# Patient Record
Sex: FEMALE | Race: WHITE | NOT HISPANIC OR LATINO | Employment: FULL TIME | URBAN - METROPOLITAN AREA
[De-identification: names, ages, dates, MRNs, and addresses within clinical notes are randomized per-mention and may not be internally consistent; named-entity substitution may affect disease eponyms.]

---

## 2017-02-10 ENCOUNTER — OFFICE VISIT - HEALTHEAST (OUTPATIENT)
Dept: INTERNAL MEDICINE | Facility: CLINIC | Age: 47
End: 2017-02-10

## 2017-02-10 DIAGNOSIS — Z12.31 SCREENING MAMMOGRAM, ENCOUNTER FOR: ICD-10-CM

## 2017-02-10 DIAGNOSIS — Z13.220 SCREENING, LIPID: ICD-10-CM

## 2017-02-10 DIAGNOSIS — F41.8 MIXED ANXIETY DEPRESSIVE DISORDER: ICD-10-CM

## 2017-02-10 DIAGNOSIS — K21.9 GASTROESOPHAGEAL REFLUX DISEASE WITHOUT ESOPHAGITIS: ICD-10-CM

## 2017-02-10 DIAGNOSIS — Z12.11 SCREENING FOR COLON CANCER: ICD-10-CM

## 2017-02-10 DIAGNOSIS — Z86.0100 HISTORY OF COLONIC POLYPS: ICD-10-CM

## 2017-02-10 DIAGNOSIS — G47.33 OBSTRUCTIVE SLEEP APNEA SYNDROME: ICD-10-CM

## 2017-02-10 DIAGNOSIS — K58.9 IRRITABLE BOWEL SYNDROME: ICD-10-CM

## 2017-02-10 DIAGNOSIS — G43.909 MIGRAINE WITHOUT STATUS MIGRAINOSUS, NOT INTRACTABLE: ICD-10-CM

## 2017-02-10 DIAGNOSIS — Z00.00 ROUTINE GENERAL MEDICAL EXAMINATION AT A HEALTH CARE FACILITY: ICD-10-CM

## 2017-02-10 LAB
CHOLEST SERPL-MCNC: 218 MG/DL
FASTING STATUS PATIENT QL REPORTED: YES
HDLC SERPL-MCNC: 82 MG/DL
LDLC SERPL CALC-MCNC: 111 MG/DL
TRIGL SERPL-MCNC: 127 MG/DL

## 2017-02-10 ASSESSMENT — MIFFLIN-ST. JEOR: SCORE: 1425.64

## 2017-02-14 ENCOUNTER — COMMUNICATION - HEALTHEAST (OUTPATIENT)
Dept: INTERNAL MEDICINE | Facility: CLINIC | Age: 47
End: 2017-02-14

## 2017-03-03 ENCOUNTER — RECORDS - HEALTHEAST (OUTPATIENT)
Dept: ADMINISTRATIVE | Facility: OTHER | Age: 47
End: 2017-03-03

## 2017-06-02 ENCOUNTER — HOSPITAL ENCOUNTER (OUTPATIENT)
Dept: MAMMOGRAPHY | Facility: CLINIC | Age: 47
Discharge: HOME | End: 2017-06-02
Attending: INTERNAL MEDICINE

## 2017-06-02 ENCOUNTER — COMMUNICATION - HEALTHEAST (OUTPATIENT)
Dept: TELEHEALTH | Facility: CLINIC | Age: 47
End: 2017-06-02

## 2017-06-02 DIAGNOSIS — Z12.31 SCREENING MAMMOGRAM, ENCOUNTER FOR: ICD-10-CM

## 2017-06-15 ENCOUNTER — OFFICE VISIT - HEALTHEAST (OUTPATIENT)
Dept: INTERNAL MEDICINE | Facility: CLINIC | Age: 47
End: 2017-06-15

## 2017-06-15 DIAGNOSIS — H10.33 ACUTE CONJUNCTIVITIS OF BOTH EYES: ICD-10-CM

## 2017-07-11 ENCOUNTER — OFFICE VISIT - HEALTHEAST (OUTPATIENT)
Dept: INTERNAL MEDICINE | Facility: CLINIC | Age: 47
End: 2017-07-11

## 2017-07-11 DIAGNOSIS — R68.89 LIGHT SENSITIVITY: ICD-10-CM

## 2017-07-11 DIAGNOSIS — R51.9 HEADACHE: ICD-10-CM

## 2017-07-11 DIAGNOSIS — Z12.4 SCREENING FOR CERVICAL CANCER: ICD-10-CM

## 2017-07-11 DIAGNOSIS — R42 VERTIGO: ICD-10-CM

## 2017-07-11 DIAGNOSIS — L98.9 SKIN LESION: ICD-10-CM

## 2017-07-11 DIAGNOSIS — R11.0 NAUSEA: ICD-10-CM

## 2017-07-11 ASSESSMENT — MIFFLIN-ST. JEOR: SCORE: 1434.72

## 2017-07-21 ENCOUNTER — RECORDS - HEALTHEAST (OUTPATIENT)
Dept: ADMINISTRATIVE | Facility: OTHER | Age: 47
End: 2017-07-21

## 2017-08-08 ENCOUNTER — COMMUNICATION - HEALTHEAST (OUTPATIENT)
Dept: INTERNAL MEDICINE | Facility: CLINIC | Age: 47
End: 2017-08-08

## 2017-08-10 ENCOUNTER — RECORDS - HEALTHEAST (OUTPATIENT)
Dept: ADMINISTRATIVE | Facility: OTHER | Age: 47
End: 2017-08-10

## 2017-08-29 ENCOUNTER — COMMUNICATION - HEALTHEAST (OUTPATIENT)
Dept: FAMILY MEDICINE | Facility: CLINIC | Age: 47
End: 2017-08-29

## 2017-08-29 ENCOUNTER — COMMUNICATION - HEALTHEAST (OUTPATIENT)
Dept: INTERNAL MEDICINE | Facility: CLINIC | Age: 47
End: 2017-08-29

## 2017-08-29 ENCOUNTER — OFFICE VISIT - HEALTHEAST (OUTPATIENT)
Dept: FAMILY MEDICINE | Facility: CLINIC | Age: 47
End: 2017-08-29

## 2017-08-29 DIAGNOSIS — J06.9 URI (UPPER RESPIRATORY INFECTION): ICD-10-CM

## 2017-08-29 DIAGNOSIS — H10.30 ACUTE CONJUNCTIVITIS: ICD-10-CM

## 2017-08-29 DIAGNOSIS — J34.89 SINUS PRESSURE: ICD-10-CM

## 2017-09-01 ENCOUNTER — RECORDS - HEALTHEAST (OUTPATIENT)
Dept: ADMINISTRATIVE | Facility: OTHER | Age: 47
End: 2017-09-01

## 2017-10-08 ENCOUNTER — COMMUNICATION - HEALTHEAST (OUTPATIENT)
Dept: FAMILY MEDICINE | Facility: CLINIC | Age: 47
End: 2017-10-08

## 2017-10-08 DIAGNOSIS — T78.40XA ALLERGY: ICD-10-CM

## 2017-10-16 ENCOUNTER — RECORDS - HEALTHEAST (OUTPATIENT)
Dept: ADMINISTRATIVE | Facility: OTHER | Age: 47
End: 2017-10-16

## 2018-01-24 ENCOUNTER — OFFICE VISIT - HEALTHEAST (OUTPATIENT)
Dept: FAMILY MEDICINE | Facility: CLINIC | Age: 48
End: 2018-01-24

## 2018-01-24 DIAGNOSIS — M54.50 PAIN IN LOWER BACK: ICD-10-CM

## 2018-01-24 DIAGNOSIS — R25.2 LEG CRAMPS: ICD-10-CM

## 2018-01-24 LAB
ANION GAP SERPL CALCULATED.3IONS-SCNC: 7 MMOL/L (ref 5–18)
BUN SERPL-MCNC: 15 MG/DL (ref 8–22)
CALCIUM SERPL-MCNC: 9 MG/DL (ref 8.5–10.5)
CHLORIDE BLD-SCNC: 105 MMOL/L (ref 98–107)
CO2 SERPL-SCNC: 27 MMOL/L (ref 22–31)
CREAT SERPL-MCNC: 0.75 MG/DL (ref 0.6–1.1)
GFR SERPL CREATININE-BSD FRML MDRD: >60 ML/MIN/1.73M2
GLUCOSE BLD-MCNC: 122 MG/DL (ref 70–125)
MAGNESIUM SERPL-MCNC: 2.1 MG/DL (ref 1.8–2.6)
POTASSIUM BLD-SCNC: 4.1 MMOL/L (ref 3.5–5)
SODIUM SERPL-SCNC: 139 MMOL/L (ref 136–145)

## 2018-01-26 ENCOUNTER — COMMUNICATION - HEALTHEAST (OUTPATIENT)
Dept: INTERNAL MEDICINE | Facility: CLINIC | Age: 48
End: 2018-01-26

## 2018-01-26 ENCOUNTER — HOSPITAL ENCOUNTER (OUTPATIENT)
Dept: PHYSICAL MEDICINE AND REHAB | Facility: CLINIC | Age: 48
Discharge: HOME | End: 2018-01-26
Attending: NURSE PRACTITIONER

## 2018-01-26 DIAGNOSIS — M54.9 BACK PAIN: ICD-10-CM

## 2018-01-26 DIAGNOSIS — M42.00 SCHEUERMANN DISEASE: ICD-10-CM

## 2018-01-26 DIAGNOSIS — R20.2 NUMBNESS AND TINGLING OF LEFT LEG: ICD-10-CM

## 2018-01-26 DIAGNOSIS — R20.0 NUMBNESS AND TINGLING OF LEFT LEG: ICD-10-CM

## 2018-01-26 DIAGNOSIS — M54.16 LEFT LUMBAR RADICULITIS: ICD-10-CM

## 2018-01-26 DIAGNOSIS — M54.16 LEFT LUMBAR RADICULOPATHY: ICD-10-CM

## 2018-01-31 ENCOUNTER — OFFICE VISIT - HEALTHEAST (OUTPATIENT)
Dept: PHYSICAL THERAPY | Facility: REHABILITATION | Age: 48
End: 2018-01-31

## 2018-01-31 ENCOUNTER — HOSPITAL ENCOUNTER (OUTPATIENT)
Dept: MRI IMAGING | Facility: CLINIC | Age: 48
Discharge: HOME | End: 2018-01-31

## 2018-01-31 DIAGNOSIS — M54.16 LEFT LUMBAR RADICULITIS: ICD-10-CM

## 2018-01-31 DIAGNOSIS — M53.86 DECREASED ROM OF LUMBAR SPINE: ICD-10-CM

## 2018-01-31 DIAGNOSIS — M62.81 GENERALIZED MUSCLE WEAKNESS: ICD-10-CM

## 2018-01-31 DIAGNOSIS — R20.0 NUMBNESS AND TINGLING OF LEFT LEG: ICD-10-CM

## 2018-01-31 DIAGNOSIS — M54.16 LEFT LUMBAR RADICULOPATHY: ICD-10-CM

## 2018-01-31 DIAGNOSIS — R20.2 NUMBNESS AND TINGLING OF LEFT LEG: ICD-10-CM

## 2018-01-31 DIAGNOSIS — M54.42 ACUTE LEFT-SIDED LOW BACK PAIN WITH LEFT-SIDED SCIATICA: ICD-10-CM

## 2018-02-02 ENCOUNTER — HOSPITAL ENCOUNTER (OUTPATIENT)
Dept: PHYSICAL MEDICINE AND REHAB | Facility: CLINIC | Age: 48
Discharge: HOME | End: 2018-02-02
Attending: NURSE PRACTITIONER

## 2018-02-02 DIAGNOSIS — M54.16 LEFT LUMBAR RADICULOPATHY: ICD-10-CM

## 2018-02-02 DIAGNOSIS — R20.2 NUMBNESS AND TINGLING OF LEFT LEG: ICD-10-CM

## 2018-02-02 DIAGNOSIS — R20.0 NUMBNESS AND TINGLING OF LEFT LEG: ICD-10-CM

## 2018-02-02 DIAGNOSIS — M42.00 SCHEUERMANN DISEASE: ICD-10-CM

## 2018-02-02 DIAGNOSIS — M51.26 LUMBAR DISC HERNIATION: ICD-10-CM

## 2018-02-02 DIAGNOSIS — M54.16 LEFT LUMBAR RADICULITIS: ICD-10-CM

## 2018-02-07 ENCOUNTER — COMMUNICATION - HEALTHEAST (OUTPATIENT)
Dept: PHYSICAL MEDICINE AND REHAB | Facility: CLINIC | Age: 48
End: 2018-02-07

## 2018-02-07 ENCOUNTER — OFFICE VISIT - HEALTHEAST (OUTPATIENT)
Dept: PHYSICAL THERAPY | Facility: REHABILITATION | Age: 48
End: 2018-02-07

## 2018-02-07 DIAGNOSIS — M53.86 DECREASED ROM OF LUMBAR SPINE: ICD-10-CM

## 2018-02-07 DIAGNOSIS — M54.42 ACUTE LEFT-SIDED LOW BACK PAIN WITH LEFT-SIDED SCIATICA: ICD-10-CM

## 2018-02-07 DIAGNOSIS — M62.81 GENERALIZED MUSCLE WEAKNESS: ICD-10-CM

## 2018-02-11 ENCOUNTER — RECORDS - HEALTHEAST (OUTPATIENT)
Dept: ADMINISTRATIVE | Facility: OTHER | Age: 48
End: 2018-02-11

## 2018-02-14 ENCOUNTER — OFFICE VISIT - HEALTHEAST (OUTPATIENT)
Dept: PHYSICAL THERAPY | Facility: REHABILITATION | Age: 48
End: 2018-02-14

## 2018-02-14 DIAGNOSIS — M54.42 ACUTE LEFT-SIDED LOW BACK PAIN WITH LEFT-SIDED SCIATICA: ICD-10-CM

## 2018-02-14 DIAGNOSIS — M53.86 DECREASED ROM OF LUMBAR SPINE: ICD-10-CM

## 2018-02-14 DIAGNOSIS — M62.81 GENERALIZED MUSCLE WEAKNESS: ICD-10-CM

## 2018-02-16 ENCOUNTER — OFFICE VISIT - HEALTHEAST (OUTPATIENT)
Dept: PHYSICAL THERAPY | Facility: REHABILITATION | Age: 48
End: 2018-02-16

## 2018-02-16 DIAGNOSIS — M53.86 DECREASED ROM OF LUMBAR SPINE: ICD-10-CM

## 2018-02-16 DIAGNOSIS — M62.81 GENERALIZED MUSCLE WEAKNESS: ICD-10-CM

## 2018-02-16 DIAGNOSIS — M54.42 ACUTE LEFT-SIDED LOW BACK PAIN WITH LEFT-SIDED SCIATICA: ICD-10-CM

## 2018-02-20 ENCOUNTER — COMMUNICATION - HEALTHEAST (OUTPATIENT)
Dept: INTERNAL MEDICINE | Facility: CLINIC | Age: 48
End: 2018-02-20

## 2018-02-21 ENCOUNTER — OFFICE VISIT - HEALTHEAST (OUTPATIENT)
Dept: PHYSICAL THERAPY | Facility: REHABILITATION | Age: 48
End: 2018-02-21

## 2018-02-21 ENCOUNTER — COMMUNICATION - HEALTHEAST (OUTPATIENT)
Dept: INTERNAL MEDICINE | Facility: CLINIC | Age: 48
End: 2018-02-21

## 2018-02-21 DIAGNOSIS — M62.81 GENERALIZED MUSCLE WEAKNESS: ICD-10-CM

## 2018-02-21 DIAGNOSIS — M54.42 ACUTE LEFT-SIDED LOW BACK PAIN WITH LEFT-SIDED SCIATICA: ICD-10-CM

## 2018-02-21 DIAGNOSIS — M53.86 DECREASED ROM OF LUMBAR SPINE: ICD-10-CM

## 2018-02-23 ENCOUNTER — OFFICE VISIT - HEALTHEAST (OUTPATIENT)
Dept: PHYSICAL THERAPY | Facility: REHABILITATION | Age: 48
End: 2018-02-23

## 2018-02-23 ENCOUNTER — OFFICE VISIT - HEALTHEAST (OUTPATIENT)
Dept: INTERNAL MEDICINE | Facility: CLINIC | Age: 48
End: 2018-02-23

## 2018-02-23 DIAGNOSIS — R41.0 CONFUSION: ICD-10-CM

## 2018-02-23 DIAGNOSIS — F41.8 MIXED ANXIETY DEPRESSIVE DISORDER: ICD-10-CM

## 2018-02-23 DIAGNOSIS — M62.81 GENERALIZED MUSCLE WEAKNESS: ICD-10-CM

## 2018-02-23 DIAGNOSIS — M54.10 RADICULAR LOW BACK PAIN: ICD-10-CM

## 2018-02-23 DIAGNOSIS — M53.86 DECREASED ROM OF LUMBAR SPINE: ICD-10-CM

## 2018-02-23 DIAGNOSIS — M54.42 ACUTE LEFT-SIDED LOW BACK PAIN WITH LEFT-SIDED SCIATICA: ICD-10-CM

## 2018-02-23 ASSESSMENT — MIFFLIN-ST. JEOR: SCORE: 1471

## 2018-03-02 ENCOUNTER — OFFICE VISIT - HEALTHEAST (OUTPATIENT)
Dept: PHYSICAL THERAPY | Facility: REHABILITATION | Age: 48
End: 2018-03-02

## 2018-03-02 DIAGNOSIS — M54.42 ACUTE LEFT-SIDED LOW BACK PAIN WITH LEFT-SIDED SCIATICA: ICD-10-CM

## 2018-03-02 DIAGNOSIS — M62.81 GENERALIZED MUSCLE WEAKNESS: ICD-10-CM

## 2018-03-02 DIAGNOSIS — M53.86 DECREASED ROM OF LUMBAR SPINE: ICD-10-CM

## 2018-03-16 ENCOUNTER — OFFICE VISIT - HEALTHEAST (OUTPATIENT)
Dept: PHYSICAL THERAPY | Facility: REHABILITATION | Age: 48
End: 2018-03-16

## 2018-03-16 DIAGNOSIS — M53.86 DECREASED ROM OF LUMBAR SPINE: ICD-10-CM

## 2018-03-16 DIAGNOSIS — M62.81 GENERALIZED MUSCLE WEAKNESS: ICD-10-CM

## 2018-03-16 DIAGNOSIS — M54.42 ACUTE LEFT-SIDED LOW BACK PAIN WITH LEFT-SIDED SCIATICA: ICD-10-CM

## 2018-03-22 ENCOUNTER — COMMUNICATION - HEALTHEAST (OUTPATIENT)
Dept: INTERNAL MEDICINE | Facility: CLINIC | Age: 48
End: 2018-03-22

## 2018-05-25 ENCOUNTER — OFFICE VISIT - HEALTHEAST (OUTPATIENT)
Dept: INTERNAL MEDICINE | Facility: CLINIC | Age: 48
End: 2018-05-25

## 2018-05-25 DIAGNOSIS — R71.8 RBC MICROCYTOSIS: ICD-10-CM

## 2018-05-25 DIAGNOSIS — T78.40XA ALLERGY: ICD-10-CM

## 2018-05-25 DIAGNOSIS — Z00.00 ROUTINE GENERAL MEDICAL EXAMINATION AT A HEALTH CARE FACILITY: ICD-10-CM

## 2018-05-25 DIAGNOSIS — F41.8 MIXED ANXIETY DEPRESSIVE DISORDER: ICD-10-CM

## 2018-05-25 DIAGNOSIS — Z13.1 SCREENING FOR DIABETES MELLITUS: ICD-10-CM

## 2018-05-25 DIAGNOSIS — G43.809 OTHER MIGRAINE WITHOUT STATUS MIGRAINOSUS, NOT INTRACTABLE: ICD-10-CM

## 2018-05-25 DIAGNOSIS — G47.33 OBSTRUCTIVE SLEEP APNEA SYNDROME: ICD-10-CM

## 2018-05-25 DIAGNOSIS — K58.1 IRRITABLE BOWEL SYNDROME WITH CONSTIPATION: ICD-10-CM

## 2018-05-25 DIAGNOSIS — Z13.220 SCREENING FOR HYPERLIPIDEMIA: ICD-10-CM

## 2018-05-25 LAB
ANION GAP SERPL CALCULATED.3IONS-SCNC: 11 MMOL/L (ref 5–18)
BUN SERPL-MCNC: 16 MG/DL (ref 8–22)
CALCIUM SERPL-MCNC: 9.4 MG/DL (ref 8.5–10.5)
CHLORIDE BLD-SCNC: 107 MMOL/L (ref 98–107)
CHOLEST SERPL-MCNC: 205 MG/DL
CO2 SERPL-SCNC: 23 MMOL/L (ref 22–31)
CREAT SERPL-MCNC: 0.76 MG/DL (ref 0.6–1.1)
ERYTHROCYTE [DISTWIDTH] IN BLOOD BY AUTOMATED COUNT: 13.2 % (ref 11–14.5)
FASTING STATUS PATIENT QL REPORTED: NO
GFR SERPL CREATININE-BSD FRML MDRD: >60 ML/MIN/1.73M2
GLUCOSE BLD-MCNC: 61 MG/DL (ref 70–125)
HBA1C MFR BLD: 6 % (ref 3.5–6)
HCT VFR BLD AUTO: 36.7 % (ref 35–47)
HDLC SERPL-MCNC: 68 MG/DL
HGB BLD-MCNC: 12.2 G/DL (ref 12–16)
LDLC SERPL CALC-MCNC: 106 MG/DL
MCH RBC QN AUTO: 26 PG (ref 27–34)
MCHC RBC AUTO-ENTMCNC: 33.2 G/DL (ref 32–36)
MCV RBC AUTO: 78 FL (ref 80–100)
PLATELET # BLD AUTO: 144 THOU/UL (ref 140–440)
PMV BLD AUTO: 9 FL (ref 7–10)
POTASSIUM BLD-SCNC: 4 MMOL/L (ref 3.5–5)
RBC # BLD AUTO: 4.69 MILL/UL (ref 3.8–5.4)
SODIUM SERPL-SCNC: 141 MMOL/L (ref 136–145)
TRIGL SERPL-MCNC: 154 MG/DL
TSH SERPL DL<=0.005 MIU/L-ACNC: 2.55 UIU/ML (ref 0.3–5)
WBC: 5.8 THOU/UL (ref 4–11)

## 2018-05-25 ASSESSMENT — MIFFLIN-ST. JEOR: SCORE: 1434.72

## 2018-07-23 ENCOUNTER — OFFICE VISIT - HEALTHEAST (OUTPATIENT)
Dept: INTERNAL MEDICINE | Facility: CLINIC | Age: 48
End: 2018-07-23

## 2018-07-23 DIAGNOSIS — E61.1 IRON DEFICIENCY: ICD-10-CM

## 2018-07-23 DIAGNOSIS — F41.8 MIXED ANXIETY DEPRESSIVE DISORDER: ICD-10-CM

## 2018-07-23 DIAGNOSIS — R73.01 IMPAIRED FASTING GLUCOSE: ICD-10-CM

## 2018-07-23 DIAGNOSIS — K21.9 GERD (GASTROESOPHAGEAL REFLUX DISEASE): ICD-10-CM

## 2018-07-23 ASSESSMENT — MIFFLIN-ST. JEOR: SCORE: 1452.86

## 2018-08-20 ENCOUNTER — HOSPITAL ENCOUNTER (OUTPATIENT)
Dept: MAMMOGRAPHY | Facility: CLINIC | Age: 48
Discharge: HOME | End: 2018-08-20
Attending: INTERNAL MEDICINE

## 2018-08-20 DIAGNOSIS — Z12.31 VISIT FOR SCREENING MAMMOGRAM: ICD-10-CM

## 2018-10-02 ENCOUNTER — RECORDS - HEALTHEAST (OUTPATIENT)
Dept: GENERAL RADIOLOGY | Facility: CLINIC | Age: 48
End: 2018-10-02

## 2018-10-02 ENCOUNTER — AMBULATORY - HEALTHEAST (OUTPATIENT)
Dept: INTERNAL MEDICINE | Facility: CLINIC | Age: 48
End: 2018-10-02

## 2018-10-02 ENCOUNTER — COMMUNICATION - HEALTHEAST (OUTPATIENT)
Dept: INTERNAL MEDICINE | Facility: CLINIC | Age: 48
End: 2018-10-02

## 2018-10-02 ENCOUNTER — OFFICE VISIT - HEALTHEAST (OUTPATIENT)
Dept: INTERNAL MEDICINE | Facility: CLINIC | Age: 48
End: 2018-10-02

## 2018-10-02 DIAGNOSIS — M25.552 HIP PAIN, LEFT: ICD-10-CM

## 2018-10-02 DIAGNOSIS — Z23 NEED FOR PROPHYLACTIC VACCINATION AND INOCULATION AGAINST INFLUENZA: ICD-10-CM

## 2018-10-02 DIAGNOSIS — R73.01 IMPAIRED FASTING GLUCOSE: ICD-10-CM

## 2018-10-02 DIAGNOSIS — R71.8 RBC MICROCYTOSIS: ICD-10-CM

## 2018-10-02 DIAGNOSIS — M25.552 PAIN IN LEFT HIP: ICD-10-CM

## 2018-10-02 LAB
ERYTHROCYTE [DISTWIDTH] IN BLOOD BY AUTOMATED COUNT: 15.8 % (ref 11–14.5)
FERRITIN SERPL-MCNC: 9 NG/ML (ref 10–130)
HBA1C MFR BLD: 5.9 % (ref 3.5–6)
HCT VFR BLD AUTO: 39.7 % (ref 35–47)
HGB BLD-MCNC: 13.1 G/DL (ref 12–16)
MCH RBC QN AUTO: 26.6 PG (ref 27–34)
MCHC RBC AUTO-ENTMCNC: 33.1 G/DL (ref 32–36)
MCV RBC AUTO: 80 FL (ref 80–100)
PLATELET # BLD AUTO: 145 THOU/UL (ref 140–440)
PMV BLD AUTO: 8.5 FL (ref 7–10)
RBC # BLD AUTO: 4.93 MILL/UL (ref 3.8–5.4)
WBC: 5.8 THOU/UL (ref 4–11)

## 2018-10-02 ASSESSMENT — MIFFLIN-ST. JEOR: SCORE: 1439.25

## 2018-10-04 ENCOUNTER — COMMUNICATION - HEALTHEAST (OUTPATIENT)
Dept: INTERNAL MEDICINE | Facility: CLINIC | Age: 48
End: 2018-10-04

## 2018-10-04 LAB
TTG IGA SER-ACNC: <0.1 U/ML
TTG IGG SER-ACNC: <0.6 U/ML

## 2018-10-09 ENCOUNTER — COMMUNICATION - HEALTHEAST (OUTPATIENT)
Dept: SCHEDULING | Facility: CLINIC | Age: 48
End: 2018-10-09

## 2018-10-09 ENCOUNTER — OFFICE VISIT - HEALTHEAST (OUTPATIENT)
Dept: PHYSICAL THERAPY | Facility: REHABILITATION | Age: 48
End: 2018-10-09

## 2018-10-09 DIAGNOSIS — R29.898 WEAKNESS OF LEFT HIP: ICD-10-CM

## 2018-10-09 DIAGNOSIS — M25.552 CHRONIC LEFT HIP PAIN: ICD-10-CM

## 2018-10-09 DIAGNOSIS — G89.29 CHRONIC LEFT HIP PAIN: ICD-10-CM

## 2018-10-09 DIAGNOSIS — M25.661 DECREASED RANGE OF MOTION OF RIGHT LOWER EXTREMITY: ICD-10-CM

## 2018-10-10 ENCOUNTER — OFFICE VISIT - HEALTHEAST (OUTPATIENT)
Dept: INTERNAL MEDICINE | Facility: CLINIC | Age: 48
End: 2018-10-10

## 2018-10-10 DIAGNOSIS — J06.9 URI (UPPER RESPIRATORY INFECTION): ICD-10-CM

## 2018-10-10 ASSESSMENT — MIFFLIN-ST. JEOR: SCORE: 1457.4

## 2018-10-11 ENCOUNTER — OFFICE VISIT - HEALTHEAST (OUTPATIENT)
Dept: PHYSICAL THERAPY | Facility: REHABILITATION | Age: 48
End: 2018-10-11

## 2018-10-11 DIAGNOSIS — M25.661 DECREASED RANGE OF MOTION OF RIGHT LOWER EXTREMITY: ICD-10-CM

## 2018-10-11 DIAGNOSIS — M54.42 ACUTE LEFT-SIDED LOW BACK PAIN WITH LEFT-SIDED SCIATICA: ICD-10-CM

## 2018-10-11 DIAGNOSIS — M62.81 GENERALIZED MUSCLE WEAKNESS: ICD-10-CM

## 2018-10-11 DIAGNOSIS — M53.86 DECREASED ROM OF LUMBAR SPINE: ICD-10-CM

## 2018-10-11 DIAGNOSIS — G89.29 CHRONIC LEFT HIP PAIN: ICD-10-CM

## 2018-10-11 DIAGNOSIS — M25.552 CHRONIC LEFT HIP PAIN: ICD-10-CM

## 2018-10-11 DIAGNOSIS — R29.898 WEAKNESS OF LEFT HIP: ICD-10-CM

## 2018-10-16 ENCOUNTER — OFFICE VISIT - HEALTHEAST (OUTPATIENT)
Dept: PHYSICAL THERAPY | Facility: REHABILITATION | Age: 48
End: 2018-10-16

## 2018-10-16 DIAGNOSIS — M25.552 CHRONIC LEFT HIP PAIN: ICD-10-CM

## 2018-10-16 DIAGNOSIS — M25.661 DECREASED RANGE OF MOTION OF RIGHT LOWER EXTREMITY: ICD-10-CM

## 2018-10-16 DIAGNOSIS — R29.898 WEAKNESS OF LEFT HIP: ICD-10-CM

## 2018-10-16 DIAGNOSIS — G89.29 CHRONIC LEFT HIP PAIN: ICD-10-CM

## 2018-10-23 ENCOUNTER — OFFICE VISIT - HEALTHEAST (OUTPATIENT)
Dept: PHYSICAL THERAPY | Facility: REHABILITATION | Age: 48
End: 2018-10-23

## 2018-10-23 DIAGNOSIS — R29.898 WEAKNESS OF LEFT HIP: ICD-10-CM

## 2018-10-23 DIAGNOSIS — G89.29 CHRONIC LEFT HIP PAIN: ICD-10-CM

## 2018-10-23 DIAGNOSIS — M25.552 CHRONIC LEFT HIP PAIN: ICD-10-CM

## 2018-10-23 DIAGNOSIS — M25.661 DECREASED RANGE OF MOTION OF RIGHT LOWER EXTREMITY: ICD-10-CM

## 2018-10-30 ENCOUNTER — OFFICE VISIT - HEALTHEAST (OUTPATIENT)
Dept: PHYSICAL THERAPY | Facility: REHABILITATION | Age: 48
End: 2018-10-30

## 2018-10-30 DIAGNOSIS — R29.898 WEAKNESS OF LEFT HIP: ICD-10-CM

## 2018-10-30 DIAGNOSIS — M62.81 GENERALIZED MUSCLE WEAKNESS: ICD-10-CM

## 2018-10-30 DIAGNOSIS — M53.86 DECREASED ROM OF LUMBAR SPINE: ICD-10-CM

## 2018-10-30 DIAGNOSIS — G89.29 CHRONIC LEFT HIP PAIN: ICD-10-CM

## 2018-10-30 DIAGNOSIS — M25.552 CHRONIC LEFT HIP PAIN: ICD-10-CM

## 2018-10-30 DIAGNOSIS — M54.42 ACUTE LEFT-SIDED LOW BACK PAIN WITH LEFT-SIDED SCIATICA: ICD-10-CM

## 2018-10-30 DIAGNOSIS — M25.661 DECREASED RANGE OF MOTION OF RIGHT LOWER EXTREMITY: ICD-10-CM

## 2018-11-09 ENCOUNTER — OFFICE VISIT - HEALTHEAST (OUTPATIENT)
Dept: PHYSICAL THERAPY | Facility: REHABILITATION | Age: 48
End: 2018-11-09

## 2018-11-09 DIAGNOSIS — M25.552 CHRONIC LEFT HIP PAIN: ICD-10-CM

## 2018-11-09 DIAGNOSIS — R29.898 WEAKNESS OF LEFT HIP: ICD-10-CM

## 2018-11-09 DIAGNOSIS — G89.29 CHRONIC LEFT HIP PAIN: ICD-10-CM

## 2018-11-09 DIAGNOSIS — M25.661 DECREASED RANGE OF MOTION OF RIGHT LOWER EXTREMITY: ICD-10-CM

## 2018-12-14 ENCOUNTER — OFFICE VISIT - HEALTHEAST (OUTPATIENT)
Dept: INTERNAL MEDICINE | Facility: CLINIC | Age: 48
End: 2018-12-14

## 2018-12-14 DIAGNOSIS — R42 DIZZINESS: ICD-10-CM

## 2018-12-14 ASSESSMENT — MIFFLIN-ST. JEOR: SCORE: 1466.47

## 2018-12-29 ENCOUNTER — COMMUNICATION - HEALTHEAST (OUTPATIENT)
Dept: INTERNAL MEDICINE | Facility: CLINIC | Age: 48
End: 2018-12-29

## 2019-05-02 ENCOUNTER — RECORDS - HEALTHEAST (OUTPATIENT)
Dept: ADMINISTRATIVE | Facility: OTHER | Age: 49
End: 2019-05-02

## 2019-05-30 ENCOUNTER — OFFICE VISIT - HEALTHEAST (OUTPATIENT)
Dept: INTERNAL MEDICINE | Facility: CLINIC | Age: 49
End: 2019-05-30

## 2019-05-30 DIAGNOSIS — K58.1 IRRITABLE BOWEL SYNDROME WITH CONSTIPATION: ICD-10-CM

## 2019-05-30 DIAGNOSIS — Z00.00 ROUTINE GENERAL MEDICAL EXAMINATION AT A HEALTH CARE FACILITY: ICD-10-CM

## 2019-05-30 DIAGNOSIS — G47.33 OBSTRUCTIVE SLEEP APNEA SYNDROME: ICD-10-CM

## 2019-05-30 DIAGNOSIS — F41.8 MIXED ANXIETY DEPRESSIVE DISORDER: ICD-10-CM

## 2019-05-30 DIAGNOSIS — G43.809 OTHER MIGRAINE WITHOUT STATUS MIGRAINOSUS, NOT INTRACTABLE: ICD-10-CM

## 2019-05-30 DIAGNOSIS — Z13.220 SCREENING FOR HYPERLIPIDEMIA: ICD-10-CM

## 2019-05-30 DIAGNOSIS — R73.03 PREDIABETES: ICD-10-CM

## 2019-05-30 DIAGNOSIS — E61.1 IRON DEFICIENCY: ICD-10-CM

## 2019-05-30 LAB
ANION GAP SERPL CALCULATED.3IONS-SCNC: 11 MMOL/L (ref 5–18)
BUN SERPL-MCNC: 11 MG/DL (ref 8–22)
CALCIUM SERPL-MCNC: 8.9 MG/DL (ref 8.5–10.5)
CHLORIDE BLD-SCNC: 107 MMOL/L (ref 98–107)
CHOLEST SERPL-MCNC: 162 MG/DL
CO2 SERPL-SCNC: 22 MMOL/L (ref 22–31)
CREAT SERPL-MCNC: 0.73 MG/DL (ref 0.6–1.1)
ERYTHROCYTE [DISTWIDTH] IN BLOOD BY AUTOMATED COUNT: 12.1 % (ref 11–14.5)
FASTING STATUS PATIENT QL REPORTED: YES
FERRITIN SERPL-MCNC: 79 NG/ML (ref 10–130)
GFR SERPL CREATININE-BSD FRML MDRD: >60 ML/MIN/1.73M2
GLUCOSE BLD-MCNC: 85 MG/DL (ref 70–125)
HBA1C MFR BLD: 5.3 % (ref 3.5–6)
HCT VFR BLD AUTO: 39.6 % (ref 35–47)
HDLC SERPL-MCNC: 72 MG/DL
HGB BLD-MCNC: 13.6 G/DL (ref 12–16)
LDLC SERPL CALC-MCNC: 72 MG/DL
MCH RBC QN AUTO: 30.3 PG (ref 27–34)
MCHC RBC AUTO-ENTMCNC: 34.4 G/DL (ref 32–36)
MCV RBC AUTO: 88 FL (ref 80–100)
PLATELET # BLD AUTO: 161 THOU/UL (ref 140–440)
PMV BLD AUTO: 8.3 FL (ref 7–10)
POTASSIUM BLD-SCNC: 3.8 MMOL/L (ref 3.5–5)
RBC # BLD AUTO: 4.5 MILL/UL (ref 3.8–5.4)
SODIUM SERPL-SCNC: 140 MMOL/L (ref 136–145)
TRIGL SERPL-MCNC: 88 MG/DL
WBC: 5.5 THOU/UL (ref 4–11)

## 2019-05-30 ASSESSMENT — MIFFLIN-ST. JEOR: SCORE: 1416.57

## 2019-06-20 ENCOUNTER — COMMUNICATION - HEALTHEAST (OUTPATIENT)
Dept: INTERNAL MEDICINE | Facility: CLINIC | Age: 49
End: 2019-06-20

## 2019-06-20 DIAGNOSIS — Z30.9 ENCOUNTER FOR CONTRACEPTIVE MANAGEMENT, UNSPECIFIED TYPE: ICD-10-CM

## 2019-08-21 ENCOUNTER — HOSPITAL ENCOUNTER (OUTPATIENT)
Dept: MAMMOGRAPHY | Facility: CLINIC | Age: 49
Discharge: HOME | End: 2019-08-21
Attending: INTERNAL MEDICINE

## 2019-08-21 DIAGNOSIS — Z12.31 VISIT FOR SCREENING MAMMOGRAM: ICD-10-CM

## 2019-08-24 ENCOUNTER — COMMUNICATION - HEALTHEAST (OUTPATIENT)
Dept: INTERNAL MEDICINE | Facility: CLINIC | Age: 49
End: 2019-08-24

## 2019-08-24 DIAGNOSIS — T78.40XA ALLERGY: ICD-10-CM

## 2019-09-04 ENCOUNTER — COMMUNICATION - HEALTHEAST (OUTPATIENT)
Dept: INTERNAL MEDICINE | Facility: CLINIC | Age: 49
End: 2019-09-04

## 2019-09-04 DIAGNOSIS — Z00.00 ROUTINE GENERAL MEDICAL EXAMINATION AT A HEALTH CARE FACILITY: ICD-10-CM

## 2019-09-18 ENCOUNTER — RECORDS - HEALTHEAST (OUTPATIENT)
Dept: ADMINISTRATIVE | Facility: OTHER | Age: 49
End: 2019-09-18

## 2019-10-21 ENCOUNTER — OFFICE VISIT - HEALTHEAST (OUTPATIENT)
Dept: INTERNAL MEDICINE | Facility: CLINIC | Age: 49
End: 2019-10-21

## 2019-10-21 DIAGNOSIS — M25.552 HIP PAIN, LEFT: ICD-10-CM

## 2019-10-21 DIAGNOSIS — R32 URINARY INCONTINENCE: ICD-10-CM

## 2019-10-21 DIAGNOSIS — F41.1 GAD (GENERALIZED ANXIETY DISORDER): ICD-10-CM

## 2019-10-21 DIAGNOSIS — F32.9 MAJOR DEPRESSIVE DISORDER WITH CURRENT ACTIVE EPISODE, UNSPECIFIED DEPRESSION EPISODE SEVERITY, UNSPECIFIED WHETHER RECURRENT: ICD-10-CM

## 2019-10-21 DIAGNOSIS — N89.8 VAGINA ITCHING: ICD-10-CM

## 2019-10-21 DIAGNOSIS — Z23 NEED FOR PROPHYLACTIC VACCINATION AND INOCULATION AGAINST INFLUENZA: ICD-10-CM

## 2019-10-21 DIAGNOSIS — M54.2 NECK PAIN: ICD-10-CM

## 2019-10-21 LAB
ALBUMIN UR-MCNC: NEGATIVE MG/DL
APPEARANCE UR: CLEAR
BILIRUB UR QL STRIP: NEGATIVE
COLOR UR AUTO: YELLOW
GLUCOSE UR STRIP-MCNC: NEGATIVE MG/DL
HGB UR QL STRIP: NEGATIVE
KETONES UR STRIP-MCNC: NEGATIVE MG/DL
LEUKOCYTE ESTERASE UR QL STRIP: NEGATIVE
NITRATE UR QL: NEGATIVE
PH UR STRIP: 7 [PH] (ref 5–8)
SP GR UR STRIP: 1.01 (ref 1–1.03)
UROBILINOGEN UR STRIP-ACNC: NORMAL

## 2019-11-07 ENCOUNTER — COMMUNICATION - HEALTHEAST (OUTPATIENT)
Dept: INTERNAL MEDICINE | Facility: CLINIC | Age: 49
End: 2019-11-07

## 2019-11-07 DIAGNOSIS — M25.559 HIP PAIN: ICD-10-CM

## 2019-11-07 DIAGNOSIS — M54.2 NECK PAIN: ICD-10-CM

## 2019-11-14 ENCOUNTER — RECORDS - HEALTHEAST (OUTPATIENT)
Dept: ADMINISTRATIVE | Facility: OTHER | Age: 49
End: 2019-11-14

## 2019-11-17 ENCOUNTER — COMMUNICATION - HEALTHEAST (OUTPATIENT)
Dept: INTERNAL MEDICINE | Facility: CLINIC | Age: 49
End: 2019-11-17

## 2019-11-17 DIAGNOSIS — F41.8 MIXED ANXIETY DEPRESSIVE DISORDER: ICD-10-CM

## 2019-11-26 ENCOUNTER — COMMUNICATION - HEALTHEAST (OUTPATIENT)
Dept: SCHEDULING | Facility: CLINIC | Age: 49
End: 2019-11-26

## 2019-11-26 ENCOUNTER — OFFICE VISIT - HEALTHEAST (OUTPATIENT)
Dept: INTERNAL MEDICINE | Facility: CLINIC | Age: 49
End: 2019-11-26

## 2019-11-26 ENCOUNTER — OFFICE VISIT - HEALTHEAST (OUTPATIENT)
Dept: FAMILY MEDICINE | Facility: CLINIC | Age: 49
End: 2019-11-26

## 2019-11-26 DIAGNOSIS — B30.9 VIRAL CONJUNCTIVITIS OF BOTH EYES: ICD-10-CM

## 2019-11-26 ASSESSMENT — PATIENT HEALTH QUESTIONNAIRE - PHQ9: SUM OF ALL RESPONSES TO PHQ QUESTIONS 1-9: 3

## 2019-11-29 ENCOUNTER — OFFICE VISIT - HEALTHEAST (OUTPATIENT)
Dept: INTERNAL MEDICINE | Facility: CLINIC | Age: 49
End: 2019-11-29

## 2019-11-29 DIAGNOSIS — D50.9 MICROCYTIC ANEMIA: ICD-10-CM

## 2019-11-29 DIAGNOSIS — Z00.00 ROUTINE GENERAL MEDICAL EXAMINATION AT A HEALTH CARE FACILITY: ICD-10-CM

## 2019-11-29 DIAGNOSIS — F32.9 MAJOR DEPRESSIVE DISORDER WITH CURRENT ACTIVE EPISODE, UNSPECIFIED DEPRESSION EPISODE SEVERITY, UNSPECIFIED WHETHER RECURRENT: ICD-10-CM

## 2019-11-29 DIAGNOSIS — F41.1 GAD (GENERALIZED ANXIETY DISORDER): ICD-10-CM

## 2019-11-29 ASSESSMENT — MIFFLIN-ST. JEOR: SCORE: 1393.89

## 2019-12-12 ENCOUNTER — OFFICE VISIT (OUTPATIENT)
Dept: URGENT CARE | Facility: URGENT CARE | Age: 49
End: 2019-12-12
Payer: COMMERCIAL

## 2019-12-12 VITALS
WEIGHT: 155 LBS | HEART RATE: 58 BPM | DIASTOLIC BLOOD PRESSURE: 68 MMHG | SYSTOLIC BLOOD PRESSURE: 114 MMHG | RESPIRATION RATE: 14 BRPM | TEMPERATURE: 97.1 F | OXYGEN SATURATION: 97 %

## 2019-12-12 DIAGNOSIS — T78.2XXA ANAPHYLAXIS, INITIAL ENCOUNTER: Primary | ICD-10-CM

## 2019-12-12 PROCEDURE — 99205 OFFICE O/P NEW HI 60 MIN: CPT | Mod: 25 | Performed by: PHYSICIAN ASSISTANT

## 2019-12-12 PROCEDURE — 96372 THER/PROPH/DIAG INJ SC/IM: CPT | Performed by: PHYSICIAN ASSISTANT

## 2019-12-12 RX ORDER — DESOGESTREL AND ETHINYL ESTRADIOL 0.15-0.03
1 KIT ORAL
COMMUNITY
Start: 2017-08-31 | End: 2021-11-29

## 2019-12-12 RX ORDER — AMITRIPTYLINE HYDROCHLORIDE 10 MG/1
10 TABLET ORAL
COMMUNITY
Start: 2016-05-16 | End: 2021-12-17

## 2019-12-12 RX ORDER — DIPHENHYDRAMINE HYDROCHLORIDE 50 MG/ML
50 INJECTION INTRAMUSCULAR; INTRAVENOUS ONCE
Status: COMPLETED | OUTPATIENT
Start: 2019-12-12 | End: 2019-12-12

## 2019-12-12 RX ORDER — DICYCLOMINE HYDROCHLORIDE 10 MG/1
CAPSULE ORAL
COMMUNITY
Start: 2016-05-16 | End: 2023-04-13

## 2019-12-12 RX ORDER — SERTRALINE HYDROCHLORIDE 100 MG/1
TABLET, FILM COATED ORAL
COMMUNITY
Start: 2014-02-27 | End: 2021-12-17

## 2019-12-12 RX ORDER — TRAZODONE HYDROCHLORIDE 100 MG/1
100 TABLET ORAL
COMMUNITY
Start: 2016-05-16 | End: 2023-04-13

## 2019-12-12 RX ORDER — METHYLPREDNISOLONE SOD SUCC 125 MG
125 VIAL (EA) INJECTION ONCE
Status: COMPLETED | OUTPATIENT
Start: 2019-12-12 | End: 2019-12-12

## 2019-12-12 RX ORDER — EPINEPHRINE 0.3 MG/.3ML
0.3 INJECTION SUBCUTANEOUS ONCE
Status: COMPLETED | OUTPATIENT
Start: 2019-12-12 | End: 2019-12-12

## 2019-12-12 RX ORDER — DIPHENOXYLATE HYDROCHLORIDE AND ATROPINE SULFATE 2.5; .025 MG/1; MG/1
1 TABLET ORAL
COMMUNITY
Start: 2016-05-16 | End: 2021-12-17

## 2019-12-12 RX ORDER — CETIRIZINE HYDROCHLORIDE 10 MG/1
10 TABLET ORAL
COMMUNITY
Start: 2017-05-31 | End: 2021-12-17

## 2019-12-12 RX ORDER — RISPERIDONE 1 MG/1
TABLET ORAL
COMMUNITY
Start: 2014-08-01 | End: 2023-08-08

## 2019-12-12 RX ORDER — LORAZEPAM 0.5 MG/1
0.5 TABLET ORAL
COMMUNITY
Start: 2016-05-16 | End: 2023-11-06

## 2019-12-12 RX ADMIN — Medication 125 MG: at 09:30

## 2019-12-12 RX ADMIN — EPINEPHRINE 0.3 MG: 0.3 INJECTION SUBCUTANEOUS at 09:30

## 2019-12-12 RX ADMIN — DIPHENHYDRAMINE HYDROCHLORIDE 50 MG: 50 INJECTION INTRAMUSCULAR; INTRAVENOUS at 09:30

## 2019-12-12 NOTE — PROGRESS NOTES
SUBJECTIVE:      Valentina Humphries is a 49 y.o. woman, with a pmhx that includes anxiety, depression, GERD, insomnia and  STEVEN who presents to  today for evaluation of acute onset swelling face, eyes, lips, ears, throat and difficultly swallowing.     HPI: Patient states she has a long hx of itching with latex exposure. She reportedly got a new CPAP headgear this past week. She noted some mild itching after a couple days use but no other symptoms. Patient states she woke this morning at approximately 5 am, with severe itching and rash on chest and face. Itching worsened throughout morning.  Patient attempted to go to her PCP clinic in [place]. PCP clinic not open so she drove here to our . Patient  States she developed acutely worsening sxs (facial swelling, eye swelling, lip swelling and difficulty swallowing on drive to .     She denies any prior hx of angioedema/anaphylactic reactions    Potential Triggers: Latex C-PAP headgear, Patient reports she had been on Risperdal for many years, she tried Latuda (3 weeks ago)--  had adverse SE to Latuda and switched back to Risperdal recently.          PMHX: Anxiety, depression, insomnia, obstructive sleep apnea, GERD   SOCIAL HX: Non-smoker/never smoker. Single   FMHX: Mother  of unknown cause in sleep age 53. Father living. 3 sisters living.  Denies any known primary fmhx of anaphylactic or hypersensitivity disorders      Review Of Systems  Skin: Positive for severely itchy rash chest, neck and face  Eyes: Positive for eye swelling  Ears/Nose/Throat: Positive for significant face, eyelid, upper lip swelling and throat swelling   Respiratory: Denies any SOB or wheezing   Cardiovascular: Denies any CP, SOB or sense of racing heartbeat. Denies any syncope or near synope    Gastrointestinal: Positive nausea since onset of sxs this morning (single emesis).  Denies any abdominal pain now. Denies any abdominal pain. Denies any diarrhea.   Musculoskeletal: Negative  for acute weakness weakness   Neurologic: Negative for HA or stiff neck. Denies any syncope or near syncope. Denies any one sided body weakness, acute speech changes, acute visual changes or severe mental confusion   Psychiatric: Positive for anxiety and depression. Positive for increased sense of anxiety since onset of current sxs   Hematologic/Lymphatic/Immunologic: Positive for hx of cat and latex allergy   Endocrine: Negative for DM or Thyroid do   Rheum: Denies any acute arthralgias or myalgias.     Administrations This Visit     diphenhydrAMINE (BENADRYL) injection 50 mg     Admin Date  12/12/2019 Action  Given Dose  50 mg Route  Intramuscular Administered By  Julieta Cortez CMA          EPINEPHrine (EPIPEN/ADRENACLICK/or ANY BX GENERIC EQUIV) injection 0.3 mg     Admin Date  12/12/2019 Action  Given Dose  0.3 mg Route  Intramuscular Administered By  Julieta Cortez CMA          methylPREDNISolone sodium succinate (solu-MEDROL) injection 125 mg     Admin Date  12/12/2019 Action  Given Dose  125 mg Route  Intramuscular Administered By  Julieta Cortez CMA              Current Outpatient Medications   Medication     amitriptyline (ELAVIL) 10 MG tablet     cetirizine (ZYRTEC) 10 MG tablet     cholecalciferol ( ULTRA STRENGTH) 50 MCG (2000 UT) CAPS     desogestrel-ethinyl estradiol (APRI) 0.15-30 MG-MCG tablet     dicyclomine (BENTYL) 10 MG capsule     LORazepam (ATIVAN) 0.5 MG tablet     Multiple Vitamin (MULTI-VITAMINS) TABS     omeprazole (PRILOSEC) 20 MG DR capsule     risperiDONE (RISPERDAL) 1 MG tablet     sertraline (ZOLOFT) 100 MG tablet     Sharps Container (SHARPS-A-GATOR LOCKING BRACKET) MISC     traZODone (DESYREL) 100 MG tablet     No current facility-administered medications for this visit.        Allergies   Allergen Reactions     Latex Anaphylaxis     Augmentin Other (See Comments)     Diarrhea and vomiting.      Cefdinir Difficulty breathing     Patient is able to tolerate Penicillin  "and Amoxicillin without any problems...         OBJECTIVE:     /70   Pulse 62   Temp 97.1  F (36.2  C) (Tympanic)   Resp 16   Wt 70.3 kg (155 lb)   SpO2 98%     See VS Flowsheet for 9 sets of VS performed here prior to arrival of EMS     GENERAL:  49 y.o. female in moderate to severe distress (facial swelling and appears apprehensive)   SKIN: Rash description:    Distribution: Morbilliform rash anterior chest and neck, diffuse facial swelling and erythema to includes very distinct, upper and lower lip swelling and bilateral eye swelling.   HEENT:   EYES: Positive for swelling of eyelids and elisabeth-orbital area.  PERRL, conjunctiva clear  Left TM is normal: no effusions, no erythema, and normal landmarks.  Right TM is normal: no effusions, no erythema, and normal landmarks.  Nasal mucosa is normal.  Oropharyngeal exam: Oropharyngeal exam  normal: no lesions, erythema, adenopathy or exudate. No posterior pharyngeal or soft palate swelling.   NECK: Trachea is midline. Neck is supple, non-tender to palpation, no adenopathy noted  RESP: lungs clear to auscultation - no rales, rhonchi or wheezes  CV: regular rates and rhythm, normal S1 S2, no murmur noted  ABDOMEN:  Soft, non-tender, non-distended.  Positive normal bowel sounds.  No HSM or masses.  No suprapubic tenderness.  No CVA tenderness.  EXTREMITIES:  Free of edema. No red, warm or swollen joints   NEURO: Alert and oriented.  Normal speech and mentation.  CN II/XII grossly intact.  Gait within normal limits.    PSYCH: Moderate to severe distress     ASSESSMENT/PLAN:     (T78.2XXA) Anaphylaxis, initial encounter  (primary encounter diagnosis)    MDM: 49 y.o. female, with a pmhx of anxiety, depression, GERD, insomnia and  STEVEN who presents to  today for evaluation of very concerning  sxs of acute onset swelling face, eyes, lips, ears, throat and difficultly swallowing.   Patient was immediately \"red flagged\" at check in. MA pulled me for immediate triage. " It was obvious, upon entering room, patient was having anaphylactic reaction, had severe facial swelling including upper eyelids swollen 1/2 shut and swelling of lips. I immediately gave Epi-Pen, Benadryl 50 mg IM and Solumedrol 125 IM. Patient had some interval improvement (decreased swelling of eyes and lips and reported decreased difficulty with swallowing),  911 initiated, patient started slowly  Developing increased facial swelling again while here. I was preparing to give second Epi-Pen dose with paramedics arrived. Medics did use their Epi to give second dose on site. Patient remained alert, stable and airway was open throughout her visit here today and was transferred to EMS in stable condition. Of note, patient had paradoxical decrease in HR to 50-51 following first dose of Epi instead of typical increased HR. I did specifically discuss this with medics. Patient was leaning toward [facility] when talking with medics.          Plan: methylPREDNISolone sodium succinate         (solu-MEDROL) injection 125 mg, diphenhydrAMINE        (BENADRYL) injection 50 mg, EPINEPHrine         (EPIPEN/ADRENACLICK/or ANY BX GENERIC EQUIV)         injection 0.3 mg

## 2019-12-16 ENCOUNTER — COMMUNICATION - HEALTHEAST (OUTPATIENT)
Dept: INTERNAL MEDICINE | Facility: CLINIC | Age: 49
End: 2019-12-16

## 2019-12-16 ENCOUNTER — OFFICE VISIT - HEALTHEAST (OUTPATIENT)
Dept: INTERNAL MEDICINE | Facility: CLINIC | Age: 49
End: 2019-12-16

## 2019-12-16 DIAGNOSIS — T78.40XD ALLERGIC REACTION, SUBSEQUENT ENCOUNTER: ICD-10-CM

## 2019-12-16 ASSESSMENT — MIFFLIN-ST. JEOR: SCORE: 1345.71

## 2019-12-18 ENCOUNTER — OFFICE VISIT - HEALTHEAST (OUTPATIENT)
Dept: ALLERGY | Facility: CLINIC | Age: 49
End: 2019-12-18

## 2019-12-18 ENCOUNTER — OFFICE VISIT - HEALTHEAST (OUTPATIENT)
Dept: PHARMACY | Facility: CLINIC | Age: 49
End: 2019-12-18

## 2019-12-18 DIAGNOSIS — K58.1 IRRITABLE BOWEL SYNDROME WITH CONSTIPATION: ICD-10-CM

## 2019-12-18 DIAGNOSIS — D50.9 MICROCYTIC ANEMIA: ICD-10-CM

## 2019-12-18 DIAGNOSIS — F41.1 GAD (GENERALIZED ANXIETY DISORDER): ICD-10-CM

## 2019-12-18 DIAGNOSIS — K21.9 ACID REFLUX: ICD-10-CM

## 2019-12-18 DIAGNOSIS — F32.9 MAJOR DEPRESSIVE DISORDER WITH CURRENT ACTIVE EPISODE, UNSPECIFIED DEPRESSION EPISODE SEVERITY, UNSPECIFIED WHETHER RECURRENT: ICD-10-CM

## 2019-12-18 DIAGNOSIS — G47.33 OBSTRUCTIVE SLEEP APNEA SYNDROME: ICD-10-CM

## 2019-12-18 DIAGNOSIS — L30.9 DERMATITIS: ICD-10-CM

## 2019-12-18 DIAGNOSIS — R21 RASH: ICD-10-CM

## 2019-12-18 DIAGNOSIS — K21.9 GASTROESOPHAGEAL REFLUX DISEASE, ESOPHAGITIS PRESENCE NOT SPECIFIED: ICD-10-CM

## 2019-12-18 DIAGNOSIS — M19.90 ARTHRITIS: ICD-10-CM

## 2019-12-18 ASSESSMENT — MIFFLIN-ST. JEOR: SCORE: 1345.71

## 2019-12-23 ENCOUNTER — COMMUNICATION - HEALTHEAST (OUTPATIENT)
Dept: INTERNAL MEDICINE | Facility: CLINIC | Age: 49
End: 2019-12-23

## 2019-12-23 ENCOUNTER — OFFICE VISIT - HEALTHEAST (OUTPATIENT)
Dept: FAMILY MEDICINE | Facility: CLINIC | Age: 49
End: 2019-12-23

## 2019-12-23 DIAGNOSIS — T78.40XD ALLERGIC REACTION, SUBSEQUENT ENCOUNTER: ICD-10-CM

## 2019-12-23 DIAGNOSIS — T78.40XA ALLERGIC REACTION, INITIAL ENCOUNTER: ICD-10-CM

## 2019-12-23 ASSESSMENT — MIFFLIN-ST. JEOR: SCORE: 1363.85

## 2019-12-30 ENCOUNTER — OFFICE VISIT - HEALTHEAST (OUTPATIENT)
Dept: ALLERGY | Facility: CLINIC | Age: 49
End: 2019-12-30

## 2019-12-30 DIAGNOSIS — L30.9 DERMATITIS: ICD-10-CM

## 2020-01-18 ENCOUNTER — COMMUNICATION - HEALTHEAST (OUTPATIENT)
Dept: INTERNAL MEDICINE | Facility: CLINIC | Age: 50
End: 2020-01-18

## 2020-01-20 ENCOUNTER — RECORDS - HEALTHEAST (OUTPATIENT)
Dept: INTERNAL MEDICINE | Facility: CLINIC | Age: 50
End: 2020-01-20

## 2020-01-20 ENCOUNTER — RECORDS - HEALTHEAST (OUTPATIENT)
Dept: ADMINISTRATIVE | Facility: OTHER | Age: 50
End: 2020-01-20

## 2020-02-26 ENCOUNTER — OFFICE VISIT - HEALTHEAST (OUTPATIENT)
Dept: INTERNAL MEDICINE | Facility: CLINIC | Age: 50
End: 2020-02-26

## 2020-02-26 DIAGNOSIS — M54.16 LUMBAR RADICULOPATHY: ICD-10-CM

## 2020-02-26 DIAGNOSIS — F41.1 GAD (GENERALIZED ANXIETY DISORDER): ICD-10-CM

## 2020-02-26 DIAGNOSIS — F32.9 MAJOR DEPRESSIVE DISORDER WITH CURRENT ACTIVE EPISODE, UNSPECIFIED DEPRESSION EPISODE SEVERITY, UNSPECIFIED WHETHER RECURRENT: ICD-10-CM

## 2020-02-26 DIAGNOSIS — Z23 NEED FOR SHINGLES VACCINE: ICD-10-CM

## 2020-04-24 ENCOUNTER — COMMUNICATION - HEALTHEAST (OUTPATIENT)
Dept: SCHEDULING | Facility: CLINIC | Age: 50
End: 2020-04-24

## 2020-05-08 ENCOUNTER — COMMUNICATION - HEALTHEAST (OUTPATIENT)
Dept: INTERNAL MEDICINE | Facility: CLINIC | Age: 50
End: 2020-05-08

## 2020-05-08 DIAGNOSIS — Z30.9 ENCOUNTER FOR CONTRACEPTIVE MANAGEMENT, UNSPECIFIED TYPE: ICD-10-CM

## 2020-06-10 ENCOUNTER — AMBULATORY - HEALTHEAST (OUTPATIENT)
Dept: FAMILY MEDICINE | Facility: CLINIC | Age: 50
End: 2020-06-10

## 2020-06-10 ENCOUNTER — VIRTUAL VISIT (OUTPATIENT)
Dept: FAMILY MEDICINE | Facility: OTHER | Age: 50
End: 2020-06-10

## 2020-06-10 DIAGNOSIS — Z20.822 SUSPECTED COVID-19 VIRUS INFECTION: ICD-10-CM

## 2020-06-10 NOTE — PROGRESS NOTES
"Date: 06/10/2020 11:49:41  Clinician: Toni Tuttle  Clinician NPI: [id number]  Patient: Valentina Yadav  Patient : 1970  Patient Address: [address], [place] [address]  Patient Phone: [phone]  Visit Protocol: URI  Patient Summary:  Valentina is a 50 year old ( : 1970 ) female who initiated a Visit for COVID-19 (Coronavirus) evaluation and screening. When asked the question \"Please sign me up to receive news, health information and promotions from [facility].\", Valentina responded \"No\".    Valentina states her symptoms started 1-2 days ago.   Her symptoms consist of a sore throat, enlarged lymph nodes, and myalgia.   Symptom details   Sore throat: Valentina reports having moderate throat pain (4-6 on a 10 point pain scale), does not have exudate on her tonsils, and can swallow liquids. The lymph nodes in her neck are enlarged. A rash has not appeared on the skin since the sore throat started.    Valentina denies having wheezing, nausea, teeth pain, ageusia, diarrhea, malaise, anosmia, facial pain or pressure, fever, cough, nasal congestion, vomiting, rhinitis, ear pain, headache, and chills. She also denies having recent facial or sinus surgery in the past 60 days and taking antibiotic medication for the symptoms. She is not experiencing dyspnea.   Precipitating events  Valentina is not sure if she has been exposed to someone with strep throat. She has not recently been exposed to someone with influenza. Valentina has been in close contact with the following high risk individuals: adults 65 or older and people with asthma, heart disease or diabetes.   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   She either works or volunteers as a healthcare worker or a , or works or volunteers in a healthcare facility. 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 She does not live with a healthcare worker.   Valentina has not had a close contact with a laboratory-confirmed COVID-19 patient within 14 days of symptom onset.   Pertinent medical history  Valentina does not get yeast infections when she takes antibiotics.   Valentina needs a return to work/school note.   Weight: 160 lbs   Valentina does not smoke or use smokeless tobacco.   Weight: 160 lbs    MEDICATIONS: risperidone oral, Zoloft oral, ALLERGIES: NKDA  Clinician Response:  Dear Valentina,    Your symptoms show that you may have coronavirus (COVID-19). This illness can cause fever, cough and trouble breathing. Many people get a mild case and get better on their own. Some people can get very sick.  What should I do?  We would like to test you for this virus. This will be a curbside test done outside the clinic.   1. Please call [phone] to schedule your visit. Explain that you were referred by [facility] to have a COVID-19 test. Be ready to share your [facility] visit ID number.  The following will serve as your written order for this COVID Test, ordered by me, for the indication of suspected COVID [Z20.828]: The test will be ordered in [facility], our electronic health record, after you are scheduled. It will show as ordered and authorized by Octavio Hurt MD.  Order: COVID-19 (Coronavirus) PCR for SYMPTOMATIC testing from [facility].      2. When it's time for your COVID test:  Stay at least 6 feet away from others. (If someone will drive you to your test, stay in the backseat, as far away from the  as you can.)   Cover your mouth and nose with a mask, tissue or washcloth.  Go straight to the testing site. Don't make any stops on the way there or back.      3.Starting now: Stay home and away from others (self-isolate) until:   You've had no fever---and no  "medicine that reduces fever---for 3 full days (72 hours). And...   Your other symptoms have gotten better. For example, your cough or breathing has improved. And...   At least 10 days have passed since your symptoms started.       During this time, don't leave the house except for testing or medical care.   Stay in your own room, even for meals. Use your own bathroom if you can.   Stay away from others in your home. No hugging, kissing or shaking hands. No visitors.  Don't go to work, school or anywhere else.    Clean \"high touch\" surfaces often (doorknobs, counters, handles, etc.). Use a household cleaning spray or wipes. You'll find a full list of  on the EPA website: www.epa.gov/pesticide-registration/list-n-disinfectants-use-against-sars-cov-2.   Cover your mouth and nose with a mask, tissue or washcloth to avoid spreading germs.  Wash your hands and face often. Use soap and water.  Caregivers in these groups are at risk for severe illness due to COVID-19:  o People 65 years and older  o People who live in a nursing home or long-term care facility  o People with chronic disease (lung, heart, cancer, diabetes, kidney, liver, immunologic)  o People who have a weakened immune system, including those who:   Are in cancer treatment  Take medicine that weakens the immune system, such as corticosteroids  Had a bone marrow or organ transplant  Have an immune deficiency  Have poorly controlled HIV or AIDS  Are obese (body mass index of 40 or higher)  Smoke regularly   o Caregivers should wear gloves while washing dishes, handling laundry and cleaning bedrooms and bathrooms.  o Use caution when washing and drying laundry: Don't shake dirty laundry, and use the warmest water setting that you can.  o For more tips, go to www.cdc.gov/coronavirus/2019-ncov/downloads/10Things.pdf.      How can I take care of myself?   Get lots of rest. Drink extra fluids (unless a doctor has told you not to).   Take Tylenol " (acetaminophen) for fever or pain. If you have liver or kidney problems, ask your family doctor if it's okay to take Tylenol.   Adults can take either:    650 mg (two 325 mg pills) every 4 to 6 hours, or...   1,000 mg (two 500 mg pills) every 8 hours as needed.    Note: Don't take more than 3,000 mg in one day. Acetaminophen is found in many medicines (both prescribed and over-the-counter medicines). Read all labels to be sure you don't take too much.   For children, check the Tylenol bottle for the right dose. The dose is based on the child's age or weight.    If you have other health problems (like cancer, heart failure, an organ transplant or severe kidney disease): Call your specialty clinic if you don't feel better in the next 2 days.       Know when to call 911. Emergency warning signs include:    Trouble breathing or shortness of breath Pain or pressure in the chest that doesn't go away Feeling confused like you haven't felt before, or not being able to wake up Bluish-colored lips or face.  Where can I get more information?   [facility] -- About COVID-19: [email or web address]   CDC -- What to Do If You're Sick: www.cdc.gov/coronavirus/2019-ncov/about/steps-when-sick.html   CDC -- Ending Home Isolation: www.cdc.gov/coronavirus/2019-ncov/hcp/disposition-in-home-patients.html   CDC -- Caring for Someone: www.cdc.gov/coronavirus/2019-ncov/if-you-are-sick/care-for-someone.html   [facility] -- Interim Guidance for Hospital Discharge to Home: www.health.[facility].[place]/diseases/coronavirus/hcp/hospdischarge.pdf   [facility] clinical trials (COVID-19 research studies): [email or web address]    Below are the COVID-19 hotlines at the [place] Department of Health ([facility]). Interpreters are available.    For health questions: Call 110-317-5736 or 1-101.657.8961 (7 a.m. to 7 p.m.) For questions about schools and childcare: Call 484-390-2519 or 1-405.927.6788 (7 a.m. to 7 p.m.)       Diagnosis: Pain in throat  Diagnosis ICD: R07.0

## 2020-06-12 ENCOUNTER — COMMUNICATION - HEALTHEAST (OUTPATIENT)
Dept: FAMILY MEDICINE | Facility: CLINIC | Age: 50
End: 2020-06-12

## 2020-06-24 ENCOUNTER — VIRTUAL VISIT (OUTPATIENT)
Dept: FAMILY MEDICINE | Facility: OTHER | Age: 50
End: 2020-06-24

## 2020-06-25 ENCOUNTER — AMBULATORY - HEALTHEAST (OUTPATIENT)
Dept: FAMILY MEDICINE | Facility: CLINIC | Age: 50
End: 2020-06-25

## 2020-06-25 DIAGNOSIS — Z20.822 SUSPECTED COVID-19 VIRUS INFECTION: ICD-10-CM

## 2020-06-25 NOTE — PROGRESS NOTES
"Date: 2020 19:09:25  Clinician: Katja Nix  Clinician NPI: [id number]  Patient: Valentina Yadav  Patient : 1970  Patient Address: [facility] [address], [place] [address]  Patient Phone: [phone]  Visit Protocol: URI  Patient Summary:  Valentina is a 50 year old ( : 1970 ) female who initiated a Visit for COVID-19 (Coronavirus) evaluation and screening. When asked the question \"Please sign me up to receive news, health information and promotions from [employer].\", Valentina responded \"Yes\".    Valentina states her symptoms started today.   Her symptoms consist of nausea, diarrhea, a headache, a sore throat, myalgia, facial pain or pressure, and nasal congestion.   Symptom details     Nasal secretions: The color of her mucus is clear.    Sore throat: Valentina reports having moderate throat pain (4-6 on a 10 point pain scale), does not have exudate on her tonsils, and can swallow liquids. She is not sure if the lymph nodes in her neck are enlarged. A rash has not appeared on the skin since the sore throat started.     Facial pain or pressure: The facial pain or pressure does not feel worse when bending or leaning forward.     Headache: She states the headache is moderate (4-6 on a 10 point pain scale).      Valentina denies having wheezing, teeth pain, ageusia, vomiting, rhinitis, malaise, ear pain, chills, anosmia, fever, and cough. She also denies having recent facial or sinus surgery in the past 60 days and taking antibiotic medication in the past month. She is not experiencing dyspnea.   Precipitating events  Valentina is not sure if she has been exposed to someone with strep throat. She has not recently been exposed to someone with influenza. Valentina has been in close contact with the following high risk individuals: people with asthma, heart disease or diabetes and adults 65 or older.   Pertinent COVID-19 (Coronavirus) information  In the past 14 days, Valentina has worked " in a congregate living setting.   She either works or volunteers as a healthcare worker or a , or works or volunteers in a healthcare facility. 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 She does not live with a healthcare worker.   Valentina has not had a close contact with a laboratory-confirmed COVID-19 patient within 14 days of symptom onset.   Pertinent medical history  Valentina does not get yeast infections when she takes antibiotics.   Valentina needs a return to work/school note.   Weight: 160 lbs   Valentina does not smoke or use smokeless tobacco.   Additional information as reported by the patient (free text): Sudden onset of significant diarrhea   Weight: 160 lbs    MEDICATIONS: risperidone oral, Zoloft oral, ALLERGIES: NKDA  Clinician Response:  Dear Valentina,   Your symptoms show that you may have coronavirus (COVID-19). This illness can cause fever, cough and trouble breathing. Many people get a mild case and get better on their own. Some people can get very sick.  What should I do?  We would like to test you for this virus.   1. Please call [phone] to schedule your visit. Explain that you were referred by [facility] to have a COVID-19 test. Be ready to share your [facility] visit ID number.  The following will serve as your written order for this COVID Test, ordered by me, for the indication of suspected COVID [Z20.828]: The test will be ordered in [employer], our electronic health record, after you are scheduled. It will show as ordered and authorized by Octavio Hurt MD.  Order: COVID-19 (Coronavirus) PCR for SYMPTOMATIC testing from [facility].      2. When it's time for your COVID test:  Stay at least 6 feet away from others. (If someone will drive you to your test, stay in the  "backseat, as far away from the  as you can.)   Cover your mouth and nose with a mask, tissue or washcloth.  Go straight to the testing site. Don't make any stops on the way there or back.      3.Starting now: Stay home and away from others (self-isolate) until:   You've had no fever---and no medicine that reduces fever---for 3 full days (72 hours). And...   Your other symptoms have gotten better. For example, your cough or breathing has improved. And...   At least 10 days have passed since your symptoms started.       During this time, don't leave the house except for testing or medical care.   Stay in your own room, even for meals. Use your own bathroom if you can.   Stay away from others in your home. No hugging, kissing or shaking hands. No visitors.  Don't go to work, school or anywhere else.    Clean \"high touch\" surfaces often (doorknobs, counters, handles, etc.). Use a household cleaning spray or wipes. You'll find a full list of  on the EPA website: www.epa.gov/pesticide-registration/list-n-disinfectants-use-against-sars-cov-2.   Cover your mouth and nose with a mask, tissue or washcloth to avoid spreading germs.  Wash your hands and face often. Use soap and water.  Caregivers in these groups are at risk for severe illness due to COVID-19:  o People 65 years and older  o People who live in a nursing home or long-term care facility  o People with chronic disease (lung, heart, cancer, diabetes, kidney, liver, immunologic)  o People who have a weakened immune system, including those who:   Are in cancer treatment  Take medicine that weakens the immune system, such as corticosteroids  Had a bone marrow or organ transplant  Have an immune deficiency  Have poorly controlled HIV or AIDS  Are obese (body mass index of 40 or higher)  Smoke regularly   o Caregivers should wear gloves while washing dishes, handling laundry and cleaning bedrooms and bathrooms.  o Use caution when washing and drying " laundry: Don't shake dirty laundry, and use the warmest water setting that you can.  o For more tips, go to www.cdc.gov/coronavirus/2019-ncov/downloads/10Things.pdf.      How can I take care of myself?   Get lots of rest. Drink extra fluids (unless a doctor has told you not to).   Take Tylenol (acetaminophen) for fever or pain. If you have liver or kidney problems, ask your family doctor if it's okay to take Tylenol.   Adults can take either:    650 mg (two 325 mg pills) every 4 to 6 hours, or...   1,000 mg (two 500 mg pills) every 8 hours as needed.    Note: Don't take more than 3,000 mg in one day. Acetaminophen is found in many medicines (both prescribed and over-the-counter medicines). Read all labels to be sure you don't take too much.   For children, check the Tylenol bottle for the right dose. The dose is based on the child's age or weight.    If you have other health problems (like cancer, heart failure, an organ transplant or severe kidney disease): Call your specialty clinic if you don't feel better in the next 2 days.       Know when to call 911. Emergency warning signs include:    Trouble breathing or shortness of breath Pain or pressure in the chest that doesn't go away Feeling confused like you haven't felt before, or not being able to wake up Bluish-colored lips or face.  Where can I get more information?   [facility] -- About COVID-19: [email or web address]   CDC -- What to Do If You're Sick: www.cdc.gov/coronavirus/2019-ncov/about/steps-when-sick.html   CDC -- Ending Home Isolation: www.cdc.gov/coronavirus/2019-ncov/hcp/disposition-in-home-patients.html   CDC -- Caring for Someone: www.cdc.gov/coronavirus/2019-ncov/if-you-are-sick/care-for-someone.html   [facility] -- Interim Guidance for Hospital Discharge to Home: www.health.[facility].[place]/diseases/coronavirus/hcp/hospdischarge.pdf   [facility] clinical trials (COVID-19 research studies):  clinicalaffairs.[facility]-clinical-trials    Below are the COVID-19 hotlines at the [place] Department of Health ([facility]). Interpreters are available.    For health questions: Call 463-567-7030 or 1-837.688.7281 (7 a.m. to 7 p.m.) For questions about schools and childcare: Call 440-489-1316 or 1-656.712.4380 (7 a.m. to 7 p.m.)    COVID-19 (Coronavirus) General Information  Because there is currently no vaccine to prevent infection, the best way to protect yourself is to avoid being exposed to this virus. Common symptoms of COVID-19 include but are not limited to fever, cough, and shortness of breath. These symptoms appear 2-14 days after you are exposed to the virus that causes COVID-19. Click here for more information from the CDC on how to protect yourself.  If you are sick with COVID-19 or suspect you are infected with the virus that causes COVID-19, follow the steps here from the CDC to help prevent the disease from spreading to people in your home and community.  Click here for general information from the CDC on testing.  If you develop any of these emergency warning signs for COVID-19, get medical attention immediately:     Trouble breathing    Persistent pain or pressure in the chest    New confusion or inability to arouse    Bluish lips or face      Call your doctor or clinic before going in. Call [phone] if you have a medical emergency and notify the  you have or think you may have COVID-19.  For more detailed and up to date information on COVID-19 (Coronavirus), please visit the CDC website.   Diagnosis: Pain in throat  Diagnosis ICD: R07.0

## 2020-06-27 ENCOUNTER — COMMUNICATION - HEALTHEAST (OUTPATIENT)
Dept: FAMILY MEDICINE | Facility: CLINIC | Age: 50
End: 2020-06-27

## 2020-07-07 ENCOUNTER — COMMUNICATION - HEALTHEAST (OUTPATIENT)
Dept: INTERNAL MEDICINE | Facility: CLINIC | Age: 50
End: 2020-07-07

## 2020-07-07 DIAGNOSIS — T78.40XD ALLERGIC REACTION, SUBSEQUENT ENCOUNTER: ICD-10-CM

## 2020-08-19 ENCOUNTER — RECORDS - HEALTHEAST (OUTPATIENT)
Dept: ADMINISTRATIVE | Facility: OTHER | Age: 50
End: 2020-08-19

## 2020-08-24 ENCOUNTER — COMMUNICATION - HEALTHEAST (OUTPATIENT)
Dept: INTERNAL MEDICINE | Facility: CLINIC | Age: 50
End: 2020-08-24

## 2020-08-25 ENCOUNTER — AMBULATORY - HEALTHEAST (OUTPATIENT)
Dept: NURSING | Facility: CLINIC | Age: 50
End: 2020-08-25

## 2020-08-25 ENCOUNTER — COMMUNICATION - HEALTHEAST (OUTPATIENT)
Dept: INTERNAL MEDICINE | Facility: CLINIC | Age: 50
End: 2020-08-25

## 2020-08-25 DIAGNOSIS — Z23 NEED FOR SHINGLES VACCINE: ICD-10-CM

## 2020-08-25 DIAGNOSIS — T78.40XA ALLERGY: ICD-10-CM

## 2020-08-25 DIAGNOSIS — Z13.220 SCREENING FOR HYPERLIPIDEMIA: ICD-10-CM

## 2020-08-26 ENCOUNTER — HOSPITAL ENCOUNTER (OUTPATIENT)
Dept: MAMMOGRAPHY | Facility: CLINIC | Age: 50
Discharge: HOME | End: 2020-08-26
Attending: INTERNAL MEDICINE

## 2020-08-26 DIAGNOSIS — Z12.31 VISIT FOR SCREENING MAMMOGRAM: ICD-10-CM

## 2020-08-30 ENCOUNTER — RECORDS - HEALTHEAST (OUTPATIENT)
Dept: ADMINISTRATIVE | Facility: OTHER | Age: 50
End: 2020-08-30

## 2020-09-02 ENCOUNTER — COMMUNICATION - HEALTHEAST (OUTPATIENT)
Dept: INTERNAL MEDICINE | Facility: CLINIC | Age: 50
End: 2020-09-02

## 2020-09-02 DIAGNOSIS — M25.552 HIP PAIN, LEFT: ICD-10-CM

## 2020-09-03 ENCOUNTER — OFFICE VISIT - HEALTHEAST (OUTPATIENT)
Dept: INTERNAL MEDICINE | Facility: CLINIC | Age: 50
End: 2020-09-03

## 2020-09-03 DIAGNOSIS — M25.552 HIP PAIN, LEFT: ICD-10-CM

## 2020-09-03 DIAGNOSIS — M54.10 RADICULAR PAIN OF LEFT LOWER EXTREMITY: ICD-10-CM

## 2020-09-03 ASSESSMENT — PATIENT HEALTH QUESTIONNAIRE - PHQ9: SUM OF ALL RESPONSES TO PHQ QUESTIONS 1-9: 4

## 2020-10-11 ENCOUNTER — COMMUNICATION - HEALTHEAST (OUTPATIENT)
Dept: INTERNAL MEDICINE | Facility: CLINIC | Age: 50
End: 2020-10-11

## 2020-10-11 DIAGNOSIS — Z00.00 ROUTINE GENERAL MEDICAL EXAMINATION AT A HEALTH CARE FACILITY: ICD-10-CM

## 2020-10-12 ENCOUNTER — RECORDS - HEALTHEAST (OUTPATIENT)
Dept: ADMINISTRATIVE | Facility: OTHER | Age: 50
End: 2020-10-12

## 2021-01-04 ENCOUNTER — COMMUNICATION - HEALTHEAST (OUTPATIENT)
Dept: INTERNAL MEDICINE | Facility: CLINIC | Age: 51
End: 2021-01-04

## 2021-01-04 DIAGNOSIS — Z30.9 ENCOUNTER FOR CONTRACEPTIVE MANAGEMENT, UNSPECIFIED TYPE: ICD-10-CM

## 2021-01-15 ENCOUNTER — HEALTH MAINTENANCE LETTER (OUTPATIENT)
Age: 51
End: 2021-01-15

## 2021-01-18 ENCOUNTER — OFFICE VISIT - HEALTHEAST (OUTPATIENT)
Dept: INTERNAL MEDICINE | Facility: CLINIC | Age: 51
End: 2021-01-18

## 2021-01-18 DIAGNOSIS — F32.9 MAJOR DEPRESSIVE DISORDER WITH CURRENT ACTIVE EPISODE, UNSPECIFIED DEPRESSION EPISODE SEVERITY, UNSPECIFIED WHETHER RECURRENT: ICD-10-CM

## 2021-01-18 DIAGNOSIS — Z86.2 HISTORY OF MICROCYTIC HYPOCHROMIC ANEMIA: ICD-10-CM

## 2021-01-18 DIAGNOSIS — Z30.9 ENCOUNTER FOR CONTRACEPTIVE MANAGEMENT, UNSPECIFIED TYPE: ICD-10-CM

## 2021-01-18 LAB
ERYTHROCYTE [DISTWIDTH] IN BLOOD BY AUTOMATED COUNT: 11.7 % (ref 11–14.5)
HCT VFR BLD AUTO: 39 % (ref 35–47)
HGB BLD-MCNC: 13.4 G/DL (ref 12–16)
MCH RBC QN AUTO: 30.1 PG (ref 27–34)
MCHC RBC AUTO-ENTMCNC: 34.3 G/DL (ref 32–36)
MCV RBC AUTO: 88 FL (ref 80–100)
PLATELET # BLD AUTO: 166 THOU/UL (ref 140–440)
PMV BLD AUTO: 7.9 FL (ref 7–10)
RBC # BLD AUTO: 4.44 MILL/UL (ref 3.8–5.4)
TSH SERPL DL<=0.005 MIU/L-ACNC: 2.62 UIU/ML (ref 0.3–5)
WBC: 6.8 THOU/UL (ref 4–11)

## 2021-01-18 ASSESSMENT — ANXIETY QUESTIONNAIRES
3. WORRYING TOO MUCH ABOUT DIFFERENT THINGS: MORE THAN HALF THE DAYS
1. FEELING NERVOUS, ANXIOUS, OR ON EDGE: MORE THAN HALF THE DAYS
4. TROUBLE RELAXING: NEARLY EVERY DAY
6. BECOMING EASILY ANNOYED OR IRRITABLE: MORE THAN HALF THE DAYS
GAD7 TOTAL SCORE: 14
2. NOT BEING ABLE TO STOP OR CONTROL WORRYING: SEVERAL DAYS
IF YOU CHECKED OFF ANY PROBLEMS ON THIS QUESTIONNAIRE, HOW DIFFICULT HAVE THESE PROBLEMS MADE IT FOR YOU TO DO YOUR WORK, TAKE CARE OF THINGS AT HOME, OR GET ALONG WITH OTHER PEOPLE: SOMEWHAT DIFFICULT
7. FEELING AFRAID AS IF SOMETHING AWFUL MIGHT HAPPEN: NEARLY EVERY DAY
5. BEING SO RESTLESS THAT IT IS HARD TO SIT STILL: SEVERAL DAYS

## 2021-01-18 ASSESSMENT — PATIENT HEALTH QUESTIONNAIRE - PHQ9: SUM OF ALL RESPONSES TO PHQ QUESTIONS 1-9: 12

## 2021-01-18 ASSESSMENT — MIFFLIN-ST. JEOR: SCORE: 1423.27

## 2021-02-23 ENCOUNTER — RECORDS - HEALTHEAST (OUTPATIENT)
Dept: ADMINISTRATIVE | Facility: OTHER | Age: 51
End: 2021-02-23

## 2021-03-22 ENCOUNTER — TRANSFERRED RECORDS (OUTPATIENT)
Dept: HEALTH INFORMATION MANAGEMENT | Facility: CLINIC | Age: 51
End: 2021-03-22

## 2021-05-26 VITALS — HEART RATE: 78 BPM | DIASTOLIC BLOOD PRESSURE: 60 MMHG | SYSTOLIC BLOOD PRESSURE: 120 MMHG

## 2021-05-26 ASSESSMENT — PATIENT HEALTH QUESTIONNAIRE - PHQ9: SUM OF ALL RESPONSES TO PHQ QUESTIONS 1-9: 3

## 2021-05-27 ASSESSMENT — PATIENT HEALTH QUESTIONNAIRE - PHQ9
SUM OF ALL RESPONSES TO PHQ QUESTIONS 1-9: 12
SUM OF ALL RESPONSES TO PHQ QUESTIONS 1-9: 4

## 2021-05-28 ASSESSMENT — ANXIETY QUESTIONNAIRES: GAD7 TOTAL SCORE: 14

## 2021-05-29 ENCOUNTER — RECORDS - HEALTHEAST (OUTPATIENT)
Dept: ADMINISTRATIVE | Facility: CLINIC | Age: 51
End: 2021-05-29

## 2021-05-29 NOTE — TELEPHONE ENCOUNTER
What is your question/concern?:  Fax received from pharmacy stating that the above medication is no longer being manufactured. Please send a new RX for an alternative

## 2021-05-29 NOTE — TELEPHONE ENCOUNTER
Medication Question or Clarification  Who is calling: Pharmacy: [facility]  What medication are you calling about?   RECLIPSEN, 28, 0.15-0.03 mg per tablet 3 Package 3 10/10/2018     Sig - Route: Take 1 tablet by mouth at bedtime. - Oral    Sent to pharmacy as: RECLIPSEN, 28, 0.15-0.03 mg per tablet    E-Prescribing Status: Receipt confirmed by pharmacy (10/10/2018 11:52 AM CDT)        Who prescribed the medication?: Javier Amador MD    What is your question/concern?:  Fax received from pharmacy stating that the above medication is no longer being manufactured. Please send a new RX for an alternative    Pharmacy: [facility] to leave a detailed message?: No  Site CMT - Please call the pharmacy to obtain any additional needed information.

## 2021-05-29 NOTE — PROGRESS NOTES
Office Visit - Physical   Valentina FLAKITA Humphries   49 y.o.  female    Date of visit: 5/30/2019  Physician: Javier Amador MD     Assessment and Plan   1. Routine general medical examination at a health care facility  This is a generally healthy 49-year-old woman with issues as discussed below.  Pap smear is up-to-date, last done 2016, never had an abnormal Pap smear no new partners.  Colonoscopy due 2022.  Mammogram coming up in August.    2. Screening for hyperlipidemia  - Lipid Cascade    3. Depression / Anxiety - [facility]  Continue with therapy and she will have a new psychiatrist refills given until she establishes with new psychiatrist    4. STEVEN-CPAP    5. Irritable bowel syndrome with constipation  Stable    6. Other migraine without status migrainosus, not intractable  Stable    7. Prediabetes  - Glycosylated Hemoglobin A1c  - Basic Metabolic Panel    8. Iron deficiency  No evidence of anemia, has been taking iron, had a colonoscopy in 2017.  Still menstruating and has a history of food restriction.  - HM2(CBC w/o Differential)  - Ferritin      The following high BMI interventions were performed this visit: encouragement to exercise and lifestyle education regarding diet    Return in about 1 year (around 5/30/2020) for annual physical.     Chief Complaint   Chief Complaint   Patient presents with     Annual Exam     fasting     right breast is larger than left        Patient Profile   Social History     Social History Narrative    Lives with her , Chris.  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  [employer] two-miler at [employer].  Likes to swim and bikes to work.          Past Medical History   Patient Active Problem List   Diagnosis     Depression / Anxiety - [facility]     History of colonic polyps     Migraine without status migrainosus, not intractable     STEVEN-CPAP     Irritable bowel syndrome       Past  Surgical History  She has a past surgical history that includes [place] tooth extraction (1987).     History of Present Illness   This 49 y.o. old woman comes in for annual physical.  Overall she is doing well.  A bit more anxious.  She needs a new psychiatrist and has an appointment coming up in the middle of June.  Needs refills of her medications which are usually prescribed by her psychiatrist.  She has noticed that her right breast is slightly larger than her left breast.  This is been most of her life.  No nipple discharge no dimpling skin no new lumps or bumps.  She has had wildly low iron stores with a normal hemoglobin.  Has had a colonoscopy with polyps and is due for a 5-year follow-up I believe around 2021.  She has been taking iron.  She is still menstruating.  He is exercising regularly.  Some stress at work.    Review of Systems: A comprehensive review of systems was negative except as noted.     Medications and Allergies   Current Outpatient Medications   Medication Sig Dispense Refill     acetaminophen (TYLENOL) 650 MG CR tablet Take 650 mg by mouth every 6 (six) hours as needed.       albuterol (PROAIR HFA;PROVENTIL HFA;VENTOLIN HFA) 90 mcg/actuation inhaler Inhale 1-2 puffs every 4 (four) hours as needed for wheezing. 1 Inhaler 11     cetirizine (ZYRTEC) 10 MG tablet Take 1 tablet (10 mg total) by mouth daily. 90 tablet 3     cholecalciferol, vitamin D3, 2,000 unit cap Take 2,000 Units by mouth.       dicyclomine (BENTYL) 10 MG capsule Take 1 capsule by mouth 4 (four) times a day as needed.       ferrous sulfate 325 (65 FE) MG tablet Take 1 tablet (325 mg total) by mouth 2 (two) times a day with meals. 60 tablet 2     fluticasone (FLONASE) 50 mcg/actuation nasal spray 2 sprays into each nostril daily. 16 g 11     glucosamine-chondroitin 500-400 mg cap Take 1 capsule by mouth 3 (three) times a day.       LORazepam (ATIVAN) 0.5 MG tablet Take 2 tablets (1 mg total) by mouth 2 (two) times a day as  "needed. 60 tablet 0     omega-3 fatty acids-vitamin E 1,000 mg cap Take 1 capsule by mouth daily.       POLYETHYLENE GLYCOL 1000 MISC Use 17 g As Directed daily.       PREVIDENT 5000 SENSITIVE 1.1-5 % Pste USE WITH TOOTHBRUSH QAM AND  mL 11     ranitidine (ZANTAC) 150 MG tablet Take 1 tablet (150 mg total) by mouth 2 (two) times a day. (Patient taking differently: Take 150 mg by mouth daily. ) 180 tablet 3     RECLIPSEN, 28, 0.15-0.03 mg per tablet Take 1 tablet by mouth at bedtime. 3 Package 3     risperiDONE (RISPERDAL) 0.5 MG tablet Take 1 tablet (0.5 mg total) by mouth 2 (two) times a day. 60 tablet 0     sertraline (ZOLOFT) 100 MG tablet Take 100 mg by mouth bedtime.       sertraline (ZOLOFT) 50 MG tablet Take 50 mg by mouth bedtime.       traZODone (DESYREL) 100 MG tablet Take 100 mg by mouth bedtime.       No current facility-administered medications for this visit.      Allergies   Allergen Reactions     Amoxicillin-Pot Clavulanate Unknown     Diarrhea and vomiting.      Cefdinir Wheezing     Patient is able to tolerate Penicillin and Amoxicillin without any problems...     Latex Itching        Family and Social History   Family History   Problem Relation Age of Onset     Sudden death Mother      Glaucoma Father      Osteoarthritis Sister         hip     Osteoarthritis Sister      No Medical Problems Sister         faternal twin     Breast cancer Paternal Aunt 60     Stroke Maternal Grandmother         Social History     Tobacco Use     Smoking status: Never Smoker     Smokeless tobacco: Never Used   Substance Use Topics     Alcohol use: No     Drug use: No        Physical Exam   General Appearance:   No acute distress    /62 (Patient Site: Left Arm, Patient Position: Sitting, Cuff Size: Adult Regular)   Pulse 71   Ht 5' 7.75\" (1.721 m)   Wt 168 lb (76.2 kg)   SpO2 98%   BMI 25.73 kg/m      EYES: Eyelids, conjunctiva, and sclera were normal. Pupils were normal. Cornea, iris, and lens were " normal bilaterally.  HEAD, EARS, NOSE, MOUTH, AND THROAT: Head and face were normal. Hearing was normal to voice and the ears were normal to external exam. Nose appearance was normal and there was no discharge. Oropharynx was normal.  NECK: Neck appearance was normal. There were no neck masses and the thyroid was not enlarged.  RESPIRATORY: Breathing pattern was normal and the chest moved symmetrically.  Percussion/auscultatory percussion was normal.  Lung sounds were normal and there were no abnormal sounds.  CARDIOVASCULAR: Heart rate and rhythm were normal.  S1 and S2 were normal and there were no extra sounds or murmurs. Peripheral pulses in arms and legs were normal.  Jugular venous pressure was normal.  There was no peripheral edema.  GASTROINTESTINAL: The abdomen was normal in contour.  Bowel sounds were present.  Percussion detected no organ enlargement or tenderness.  Palpation detected no tenderness, mass, or enlarged organs.   MUSCULOSKELETAL: Skeletal configuration was normal and muscle mass was normal for age. Joint appearance was overall normal.  LYMPHATIC: There were no enlarged nodes.  SKIN/HAIR/NAILS: Skin color was normal.  There were no skin lesions.  Hair and nails were normal.  NEUROLOGIC: The patient was alert and oriented to person, place, time, and circumstance. Speech was normal. Cranial nerves were normal. Motor strength was normal for age. The patient was normally coordinated.  PSYCHIATRIC:  Mood and affect were normal and the patient had normal recent and remote memory. The patient's judgment and insight were normal.  CHEST WALL/BREASTS: Normal breast exam       Additional Information        Javier Amador MD  Internal Medicine  Contact me at [phone]

## 2021-05-30 ENCOUNTER — RECORDS - HEALTHEAST (OUTPATIENT)
Dept: ADMINISTRATIVE | Facility: CLINIC | Age: 51
End: 2021-05-30

## 2021-05-30 VITALS — WEIGHT: 170 LBS | BODY MASS INDEX: 25.76 KG/M2 | HEIGHT: 68 IN

## 2021-05-31 VITALS — BODY MASS INDEX: 26.22 KG/M2 | WEIGHT: 171.2 LBS

## 2021-05-31 VITALS — WEIGHT: 173 LBS | BODY MASS INDEX: 26.5 KG/M2

## 2021-05-31 VITALS — BODY MASS INDEX: 26 KG/M2 | WEIGHT: 169.75 LBS

## 2021-05-31 VITALS — HEIGHT: 68 IN | BODY MASS INDEX: 26.5 KG/M2

## 2021-05-31 VITALS — BODY MASS INDEX: 26.07 KG/M2 | HEIGHT: 68 IN | WEIGHT: 172 LBS

## 2021-05-31 NOTE — TELEPHONE ENCOUNTER
Refill Approved    Rx renewed per Medication Renewal Policy. Medication was last renewed on 5/25/18.    Peggy Gomez, [facility] Triage/Med Refill 8/24/2019     Requested Prescriptions   Pending Prescriptions Disp Refills     cetirizine (ZYRTEC) 10 MG tablet [Pharmacy Med Name: CETIRIZINE HCL 10 MG TABLET] 90 tablet 3     Sig: TAKE 1 TABLET BY MOUTH EVERY DAY       Antihistamine Refill Protocol Passed - 8/24/2019 12:22 AM        Passed - Patient has had office visit/physical in last year     Last office visit with prescriber/PCP: 10/10/2018 Javier Amador MD OR same dept: 12/14/2018 Javier Tee MD OR same specialty: 12/14/2018 Javier Tee MD  Last physical: 5/30/2019 Last MTM visit: Visit date not found   Next visit within 3 mo: Visit date not found  Next physical within 3 mo: Visit date not found  Prescriber OR PCP: Javier Amador MD  Last diagnosis associated with med order: 1. Allergy  - cetirizine (ZYRTEC) 10 MG tablet [Pharmacy Med Name: CETIRIZINE HCL 10 MG TABLET]; TAKE 1 TABLET BY MOUTH EVERY DAY  Dispense: 90 tablet; Refill: 3    If protocol passes may refill for 12 months if within 3 months of last provider visit (or a total of 15 months).

## 2021-06-01 VITALS — HEIGHT: 68 IN | BODY MASS INDEX: 26.07 KG/M2 | WEIGHT: 172 LBS

## 2021-06-01 VITALS — WEIGHT: 170 LBS | BODY MASS INDEX: 26.04 KG/M2

## 2021-06-01 VITALS — HEIGHT: 68 IN | BODY MASS INDEX: 26.67 KG/M2 | WEIGHT: 176 LBS

## 2021-06-01 VITALS — WEIGHT: 176 LBS | BODY MASS INDEX: 26.96 KG/M2

## 2021-06-01 VITALS — WEIGHT: 180 LBS | HEIGHT: 68 IN | BODY MASS INDEX: 27.28 KG/M2

## 2021-06-01 VITALS — BODY MASS INDEX: 26.19 KG/M2 | WEIGHT: 171 LBS

## 2021-06-01 NOTE — TELEPHONE ENCOUNTER
RN cannot approve Refill Request    RN can NOT refill this medication med is not covered by policy/route to provider     . Last office visit: 10/10/2018 Javier Amador MD Last Physical: 5/30/2019 Last MTM visit: Visit date not found Last visit same specialty: 12/14/2018 Javier Tee MD.  Next visit within 3 mo: Visit date not found  Next physical within 3 mo: Visit date not found      Maricruz Katz, [facility] Triage/Med Refill 9/4/2019    Requested Prescriptions   Pending Prescriptions Disp Refills     PREVIDENT 5000 ENAMEL PROTECT 1.1-5 % Pste [Pharmacy Med Name: PREVIDENT 5000 ENAMEL PROTECT]  11     Sig: USE WITH TOOTHBRUSH IN THE MORNING AND AT BEDTIME DAILY       There is no refill protocol information for this order

## 2021-06-02 VITALS — HEIGHT: 68 IN | WEIGHT: 177 LBS | BODY MASS INDEX: 26.83 KG/M2

## 2021-06-02 VITALS — BODY MASS INDEX: 27.13 KG/M2 | HEIGHT: 68 IN | WEIGHT: 179 LBS

## 2021-06-02 VITALS — WEIGHT: 173 LBS | HEIGHT: 68 IN | BODY MASS INDEX: 26.22 KG/M2

## 2021-06-02 NOTE — PATIENT INSTRUCTIONS - HE
Your urine sample was negative for a UTI  Chasteberry, might help with the mood symptoms,  But I recommend you follow-up with a womens health specialist
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
## 2021-06-03 VITALS
SYSTOLIC BLOOD PRESSURE: 106 MMHG | BODY MASS INDEX: 25.12 KG/M2 | HEART RATE: 74 BPM | DIASTOLIC BLOOD PRESSURE: 60 MMHG | OXYGEN SATURATION: 95 % | WEIGHT: 164 LBS

## 2021-06-03 VITALS — HEIGHT: 68 IN | WEIGHT: 168 LBS | BODY MASS INDEX: 25.46 KG/M2

## 2021-06-03 NOTE — TELEPHONE ENCOUNTER
Patient calling.  She says she submitted an evisit but needs medication for pink eye symptoms today before leaving for work soon.  [facility]    Kyara Woods, RN, [facility] Nurse Triage/Med Refills RN

## 2021-06-03 NOTE — TELEPHONE ENCOUNTER
Type of referral:  Physical Therapy    What provider or facility are you being referred to?      Internal: [facility], however patient called [facility] and is scheduled 11/12/19    Do you have an appointment scheduled?  Yes 11/12/19    What is your question?     Asking if PCP will authorize patient to go to [facility] for PT?    Need orders faxed to [facility]   Fx: [phone]    Okay to leave a detailed message: Yes

## 2021-06-03 NOTE — TELEPHONE ENCOUNTER
Faxed order for PT/OT to [facility] as requested below.    Oanh FAIR LPN .......... 9:12 AM  11/11/19

## 2021-06-03 NOTE — TELEPHONE ENCOUNTER
RN cannot approve Refill Request    RN can NOT refill this medication med is not covered by policy/route to provider. Last office visit: 10/10/2018 Javier Amador MD Last Physical: 5/30/2019 Last MTM visit: Visit date not found Last visit same specialty: 10/21/2019 Suleman Guillen MD.  Next visit within 3 mo: Visit date not found  Next physical within 3 mo: Visit date not found      Hamida Cortes, [facility] Triage/Med Refill 11/17/2019    Requested Prescriptions   Pending Prescriptions Disp Refills     risperiDONE (RISPERDAL) 0.5 MG tablet [Pharmacy Med Name: RISPERIDONE 0.5 MG TABLET] 180 tablet 0     Sig: TAKE 1-2 TABLETS (0.5 MG - 1 MG) BY MOUTH DAILY AT BEDTIME       There is no refill protocol information for this order

## 2021-06-03 NOTE — TELEPHONE ENCOUNTER
[facility] was calling stating they never received the orders for the patients physical therapy. Please fax the order to them at: [phone]

## 2021-06-04 VITALS
HEART RATE: 65 BPM | TEMPERATURE: 97.7 F | OXYGEN SATURATION: 96 % | RESPIRATION RATE: 14 BRPM | SYSTOLIC BLOOD PRESSURE: 112 MMHG | WEIGHT: 162.1 LBS | BODY MASS INDEX: 24.83 KG/M2 | DIASTOLIC BLOOD PRESSURE: 68 MMHG

## 2021-06-04 VITALS
SYSTOLIC BLOOD PRESSURE: 102 MMHG | OXYGEN SATURATION: 98 % | WEIGHT: 163.08 LBS | HEART RATE: 69 BPM | BODY MASS INDEX: 25.54 KG/M2 | DIASTOLIC BLOOD PRESSURE: 58 MMHG

## 2021-06-04 VITALS
WEIGHT: 155 LBS | BODY MASS INDEX: 24.33 KG/M2 | DIASTOLIC BLOOD PRESSURE: 68 MMHG | SYSTOLIC BLOOD PRESSURE: 100 MMHG | HEIGHT: 67 IN | HEART RATE: 70 BPM

## 2021-06-04 VITALS
HEIGHT: 67 IN | RESPIRATION RATE: 16 BRPM | HEART RATE: 74 BPM | WEIGHT: 159 LBS | BODY MASS INDEX: 24.96 KG/M2 | OXYGEN SATURATION: 96 %

## 2021-06-04 VITALS
DIASTOLIC BLOOD PRESSURE: 62 MMHG | BODY MASS INDEX: 24.71 KG/M2 | WEIGHT: 163 LBS | OXYGEN SATURATION: 97 % | SYSTOLIC BLOOD PRESSURE: 100 MMHG | HEART RATE: 63 BPM | HEIGHT: 68 IN

## 2021-06-04 VITALS — HEART RATE: 68 BPM | BODY MASS INDEX: 24.33 KG/M2 | WEIGHT: 155 LBS | HEIGHT: 67 IN | OXYGEN SATURATION: 98 %

## 2021-06-04 VITALS
BODY MASS INDEX: 24.28 KG/M2 | HEART RATE: 70 BPM | DIASTOLIC BLOOD PRESSURE: 68 MMHG | SYSTOLIC BLOOD PRESSURE: 100 MMHG | WEIGHT: 155 LBS

## 2021-06-04 NOTE — PROGRESS NOTES
OFFICE VISIT NOTE    Subjective:   Chief Complaint:  Follow-up (Rash)    49-year-old woman who is only used a new shampoo for first time.  She developed itching of the face scalp neck and upper chest.  It became quite severe she went to the urgent care later the ER.  She was concerned about anaphylactic reaction.  Anaphylaxis was not diagnosed but she had a considerable allergic reaction so was placed on prednisone.  She is also been taking Benadryl and Allegra.  She continues to have considerable itching.  She is uncomfortable.  No trouble with swallowing or breathing.    Current Outpatient Medications   Medication Sig     acetaminophen (TYLENOL) 650 MG CR tablet Take 650 mg by mouth every 6 (six) hours as needed.     albuterol (PROAIR HFA;PROVENTIL HFA;VENTOLIN HFA) 90 mcg/actuation inhaler Inhale 1-2 puffs every 4 (four) hours as needed for wheezing.     cetirizine (ZYRTEC) 10 MG tablet Take 2 tablets (20 mg total) by mouth daily.     desogestrel-ethinyl estradiol (APRI) 0.15-0.03 mg per tablet Take 1 tablet by mouth daily.     dicyclomine (BENTYL) 10 MG capsule Take 1 capsule by mouth 4 (four) times a day as needed.     diphenhydrAMINE (BENADRYL) 25 mg capsule Take 1 capsule (25 mg total) by mouth every 6 (six) hours as needed for itching.     EPINEPHrine (EPIPEN) 0.3 mg/0.3 mL injection Inject 0.3 mL (0.3 mg total) into the shoulder, thigh, or buttocks as needed for anaphylaxis. Inject into thigh.     fluticasone (FLONASE) 50 mcg/actuation nasal spray 2 sprays into each nostril daily.     hydrocortisone with aloe 1 % Crea cream Apply to affected area 2 times daily     LORazepam (ATIVAN) 0.5 MG tablet Take 2 tablets (1 mg total) by mouth 2 (two) times a day as needed.     omeprazole (PRILOSEC) 20 MG capsule take 1 capsule by oral route 1 times every day before a meal as needed     polyethylene glycol (PURELAX) 17 gram/dose powder take 1 capful by Oral route  every day     POLYETHYLENE GLYCOL 1000 MISC Use 17 g  "As Directed daily.     predniSONE (DELTASONE) 10 mg tablet pack Take by mouth 2 (two) times a day for 4 days.     predniSONE (DELTASONE) 10 mg tablet Take 20 mg by mouth daily.     PREVIDENT 5000 ENAMEL PROTECT 1.1-5 % Pste USE WITH TOOTHBRUSH IN THE MORNING AND AT BEDTIME DAILY     sertraline (ZOLOFT) 100 MG tablet Take 100 mg by mouth bedtime.     sertraline (ZOLOFT) 50 MG tablet Take 50 mg by mouth bedtime.     traZODone (DESYREL) 100 MG tablet Take 100 mg by mouth bedtime.     cholecalciferol, vitamin D3, 2,000 unit cap Take 2,000 Units by mouth.     ferrous sulfate 325 (65 FE) MG tablet Take 1 tablet (325 mg total) by mouth 2 (two) times a day with meals.     glucosamine-chondroitin 500-400 mg cap Take 1 capsule by mouth 3 (three) times a day.     omega-3 fatty acids-vitamin E 1,000 mg cap Take 1 capsule by mouth daily.     predniSONE (DELTASONE) 10 mg tablet 2 tablets p.o. twice daily for 2 days then 1 tablet twice daily for 2 days then 1 tablet daily for 2 days and stop.       PSFHx: Tobacco Status:  She  reports that she has never smoked. She has never used smokeless tobacco.    Review of Systems:  A comprehensive review of systems is negative except for the comments above    Objective:    Ht 5' 7\" (1.702 m)   Wt 155 lb (70.3 kg)   LMP 11/28/2019 (Approximate)   BMI 24.28 kg/m    GENERAL: No acute distress.  She is in no distress.  Afebrile.  Pulse is 68 and regular.  She has a typical blanching red rash on the neck shoulders and upper chest.  Nothing on the back legs or abdomen.  Mouth and throat are normal.  There is no swelling of the mucous membranes.  Her voice is normal.  Airflow is good through the nose and mouth.  Lungs are free of any rales or wheezes.  Respiratory rate is 14.  No peripheral edema.  No abdominal tenderness.  Assessment & Plan   Valentina Humphries is a 49 y.o. female.  Recent emergency room record reviewed.  All of her medications reviewed and reconciled.  This looks " like a allergic reaction-contact dermatitis issue.  The most severe on the neck and upper chest.  She will take prednisone 20 twice daily for 2 days, then 10 twice daily for 2 days, then 10 mg daily for 2 days and stop.  She can apply Sarna lotion to reduce the itching.  Also can use over-the-counter Benadryl or Allegra.  She would like to see an allergist.  This will be arranged.  Off work until 12/18/2019    Diagnoses and all orders for this visit:    Allergic reaction, subsequent encounter  -     predniSONE (DELTASONE) 10 mg tablet; 2 tablets p.o. twice daily for 2 days then 1 tablet twice daily for 2 days then 1 tablet daily for 2 days and stop.  Dispense: 14 tablet; Refill: 0  -     Ambulatory referral to Allergy            Bora Hylton MD  Transcription using voice recognition software, may contain typographical errors.

## 2021-06-04 NOTE — TELEPHONE ENCOUNTER
RN cannot approve Refill Request    RN can NOT refill this medication med is not covered by policy/route to provider.     Medication prescribed at [facility] to different [facility] pharmacy. 2 different sig on current med list.     Provider to review and advise.     Fran Herr RN Supervisor Triage Nurse Advisor    Last office visit: 2019 Bora Hylton MD Last Physical: Visit date not found Last MTM visit: Visit date not found Last visit same specialty: 2019 Bora Hylton MD.  Next visit within 3 mo: Visit date not found  Next physical within 3 mo: Visit date not found      Fran Herr, [facility] Triage/Med Refill 2019    Requested Prescriptions   Pending Prescriptions Disp Refills     predniSONE (DELTASONE) 10 mg tablet 14 tablet 0     Si tablets p.o. twice daily for 2 days then 1 tablet twice daily for 2 days then 1 tablet daily for 2 days and stop.       There is no refill protocol information for this order

## 2021-06-04 NOTE — PROGRESS NOTES
MTM Transition of Care Encounter  Assessment & Plan                                                     Post Discharge Medication Reconciliation Status: discharge medications reconciled, continue medications without change    1. Rash  Uncontrolled - seeing Dr. Matias after this visit to get further assessment, defer to him for diagnosis of rash. Educated on duplication of therapy of fexofenadine plus cetirizine.  Provided letter explaining medical situation to work. Reviewed additional option for itching, to discuss with Dr. Matias as needed.   -Consider hydroxyzine 25-50 mg every 6 hours as needed for rash and anxiety.    2. Major depressive disorder with current active episode, unspecified depression episode severity, unspecified whether recurrent/ EBER (generalized anxiety disorder)  Stable continue current regimen, follows with psychiatry.  Risperidone added to medication list, identified by Dr. Matias, not brought up in this visit.     4. Microcytic anemia  Stable -appropriate to continue off of iron supplementation.    5. Irritable bowel syndrome with constipation  Stable continue current regimen.    6. Arthritis  Stable continue current regimen.    7. STEVEN/CPAP  Stable continue current regimen.    8. Acid Reflux:  Stable continue current regimen.    Follow Up  Return today (on 12/18/2019) for Dr. Matias .    Subjective & Objective                                                       Valentina Humphries is a 49 y.o. female coming in for a transitions of care visit. she was discharged from [facility] on 12/15/19 for allergic reaction.    Chief Complaint: Review of medications     Medication Adherence/Access: Appears to be adherent and able to obtain medications. Reports that work is unhappy with missing time due to this reaction/rash she has been dealing with.    1. Rash  Seen in emergency department twice (12/12/19 and 12/15/19) for allergic reaction/rash/feelings of anaphylaxis. Has been taking 20 mg of  cetirizine daily, 180 mg fexofenadine daily, 50 mg of diphenhydramine three times daily, and a prednisone taper (onto 10 mg twice daily x2 days, then 10 mg once daily x2 days). Reports that since starting the fexofenadine yesterday, she hasn't noticed much improvement. The diphenhydramine primarily helps her to be able to sleep. She denies excess anxiety with the prednisone. Reports worsening of rash with hydrocortisone cream so stopped using.    2. Major depressive disorder with current active episode, unspecified depression episode severity, unspecified/EBER (generalized anxiety disorder) whether recurrent  Reports that she can take up to 3 tablets of lorazepam per day, does find they relieve her anxiety. Taking 150 mg sertraline daily which she reports effective for past 30 years. Denies side effects. Did not report taking risperidone however reported taking to Dr. Matias and recent fill history confirmed via [employer]. Reports trazodone effective for helping her sleep - can take 100-150 mg nightly.    4. Microcytic anemia  Reports discontinuing ferrous sulfate due to hemoglobin within normal limits.   Lab Results   Component Value Date    HGB 13.6 05/30/2019     5. Irritable bowel syndrome with constipation  Taking polyethylene glycol daily and dicyclomine as needed. Finds both to be effective for her.     6. Arthritis  Taking acetaminophen as needed which she finds effective for pain and additionally some relief of the itching associated with her rash. Normally takes glucosamine-chondroitin but has not been taking recently while having issues with rash - plans to resume in the future as she finds it beneficial.    7. STEVEN/CPAP  Has not needed her albuterol inhaler or fluticasone nasal spray lately - reports symptoms controlled. Still has both if needed.     8. Acid Reflux  Reports symptoms controlled on famotidine and omeprazole.     PMH: reviewed in EPIC   Allergies/ADRs: reviewed in EPIC   Alcohol: Reviewed in  Epic  Tobacco:   Social History     Tobacco Use   Smoking Status Never Smoker   Smokeless Tobacco Never Used     Recent Vitals:   BP Readings from Last 3 Encounters:   12/18/19 100/68   12/18/19 100/68   12/15/19 95/65      Wt Readings from Last 3 Encounters:   12/18/19 155 lb (70.3 kg)   12/18/19 155 lb (70.3 kg)   12/16/19 155 lb (70.3 kg)     ----------------    The patient declined an after visit summary    I spent 60 minutes with this patient today;  . All changes were made via collaborative practice agreement with Javier Amador MD. A copy of the visit note was provided to the patient's provider.     Cherelle Espinoza, PharmD  [facility] Resident     I spent 100% of the time in direct supervision of the PharmD Resident. I have reviewed the note and agree with the assessment/plan as it is now written.     Onesimo Fraga, PharmD, BCACP  Medication Management (MTM) Pharmacist  [facility]    Current Outpatient Medications   Medication Sig Dispense Refill     acetaminophen (TYLENOL) 500 MG tablet Take 500 mg by mouth every 6 (six) hours as needed.        calcium/D3/B12/folic acid/B6 (CALCIUM CARB-VIT A0-P87-D2-FA ORAL) Take 1 tablet by mouth daily.       cetirizine (ZYRTEC) 10 MG tablet Take 2 tablets (20 mg total) by mouth daily. 60 tablet 0     desogestrel-ethinyl estradiol (APRI) 0.15-0.03 mg per tablet Take 1 tablet by mouth daily. 3 Package 3     dicyclomine (BENTYL) 10 MG capsule Take 1 capsule by mouth 4 (four) times a day as needed.       diphenhydrAMINE (BENADRYL) 25 mg capsule Take 1 capsule (25 mg total) by mouth every 6 (six) hours as needed for itching. (Patient taking differently: Take 50 mg by mouth every 8 (eight) hours as needed for itching. ) 30 capsule 0     famotidine (PEPCID) 20 MG tablet Take 20 mg by mouth daily before breakfast.       fexofenadine (ALLEGRA) 180 MG tablet Take 180 mg by mouth daily.       fluticasone (FLONASE) 50 mcg/actuation nasal spray 2 sprays into  each nostril daily. (Patient taking differently: 2 sprays into each nostril daily as needed. ) 16 g 11     LORazepam (ATIVAN) 0.5 MG tablet Take 2 tablets (1 mg total) by mouth 2 (two) times a day as needed. (Patient taking differently: Take 0.5-1 mg by mouth 2 (two) times a day as needed (maxium of 3 tablets per day). ) 60 tablet 0     omeprazole (PRILOSEC) 20 MG capsule Take 20 mg by mouth daily before breakfast.        polyethylene glycol (PURELAX) 17 gram/dose powder Take 17 g by mouth daily.        predniSONE (DELTASONE) 10 mg tablet 2 tablets p.o. twice daily for 2 days then 1 tablet twice daily for 2 days then 1 tablet daily for 2 days and stop. 14 tablet 0     PREVIDENT 5000 ENAMEL PROTECT 1.1-5 % Pste USE WITH TOOTHBRUSH IN THE MORNING AND AT BEDTIME DAILY 100 mL 11     risperiDONE (RISPERDAL) 0.5 MG tablet Take 0.5-1 mg by mouth at bedtime.       sertraline (ZOLOFT) 100 MG tablet Take 100 mg by mouth daily.        sertraline (ZOLOFT) 50 MG tablet Take 50 mg by mouth bedtime.       yeast,dried, S. cerevisiae, (YEAST ORAL) Take 1 Dose by mouth daily as needed (low energy).       albuterol (PROAIR HFA;PROVENTIL HFA;VENTOLIN HFA) 90 mcg/actuation inhaler Inhale 1-2 puffs every 4 (four) hours as needed for wheezing. 1 Inhaler 11     EPINEPHrine (EPIPEN) 0.3 mg/0.3 mL injection Inject 0.3 mL (0.3 mg total) into the shoulder, thigh, or buttocks as needed for anaphylaxis. Inject into thigh. 1 Pre-filled Pen Syringe 0     glucosamine-chondroitin 500-400 mg cap Take 1 capsule by mouth daily.        traZODone (DESYREL) 50 MG tablet Take 100-150 mg by mouth at bedtime.        triamcinolone (KENALOG) 0.1 % ointment Apply topically 2 (two) times a day. To affected areas 80 g 1     No current facility-administered medications for this visit.

## 2021-06-04 NOTE — PROGRESS NOTES
Assessment:    Rash concerning for allergic contact dermatitis    Plan:    Recommend patch testing.   Triamcinolone 0.1% apply twice daily to affected areas.  Recommend Dermatology appointment.  Patient has been scheduled.  Cetirizine 10 mg 2-3 times daily can be helpful for itch.  Prescription for prednisone taper done if flare at this next week.  ____________________________________________________________________________     Patient comes in today for follow-up of rash.  Despite changing product she is still continue to have rash on her upper chest.  She feels that it is also under her breasts as well.  She describes it as itchy.  She is been on prednisone off and on since last seen.  Just finishing a taper now.  She has used over-the-counter topical hydrocortisone.  She is not started triamcinolone.  She reports otherwise feeling well.    Physical Exam:  General:  Alert and Oriented.  Eyes:  Sclera clear. Nose: pale boggy mucosal membranes.  Throat:  pink moist, no lesions.  Lungs:  clear to auscultation. Skin: Erythematous fine papular rash upper chest.  No desquamation seen.  No blistering seen.

## 2021-06-04 NOTE — PATIENT INSTRUCTIONS - HE
Assessment:    Rash concerning for allergic contact dermatitis    Plan:    Recommend patch testing.   We will schedule today  Recommend Dermatology appointment

## 2021-06-04 NOTE — PATIENT INSTRUCTIONS - HE
Assessment:    Rash most consistent with allergic contact dermatitis.  Based on pattern, consider hair product.  Recent new styling gel is most likely the culprit.  Pattern is not not entirely consistent with a drug exanthem.  Rashes not consistent with type I immediate hypersensitivity allergy.  History of previous allergic contact dermatitis to nickel as well as latex products.  No obvious new exposure to these.    Also consider photo allergic reaction.  Patient uses a photo box however generally these have very little UV wavelengths.     Plan:    For now, treat symptoms with prednisone taper.  Cetirizine 10 mg morning and noon and night.  Benadryl (diphenhydramine) 25 to 50 mg every 6 hours as needed (drowsy).  Topical hydrocortisone was irritating.  Will prescribe triamcinolone 0.1% to be applied twice daily.    Note for work completed.  With the severity of the rash it may be difficult for patient to work.  Should be up to her and over the next week what work she is able to do.    Follow-up in 1 to 2 weeks if not improving.    Consider patch testing if this is chronic.

## 2021-06-04 NOTE — PROGRESS NOTES
Assessment:    Rash most consistent with allergic contact dermatitis.  Based on pattern, consider hair product.  Recent new styling gel is most likely the culprit.  Pattern is not not entirely consistent with a drug exanthem.  Rashes not consistent with type I immediate hypersensitivity allergy.  History of previous allergic contact dermatitis to nickel as well as latex products.  No obvious new exposure to these.    Also consider photo allergic reaction.  Patient uses a photo box however generally these have very little UV wavelengths.     Plan:    For now, treat symptoms with prednisone taper.  Cetirizine 10 mg morning and noon and night.  Benadryl (diphenhydramine) 25 to 50 mg every 6 hours as needed (drowsy).  Topical hydrocortisone was irritating.  Will prescribe triamcinolone 0.1% to be applied twice daily.    Note for work completed.  With the severity of the rash it may be difficult for patient to work.  Should be up to her and over the next week what work she is able to do.    Follow-up in 1 to 2 weeks if not improving.    Consider patch testing if this is chronic.  ____________________________________________________________________________     Patient here today for evaluation of an itchy rash.  Started month of December.  Initially with some itching of her head.  On December 12 there was a flare.  She describes itching burning sensation.  Her eyes and lips felt puffy initially went to urgent care clinic and then she was transferred to the emergency room.  She was transferred because of concerns of possible anaphylaxis..  In the emergency room she was given epinephrine without any significant benefit.  Patient had no difficulty with breathing.  No hypotension documented in the emergency room.  She is not admitted to the hospital at home.  She went to work next day and the following day the rash seemed to be getting worse.  Again she went to the emergency room.  There no epinephrine was given.  No  hypotension.  A prescription for prednisone was given.  The next day she was seen by her primary doctor.  A new prednisone prescription was given.  This 1 was a taper.  She is just now completing the course.  Patient does have a history of previous contact allergy.  She had significant poison ivy in 1991.  Systemic steroids were given.  She also reports that she jewelry reacts with her skin.  She has had a rash with latex gloves approximately 20 years ago.  She did have environmental allergy testing done in 2010.  She reports no food allergies from that test.  Patient's distribution of the rash is behind her ears on her neck and on her upper chest.  With these episodes no associated vomiting or diarrhea.  No breathing.  She still continues to have some rash although improved.  She has used a new styling gel started 1 month ago.  She just recently stopped using it with these reactions.  She has new headgear for her CPAP however she contacted the company and see exact same 1 as before just a little bit different sizing.  She has a curl cream that she is using over the past 6 months.  She did use it in the preceding week.  She has a new detergent that she has been using over the past 1 month at home.  She is currently using cetirizine 20 mg daily she is using Benadryl at night for itching and sleep.  Patient uses sertraline for the past 30 years.  She uses trazodone.  Patient was on respirdone.  Patient switched to Latuda 1 month ago.  She was having problems with sleep on this medication and stopped it after 2 weeks.  She is back on Respirdone.    Review of symptoms:  As above, otherwise negative    Past medical history: Depression, migraine headache, irritable bowel syndrome, generalized anxiety disorder    Allergies: No known allergies to foods or hymenoptera venom.  History of allergy to Augmentin and Cefdinir.    Family history: Sister with environmental allergies.    Social story: Currently is lived in the same  apartment with central air-conditioning and a basement.  2 cats in the home.  No indoor smoking or significant cigarette smoke exposure.  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    Medications: reviewed in chart    Physical Exam:  General:  Alert and in no apparent distress.  Eyes:  Sclera clear.  Ears: TMs translucent grey with bony landmarks visible. Nose: Pale, boggy mucosal membranes.  Throat: Pink, moist.  No lesions.  Neck: Supple.  No lymphadenopathy.  Lungs: CTA.  CV: Regular rate and rhythm. Extremities: Well perfused.  No clubbing or cyanosis. Skin: Eczematous rash on upper chest also behind bilateral ears.    45 min spent in direct contact with the patient.  More than 50% in counseling regarding contact allergy and management.

## 2021-06-05 VITALS
SYSTOLIC BLOOD PRESSURE: 102 MMHG | TEMPERATURE: 97.1 F | OXYGEN SATURATION: 98 % | BODY MASS INDEX: 27.01 KG/M2 | HEIGHT: 67 IN | DIASTOLIC BLOOD PRESSURE: 64 MMHG | HEART RATE: 70 BPM | WEIGHT: 172.1 LBS

## 2021-06-06 NOTE — PROGRESS NOTES
[facility] Clinic Note  Valentina Humphries   49 y.o. female    Date of Visit: 2/26/2020  Chief Complaint   Patient presents with     Establish Care     Referral     Hip pain and wants to go back to physical therapy - Allergist referral as well     Immunizations     Shingrix     Assessment/Plan  1. Lumbar radiculopathy  Takes acetaminophen for pain currently.  May benefit from gabapentin in the future if symptoms do not improve with PT.  - Ambulatory referral to Adult PT- Internal    2. Need for shingles vaccine  - Varicella Zoster, Recombinant Vaccine IM  - Varicella Zoster, Recombinant Vaccine IM; Future    3. Major depressive disorder with current active episode, unspecified depression episode severity, unspecified whether recurrent  4. EBER (generalized anxiety disorder)  Will continue Zoloft and risperidone. Trazodone for insomnia.  Encouraged her to set up appointments with her psychologist and Psychiatrist as it has been sometime since she is last seen them.    Much or all of the text in this note was generated through the use of Dragon Dictate voice-to-text software. Errors in spelling or words which seem out of context are unintentional. Sound alike errors, in particular, may have escaped editing  Suleman Guillen MD    Return if symptoms worsen or fail to improve.    Subjective  This 49 y.o. old female. Patient of Dr. Amador.  Has been seen seen recently for concern of an allergic reaction.  Treated with prednisone for 6 days and referred to an allergist.  More recently she has shared concern for exposure to an allergen at her workplace.  Saw Derm on 1/20/2020 when diagnosed with viral wart on the right dorsal hand.  Saw allergy on 12/18/19 for allergic contact dermatitis, presumed 2/2 a hair product. Rx prednisone taper, cetirizine in the AM three times a day, benadryl and topical triamcinolone.Requested completion of [place] paperwork but is unclear if this has been done  yet.   History pertinent for MDD, EBER, STEVEN on CPAP.  CBC, iron panel, A1c and CBC normal on 5/30/2019.   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 Enjoying more time for herself. Endorses recurrence of hip pain and wants to go back to PT for more exercises. Left lateral hip throbs, more when she standings up. No radiation, N/T, but there is discomfort from the buttocks all the way to the medial groin.  Takes acetaminophen for pain.  NSAIDs upset her stomach.  Better with walking, worse with sitting for prolong periods of time, and then driving. Has control of bowel and bladder function. Did have to wear a pad after a GI bug.  Endorses poor p.o. intake of late and noted that she has a history of bulimia, but states it is not an issue.  Endorses stress with 's medical issues and his connection with taking better care of himself.  Endorses trying to practice self-care and acknowledges that she can only encourage him so much.  Endorses some residual rash on the anterior chest. Now only using the triamcinolone cream, but not regularly. Wants to follow-up with allergy regarding what potential culprit cause the rash.      ROS A comprehensive review of systems was performed and was otherwise negative    Medications, allergies, and problem list were reviewed and updated    Exam  General appearance: Pleasant, nontoxic-appearing, no acute distress, alert and oriented x4  Vitals:    02/26/20 0848   BP: 102/58   Pulse: 69   SpO2: 98%   RESPIRATORY: Bilaterally with no crackles, wheezing or rhonchi  CARDIOVASCULAR: Regular S1 and S2.  Radial pulses intact.  No edema.  MUSCULOSKELETAL: Skeletal configuration was normal and muscle mass was normal for age. Joint appearance was overall normal.  Tightness in the left proximal hamstring with straight leg raise.  Nontender over left greater trochanter.  Relief of discomfort over lateral thigh and gluteal muscles on the left.  SKIN/HAIR/NAILS:  Minimal erythematous dermatitis over anterior chest.  Skin color otherwise was normal.  There were no skin lesions.    NEUROLOGIC: Alert and oriented to person, place, time, and circumstance. Speech was normal. Cranial nerves were normal. Motor strength was normal for age   PSYCHIATRIC:  Mood and affect were normal and the patient had normal recent and remote memory. The patient's judgment and insight were normal.    Additional Information   Current Outpatient Medications   Medication Sig Dispense Refill     acetaminophen (TYLENOL) 500 MG tablet Take 500 mg by mouth every 6 (six) hours as needed.        calcium/D3/B12/folic acid/B6 (CALCIUM CARB-VIT G7-T62-E8-FA ORAL) Take 1 tablet by mouth daily.       desogestrel-ethinyl estradiol (APRI) 0.15-0.03 mg per tablet Take 1 tablet by mouth daily. 3 Package 3     dicyclomine (BENTYL) 10 MG capsule Take 1 capsule by mouth 4 (four) times a day as needed.       fluticasone (FLONASE) 50 mcg/actuation nasal spray 2 sprays into each nostril daily. (Patient taking differently: 2 sprays into each nostril daily as needed. ) 16 g 11     glucosamine-chondroitin 500-400 mg cap Take 1 capsule by mouth daily.        LORazepam (ATIVAN) 0.5 MG tablet Take 2 tablets (1 mg total) by mouth 2 (two) times a day as needed. (Patient taking differently: Take 0.5-1 mg by mouth 2 (two) times a day as needed (maxium of 3 tablets per day). ) 60 tablet 0     polyethylene glycol (PURELAX) 17 gram/dose powder Take 17 g by mouth daily.        PREVIDENT 5000 ENAMEL PROTECT 1.1-5 % Pste USE WITH TOOTHBRUSH IN THE MORNING AND AT BEDTIME DAILY 100 mL 11     risperiDONE (RISPERDAL) 0.5 MG tablet Take 0.5-1 mg by mouth at bedtime.       sertraline (ZOLOFT) 100 MG tablet Take 100 mg by mouth daily.        sertraline (ZOLOFT) 50 MG tablet Take 50 mg by mouth bedtime.       traZODone (DESYREL) 50 MG tablet Take 100-150 mg by mouth at bedtime.        triamcinolone (KENALOG) 0.1 % ointment Apply topically 2 (two)  times a day. To affected areas 80 g 1     yeast,dried, S. cerevisiae, (YEAST ORAL) Take 1 Dose by mouth daily as needed (low energy).       albuterol (PROAIR HFA;PROVENTIL HFA;VENTOLIN HFA) 90 mcg/actuation inhaler Inhale 1-2 puffs every 4 (four) hours as needed for wheezing. 1 Inhaler 11     cetirizine (ZYRTEC) 10 MG tablet Take 10 mg by mouth daily.       EPINEPHrine (EPIPEN) 0.3 mg/0.3 mL injection Inject 0.3 mL (0.3 mg total) into the shoulder, thigh, or buttocks as needed for anaphylaxis. Inject into thigh. 1 Pre-filled Pen Syringe 0     famotidine (PEPCID) 20 MG tablet Take 20 mg by mouth daily before breakfast.       No current facility-administered medications for this visit.      Allergies   Allergen Reactions     Amoxicillin-Pot Clavulanate Unknown     Diarrhea and vomiting.      Cefdinir Wheezing     Patient is able to tolerate Penicillin and Amoxicillin without any problems...     Latex Itching     Social History     Social History Narrative    Lives with her , Chris.  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  [employer] two-miler at [employer].  Likes to swim and bikes to work.       Social History     Tobacco Use     Smoking status: Never Smoker     Smokeless tobacco: Never Used   Substance Use Topics     Alcohol use: No     Drug use: No     Family History   Problem Relation Age of Onset     Sudden death Mother      Glaucoma Father      Osteoarthritis Sister         hip     Osteoarthritis Sister      No Medical Problems Sister         faternal twin     Breast cancer Paternal Aunt 60     Stroke Maternal Grandmother      Past Surgical History:   Procedure Laterality Date     WISDOM TOOTH EXTRACTION  1987       Time: total time spent with the patient was 25 minutes of which >50% was spent in counseling and coordination of care

## 2021-06-07 NOTE — TELEPHONE ENCOUNTER
Thank you for the update.  I agree with the recommendations. Ms Humphries does not have an inherent cardiopulmonary comorbidity or immunosuppressive state.  Ideally her facility/place of work should have a protocol for COVID-19 for both residents and the healthcare/management staff in respect to testing, isolation and treatment/monitoring.

## 2021-06-07 NOTE — TELEPHONE ENCOUNTER
"Pt calling  Works in senior living facility  Is wondering how much of a risk she is to get Covid-19 since there is an outbreak at this facility  \"they want me to run activities on the unit the outbreak is in.    Supported pt that this is a decision she & her family need to make for what their \"aceptable risk\" factor is.    Pt understood & will call back if she has other questions.  Maricruz Garcia RN  [facility] Nurse Advisor    "

## 2021-06-08 NOTE — TELEPHONE ENCOUNTER
CT Calcium Risk Screening Questions:    1. Patient's age is over 35 years old? 55 year old (If not, the patient must consult their primary care or cardiology physician for a referral)      2. Have you been previously diagnosed with heart disease, a history of angioplasty, stenting of the heart, or coronary artery bypass surgery? No (If yes, refer the patient back to their primary care or cardiology provider and do not proceed with scheduling. This test may not be appropriate for patients already diagnosed with heart disease because the test is a screening for the presence or absence of the disease.)    3. Do you have at least “two” of the following risk factors: (check all that apply)  [x] Men over age 45  [] Women over age 55  [] Obesity   [] Physically inactive (less than 30-60 minutes of exercise each week)  [] Diabetes  [] Family history of coronary artery disease or stroke  [] High cholesterol  [] Depression  [x] High blood pressure  [] Smoker     *If any of the below applies, “do not” schedule the test and ask the patient to consult their primary care physician or cardiologist for a referral.  · Patient is under 35  • Is currently pregnant, or think they might be pregnant, or is currently breastfeeding.  · Has been previously diagnosed with heart disease  · Doesn’t meet at least two of the risk factors  · Has a pacemaker    Terms and conditions: (Read through with patient)  · I understand this is NOT a diagnostic examination or therapy session  · I understand that heart scans use a type of X-ray and therefore exposure to radiation   · I understand the price of the test is $49. This is self-pay only and will not be billed to my insurance company  • If your Primary Care Physician:  o Is not an [facility] physician, it is your responsibility to inform him/her of your test results.   o Is an [facility] physician, and you would like us to send results, we will do so. Otherwise, you may discuss the  Refill Approved    Rx renewed per Medication Renewal Policy. Medication was last renewed on 6/20/19.    Maricruz Katz, [facility] Triage/Med Refill 5/11/2020     Requested Prescriptions   Pending Prescriptions Disp Refills     APRI 0.15-0.03 mg per tablet [Pharmacy Med Name: APRI 28 DAY TABLET] 84 tablet 3     Sig: TAKE 1 TABLET BY MOUTH EVERY DAY       Oral Contraceptives Protocol Passed - 5/8/2020  9:41 AM        Passed - Visit with PCP or prescribing provider visit in last 12 months      Last office visit with prescriber/PCP: 10/10/2018 Javier Amador MD OR same dept: 2/26/2020 Suleman Guillen MD OR same specialty: 2/26/2020 Suleman Guileln MD  Last physical: 5/30/2019 Last MTM visit: Visit date not found   Next visit within 3 mo: Visit date not found  Next physical within 3 mo: Visit date not found  Prescriber OR PCP: Javier Amador MD  Last diagnosis associated with med order: 1. Encounter for contraceptive management, unspecified type  - APRI 0.15-0.03 mg per tablet [Pharmacy Med Name: APRI 28 DAY TABLET]; TAKE 1 TABLET BY MOUTH EVERY DAY  Dispense: 84 tablet; Refill: 3    If protocol passes may refill for 12 months if within 3 months of last provider visit (or a total of 15 months).                             findings with the Primary Care physician at your discretion. Confirm PCP: Alfredo Jennings MD     Patient met Criteria? Yes

## 2021-06-08 NOTE — PROGRESS NOTES
Office Visit - Physical   Valentina DIEGO   46 y.o.  female    Date of visit: 2/10/2017  Physician: Javier Amador MD     Assessment and Plan   1. Routine general medical examination at a health care facility  This is a healthy 46-year-old woman.  She is up-to-date on her health maintenance.  She does need colonoscopies every 3 years and an order was placed.  Mammogram order place.  Pap smear in the next 3-5 years and offered referral to gynecology and she would like to defer at this time.  She has never had an abnormal fashion.  Immunizations are up-to-date.  Regular exercise and healthy diet recommended    2. Obstructive sleep apnea syndrome  Continue CPAP    3. Mixed anxiety depressive disorder  Stable, continue current medications per psychiatry  - Thyroid Stimulating Hormone (TSH)    4. History of colonic polyps  See above, colonoscopy due now every 3 years    5. Irritable bowel syndrome  She does see [place] [facility], continue current plan at  [facility](CBC w/o Differential)  - Basic Metabolic Panel; Future  - Basic Metabolic Panel  - Ambulatory referral to Gastroenterology    6. Migraine without status migrainosus, not intractable  Stable    7. Gastroesophageal reflux disease without esophagitis  Continue omeprazole    8. Screening, lipid  - Lipid [place]; Future  - Lipid [place]    9. Screening mammogram, encounter for  - Mammo Screening Bilateral; Future    10. Screening for colon cancer  - Ambulatory referral for Colonoscopy      The following high BMI interventions were performed this visit: lifestyle education regarding diet    Return in about 1 year (around 2/10/2018) for recheck.     Chief Complaint   Chief Complaint   Patient presents with     Annual Exam     Patient is fasting        Patient Profile   Social History     Social History Narrative    Lives with her , Chris.  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  at [place].  [employer]  at [place]        Past Medical History   Patient Active Problem List   Diagnosis     Depression / Anxiety - [facility]     History of colonic polyps     Migraine without status migrainosus, not intractable     STEVEN-CPAP     Irritable bowel syndrome       Past Surgical History  She has a past surgical history that includes No past surgeries.     History of Present Illness   This 46 y.o. old woman comes in to [facility] care and for annual physical.  Her medical history was reviewed, electronic medical record is updated and it is reflected in his note.  She has no concerns today.     Review of Systems: A comprehensive review of systems was negative except as noted.     Medications and Allergies   Current Outpatient Prescriptions   Medication Sig Dispense Refill     acetaminophen (TYLENOL) 650 MG CR tablet Take 650 mg by mouth every 6 (six) hours as needed.       cetirizine (ZYRTEC) 10 MG tablet Take 10 mg by mouth daily.       cholecalciferol, vitamin D3, 2,000 unit cap Take 2,000 Units by mouth.       dicyclomine (BENTYL) 10 MG capsule Take 1 capsule by mouth 4 (four) times a day as needed.       Lactobacillus acidoph-pectin cap Take 1 capsule by mouth daily.       LORazepam (ATIVAN) 0.5 MG tablet Take 0.5 mg by mouth daily as needed.       omega-3 fatty acids-vitamin E 1,000 mg cap Take 1 capsule by mouth daily.       omeprazole (PRILOSEC) 20 MG capsule Take 40 mg by mouth daily.  8     POLYETHYLENE GLYCOL 1000 MISC Use 17 g As Directed daily.       PREVIDENT 5000 SENSITIVE 1.1-5 % Pste USE WITH TOOTHBRUSH QAM AND QHS  0     RECLIPSEN, 28, 0.15-0.03 mg per tablet Take 1 tablet by mouth bedtime.  2     risperiDONE (RISPERDAL) 1 MG tablet Take 1 mg by mouth bedtime.  1     sertraline (ZOLOFT) 100 MG tablet Take 100 mg by mouth bedtime.       sertraline (ZOLOFT) 50 MG tablet Take 50 mg by mouth bedtime.       traZODone (DESYREL) 100 MG tablet Take 100 mg by mouth bedtime.       No current facility-administered  "medications for this visit.      Allergies   Allergen Reactions     Amoxicillin-Pot Clavulanate Unknown     Diarrhea and vomiting.      Cefdinir Wheezing     Patient is able to tolerate Penicillin and Amoxicillin without any problems...        Family and Social History   Family History   Problem Relation Age of Onset     Sudden death Mother      Glaucoma Father      No Medical Problems Sister      No Medical Problems Sister      No Medical Problems Sister         Social History   Substance Use Topics     Smoking status: Never Smoker     Smokeless tobacco: None     Alcohol use No        Physical Exam   General Appearance:   Appropriate pleasant and in no acute distress    Visit Vitals     /64 (Patient Site: Right Arm, Patient Position: Sitting, Cuff Size: Adult Regular)     Pulse 74     Ht 5' 7.75\" (1.721 m)     Wt 170 lb (77.1 kg)     SpO2 96%     BMI 26.04 kg/m2       EYES: Eyelids, conjunctiva, and sclera were normal. Pupils were normal. Cornea, iris, and lens were normal bilaterally.  HEAD, EARS, NOSE, MOUTH, AND THROAT: Head and face were normal. Hearing was normal to voice and the ears were normal to external exam. Nose appearance was normal and there was no discharge. Oropharynx was normal.  NECK: Neck appearance was normal. There were no neck masses and the thyroid was not enlarged.  RESPIRATORY: Breathing pattern was normal and the chest moved symmetrically.  Percussion/auscultatory percussion was normal.  Lung sounds were normal and there were no abnormal sounds.  CARDIOVASCULAR: Heart rate and rhythm were normal.  S1 and S2 were normal and there were no extra sounds or murmurs. Peripheral pulses in arms and legs were normal.  Jugular venous pressure was normal.  There was no peripheral edema.  GASTROINTESTINAL: The abdomen was normal in contour.  Bowel sounds were present.  Percussion detected no organ enlargement or tenderness.  Palpation detected no tenderness, mass, or enlarged organs. "   MUSCULOSKELETAL: Skeletal configuration was normal and muscle mass was normal for age. Joint appearance was overall normal.  LYMPHATIC: There were no enlarged nodes.  SKIN/HAIR/NAILS: Skin color was normal.  There were no skin lesions.  Hair and nails were normal.  NEUROLOGIC: The patient was alert and oriented to person, place, time, and circumstance. Speech was normal. Cranial nerves were normal. Motor strength was normal for age. The patient was normally coordinated.  PSYCHIATRIC:  Mood and affect were normal and the patient had normal recent and remote memory. The patient's judgment and insight were normal.  CHEST WALL/BREASTS:Breasts are Symmetric, No breast masses, No nipple discharge.          Additional Information        Javier Amador MD  Internal Medicine  Contact me at [phone]

## 2021-06-09 NOTE — TELEPHONE ENCOUNTER
Refill Approved    Rx renewed per Medication Renewal Policy. Medication was last renewed on 10/10/18.    Ov: 2/26/2020    Marjan Beltrán, [facility] Triage/Med Refill 7/10/2020     Requested Prescriptions   Pending Prescriptions Disp Refills     fluticasone propionate (FLONASE) 50 mcg/actuation nasal spray [Pharmacy Med Name: FLUTICASONE PROP 50 MCG SPRAY] 16 g 11     Sig: SPRAY 2 SPRAYS INTO EACH NOSTRIL EVERY DAY       Nasal Steroid Refill Protocol Passed - 7/7/2020  9:38 AM        Passed - Patient has had office visit/physical in last 2 years     Last office visit with prescriber/PCP: 10/10/2018 OR same dept: 2/26/2020 Suleman Guillen MD OR same specialty: 2/26/2020 Suleman Guillen MD Last physical: 5/30/2019 Last MTM visit: Visit date not found    Next appt within 3 mo: Visit date not found  Next physical within 3 mo: Visit date not found  Prescriber OR PCP: Javier Amador MD  Last diagnosis associated with med order: There are no diagnoses linked to this encounter.   If protocol passes may refill for 12 months if within 3 months of last provider visit (or a total of 15 months).

## 2021-06-10 NOTE — TELEPHONE ENCOUNTER
Refill Approved    Rx renewed per Medication Renewal Policy. Medication was last renewed on 2/211/20.    Maricruz Katz, [facility] Triage/Med Refill 8/27/2020     Requested Prescriptions   Pending Prescriptions Disp Refills     cetirizine (ZYRTEC) 10 MG tablet [Pharmacy Med Name: CETIRIZINE HCL 10 MG TABLET] 90 tablet 3     Sig: TAKE 1 TABLET BY MOUTH EVERY DAY       Antihistamine Refill Protocol Passed - 8/26/2020  1:59 PM        Passed - Patient has had office visit/physical in last year     Last office visit with prescriber/PCP: 10/10/2018 Javier Amador MD OR same dept: 2/26/2020 Suleman Guillen MD OR same specialty: 2/26/2020 Suleman Guillen MD  Last physical: 5/30/2019 Last MTM visit: Visit date not found   Next visit within 3 mo: Visit date not found  Next physical within 3 mo: Visit date not found  Prescriber OR PCP: Javier Amador MD  Last diagnosis associated with med order: There are no diagnoses linked to this encounter.  If protocol passes may refill for 12 months if within 3 months of last provider visit (or a total of 15 months).

## 2021-06-10 NOTE — TELEPHONE ENCOUNTER
LM for patient - need to reschedule PX on 9/3 to another time slot that allows for 40 minute appointment. If patient is able, please reschedule to 3:00 same day. Ok to take VV per Dr. Guillen. OR change to OV.

## 2021-06-11 NOTE — TELEPHONE ENCOUNTER
She does not plan on seeing orthopedics right away. She asked how long the referral would be good for.    I will leave it as a virtual visit.    Oanh FAIR LPN .......... 4:59 PM  09/02/20  [facility]

## 2021-06-11 NOTE — PROGRESS NOTES
"  Office Visit - Follow Up   aVlentina DIEGO   47 y.o. female    Date of Visit: 7/11/2017    Chief Complaint   Patient presents with     Nausea     Referral     to derm        Assessment and Plan   1. Screening for cervical cancer  - Ambulatory referral to Obstetrics / Gynecology    2. Skin lesion  - Ambulatory referral to Dermatology    3. NauseaVertigoHeadacheLight sensitivity  This is equally a migraine headache provoked by caffeine is now resolved.  We discussed moderating her caffeine intake.  If symptoms return she will let me know    Return if symptoms worsen or fail to improve, for recheck.     History of Present Illness   This 47 y.o. old woman comes in for evaluation of about a week of nausea, dizziness, light sensitivity and headache.  She traveled to [place] last week.  On the drive home she was a bit sleepy and she drank a triple latte.  She started to feel nauseated later that day.  She did not vomit.  She had some light sensitivity and headache.  She felt a little vertiginous.  This persisted over the last few days and she took some migraine headache and it now seems to resolve.  She has had similar symptoms in the past when she has had a lot of caffeine.  No persistent headache no visual changes she has had no chest pain no weakness no numbness.  She also has a couple lesions on her face she would like me to look at a 1 on her arm that keeps bleeding.    Review of Systems: A comprehensive review of systems was negative except as noted.     Medications, Allergies and Problem List   Reviewed and updated     Physical Exam   General Appearance:   No acute Distor    /72 (Patient Site: Left Arm, Patient Position: Sitting, Cuff Size: Adult Regular)  Pulse 73  Ht 5' 7.75\" (1.721 m)  Wt 172 lb (78 kg)  SpO2 98%  BMI 26.35 kg/m2    HEENT exam is unremarkable neck supple cardiovascular regular rate and rhythm no murmur gallop or rub lungs clear to auscultation bilaterally neurologic " exam is nonfocal, no nystagmus pupils are reactive, no pronator drift, normal coordination, reflexes are symmetric strength and sensation symmetric and intact mood and affect appropriate      Additional Information   Current Outpatient Prescriptions   Medication Sig Dispense Refill     acetaminophen (TYLENOL) 650 MG CR tablet Take 650 mg by mouth every 6 (six) hours as needed.       cetirizine (ZYRTEC) 10 MG tablet Take 10 mg by mouth daily.       cholecalciferol, vitamin D3, 2,000 unit cap Take 2,000 Units by mouth.       dicyclomine (BENTYL) 10 MG capsule Take 1 capsule by mouth 4 (four) times a day as needed.       Lactobacillus acidoph-pectin cap Take 1 capsule by mouth daily.       LORazepam (ATIVAN) 0.5 MG tablet Take 0.5 mg by mouth daily as needed.       omega-3 fatty acids-vitamin E 1,000 mg cap Take 1 capsule by mouth daily.       omeprazole (PRILOSEC) 20 MG capsule Take 40 mg by mouth daily.  8     POLYETHYLENE GLYCOL 1000 MISC Use 17 g As Directed daily.       PREVIDENT 5000 SENSITIVE 1.1-5 % Pste USE WITH TOOTHBRUSH QAM AND QHS  0     RECLIPSEN, 28, 0.15-0.03 mg per tablet Take 1 tablet by mouth bedtime.  2     risperiDONE (RISPERDAL) 1 MG tablet Take 1 mg by mouth bedtime.  1     sertraline (ZOLOFT) 100 MG tablet Take 100 mg by mouth bedtime.       sertraline (ZOLOFT) 50 MG tablet Take 50 mg by mouth bedtime.       traZODone (DESYREL) 100 MG tablet Take 100 mg by mouth bedtime.       No current facility-administered medications for this visit.      Allergies   Allergen Reactions     Amoxicillin-Pot Clavulanate Unknown     Diarrhea and vomiting.      Cefdinir Wheezing     Patient is able to tolerate Penicillin and Amoxicillin without any problems...     Social History   Substance Use Topics     Smoking status: Never Smoker     Smokeless tobacco: None     Alcohol use No       Review and/or order of clinical lab tests:  Review and/or order of radiology tests:  Review and/or order of medicine  tests:  Discussion of test results with performing physician:  Decision to obtain old records and/or obtain history from someone other than the patient:  Review and summarization of old records and/or obtaining history from someone other than the patient and.or discussion of case with another health care provider:  Independent visualization of image, tracing or specimen itself:    Time:      Javier Amador MD

## 2021-06-11 NOTE — TELEPHONE ENCOUNTER
Two questions: If she was recently exposed to COVID, then would it be prudent for her to be seen at all in person either in our clinic or at orthopedics?  Second question, the x-ray of the left hip from 2018 was read to be normal.  I am okay with an orthopedic referral but I think a video visit would probably be more appropriate based on the first question?

## 2021-06-11 NOTE — TELEPHONE ENCOUNTER
Dr. 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     She has tested negative previously but has not been tested recently. Last test was in June 2020.    Patient is scheduled tomorrow for an OV, however all she wants if a referral to orthopedics for her long standing hip pain. Mainly the LT hip. She did have an xray completed due to this in October 2018.    No recent falls or injury - same tightness/pain.    She is wondering if she can just get a referral instead of coming in to be seen.    Please advise.    Thank you.    Oanh FAIR LPN .......... 10:01 AM  09/02/20  [facility]

## 2021-06-11 NOTE — PROGRESS NOTES
Office Visit - Follow Up   Valentina DIEGO   47 y.o. female    Date of Visit: 6/15/2017    Chief Complaint   Patient presents with     Conjunctivitis     Right eye more than the Left started yesterday        Assessment and Plan   1. Acute conjunctivitis of both eyes  Complains of itchy right eye followed by redness of the right eye for 2 days.  Today noticed this redness affected the left eye also.  For these reasons she came to see me.  Assessed her to have acute conjunctivitis of both eyes.  Will treat empirically with sulfa eyedrop 4 times a day.  Advised to continue using eyedrops until the redness resolved.  Advised to regularly wash her hands after touching her eyes to prevent spread.  - sulfacetamide (BLEPH-10) 10 % ophthalmic solution; Administer 2 drops to both eyes 4 (four) times a day for 7 days.  Dispense: 5 mL; Refill: 0\      Follow up in as needed.       History of Present Illness   This 47 y.o. old eye female, patient of Dr. Amador, complains of bilateral eye redness.  Apparently started to have itchy right eye followed by redness 2 days ago and subsequently also affected the left eye this morning.  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  Does not have cough, colds and fever.  Overall feels well.    Review of Systems   A 12 point comprehensive review of systems was negative except as noted..     Medications, Allergies and Problem List   Reviewed and updated             Chief Complaint   Conjunctivitis (Right eye more than the Left started yesterday)       Patient Profile   Social History     Social History Narrative    Lives with her , Chris.  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 [employer] at [employer].  [employer] at [employer]        Past Medical History   Patient Active Problem List   Diagnosis     Depression / Anxiety - [facility]     History of colonic polyps     Migraine without status migrainosus, not  intractable     STEVEN-CPAP     Irritable bowel syndrome       Past Surgical History  She has a past surgical history that includes No past surgeries.       Medications and Allergies   Current Outpatient Prescriptions   Medication Sig     acetaminophen (TYLENOL) 650 MG CR tablet Take 650 mg by mouth every 6 (six) hours as needed.     cetirizine (ZYRTEC) 10 MG tablet Take 10 mg by mouth daily.     cholecalciferol, vitamin D3, 2,000 unit cap Take 2,000 Units by mouth.     dicyclomine (BENTYL) 10 MG capsule Take 1 capsule by mouth 4 (four) times a day as needed.     Lactobacillus acidoph-pectin cap Take 1 capsule by mouth daily.     LORazepam (ATIVAN) 0.5 MG tablet Take 0.5 mg by mouth daily as needed.     omega-3 fatty acids-vitamin E 1,000 mg cap Take 1 capsule by mouth daily.     omeprazole (PRILOSEC) 20 MG capsule Take 40 mg by mouth daily.     POLYETHYLENE GLYCOL 1000 MISC Use 17 g As Directed daily.     PREVIDENT 5000 SENSITIVE 1.1-5 % Pste USE WITH TOOTHBRUSH QAM AND QHS     RECLIPSEN, 28, 0.15-0.03 mg per tablet Take 1 tablet by mouth bedtime.     risperiDONE (RISPERDAL) 1 MG tablet Take 1 mg by mouth bedtime.     sertraline (ZOLOFT) 100 MG tablet Take 100 mg by mouth bedtime.     sertraline (ZOLOFT) 50 MG tablet Take 50 mg by mouth bedtime.     traZODone (DESYREL) 100 MG tablet Take 100 mg by mouth bedtime.     sulfacetamide (BLEPH-10) 10 % ophthalmic solution Administer 2 drops to both eyes 4 (four) times a day for 7 days.     Allergies   Allergen Reactions     Amoxicillin-Pot Clavulanate Unknown     Diarrhea and vomiting.      Cefdinir Wheezing     Patient is able to tolerate Penicillin and Amoxicillin without any problems...        Family and Social History   Family History   Problem Relation Age of Onset     Sudden death Mother      Glaucoma Father      No Medical Problems Sister      No Medical Problems Sister      No Medical Problems Sister      Breast cancer Paternal Aunt 60        Social History    Substance Use Topics     Smoking status: Never Smoker     Smokeless tobacco: None     Alcohol use No      Physical Exam       Physical Exam  /54  Pulse 64  Wt 169 lb 12 oz (77 kg)  LMP 05/12/2017  BMI 26 kg/m2  General appearance: alert, appears stated age, cooperative and no distress  Head: Normocephalic, without obvious abnormality, atraumatic  Eyes: Injected and red conjunctiva of both eyes  Throat: lips, mucosa, and tongue normal; teeth and gums normal  Neck: no adenopathy, no carotid bruit, no JVD, supple, symmetrical, trachea midline and thyroid not enlarged, symmetric, no tenderness/mass/nodules  Lungs: clear to auscultation bilaterally  Heart: regular rate and rhythm, S1, S2 normal, no murmur, click, rub or gallop  Abdomen: soft, non-tender; bowel sounds normal; no masses,  no organomegaly  Extremities: extremities normal, atraumatic, no cyanosis or edema  Skin: Skin color, texture, turgor normal. No rashes or lesions     Additional Information        Antony Marie MD  Internal Medicine  Contact me at [phone]     Additional Information   Current Outpatient Prescriptions   Medication Sig     acetaminophen (TYLENOL) 650 MG CR tablet Take 650 mg by mouth every 6 (six) hours as needed.     cetirizine (ZYRTEC) 10 MG tablet Take 10 mg by mouth daily.     cholecalciferol, vitamin D3, 2,000 unit cap Take 2,000 Units by mouth.     dicyclomine (BENTYL) 10 MG capsule Take 1 capsule by mouth 4 (four) times a day as needed.     Lactobacillus acidoph-pectin cap Take 1 capsule by mouth daily.     LORazepam (ATIVAN) 0.5 MG tablet Take 0.5 mg by mouth daily as needed.     omega-3 fatty acids-vitamin E 1,000 mg cap Take 1 capsule by mouth daily.     omeprazole (PRILOSEC) 20 MG capsule Take 40 mg by mouth daily.     POLYETHYLENE GLYCOL 1000 MISC Use 17 g As Directed daily.     PREVIDENT 5000 SENSITIVE 1.1-5 % Pste USE WITH TOOTHBRUSH QA AND QHS     RECLIPSEN, 28, 0.15-0.03 mg per tablet Take 1 tablet by  mouth bedtime.     risperiDONE (RISPERDAL) 1 MG tablet Take 1 mg by mouth bedtime.     sertraline (ZOLOFT) 100 MG tablet Take 100 mg by mouth bedtime.     sertraline (ZOLOFT) 50 MG tablet Take 50 mg by mouth bedtime.     traZODone (DESYREL) 100 MG tablet Take 100 mg by mouth bedtime.     sulfacetamide (BLEPH-10) 10 % ophthalmic solution Administer 2 drops to both eyes 4 (four) times a day for 7 days.     Allergies   Allergen Reactions     Amoxicillin-Pot Clavulanate Unknown     Diarrhea and vomiting.      Cefdinir Wheezing     Patient is able to tolerate Penicillin and Amoxicillin without any problems...     Social History   Substance Use Topics     Smoking status: Never Smoker     Smokeless tobacco: None     Alcohol use No         Time: total time spent with the patient was 15 minutes of which >50% was spent in counseling and coordination of care

## 2021-06-11 NOTE — PROGRESS NOTES
"Valentina Humphries is a 50 y.o. female who is being evaluated via a billable telephone visit.      The patient has been notified of following:     \"This telephone visit will be conducted via a call between you and your physician/provider. We have found that certain health care needs can be provided without the need for a physical exam.  This service lets us provide the care you need with a short phone conversation.  If a prescription is necessary we can send it directly to your pharmacy.  If lab work is needed we can place an order for that and you can then stop by our lab to have the test done at a later time.    Telephone visits are billed at different rates depending on your insurance coverage. During this emergency period, for some insurers they may be billed the same as an in-person visit.  Please reach out to your insurance provider with any questions.    If during the course of the call the physician/provider feels a telephone visit is not appropriate, you will not be charged for this service.\"    Patient has given verbal consent to a Telephone visit? Yes    What phone number would you like to be contacted at? [phone]    Patient would like to receive their AVS by AVS Preference: [place].    Additional provider notes:  Subjective: Today she reports that her left hip is progressively worse. She notes that it throbs and occasionally she will get some numbness in her foot. It used to hurt when he would stand. Now she is limping and it hurts to extend the hip.  This is similar to her symptoms in 10/2018, but now it is more constant. No trauma, fall. She notes that with her depression and anxiety, her whole body tightens up and it does not help it. Pain is 6-8/10. Did work with PT back in 10-11/2018 X 4 sessions. 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 Was tested Sunday 8/30 and told she came back negative yesterday. States she has been doing the PT " exercises about TIW on average, but she is unsure whether exercise is sufficient. Can use APAP, which she is using PRN.     Assessment/Plan:  1. Hip pain, left  2. Radicular pain of left lower extremity  Unsure of etiology.  Disinterested in pursuing PT.  Counseled to continue to use PRN acetaminophen for pain.  Radicular symptoms are concerning and likely will benefit from lumbar imaging.  This is in the setting of known disc herniation and a mild to moderate disc degeneration in the lumbar spine.  - Ambulatory referral to Orthopedics    Phone call duration: 13 minutes    Suleman Guillen MD

## 2021-06-12 NOTE — TELEPHONE ENCOUNTER
RN cannot approve Refill Request    RN can NOT refill this medication med is not covered by policy/route to provider. Last office visit: 10/10/2018 Javier Amador MD Last Physical: 5/30/2019 Last MTM visit: Visit date not found Last visit same specialty: 2/26/2020 Suleman Guillen MD.  Next visit within 3 mo: Visit date not found  Next physical within 3 mo: Visit date not found      Hamida Cortes, [facility] Triage/Med Refill 10/11/2020    Requested Prescriptions   Pending Prescriptions Disp Refills     PREVIDENT 5000 ENAMEL PROTECT 1.1-5 % Pste [Pharmacy Med Name: PREVIDENT 5000 ENAMEL PROTECT]  11     Sig: USE WITH TOOTHBRUSH IN THE MORNING AND AT BEDTIME DAILY       There is no refill protocol information for this order

## 2021-06-12 NOTE — PROGRESS NOTES
ASSESSMENT:   1. Acute conjunctivitis  ciprofloxacin HCl (CILOXAN) 0.3 % ophthalmic solution   2. URI (upper respiratory infection)     3. Sinus pressure  amoxicillin (AMOXIL) 875 MG tablet        PLAN:  1-Ciprofloxacin eye drops were prescribed. Warm moist compresses to eye to soothe and clear discharge.  Contagious for 24 hours after starting antibiotic eye drops. Discard disposable contacts or cleanse permanent per ophthalmologist's advice. No wear for 7 days, then may resume.  Discard and replace eye make-up, no use until treatment is complete.Follow-up with ophthalmologist or PCP if symptoms not improved within 24-48 hours, sooner if worsening in any way.    2, 3- Suspect viral in nature at this time. Neti pot or Sinuflo bottle 1-2 times daily for nasal congestion.  Use distilled or previously boiled water.  Get good rest and drink lots of fluids.  If not improving in 3-5 days, unilateral sinus pain, thick purulent nasal discharge, or any worsening symptoms - start amoxicillin prescription (She has had GI symptoms with Augmentin, but reports she tolerates amoxicillin). Follow up with primary care provider if not improving in 3-5 days, sooner if any worsening or new symptoms.       SUBJECTIVE:   Valentina DIEGO is a 47 y.o. female presents today with 7 days complaint of not feeling well.  Reports sore throat, sneezing, watery eyes, rhinorrhea, nasal congestion, headache, sinus pressure, and mild cough. She has had chills but no fever. Right eye has become more red and developed yellow drainage over the past few days. She normally wears contacts, but has been wearing glasses since her eye symptoms started. Sick contacts: coworker with similar symptoms. Has tried Robitussin and Dayquil without relief.     Denies vomiting, diarrhea, shortness of breath. Denies eye pain, vision changes, injury/trauma to the eye.     Patient Active Problem List   Diagnosis     Depression / Anxiety - [facility]      History of colonic polyps     Migraine without status migrainosus, not intractable     STEVEN-CPAP     Irritable bowel syndrome       History   Smoking Status     Never Smoker   Smokeless Tobacco     Not on file       Current Medications:  Current Outpatient Prescriptions on File Prior to Visit   Medication Sig Dispense Refill     acetaminophen (TYLENOL) 650 MG CR tablet Take 650 mg by mouth every 6 (six) hours as needed.       cetirizine (ZYRTEC) 10 MG tablet Take 10 mg by mouth daily.       cholecalciferol, vitamin D3, 2,000 unit cap Take 2,000 Units by mouth.       dicyclomine (BENTYL) 10 MG capsule Take 1 capsule by mouth 4 (four) times a day as needed.       Lactobacillus acidoph-pectin cap Take 1 capsule by mouth daily.       LORazepam (ATIVAN) 0.5 MG tablet Take 0.5 mg by mouth daily as needed.       omega-3 fatty acids-vitamin E 1,000 mg cap Take 1 capsule by mouth daily.       omeprazole (PRILOSEC) 20 MG capsule Take 40 mg by mouth daily.  8     POLYETHYLENE GLYCOL 1000 MISC Use 17 g As Directed daily.       PREVIDENT 5000 SENSITIVE 1.1-5 % Pste USE WITH TOOTHBRUSH QAM AND QHS  0     RECLIPSEN, 28, 0.15-0.03 mg per tablet Take 1 tablet by mouth bedtime.  2     risperiDONE (RISPERDAL) 1 MG tablet Take 1 mg by mouth bedtime.  1     sertraline (ZOLOFT) 100 MG tablet Take 100 mg by mouth bedtime.       sertraline (ZOLOFT) 50 MG tablet Take 50 mg by mouth bedtime.       traZODone (DESYREL) 100 MG tablet Take 100 mg by mouth bedtime.       No current facility-administered medications on file prior to visit.        Allergies:   Allergies   Allergen Reactions     Amoxicillin-Pot Clavulanate Unknown     Diarrhea and vomiting.      Cefdinir Wheezing     Patient is able to tolerate Penicillin and Amoxicillin without any problems...       OBJECTIVE:   Vitals:    08/29/17 1753   BP: 98/66   Pulse: 73   Resp: 16   Temp: 98.6  F (37  C)   TempSrc: Oral   SpO2: 98%   Weight: 171 lb 3.2 oz (77.7 kg)     Physical exam reveals a   47 y.o. female.   Appears healthy, alert, cooperative and in NAD.  Eyes:  Bilateral conjunctiva erythematous, R>L. No ciliary flush. There is mild yellow discharge from the medial canthus of the right eye.  PERRL. EOMs intact. Lids without erythema or edema.   Ears:  normal TMs bilaterally  Nose:    Mucosa normal. Clear rhinorrhea.   Mouth:  Mucosa pink and moist.  no pharyngitis, no exudate and uvula is midline.    Lymph: no cervical LAD   Lungs: Chest is clear, no wheezing, rhonchi or rales. Symmetric air entry throughout both lung fields.  Heart: regular rate and rhythm, no murmur, rub or gallop

## 2021-06-14 NOTE — PROGRESS NOTES
"  Assessment & Plan   Encounter for contraceptive management, unspecified type  Has not yet transitioned into menopause.   - desogestreL-ethinyl estradioL (APRI) 0.15-0.03 mg per tablet  Dispense: 84 tablet; Refill: 2    History of microcytic hypochromic anemia  On 25 mg iron as part of an OTC multivitamin supplement.  Reports having regular (not heavy) menstrual cycles. No documented history of low iron.  CBC normal today.  - HM2(CBC w/o Differential)    Major depressive disorder with current active episode, unspecified depression episode severity, unspecified whether recurrent  Overall she looks great and in good spirits.  Nonetheless, does have significant stressors in the setting of her marriage and work situation TSH was 2.55 back in May 2018 and has gained 9 pounds since last visit. Encouraged her to continue to work with psychiatry and psychology.  Will check TSH in light of high PHQ-9 and EBER-7 despite her medication regimen and therapy.    - Thyroid Barnes    Review of external notes as documented above     32 minutes spent on the date of the encounter doing chart review, history and exam, documentation and further activities as noted above    BMI:   Estimated body mass index is 26.95 kg/m  as calculated from the following:    Height as of this encounter: 5' 7\" (1.702 m).    Weight as of this encounter: 172 lb 1.6 oz (78.1 kg).     Return if symptoms worsen or fail to improve.    Suleman Guillen MD  [facility]     Valentinahoracio Humphries is 50 y.o. 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 Recevied first shot of covid vaccine on 1/13/21, unfortunately, she started taking azithromycin in the setting of temporary crown placedby her dentist.   Notes she has been taking iron supplements due to donating blood. 25 mg as part of a vegan " "supplement.  Denies any heavy menstrual cycles.  Is taking an OTC multivitamin with 25 mg of iron.  No guns, and no SI/HI. Does want to fel better. Sees a therapist and a psychiatrist. Thinks the trauma skills at [facility] is a bit depressing as did not really provide her with tools/skills to help manage her depression. HPI GAD7 of 14 and PHQ-9 of 12.     Review of Systems  ROS A comprehensive review of systems was performed and was otherwise negative        Objective    /64 (Patient Site: Right Arm, Patient Position: Sitting, Cuff Size: Adult Regular)   Pulse 70   Temp 97.1  F (36.2  C) (Temporal)   Ht 5' 7\" (1.702 m)   Wt 172 lb 1.6 oz (78.1 kg)   SpO2 98%   BMI 26.95 kg/m    Body mass index is 26.95 kg/m .  Physical Exam  GENERAL APPEARANCE: Pleasant, nontoxic-appearing, no acute distress   RESPIRATORY: Bilaterally with no crackles, wheezing or rhonchi  CARDIOVASCULAR: Regular S1 and S2.  Radial pulses intact.  No LE edema.  GASTROINTESTINAL: NABS, soft, NT, ND, no HSM  SKIN/HAIR/NAILS: Skin color was normal.  No subconjunctival pallor.  Good capillary refill in the fingers.  NEUROLOGIC: Alert and oriented to person, place, time, and circumstance. Speech was normal.   PSYCHIATRIC:  Mood and affect were positive. The patient's judgment and insight were normal.     I spent a total of 32 minutes face-to-face with Valentina Humphries during today's office visit.        "

## 2021-06-15 PROBLEM — F32.A DEPRESSION: Status: ACTIVE | Noted: 2017-02-10

## 2021-06-15 NOTE — PROGRESS NOTES
[facility]   [facility]-Pelvic Initial Evaluation    Patient Name: Valentina Humphries  Date of evaluation: 1/31/2018  Referral Diagnosis: Left lumbar radiculitis  Referring provider: Sherron Pleitez C*  Visit Diagnosis:     ICD-10-CM    1. Acute left-sided low back pain with left-sided sciatica M54.42    2. Generalized muscle weakness M62.81    3. Decreased ROM of lumbar spine M25.60        Assessment:      Impairments in  pain, posture, ROM, joint mobility, strength, motor function, sensory function, ADL's, gait/locomotion and balance  Patient's signs and symptoms are consistent with L lumbar radiculopathy with extension preference. Pt presents with L sided lumbar spine pain that radiates to her L posterior buttock, thigh, lower leg and foot. She exeperiences n/t along with pain. Her symptoms started a few weeks ago after shoveling snow. It limits her ability to sleep, walk, sit, stand, work, complete household chores and lift. She has + neurodynamic testing, L LE weakness, and limited lumbar AROM with pain..  The POC is dynamic and will be modified on an ongoing basis.  Barriers to achieving goals as noted in the assessment section may affect outcome.  Prognosis to achieve goals is  good   Skilled PT is required to reduce pain and improve function.  Pt. is appropriate for skilled PT intervention as outlined in the Plan of Care (POC).  Pt. is a good candidate for skilled PT services to improve pain levels and function.  Plan of care and goals were established in collaboration with patient.     Goals:  Pt. will be independent with home exercise program in : 4 weeks (to self-manage pain and improve function)  Pt. will have improved quality of sleep: waking less times/night;in other weeks  In Other Weeks: 8 weeks  Pt. will bend: to clean; 10#;with less pain;in other weeks  Other:: 8 weeks  Patient will decrease : MOUSTAPHA score;for improved quality of function;for improved quality of life;by _  points;in other weeks  by ___ points: >=20%  in other weeks: 8 weeks    Patient's expectations/goals are realistic.    Barriers to Learning or Achieving Goals:  No Barriers.       Plan / Patient Instructions:        Plan of Care:   Communication with: Referral Source  Patient Related Instruction: Nature of Condition;Treatment plan and rationale;Basis of treatment;Precautions;Next steps;Body mechanics;Self Care instruction;Posture;Expected outcome  Times per Week: 1-2  Number of Weeks: 8  Number of Visits: 10-12  Therapeutic Exercise: ROM;Stretching;Strengthening  Neuromuscular Reeducation: kinesio tape;posture;balance/proprioception;TNE;core  Manual Therapy: soft tissue mobilization;myofascial release;joint mobilization;muscle energy;strain counterstrain  Modalities: traction;electrical stimulation;TENS;ultrasound;cold pack;hot pack (prn)  Gait Training: to reduce pain and improve function  Equipment: theraband    Plan for next visit: progress prone program (standing ext, prone press ups, planks), continue nerve glides, trial SL mobilizations or prone mobs     Subjective:         Social information:   Occupation:senior activity professional   Work Status:Working full time    History of Present Illness:    Valentina is a 47 y.o. female who presents to therapy today with complaints of L low back pain that radiates to her L posterior buttock, thigh and lateral foot. Pt has an MRI ordered but has not completed yet. Pt reports in the beginning of January she started to have hip pain in the front of her L hip. After shoveling a few weeks later the pain moved into her L low back. She has a history of thoracic spine pain that has been doing pretty well recently. Pt is looking to change jobs at a different location and would like her pain to decrease prior to this change. She has n/t into her L thigh, lower leg and toes. Walking seems to help decrease her pain.     PMH: depression/anxiety, migraine without aura, STEVEN/CPAP,  irritable bowel syndrome, gastric ulcer in 2010    Pain Ratin-7  Pain rating at best: 6  Pain rating at worst: 9  Pain description: numbness and tingling    Functional limitations are described as occurring with:   Stand 1 hour  Sit 30 min  Walk 3 hours- slowly  Bend  Transitions  Sleep- wakes 2x/night, usually sleeps on her R side but that increases her pain  Walk on uneven surfaces  Lift   groom/ dress  Kneel/squat  Wash/bath/shower  Carry laundry/groceries  Patient reports benefit from:  ice, walk         Objective:      Note: Items left blank indicates the item was not performed or not indicated at the time of the evaluation.    Patient Outcome Measures :    Modified Oswestry Low Back Pain Disablity Questionnaire  in %: 46   Scores range from 0-100%, where a score of 0% represents minimal pain and maximal function. The minimal clinically important difference is a score reduction of 12%.    Examination  1. Acute left-sided low back pain with left-sided sciatica     2. Generalized muscle weakness     3. Decreased ROM of lumbar spine       Involved side: Left  Standing: symmetrical ASIS    Lumbar ROM:    Date: 2018     *Indicate scale AROM AROM AROM   Lumbar Flexion To proximal knees with pain     Lumbar Extension Mod with pain      Right Left Right Left Right Left   Lumbar Sidebending mild Mild with pain       Lumbar Rotation Mod with pain Severe with pain       Thoracic Flexion      Thoracic Extension      Thoracic Sidebending         Thoracic Rotation           Lower Extremity Strength:     Date: 2018     LE strength/5 Right Left Right Left Right Left   Hip Flexion (L1-3) 5 4       Hip Extension (L5-S1)         Hip Abduction (L4-5)         Hip Adduction (L2-3)         Hip External Rotation 4 4-       Hip Internal Rotation         Knee Extension (L3-4) 5 4       Knee Flexion         Ankle Dorsiflexion (L4-5) 5 4       Great Toe Extension (L5)         Ankle Plantar flexion (S1)         Abdominals         Sensation    Did not assess       Reflex Testing  Lumbar Dermatomes Right Left UE Reflexes Right Left   Iliac Crest and Groin (L1)   Biceps (C5-6)     Anterior Medial Thigh (L2)   Brachioradialis (C5-6)     Anterior Thigh, Medial Epicondyle Femur (L3)   Triceps (C7-8)     Lateral Thigh, Anterior Knee, Medial Leg/Malleolus (L4)   Sayda s test     Lateral Leg, Dorsal Foot (L5)   LE Reflexes     Lateral Foot (S1)   Patellar (L3-4)     Posterior Leg (S2)   Achilles (S1-2)     Other:   Babinski Response         Lumbar Special Tests:     Lumbar Special Tests Right Left SI Tests Right  Left   Quadrant test - - SI Compression     Straight leg raise - + at 50 deg SI Distraction     Crossover response   POSH Test     Slump - + Sacral Thrust     Sit-up test  FADIR - -   Trunk extensor endurance test  Resisted Abduction     Prone instability test  Other: MARICEL - -   Pubic shotgun  Other: Scour - -     Repeated Motion Testing:  Did not centralize but extension preference noted with CHERI      LE Screen: Pain with L hip end range flexion      Treatment Today   Pt arrived at 7:10 am and completed paperwork. Appt: 7:27am-8:03 am  TREATMENT MINUTES COMMENTS   Evaluation 16 -lumbar spine   Self-care/ Home management     Manual therapy     Neuromuscular Re-education 10 -1. TNE Intro (Pain Knowledge)   Patient introduced to the topic of pain neuroscience education and that improving knowledge of how pain works promotes improved recovery and rehabilitation.  -see flow sheet for nerve glides provided  -KTape to L posterior buttock with star formation, skin prepped and reviewed indications/precautions   Therapeutic Activity     Therapeutic Exercises 10 -see exercise flow sheet  -educated on POC, diagnosis and HEP   Gait training     Modality__________________                Total 36    Blank areas are intentional and mean the treatment did not include these items.     PT Evaluation Code: (Please list factors)  Patient  History/Comorbidities: none  Examination: lumbar spine  Clinical Presentation: stable  Clinical Decision Making: low    Patient History/  Comorbidities Examination  (body structures and functions, activity limitations, and/or participation restrictions) Clinical Presentation Clinical Decision Making (Complexity)   No documented Comorbidities or personal factors 1-2 Elements Stable and/or uncomplicated Low   1-2 documented comorbidities or personal factor 3 Elements Evolving clinical presentation with changing characteristics Moderate   3-4 documented comorbidities or personal factors 4 or more Unstable and unpredictable High                Julieta Powers, PT, DPT  1/31/2018  7:11 AM

## 2021-06-15 NOTE — PROGRESS NOTES
F/U, doing better  --To review MRI lumbar spine 1/31/18  --C/O left buttock pain   --Numbness in the left calf to the left foot  --Rates pain 6/10   --PT x 1 session [facility] 1/31/18 for lumbar  --Chiro x 2 sessions (1/31/18 and 2/1/18), some relief    Medication  --Gabapentin 100 mg 1 cap TID, helps but gets dizzy per pt  --Tylenol PRN

## 2021-06-15 NOTE — PROGRESS NOTES
New patient evaluation   --Referred by PCP Dr. Amador  --Left hip pain that radiated down the left anterior thigh x 2 weeks per pt, now better  --C/O left buttock and left posterior upper thigh pain that started after shoveling snow on 1/22/18  --Tingling/numbness in the back of the left thigh since 1/22/18  --Rates pain 9/10  --PT and chiro, 3 years ago and nothing recent  --No hx of spine surgery or injections   --MRI cervical and thoracic with CDI 2002, no lumbar MRI    Medication  --Medrol dosepack Rx 1/24/18, hasn't felt any relief yet per pt  --Tylenol 650 mg 1 tab Q6H PRN

## 2021-06-15 NOTE — PROGRESS NOTES
[facility] Daily Progress     Patient Name: Valentina Humphries  Date: 2018  Visit #: 2  PTA visit #:  na  Referral Diagnosis: Left lumbar radiculitis  Referring provider: Sherron Pleitez C*  Visit Diagnosis:     ICD-10-CM    1. Acute left-sided low back pain with left-sided sciatica M54.42    2. Generalized muscle weakness M62.81    3. Decreased ROM of lumbar spine M25.60          Assessment:     HEP/POC compliance is  good .  Response to Intervention Slight decrease in pain reported after MT. Tolerated progression with abdominal strengthening.  Patient is benefitting from skilled physical therapy and is making steady progress toward functional goals.  Patient is appropriate to continue with skilled physical therapy intervention, as indicated by initial plan of care.    Goal Status: on-going  Pt. will be independent with home exercise program in : 4 weeks (to self-manage pain and improve function)  Pt. will have improved quality of sleep: waking less times/night;in other weeks  In Other Weeks: 8 weeks  Pt. will bend: to clean; 10#;with less pain;in other weeks  Other:: 8 weeks  Patient will decrease : MOUSTAPHA score;for improved quality of function;for improved quality of life;by _ points;in other weeks  by ___ points: >=20%  in other weeks: 8 weeks    Plan / Patient Education:     Continue with initial plan of care.  Progress with home program as tolerated. progress prone program ( prone press ups, prone hip ext, 4 point ex, planks), continue nerve glides    Subjective:     Pain Ratin.5 L LBP and leg pain  Pt arrived 7 min late.  Has not been taking the gabapentin because she does not tolerate it. She has been able to get through work. She also recently got a new bed which has helped. Exercises help for about 30 min after. Does the nerve glides 3-4x/day.      Objective:     Lumbar AROM:  Flexion: to proximal knees with pain  Ext: mod with pain at end range  Rotation: R mild  with pain, L mild    Min to mod cueing with HEP    Exercises:  Exercise #1: slump slider 15x on L with  mod cueing initially  Comment #1: seated ankle DF/PF nerve glide 15x on L  Exercise #2: LTR 10x  Comment #2: straight leg slider 10x on L  Exercise #3: CHERI- can  Comment #3: standing ext 10x through small ROM  Exercise #4: PPT 10x without a hold      Treatment Today   Pt arrived 7 min late.  TREATMENT MINUTES COMMENTS   Evaluation     Self-care/ Home management     Manual therapy 8 -prone central and L unilateral PAs to lumbar spine 4x10 sec oscillations, grade I-III  -prone STM to L lumbar paraspinals with hypertonicity noted   Neuromuscular Re-education     Therapeutic Activity     Therapeutic Exercises 12 -see exercise flow sheet for date performed   Gait training     Modality__________________                Total 20    Blank areas are intentional and mean the treatment did not include these items.       Julieta Powers  2/7/2018

## 2021-06-15 NOTE — PROGRESS NOTES
ASSESSMENT: Valentina DIEGO is a 47 y.o. female who presents for consultation at the request of HE PCP Javier Amador MD, with a past medical history significant for depression/anxiety, migraine without aura, STEVEN/CPAP, irritable bowel syndrome, gastric ulcer in 2010, who presents today for new patient evaluation of:    Acute left low back pain radiating into the posterior buttock thigh and lateral foot with associated numbness and tingling ×2 weeks worsened 4 days ago after shoveling.    Patient is strong on exam however she does have sensory deficits L5 and S1 dermatomal pattern. No myelopathic or red flag symptoms.     MOUSTAPHA Score: 28    WHO 5: 17 -patient reports she does see a psychologist and a psychiatrist regularly lready.    PHQ-2: 0    Diagnoses and all orders for this visit:    Left lumbar radiculitis  -     Ambulatory referral to PT/OT  -     MR Lumbar Spine Without Contrast; Future; Expected date: 1/26/18  -     gabapentin (NEURONTIN) 100 MG capsule; Take 100 mg by mouth 3 (three) times a day. Follow Gabapentin Dosing chart given  Dispense: 90 capsule; Refill: 3    Left lumbar radiculopathy  -     Ambulatory referral to PT/OT  -     MR Lumbar Spine Without Contrast; Future; Expected date: 1/26/18  -     gabapentin (NEURONTIN) 100 MG capsule; Take 100 mg by mouth 3 (three) times a day. Follow Gabapentin Dosing chart given  Dispense: 90 capsule; Refill: 3    Numbness and tingling of left leg  -     Ambulatory referral to PT/OT  -     MR Lumbar Spine Without Contrast; Future; Expected date: 1/26/18    Scheuermann disease      PLAN:  Reviewed spine anatomy and disease process. Discussed diagnosis and treatment options with the patient today. A shared decision making model was used.  The patient's values and choices were respected. The following represents what was discussed and decided upon by the provider and the patient.      -DIAGNOSTIC TESTS:  Images were personally reviewed and  interpreted and explained to patient today using spine model.   --Ordered lumbar spine MRI today to further evaluate acute radiculopathy.  --Did review her thoracic spine MRI from 2002 today which does reveal mild to moderate multilevel spondylosis with moderate thoracic  Scheuermann's changes.    -PHYSICAL THERAPY: Referral to [facility] sent today for core strengthening and nerve glide exercises for acute pain.  Discussed the importance of core strengthening, ROM, stretching exercises with the patient and how each of these entities is important in decreasing pain.  Explained to the patient that the purpose of physical therapy is to teach the patient a home exercise program.  These exercises need to be performed every day in order to decrease pain and prevent future occurrences of pain.        -MEDICATIONS: Did prescribe low-dose gabapentin 100 mg, 1 tablet 3 times daily as tolerated for radicular pain.  -Not a good candidate for muscle relaxants due to current psych meds.  Did advise patient to continue out Medrol Dosepak.  -Can also continue Tylenol as needed for pain.  Discussed multiple medication options today with patient. Discussed risks, side effects, and proper use of medications. Patient verbalized understanding.    -INTERVENTIONS: Did discuss the possibility of injections down the road if physical therapy is not improving symptoms pending MRI review.  Discussed risks and benefits of injections with patient today.    -PATIENT EDUCATION:  45 minutes of total visit time was spent face to face with the patient today, greater than 50% of total time spent with patient was spent on counseling, education, and coordinating care.   -10 minutes spent outside of visit time, non-face-to-face time, reviewing chart.    -FOLLOW-UP:   Follow-up after obtaining lumbar spine MRI to review images and ongoing plan.    Advised patient to call the [facility] if symptoms worsen or you have problems controlling  bladder and bowel function.   ______________________________________________________________________    SUBJECTIVE:  HPI:  Valentina DIEGO  Is a 47 y.o. female who presents today for new patient evaluation of low back pain initially starting 2 weeks ago that was more bothersome into the left anterior thigh that did worsen Monday 4 days ago during the snowstorm due to shoveling however her pain has shifted now is severe into the left lumbosacral junction, that radiates to the left buttock left posterior thigh and posterior knee with associated numbness and tingling with numbness and tingling and some pain/sensitivity into the left lateral foot.  Currently her pain is a 9/10 significant pain that is worse with standing in one spot or lying down does slightly improve with sitting and walking however the pain is constant.    Patient denies any weakness, denies recent trips or falls or balance changes, denies bowel or bladder dysfunction, denies right leg symptoms.    *Patient currently seeing [facility] Dr. Cedeño, individual and group psychotherapy.    -Treatment to Date: No prior spinal surgery or spinal injection.  Physical therapy and chiropractic manipulation 3 years ago for mid back pain with some relief.    -Medications:  Medrol Dosepak prescribed 1/24/2018 PCP, minimal relief.  Tylenol    Current Outpatient Prescriptions on File Prior to Encounter   Medication Sig Dispense Refill     acetaminophen (TYLENOL) 650 MG CR tablet Take 650 mg by mouth every 6 (six) hours as needed.       cetirizine (ZYRTEC) 10 MG tablet TAKE 1 TABLET BY MOUTH EVERY DAY 60 tablet 6     dicyclomine (BENTYL) 10 MG capsule Take 1 capsule by mouth 4 (four) times a day as needed.       fluticasone (FLONASE) 50 mcg/actuation nasal spray 1 spray into each nostril daily. 16 g 2     LORazepam (ATIVAN) 0.5 MG tablet Take 0.5 mg by mouth daily as needed.       methylPREDNISolone (MEDROL DOSEPACK) 4 mg tablet Take 1 tablet (4 mg  total) by mouth daily. follow package directions 21 tablet 0     omeprazole (PRILOSEC) 20 MG capsule Take 40 mg by mouth daily.  8     POLYETHYLENE GLYCOL 1000 MISC Use 17 g As Directed daily.       PREVIDENT 5000 SENSITIVE 1.1-5 % Pste USE WITH TOOTHBRUSH QAM AND QHS  0     RECLIPSEN, 28, 0.15-0.03 mg per tablet Take 1 tablet by mouth at bedtime. 3 Package 3     risperiDONE (RISPERDAL) 1 MG tablet Take 1 mg by mouth bedtime.  1     sertraline (ZOLOFT) 100 MG tablet Take 100 mg by mouth bedtime.       sertraline (ZOLOFT) 50 MG tablet Take 50 mg by mouth bedtime.       traZODone (DESYREL) 100 MG tablet Take 100 mg by mouth bedtime.       cholecalciferol, vitamin D3, 2,000 unit cap Take 2,000 Units by mouth.       Lactobacillus acidoph-pectin cap Take 1 capsule by mouth daily.       omega-3 fatty acids-vitamin E 1,000 mg cap Take 1 capsule by mouth daily.       No current facility-administered medications on file prior to encounter.        Allergies   Allergen Reactions     Amoxicillin-Pot Clavulanate Unknown     Diarrhea and vomiting.      Cefdinir Wheezing     Patient is able to tolerate Penicillin and Amoxicillin without any problems...     Latex Itching       Past Medical History:   Diagnosis Date     Anemia      Anxiety      Asthma      Depression      Hyperlipidemia      Migraine     Visual aura     Painful swelling of joint      Varicella         Patient Active Problem List   Diagnosis     Depression / Anxiety - [facility]     History of colonic polyps     Migraine without status migrainosus, not intractable     STEVEN-CPAP     Irritable bowel syndrome       Past Surgical History:   Procedure Laterality Date     NO PAST SURGERIES       wisdom teeth  1987       Family History   Problem Relation Age of Onset     Sudden death Mother      Glaucoma Father      No Medical Problems Sister      No Medical Problems Sister      No Medical Problems Sister      Breast cancer Paternal Aunt 60     Stroke Maternal Grandmother         Reviewed past medical, surgical, and family history with patient found on new patient intake packet located in EMR Media tab.     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  Patient denies smoking/tobacco use, denies alcohol use, denies history of being a heavy drinker, denies occasional drug use.    ROS: Positive for poor sleep quality, back pain, joint pain, leg pain, headache, depression/worry, difficulty swallowing, swelling in feet, anxiety, abdominal pain, diarrhea.  Specifically negative for bowel/bladder dysfunction, balance changes, dizziness, foot drop, fevers, chills, appetite changes, nausea/vomiting, unexplained weight loss. Otherwise 13 systems reviewed are negative. Please see the patient's intake questionnaire from today for details.    OBJECTIVE:  /68 (Patient Site: Left Arm, Patient Position: Sitting)  Pulse 79  Temp 97.8  F (36.6  C) (Oral)   Wt 171 lb (77.6 kg)  SpO2 98%  BMI 26.19 kg/m2    PHYSICAL EXAMINATION:    --CONSTITUTIONAL:  Vital signs as above.  No acute distress.  The patient is well nourished and well groomed.  --PSYCHIATRIC:  Appropriate mood and affect. The patient is awake, alert, oriented to person, place, time and answering questions appropriately with clear speech.    --SKIN:  Skin over the face, bilateral lower extremities, and posterior torso is clean, dry, intact without rashes.    --RESPIRATORY: Normal rhythm and effort. No abnormal accessory muscle breathing patterns noted.   --STANDING EXAMINATION:  Normal lumbar lordosis noted, no lateral shift.  --MUSCULOSKELETAL: Lumbar spine inspection reveals no evidence of deformity. Range of motion is limited in lumbar flexion greater than extension due to pain.  Tenderness palpation L4-5 and L5-S1 region on the left. Straight leg raising in the supine position is negative to radicular pain on the right and positive on the left.  Sciatic notch slightly tender bilaterally.  --SACROILIAC JOINT: Negative bilateral distraction.  Negative bilateral Estevan's with reproduction of pain to affected extremity.   --GROSS MOTOR: Gait is non-antalgic. Easily arises from a seated position. Toe walking and heel walking are normal without significant difficulty.    --LOWER EXTREMITY MOTOR TESTING:  Plantar flexion left 5/5, right 5/5   Dorsiflexion left 5/5, right 5/5   Great toe MTP extension left 5/5, right 5/5  Knee flexion left 5/5, right 5/5  Knee extension left 5/5, right 5/5   Hip flexion left 5/5, right 5/5  Hip abduction left 5/5, right 5/5  Hip adduction left 5/5, right 5/5   --HIPS: Full range of motion bilaterally. Negative FABERs on the involved lower extremity.   --NEUROLOGICAL:  2/4 patellar, medial hamstring, and achilles reflexes bilaterally.  Sensation to light touch is intact on the right and diminished on the left L5 and S1 dermatomal pattern. Babinski is negative. No clonus.  --VASCULAR:  2/4 dorsalis pedis and posterior tibialsi pulses bilaterally.  Bilateral lower extremities are warm.  There is no pitting edema of the bilateral lower extremities.    RESULTS: [facility] medical records from [facility] 8/29/[facility] to [facility] and care everywhere 10/21/2017 to current were reviewed today.    Imaging: Spine Imaging was personally reviewed and interpreted today. The images were shown to the patient and the findings were explained using a spine model.      Thoracic spine MRI  [facility]-5/14/2002  Conclusion:  1.  Mild to moderate multilevel mid and lower thoracic spondylosis as seen and associated with moderate thoracic Scheuermann's like choana.  Central posterior annular tearing and bulging is visualized at T8-9 and T9-10 and moderate diffuse posterior annular bulging is visualized at T11-12 with minimal cord flattening of the T11-12 level.  No significant lateral canal stenosis and no nerve root impingement is seen.  2.  Incidental note is made of  bilateral pleural effusions.      Cervical spine MRI  [facility]-5/14/2002  Conclusion:  Mild multilevel cervical spondylosis with posterior annular bulging at C6-7 and C5-6, no focal herniation, and no stenosis or impingement.  Mild facet hypertrophy is seen bilaterally at C7-T1.

## 2021-06-15 NOTE — PROGRESS NOTES
Assessment:     Diagnoses and all orders for this visit:    Left lumbar radiculitis    Left lumbar radiculopathy    Numbness and tingling of left leg    Scheuermann disease    Lumbar disc herniation       Valentina Humphries is a 47 y.o. y.o. female with past medical history significant for depression/anxiety, migraine without aura, STEVEN/CPAP, irritable bowel syndrome, gastric ulcer in 2010 who presents today for follow-up regarding:    Acute left low back pain radiating to the posterior buttock posterior lateral thigh and posterior lateral foot with associated numbness and tingling ongoing for 2 weeks that has improved with physical therapy and gabapentin at this point.  MRI does reveal L5-S1 disc bulge with S1 nerve root compression.  Explained to patient that physical therapy is most important in the long run however we can trial injection if symptoms are not improving.    Patient is strong on exam however she does have sensory deficits L5 and S1 dermatomal pattern. No myelopathic or red flag symptoms.      Plan:     A shared decision making plan was used. The patient's values and choices were respected. Prior medical records from 1/26/18 were reviewed today. The following represents what was discussed and decided upon by the provider and the patient.        -DIAGNOSTIC TESTS: Images were personally reviewed and interpreted.   --Lumbar Spine MRI 1/13/18 reveals L5-S1 small left paracentral disc extrusion contacting the left S1 nerve root  as well as lateral recess.  Mild lumbar facet arthropathy.  --Thoracic spine MRI from 2002 does reveal mild to moderate multilevel spondylosis with moderate thoracic  Scheuermann's changes.    -INTERVENTIONS: Did discuss the possibility of a LEFT L5-S1 and S1-S2 transforaminal epidural steroid injection if she is not tolerating physical therapy or she is not getting improvement with physical therapy sessions.  She is hopeful to avoid that however as she is feeling  slightly better.    -MEDICATIONS: Did advise patient to continue Gabapentin 100 mg 1 tablet 3 times daily.  She noticed slight dizziness with this medication therefore advised patient in the next 2-3 days if that does not improve take 1 tablet in the morning 1 and 1 at night twice daily instead of 3 times daily.  Discussed side effects of medications and proper use. Patient verbalized understanding.    -PHYSICAL THERAPY: did advise patient continue physical therapy sessions which she does have scheduled at this time.  She noticed good relief so far even with nerve glide exercises alone.  Discussed the importance of core strengthening, ROM, stretching exercises with the patient and how each of these entities is important in decreasing pain.  Explained to the patient that the purpose of physical therapy is to teach the patient a home exercise program.  These exercises need to be performed every day in order to decrease pain and prevent future occurrences of pain.        -PATIENT EDUCATION:  20 minutes of total visit time was spent face to face with the patient today, 60 % of the visit was spent on counseling, education, and coordinating care.   -5 minutes spent outside of visit time, non-face-to-face time, reviewing chart.    -FOLLOW UP: Follow-up in 6 weeks, sooner pain is worsening or new symptoms arise.  Advised to contact clinic if symptoms worsen or change.    Subjective:     Valentina Humphries is a 47 y.o. female who presents today for follow-up regarding left low back pain as well as pain into the left buttock, left posterior thigh and sensitivity to the left lateral foot with associated numbness and tingling ongoing for the last 3 weeks with some relief so far with gabapentin and physical therapy, no relief with Medrol Dosepak.  Currently her pain is a 6/10, a 4 to its best but up to a 9 at its worst, does feel like twisting, bending, sitting for long periods of time makes her pain worse whereas ice  and walking slow does improve her pain.  She does report perceived weakness in left lower extremity however denies episodes of her left leg giving out on her.  She denies any new symptoms today.  She is here to review MRI.    Patient denies any weakness, denies recent trips or falls or balance changes, denies bowel or bladder dysfunction, denies right leg symptoms.     *Patient currently seeing [facility] Dr. Cedeño, individual and group psychotherapy.     -Treatment to Date: No prior spinal surgery or spinal injection.  Physical therapy and chiropractic manipulation 3 years ago for mid back pain with some relief.  Physical therapy ×1 session 1/31/2018 with some relief already.     -Medications:  Medrol Dosepak prescribed 1/24/2018 PCP, minimal relief.  Tylenol  Gabapentin 100 mg 1 tablet 3 times daily with some relief, mild dizziness side effect.  So far it is tolerable however.    Patient Active Problem List   Diagnosis     Depression / Anxiety - [facility]     History of colonic polyps     Migraine without status migrainosus, not intractable     STEVEN-CPAP     Irritable bowel syndrome       Current Outpatient Prescriptions on File Prior to Encounter   Medication Sig Dispense Refill     acetaminophen (TYLENOL) 650 MG CR tablet Take 650 mg by mouth every 6 (six) hours as needed.       cetirizine (ZYRTEC) 10 MG tablet TAKE 1 TABLET BY MOUTH EVERY DAY 60 tablet 6     cholecalciferol, vitamin D3, 2,000 unit cap Take 2,000 Units by mouth.       dicyclomine (BENTYL) 10 MG capsule Take 1 capsule by mouth 4 (four) times a day as needed.       fluticasone (FLONASE) 50 mcg/actuation nasal spray 1 spray into each nostril daily. 16 g 2     gabapentin (NEURONTIN) 100 MG capsule Take 100 mg by mouth 3 (three) times a day. Follow Gabapentin Dosing chart given 90 capsule 3     Lactobacillus acidoph-pectin cap Take 1 capsule by mouth daily.       LORazepam (ATIVAN) 0.5 MG tablet Take 0.5 mg by mouth daily as needed.       omega-3  fatty acids-vitamin E 1,000 mg cap Take 1 capsule by mouth daily.       omeprazole (PRILOSEC) 20 MG capsule Take 40 mg by mouth daily.  8     POLYETHYLENE GLYCOL 1000 MISC Use 17 g As Directed daily.       PREVIDENT 5000 SENSITIVE 1.1-5 % Pste USE WITH TOOTHBRUSH QAM AND QHS  0     RECLIPSEN, 28, 0.15-0.03 mg per tablet Take 1 tablet by mouth at bedtime. 3 Package 3     risperiDONE (RISPERDAL) 1 MG tablet Take 1 mg by mouth at bedtime as needed.   1     sertraline (ZOLOFT) 100 MG tablet Take 100 mg by mouth bedtime.       sertraline (ZOLOFT) 50 MG tablet Take 50 mg by mouth bedtime.       traZODone (DESYREL) 100 MG tablet Take 100 mg by mouth bedtime.       [DISCONTINUED] methylPREDNISolone (MEDROL DOSEPACK) 4 mg tablet Take 1 tablet (4 mg total) by mouth daily. follow package directions 21 tablet 0     No current facility-administered medications on file prior to encounter.        Allergies   Allergen Reactions     Amoxicillin-Pot Clavulanate Unknown     Diarrhea and vomiting.      Cefdinir Wheezing     Patient is able to tolerate Penicillin and Amoxicillin without any problems...     Latex Itching       Past Medical History:   Diagnosis Date     Anemia      Anxiety      Asthma      Depression      Hyperlipidemia      Migraine     Visual aura     Painful swelling of joint      Varicella         Review of Systems  ROS: Positive for numbness and tingling, perceived weakness, headache, dizziness, balance changes.  Specifically negative for bowel/bladder dysfunction, foot drop, fevers, chills, appetite changes, nausea/vomiting, unexplained weight loss. Otherwise 13 systems reviewed are negative. Please see the patient's intake questionnaire from today for details.    Reviewed Social, Family, Past Medical and Past Surgical history with patient, no significant changes noted since prior visit.     Objective:     /60 (Patient Site: Left Arm, Patient Position: Sitting)  Pulse 78  Temp 97.9  F (36.6  C) (Oral)   Wt  170 lb (77.1 kg)  LMP 01/19/2018  SpO2 98%  BMI 26.04 kg/m2    PHYSICAL EXAMINATION:    --CONSTITUTIONAL: Well developed, well nourished, healthy appearing individual.  --PSYCHIATRIC: Appropriate mood and affect. No difficulty interacting due to temper, social withdrawal, or memory issues.  --SKIN: Lumbar region is dry and intact. Sensation to light touch is intact in the L4, L5, and S1 dermatomes on the right, diminished on the left and L5 and S1 dermatomal pattern.  --RESPIRATORY: Normal rhythm and effort. No abnormal accessory muscle breathing patterns noted.   --MUSCULOSKELETAL:  Normal lumbar lordosis noted, no lateral shift.  --GROSS MOTOR: Easily arises from a seated position.   --LUMBAR SPINE:  Inspection reveals no evidence of deformity. Range of motion is slightly limited in lumbar flexion greater than extension due to pain.  Tenderness palpation L4-5 and L5-S1 region on the left. Straight leg raising in the supine position is negative to radicular pain on the right and positive on the left.   --LOWER EXTREMITY MOTOR TESTING:  Plantar flexion left 5/5, right 5/5   Dorsiflexion left 5/5, right 5/5   Great toe MTP extension left 5/5, right 5/5  Knee flexion left 5/5, right 5/5  Knee extension left 5/5, right 5/5   Hip flexion left 5/5, right 5/5  Hip abduction left 5/5, right 5/5  Hip adduction left 5/5, right 5/5   --HIPS: Full range of motion bilaterally.   --NEUROLOGIC: Bilateral patellar and achilles reflexes are physiologic and symmetric.     RESULTS:   Imaging: Lumbar spine imaging was reviewed today. The images were shown to the patient and the findings were explained using a spine model.      Mr Lumbar Spine Without Contrast  Result Date: 2/1/2018  INDICATION: Low back pain, >6 wks / red flag(s) / radiculopathy left lumbar radiculitis/sciatica L5 and S1 dermatomal pattern. TECHNIQUE: Without IV contrast. CONTRAST: None. COMPARISON: None. FINDINGS: Nomenclature is based on 5 lumbar type vertebral  bodies. Normal vertebral body heights and alignment. Somewhat heterogeneous T1 marrow pattern in the visualized skeleton without STIR signal abnormality likely a normal variant marrow pattern. Multiple chronic Schmorl's node deformities in the bony endplates of the low thoracic and upper lumbar levels noted. No pars defect. The conus tip is identified at L1-L2. Grossly normal paraspinal soft tissues. No abdominal aortic aneurysm. The visualized portions of the bony pelvis are normal for age. T11-T12: This level included on the sagittal views and shows moderate loss of disc height and signal with mild dorsal spurring and disc bulging and slight central canal narrowing. T12-L1: Slight loss of disc height. This signal preserved. Mild dorsal spurring and disc bulging. No facet arthropathy. No spinal canal stenosis. No right neural foraminal stenosis. No left neural foraminal stenosis. L1-L2: Normal disc height and signal with chronic endplate changes. Slight disc bulging. No facet arthropathy. No spinal canal stenosis. No right neural foraminal stenosis. No left neural foraminal stenosis. L2-L3: Normal disc height with slight loss of disc signal. Slight dorsal spurring. No facet arthropathy. No spinal canal stenosis. No right neural foraminal stenosis. No left neural foraminal stenosis. L3-L4: Normal disc height and signal. No herniation. No facet arthropathy. No spinal canal stenosis. No right neural foraminal stenosis. No left neural foraminal stenosis. L4-L5: Normal disc height and signal. No herniation. Mild to moderate facet arthropathy. No spinal canal stenosis. No right neural foraminal stenosis. No left neural foraminal stenosis. L5-S1: Moderate loss of disc height and signal. Mild disc bulging with a superimposed small to intermediate size left paracentral disc extrusion extending into the left S1 lateral recess likely affecting the left S1 root. Mild facet arthropathy. No spinal canal stenosis. No right neural  foraminal stenosis. No left neural foraminal stenosis.   CONCLUSION:   1.  At the L5-S1 level there is generalized mild disc bulging with a superimposed small to intermediate sized left paracentral disc extrusion extending into the left S1 lateral recess likely affecting the left S1 root. Central canal adequate and the L5-S1 foramen are adequate.   2.  No other left-sided disc herniation or significant left-sided foraminal narrowing.   3.  The central canal is adequate throughout the lumbar region.   4.  Mild to moderate disc degeneration with chronic endplate changes at multiple levels. Relatively mild facet arthropathy in the lumbar facets.       Thoracic spine MRI  [facility]-5/14/2002  Conclusion:  1.  Mild to moderate multilevel mid and lower thoracic spondylosis as seen and associated with moderate thoracic Scheuermann's like choana.  Central posterior annular tearing and bulging is visualized at T8-9 and T9-10 and moderate diffuse posterior annular bulging is visualized at T11-12 with minimal cord flattening of the T11-12 level.  No significant lateral canal stenosis and no nerve root impingement is seen.  2.  Incidental note is made of bilateral pleural effusions.        Cervical spine MRI  [facility]-5/14/2002  Conclusion:  Mild multilevel cervical spondylosis with posterior annular bulging at C6-7 and C5-6, no focal herniation, and no stenosis or impingement.  Mild facet hypertrophy is seen bilaterally at C7-T1.

## 2021-06-15 NOTE — PROGRESS NOTES
Chief Complaint   Patient presents with     Hip Pain     left hip x 1-2 weeks, worse after having to shuvel snow on monday, now feels deeper, pain was more in front now it is more in the back, sitting makes worse, walking helps         HPI    Patient is here for 1-2 wks of mild to moderate pain around bilateral hips. After shoveling snow on Monday, pain localizes to left lower back and has been constant, improves with standing up. No radiation of pain. No fever, bowel and bladder incontinence.     She also has an ongoing issues with bilateral leg cramps intermittent and wanted to have blood works done today. No leg issues today.     ROS: Pertinent ROS noted in HPI.     Allergies   Allergen Reactions     Amoxicillin-Pot Clavulanate Unknown     Diarrhea and vomiting.      Cefdinir Wheezing     Patient is able to tolerate Penicillin and Amoxicillin without any problems...     Latex Itching       Patient Active Problem List   Diagnosis     Depression / Anxiety - [facility]     History of colonic polyps     Migraine without status migrainosus, not intractable     STEVEN-CPAP     Irritable bowel syndrome       Family History   Problem Relation Age of Onset     Sudden death Mother      Glaucoma Father      No Medical Problems Sister      No Medical Problems Sister      No Medical Problems Sister      Breast cancer Paternal Aunt 60     Stroke Maternal Grandmother        Social History     Social History     Marital status:      Spouse name: N/A     Number of children: N/A     Years of education: N/A     Occupational History     Not on file.     Social History Main Topics     Smoking status: Never Smoker     Smokeless tobacco: Never Used     Alcohol use No     Drug use: No     Sexual activity: Yes     Partners: Male     Birth control/ protection: OCP     Other Topics Concern     Not on file     Social History Narrative    Lives with her , Chris.  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 "[employer] at  [employer].  [employer] two[employer]miler at [place]     Objective:    Vitals:    01/24/18 1859   BP: 116/70   Pulse: 70   Temp: 98.4  F (36.9  C)   SpO2: 98%       Gen:NAD  CV: RRR,no M, R, G  Pulm: CTAB  MSK: normal inspection of back, mild pain to palpation from upper left paralumbar area down to the sacral area, no midline spinal tenderness. Reduced flexion of back due to pain. Normal gait. Unable to perform straight leg raising test as patient was not comfortable sitting or lying down.         Pain in lower back - no evidence of radiculopathy, suspect muscular etiology. F/u with PCP next week to consider PT if no improvement.   -     methylPREDNISolone (MEDROL DOSEPACK) 4 mg tablet; Take 1 tablet (4 mg total) by mouth daily. follow package directions    Leg cramps - will obtain basic blood tests, and have patient f/u with primary care.   -     Basic Metabolic Panel  -     Magnesium

## 2021-06-16 PROBLEM — K64.8 OTHER HEMORRHOIDS: Status: ACTIVE | Noted: 2017-10-16

## 2021-06-16 PROBLEM — D50.9 MICROCYTIC ANEMIA: Status: ACTIVE | Noted: 2019-11-29

## 2021-06-16 PROBLEM — R10.9 UNSPECIFIED ABDOMINAL PAIN: Status: ACTIVE | Noted: 2021-01-18

## 2021-06-16 PROBLEM — K63.5 POLYP OF COLON: Status: ACTIVE | Noted: 2017-10-16

## 2021-06-16 PROBLEM — F41.1 GAD (GENERALIZED ANXIETY DISORDER): Status: ACTIVE | Noted: 2019-10-22

## 2021-06-16 PROBLEM — K21.9 GASTROESOPHAGEAL REFLUX DISEASE WITHOUT ESOPHAGITIS: Status: ACTIVE | Noted: 2017-03-03

## 2021-06-16 NOTE — PROGRESS NOTES
[facility] Daily Progress     Patient Name: Valentina Humphries  Date: 3/16/2018  Visit #: 9  PTA visit #:  2  Referral Diagnosis: Left lumbar radiculitis  Referring provider: Sherron Pleitez C*  Visit Diagnosis:     ICD-10-CM    1. Acute left-sided low back pain with left-sided sciatica M54.42    2. Generalized muscle weakness M62.81    3. Decreased ROM of lumbar spine M25.60          Assessment:     HEP/POC compliance is  good .  Patient demonstrates understanding/independence with home program.  Response to Intervention Significant decrease in pain and improvements in function. Tolerated continued progression of abdominal and hip strengthening.  Patient is benefitting from skilled physical therapy and is making steady progress toward functional goals.  Patient is appropriate to continue with skilled physical therapy intervention, as indicated by initial plan of care.     Goal Status: on-going  Pt. will be independent with home exercise program in : Met  Pt. will have improved quality of sleep: Discontinued;with less pain  In Other Weeks: 8 weeks  Pt. will bend: with less pain;Met  Other:: 8 weeks  Patient will decrease : Met  by ___ points: >=20%  in other weeks: 8 weeks    Plan / Patient Education:     Continue with initial plan of care.  Progress with home program as tolerated.Silvia CHAMBERS    Subjective:   Pain Ratin/10 L LBP and leg pain to foot pretty constant    Real slight numbness in calf but that is all. Exercises are going well. HAs have improved but had an intense one last night.      Objective:     Lumbar AROM (from previous session):  Flexion: to mid-calf with tightness and + [place]'s sign  Ext: mod with sharp pain  Rotation: R mild, L mod    Modified Oswestry Low Back Pain Disablity Questionnaire  in %: 26 at initial evaluation 46%      Treatment Today  Pt arrived 5 min late    TREATMENT MINUTES COMMENTS   Evaluation     Self-care/ Home management     Manual therapy      Neuromuscular Re-education     Therapeutic Activity     Therapeutic Exercises 23 -see exercise flow sheet for date performed  -educated on progress and pt questions answered  -educated on yoga poses and positioning- decreased hold times initially  -educated on lifting mechanics for work   Gait training     Modality_________________                Total 23    Blank areas are intentional and mean the treatment did not include these items.       Julieta Powers, PT, DPT  3/16/2018     [facility] Discharge Summary  Patient Name: Valentina Humphries  Date: 6/4/2018  Referral Diagnosis: Left lumbar radiculitis  Referring provider: Sherron Pleitez C*  Visit Diagnosis:   1. Acute left-sided low back pain with left-sided sciatica     2. Generalized muscle weakness     3. Decreased ROM of lumbar spine         Goals:  Pt. will be independent with home exercise program in : Met  Pt. will have improved quality of sleep: Discontinued;with less pain  Pt. will bend: with less pain;Met  Patient will decrease : Met    Patient was seen for 9 visits from 1/31/18 to 3/16/18 with 0 missed appointments (4 cancelled appts).  Patient received a home program .  Pt met or was making progress towards all goals. She started a new job and was unable to return to PT.     Therapy will be discontinued at this time.  The patient will need a new referral to resume.    Thank you for your referral.  Julieta Powers, PT, DPT  6/4/2018  9:59 AM

## 2021-06-16 NOTE — PROGRESS NOTES
[facility] Daily Progress     Patient Name: Valentina Humphries  Date: 2018  Visit #: 5  PTA visit #:  2  Referral Diagnosis: Left lumbar radiculitis  Referring provider: Sherron Pleitez C*  Visit Diagnosis:     ICD-10-CM    1. Acute left-sided low back pain with left-sided sciatica M54.42    2. Generalized muscle weakness M62.81    3. Decreased ROM of lumbar spine M25.60          Assessment:     HEP/POC compliance is  good .  Patient demonstrates understanding/independence with home program.  Response to Intervention Tolerated prone progression and is reporting less pain and relief with KTape.  Patient is benefitting from skilled physical therapy and is making steady progress toward functional goals.  Patient is appropriate to continue with skilled physical therapy intervention, as indicated by initial plan of care.     Goal Status: on-going  Pt. will be independent with home exercise program in : Progressing toward  Pt. will have improved quality of sleep: Discontinued;with less pain  In Other Weeks: 8 weeks  Pt. will bend: with less pain  Other:: 8 weeks  Patient will decrease : MOUSTAPHA score;for improved quality of function;for improved quality of life;by _ points;in other weeks  by ___ points: >=20%  in other weeks: 8 weeks    Plan / Patient Education:     Continue with initial plan of care.  Progress with home program as tolerated. progress prone program ( prone press ups, prone hip ext, 4 point ex, planks), continue nerve glides  Assess PTx    Subjective:     Pain Ratin/10 L LBP and leg pain to foot pretty constant, feels more movement in back pain is starting to centralize.    Does the nerve glides 3-4x/day.  Swimming every other day able to do more flutter kicks  [place] the KTape was helpful.    Objective:   Pt reports more centralizing of pain  Reviewed HEP  Added supine PTX instruction/precautions discussed  Reported some decreased leg symptoms following  Reapplied KT, skin  clear  Pt arrived 10 mins late      Treatment Today     TREATMENT MINUTES COMMENTS   Evaluation     Self-care/ Home management     Manual therapy  -prone central and L unilateral PAs to lumbar spine 4x10 sec oscillations, grade III  -prone STM to L lumbar paraspinals and piriformis with hypertonicity noted   Neuromuscular Re-education 5 KT HNP star to L lumbar spine and buttock with 4 I strips, skin prepped and reviewed precautions/indications   Therapeutic Activity     Therapeutic Exercises 8 -see exercise flow sheet for date performed   Gait training     Modality_________PTX pt body wt# 170 reported_________ 15 + 3 setup PTx supine bolsters under knees 50# pull 60 sec pull 10 sec release at 10 degrees angle              Total 31    Blank areas are intentional and mean the treatment did not include these items.       Dorota Hilaroi, PT, DPT  2/21/2018

## 2021-06-16 NOTE — PROGRESS NOTES
[facility] Daily Progress     Patient Name: Valentina Humphries  Date: 2018  Visit #: 3  PTA visit #:  1  Referral Diagnosis: Left lumbar radiculitis  Referring provider: Sherron Pleitez C*  Visit Diagnosis:     ICD-10-CM    1. Acute left-sided low back pain with left-sided sciatica M54.42    2. Generalized muscle weakness M62.81    3. Decreased ROM of lumbar spine M25.60          Assessment:     HEP/POC compliance is  good .  Response to Intervention Slight decrease in pain reported after MT. Tolerated progression with abdominal strengthening.  Patient is benefitting from skilled physical therapy and is making steady progress toward functional goals.  Patient is appropriate to continue with skilled physical therapy intervention, as indicated by initial plan of care.   Reports some slight decrease in leg sytptoms and greater ease with walking    Goal Status: on-going  Pt. will be independent with home exercise program in : 4 weeks (to self-manage pain and improve function)  Pt. will have improved quality of sleep: waking less times/night;in other weeks  In Other Weeks: 8 weeks  Pt. will bend: to clean; 10#;with less pain;in other weeks  Other:: 8 weeks  Patient will decrease : MOUSTAPHA score;for improved quality of function;for improved quality of life;by _ points;in other weeks  by ___ points: >=20%  in other weeks: 8 weeks    Plan / Patient Education:     Continue with initial plan of care.  Progress with home program as tolerated. progress prone program ( prone press ups, prone hip ext, 4 point ex, planks), continue nerve glides    Subjective:     Pain Ratin/10 sometimes 6/10 L LBP and leg pain  Reports walking has improved some, slight decrease in leg symptoms, went swimming yesterday  Icing at home  5x/day ex   after. Does the nerve glides 3-4x/day.      Objective:     Prone over pillow added knee bends, glut sets, CHERI  Continued MT per chart  Min to mod cueing with  HEP  Reviewed step through gait when walking        Treatment Today     TREATMENT MINUTES COMMENTS   Evaluation     Self-care/ Home management     Manual therapy 8 -prone central and L unilateral PAs to lumbar spine 4x10 sec oscillations, grade I-III  -prone STM to L lumbar paraspinals with hypertonicity noted   Neuromuscular Re-education     Therapeutic Activity     Therapeutic Exercises 17 -see exercise flow sheet for date performed/KT HNP star   Gait training     Modality__________________                Total 25    Blank areas are intentional and mean the treatment did not include these items.       Dorota Hilario  2/14/2018

## 2021-06-16 NOTE — PROGRESS NOTES
[facility] Daily Progress     Patient Name: Valentina Humphries  Date: 2018  Visit #: 6  PTA visit #:  2  Referral Diagnosis: Left lumbar radiculitis  Referring provider: Sherron Pleitez C*  Visit Diagnosis:     ICD-10-CM    1. Acute left-sided low back pain with left-sided sciatica M54.42    2. Generalized muscle weakness M62.81    3. Decreased ROM of lumbar spine M25.60          Assessment:     HEP/POC compliance is  good .  Patient demonstrates understanding/independence with home program.  Response to Intervention Overall, pt reporting improvement in pain/function. Felt long axis hip distraction was more helpful than lumbar tx.  Patient is benefitting from skilled physical therapy and is making steady progress toward functional goals.  Patient is appropriate to continue with skilled physical therapy intervention, as indicated by initial plan of care.     Goal Status: on-going  Pt. will be independent with home exercise program in : Progressing toward  Pt. will have improved quality of sleep: Discontinued;with less pain  In Other Weeks: 8 weeks  Pt. will bend: with less pain  Other:: 8 weeks  Patient will decrease : MOUSTAPHA score;for improved quality of function;for improved quality of life;by _ points;in other weeks  by ___ points: >=20%  in other weeks: 8 weeks    Plan / Patient Education:     Continue with initial plan of care.  Progress with home program as tolerated. progress prone program ( prone press ups, prone hip ext, 4 point ex, planks), continue nerve glides      Subjective:   Pain Ratin/10 L LBP and leg pain to foot pretty constant, feels more movement in back pain is starting to centralize.    Overall, feels the pain is getting better. More centralized. Is getting HAs that might be from some of the exercises. Sometimes will have sharper pains a few times down her leg after the exercises.    Objective:     Lumbar AROM:  Flexion: to mid-calf with tightness and +  [place]'s sign  Ext: mod with sharp pain  Rotation: R mild, L mod      Treatment Today  Pt arrived 8 min late.   TREATMENT MINUTES COMMENTS   Evaluation     Self-care/ Home management     Manual therapy 10 -supine long axis hip distraction, 3x2 min oscillations, grade III   Neuromuscular Re-education 5 KT HNP star to center low back and to L  buttock with 3 I strips, skin prepped and reviewed precautions/indications   Therapeutic Activity     Therapeutic Exercises 9 -see exercise flow sheet for date performed   Gait training     Modality_________________                Total 24    Blank areas are intentional and mean the treatment did not include these items.       Julieta Powers, PT, DPT  2/23/2018

## 2021-06-16 NOTE — PROGRESS NOTES
Office Visit - Follow Up   Valentina Humphries   47 y.o. female    Date of Visit: 2/23/2018    Chief Complaint   Patient presents with     Memory Loss        Assessment and Plan   1. Confusion  This resolved after stopping gabapentin and seems to fit with pain and medication side effect, continue to monitor and evaluate if this recurs    2. Depression / Anxiety - [facility]  Continue medications as prescribed by her psychiatrist.  I recommended that she discuss with her psychiatrist some of her side effects like word finding difficulty which is been present for many years and see if any dose adjustments could be made    3. Radicular low back pain  She is doing well with physical therapy and would continue this route    Return in about 4 weeks (around 3/23/2018) for annual physical.     History of Present Illness   This 47 y.o. old woman comes in for evaluation of an episode of confusion.  She had some low back pain after shoveling and was seen in the spine clinic.  She is starting gabapentin and did not titrate this up as was recommended and started on the full dose.  That weekend she started she had an episode where she forgot to pay for a soda at the movie theater and she also went outside with just her long underwear on.  This is very concerning to her and she stopped gabapentin and after stopping gabapentin these issues resolved.  She now feels back to baseline.  She does have some occasional word finding difficulties which has been present since she started her numerous medications for mental illness many years ago.  She wonders if any medication adjustments can be made.  She still has some numbness in her left foot.  She did have an MRI of her lumbar spine which showed some disc disease.  She has done well with physical therapy and continues to improve.    Review of Systems: A comprehensive review of systems was negative except as noted.     Medications, Allergies and Problem List   Reviewed and  "updated     Physical Exam   General Appearance:   No acute distress    /60 (Patient Site: Left Arm, Patient Position: Sitting, Cuff Size: Adult Regular)  Pulse 70  Ht 5' 7.75\" (1.721 m)  Wt 180 lb (81.6 kg)  SpO2 97%  BMI 27.57 kg/m2    HEENT exam is unremarkable  Neck supple no thyromegaly or nodule palpable  Lymphatic no cervical lymphadenopathy  Cardiovascular regular rate and rhythm no murmur gallop or rub  Pulmonary lungs are clear to auscultation bilaterally  Gastrointestinall abdomen soft nontender nondistended no organomegaly  Neurologic exam is non focal  Psychiatric pleasant, no confusion or agitation        Additional Information   Current Outpatient Prescriptions   Medication Sig Dispense Refill     acetaminophen (TYLENOL) 650 MG CR tablet Take 650 mg by mouth every 6 (six) hours as needed.       cetirizine (ZYRTEC) 10 MG tablet TAKE 1 TABLET BY MOUTH EVERY DAY 60 tablet 6     cholecalciferol, vitamin D3, 2,000 unit cap Take 2,000 Units by mouth.       dicyclomine (BENTYL) 10 MG capsule Take 1 capsule by mouth 4 (four) times a day as needed.       fluticasone (FLONASE) 50 mcg/actuation nasal spray 1 spray into each nostril daily. 16 g 2     Lactobacillus acidoph-pectin cap Take 1 capsule by mouth daily.       LORazepam (ATIVAN) 0.5 MG tablet Take 0.5 mg by mouth daily as needed.       omega-3 fatty acids-vitamin E 1,000 mg cap Take 1 capsule by mouth daily.       omeprazole (PRILOSEC) 20 MG capsule Take 40 mg by mouth daily.  8     POLYETHYLENE GLYCOL 1000 MISC Use 17 g As Directed daily.       PREVIDENT 5000 SENSITIVE 1.1-5 % Pste USE WITH TOOTHBRUSH QAM AND QHS  0     RECLIPSEN, 28, 0.15-0.03 mg per tablet Take 1 tablet by mouth at bedtime. 3 Package 3     risperiDONE (RISPERDAL) 0.25 MG tablet TAKE 1 TABLET BY MOUTH EVERY DAY  2     risperiDONE (RISPERDAL) 0.5 MG tablet Take 0.5 mg by mouth daily.   2     risperiDONE (RISPERDAL) 1 MG tablet Take 1 mg by mouth at bedtime as needed.   1     " sertraline (ZOLOFT) 100 MG tablet Take 100 mg by mouth bedtime.       sertraline (ZOLOFT) 50 MG tablet Take 50 mg by mouth bedtime.       traZODone (DESYREL) 100 MG tablet Take 100 mg by mouth bedtime.       No current facility-administered medications for this visit.      Allergies   Allergen Reactions     Amoxicillin-Pot Clavulanate Unknown     Diarrhea and vomiting.      Cefdinir Wheezing     Patient is able to tolerate Penicillin and Amoxicillin without any problems...     Latex Itching     Social History   Substance Use Topics     Smoking status: Never Smoker     Smokeless tobacco: Never Used     Alcohol use No       Review and/or order of clinical lab tests:  Review and/or order of radiology tests:  Review and/or order of medicine tests:  Discussion of test results with performing physician:  Decision to obtain old records and/or obtain history from someone other than the patient:  Review and summarization of old records and/or obtaining history from someone other than the patient and.or discussion of case with another health care provider:  Independent visualization of image, tracing or specimen itself:    Time:      Javier Amador MD

## 2021-06-16 NOTE — PROGRESS NOTES
[facility] Daily Progress     Patient Name: Valentina Humphries  Date: 3/2/2018  Visit #: 8  PTA visit #:  2  Referral Diagnosis: Left lumbar radiculitis  Referring provider: Sherron Pleitez C*  Visit Diagnosis:     ICD-10-CM    1. Acute left-sided low back pain with left-sided sciatica M54.42    2. Generalized muscle weakness M62.81    3. Decreased ROM of lumbar spine M25.60          Assessment:     HEP/POC compliance is  good .  Patient demonstrates understanding/independence with home program.  Response to Intervention Pt progressing towards or met all PT goals. Decreased pain reports in L LB and location of numbness in L foot has decreased in size.  Patient is benefitting from skilled physical therapy and is making steady progress toward functional goals.  Patient is appropriate to continue with skilled physical therapy intervention, as indicated by initial plan of care.     Goal Status: on-going  Pt. will be independent with home exercise program in : Progressing toward  Pt. will have improved quality of sleep: Discontinued;with less pain  In Other Weeks: 8 weeks  Pt. will bend: with less pain  Other:: 8 weeks  Patient will decrease : MOUSTAPHA score;for improved quality of function;for improved quality of life;by _ points;in other weeks  by ___ points: >=20%  in other weeks: 8 weeks    Plan / Patient Education:     Continue with initial plan of care.  Progress with home program as tolerated. progress prone program ( prone press ups, prone hip ext, 4 point ex, planks), continue nerve glides      Subjective:   Pain Ratin/10 L LBP and leg pain to foot pretty constant    HAs have improved. Her back pain has improved but still feels numbness into her L foot (now just underneath the middle toes- prior was under all 4 toes). KTape felt helpful.      Objective:     Lumbar AROM:  Flexion: to mid-calf with tightness and + [place]'s sign  Ext: mod with sharp pain  Rotation: R mild, L  mod    Modified Oswestry Low Back Pain Disablity Questionnaire  in %: 26 at initial evaluation 46%      Treatment Today     TREATMENT MINUTES COMMENTS   Evaluation     Self-care/ Home management     Manual therapy     Neuromuscular Re-education 5 KT HNP star to center low back and to L  buttock with 3 I strips, skin prepped and reviewed precautions/indications   Therapeutic Activity     Therapeutic Exercises 23 -see exercise flow sheet for date performed  -educated on progress and pt questions answered   Gait training     Modality_________________                Total 28    Blank areas are intentional and mean the treatment did not include these items.       Julieta Powers, PT, DPT  3/2/2018

## 2021-06-16 NOTE — TELEPHONE ENCOUNTER
Telephone Encounter by Thania Hernandez LPN at 6/20/2019  3:29 PM     Author: Thania Hernandez LPN Service: -- Author Type: Licensed Nurse    Filed: 6/20/2019  3:29 PM Encounter Date: 6/20/2019 Status: Signed    : Thania Hernandez LPN (Licensed Nurse)       Cherelle Call, PharmD  P [facility]   Caller: Unspecified (Today,  8:39 AM)             Looks like pharmacy is requesting a generic rx for this, would recommend signing new order for [facility].     Thanks   Miguel Kearns teed up for [facility]

## 2021-06-16 NOTE — PROGRESS NOTES
"[facility] Daily Progress     Patient Name: Valentina Humphries  Date: 2018  Visit #: 4  PTA visit #:  1  Referral Diagnosis: Left lumbar radiculitis  Referring provider: Sherron Pleitez C*  Visit Diagnosis:     ICD-10-CM    1. Acute left-sided low back pain with left-sided sciatica M54.42    2. Generalized muscle weakness M62.81    3. Decreased ROM of lumbar spine M25.60          Assessment:     HEP/POC compliance is  good .  Patient demonstrates understanding/independence with home program.  Response to Intervention Tolerated prone progression and is reporting less pain and relief with KTape.  Patient is benefitting from skilled physical therapy and is making steady progress toward functional goals.  Patient is appropriate to continue with skilled physical therapy intervention, as indicated by initial plan of care.     Goal Status: on-going  Pt. will be independent with home exercise program in : 4 weeks (to self-manage pain and improve function)  Pt. will have improved quality of sleep: waking less times/night;in other weeks  In Other Weeks: 8 weeks  Pt. will bend: to clean; 10#;with less pain;in other weeks  Other:: 8 weeks  Patient will decrease : MOUSTAPHA score;for improved quality of function;for improved quality of life;by _ points;in other weeks  by ___ points: >=20%  in other weeks: 8 weeks    Plan / Patient Education:     Continue with initial plan of care.  Progress with home program as tolerated. progress prone program ( prone press ups, prone hip ext, 4 point ex, planks), continue nerve glides    Subjective:     Pain Ratin/10 L LBP and leg pain to foot  Back is getting better. Walking mostly without a \"limp\". Has been doing most everything at work except for moving furniture. Does not have as much pain but still having numbness. Feels the pain is starting to centralize.  Does the nerve glides 3-4x/day.  Felt the KTape was helpful.    Objective:     Lumbar " AROM:  Flexion: to distal knees  Ext: WNL  Lateral flexion: R WNL with mild increase in pain, L WNL  Rotation: R WNL, L WNL        Treatment Today     TREATMENT MINUTES COMMENTS   Evaluation     Self-care/ Home management     Manual therapy 10 -prone central and L unilateral PAs to lumbar spine 4x10 sec oscillations, grade III  -prone STM to L lumbar paraspinals and piriformis with hypertonicity noted   Neuromuscular Re-education 8 KT HNP star to L lumbar spine and buttock with 4 I strips, skin prepped and reviewed precautions/indications   Therapeutic Activity     Therapeutic Exercises 12 -see exercise flow sheet for date performed   Gait training     Modality__________________                Total 30    Blank areas are intentional and mean the treatment did not include these items.       Julieta Powers, PT, DPT  2/16/2018

## 2021-06-17 NOTE — PATIENT INSTRUCTIONS - HE
Patient Instructions by Kassie Wahl CNP at 11/26/2019 11:40 AM     Author: Kassie Wahl CNP Service: -- Author Type: Nurse Practitioner    Filed: 11/26/2019 12:05 PM Encounter Date: 11/26/2019 Status: Signed    : Kassie Wahl CNP (Nurse Practitioner)         Patient Education     Viral Conjunctivitis    Viral conjunctivitis (sometimes called pink eye) is a common infection of the eye. It is very contagious. Touching the infected eye, then touching another person passes this infection. It can also be spread from one eye to the other in this same way. The most common symptoms include redness, discharge from the eye, swollen eyelids, and a gritty or scratchy feeling in the eye.  This condition will take about 7 to 10 days to go away. Artificial tears (available without a prescription) are often recommended to moisten and clean the eyes. Antibiotic eye drops often are not recommended because they will not kill the virus. But sometimes they may be prescribed by eye doctors. This is to prevent a second, bacterial infection.  Home care    Apply a towel soaked in cool water to the affected eye 3 to 4 times a day (just before applying medicine to the eye).    It is common to have mucus drainage during the night, causing the eyelids to become crusted by morning. Use a warm, wet cloth to wipe this away.    Wash any cloths used to clean the eye after one use. Don't reuse them.    If antibiotic medicines are prescribed, take them exactly as directed. Don't stop taking them until you are told to.    You may use acetaminophen or ibuprofen to control pain, unless another medicine was prescribed. (Note: If you have chronic liver or kidney disease, or if you have ever had a stomach ulcer or gastrointestinal bleeding, talk with your healthcare provider before using these medicines.) Aspirin should never be used in anyone under 18 years of age who is ill with a fever. It may cause severe liver damage.    Wash  your hands before and after touching the affected eye. This helps to prevent spreading the infection to your other eye and to others.    The infected person should avoid sharing towels, washcloths, and bedding with others. This is to prevent spreading the infection.    This illness is contagious during the first week. Children with this illness should be kept out of day care and school until the redness clears.  Follow-up care  Follow up with your healthcare provider, or as advised.  When to seek medical advice  Call your healthcare provider right away if any of these occur:    Worsening vision    Increasing pain in the eye    Increasing swelling or redness of the eyelid    Redness spreading to the face around the eye    Large amount of green or yellow drainage from the eye    Severe itching in or around the eye    Fever of 100.4 F (38 C) or higher  Date Last Reviewed: 7/1/2017 2000-2017 [employer]. [address] [place] [id number]. All rights reserved. This information is not intended as a substitute for professional medical care. Always follow your healthcare professional's instructions.

## 2021-06-17 NOTE — PATIENT INSTRUCTIONS - HE
Patient Instructions by Kassie Wahl CNP at 12/23/2019  1:40 PM     Author: Kassie Wahl CNP Service: -- Author Type: Nurse Practitioner    Filed: 12/23/2019  3:17 PM Encounter Date: 12/23/2019 Status: Signed    : Kassie Wahl CNP (Nurse Practitioner)         Patient Education     General Allergic Reactions  An allergic reaction is a set of symptoms caused by an allergen. An allergen is something that causes a persons immune system to react. When a person comes in contact with an allergen, it causes the body to release chemicals. These include the chemical histamine. Histamine causes swelling and itching. It may affect the entire body. This is called a general allergic reaction. Often symptoms affect only 1 part of the body. This is called a local allergic reaction.  You are having an allergic reaction. Almost anything can cause one. Different people are allergic to different things. It is usually something that you ate or swallowed, came into contact with by getting or putting it on your skin or clothes, or something you breathed in the air. This can be very annoying and sometimes scary.  Most of us think of allergic reactions when we have a rash or itchy skin. Symptoms can include:    Itching of the eyes, nose, and roof of the mouth    Runny or stuffy nose    Watery eyes     Sneezing or coughing     A blocked feeling in the ear    Red, itchy rash called hives    Red and purple spots    Rash, redness, welts, blisters    Itching, burning, stinging, pain    Dry, flaky, cracking, scaly skin  Severe symptoms include:    Swelling of the face, lips, or other parts of the body    Hoarse voice    Trouble swallowing, feeling like your throat is closing    Trouble breathing, wheezing    Nausea, vomiting, diarrhea, stomach cramps    Feeling faint or lightheaded, rapid heart rate  Sometimes the cause may be obvious. But there are so many things that can cause a reaction that you may not be able to  figure out. The most important things to help find your allergen are:    Remembering when it started    What you were doing at the time or just before that    Any activities you were involved in    Any new products or contacts  Below are some common causes. But remember that almost anything can cause a reaction. You may not even be aware that you came into contact with one of these things:    Dust, mold, pollen    Plants (common ones are poison ivy and poison oak, but there are many others)     Animals    Foods such as shrimp, shellfish, peanuts, milk products, gluten, and eggs. Also food colorings, flavorings, and additives.    Insect bites or stings such as bees, mosquitos, fleas, ticks    Medicines such as penicillin, sulfa medicines, amoxicillin, aspirin, and ibuprofen. But any medicine can cause a reaction.    Jewelry such as nickel or gold. This can be new, or something youve worn for a while, including zippers and buttons.    Latex such as in gloves, clothes, toys, balloons, or some tapes. Some people allergic to latex may also have problems with foods like bananas, avocados, kiwi, papaya, or chestnuts.    Lotions, perfumes, cosmetics, soaps, shampoos, skincare products, nail products    Chemicals or dyes in clothing, linen, , hair dyes, soaps, iodine  Many viruses and common colds can cause a rash that is not an allergic reaction. Sometimes it is hard to tell the difference between allergies, sensitivity, or an intolerance to something. This is especially true with food. Many things can cause diarrhea, vomiting, stomach cramps, and skin irritation.  Home care    The goal of treatment is to help relieve the symptoms and get you feeling better. The rash will usually fade over several days. But it can sometimes last a couple of weeks. Over the next couple of days, there may be times when it is gets a little worse, and then better again. Here are some things to do:    If you know what you are allergic  to, stay away from it. Future reactions could be worse than this one.    Avoid tight clothing and anything that heats up your skin (hot showers or baths, direct sunlight). Heat will make itching worse.    An ice pack will relieve local areas of intense itching and redness. To make an ice pack, put ice cubes in a plastic bag that seals at the top. Wrap it in a thin, clean towel. Dont put the ice directly on the skin because it can damage the skin.    Oral diphenhydramine is an over-the-counter antihistamine sold at pharmacy and grocery stores. Unless a prescription antihistamine was given, diphenhydramine may be used to reduce itching if large areas of the skin are involved. It may make you sleepy. So be careful using it in the daytime or when going to school, working, or driving. Note: Dont use diphenhydramine if you have glaucoma or if you are a man with trouble urinating due to an enlarged prostate. There are other antihistamines that wont make you so sleepy. These are good choices for daytime use. Ask your pharmacist for suggestions.    Dont use diphenhydramine cream on your skin. It can cause a further reaction in some people.    To help prevent an infection, don't scratch the affected area. Scratching may worsen the reaction and damage your skin. It can also lead to an infection. Always check the affected for signs of an infection.    Call your healthcare provider and ask what you can use to help decrease the itching.    To decrease allergic reactions, try the following:      Use heat-steam to clean your home    Use high-efficiency particulate (HEPA) vacuums and filters    Stay away from food and pet triggers    Kill any cockroaches    Clean your house often  Follow-up care  Follow up with your healthcare provider, or as advised. If you had a severe reaction today, or if you have had several mild to medium allergic reactions in the past, ask your provider about allergy testing. This can help you find out what  you are allergic to. If your reaction included dizziness, fainting, or trouble breathing or swallowing, ask your provider about carrying auto-injectable epinephrine.  Call 911  Call 911 if any of these occur:    Trouble breathing or swallowing, wheezing    Cool, moist, pale skin    Shortness of breath    Hoarse voice or trouble speaking    Confused     Very drowsy or trouble awakening    Fainting or loss of consciousness    Rapid heart rate    Feeling of dizziness or weakness or a sudden drop in blood pressure    Feeling of doom    Feeling lightheaded    Severe nausea or vomiting, or diarrhea    Seizure    Swelling in the face, eyelids, lips, mouth, throat or tongue    Drooling  When to seek medical advice  Call your healthcare provider right away if any of these occur:    Spreading areas of itching, redness or swelling    Nausea or stomach cramps or abdominal pain    Continuing or recurring symptoms    Spreading areas of redness, swelling, or itching    Signs of infection at the affected site:  ? Spreading redness  ? Increased pain or swelling  ? Fluid or colored drainage from the site  ? Fever of 100.4 F (38 C) or above lasting for 24 to 48 hours, or as directed by your provider  Date Last Reviewed: 3/1/2017    6819-7078 [employer]. [address] [place] [address]. All rights reserved. This information is not intended as a substitute for professional medical care. Always follow your healthcare professional's instructions.

## 2021-06-18 NOTE — PROGRESS NOTES
Office Visit - Physical   Valentina FLAKITA Humphries   48 y.o.  female    Date of visit: 5/25/2018  Physician: Javier Amador MD     Assessment and Plan   1. Routine general medical examination at a health care facility  This is generally healthy 48-year-old woman.  Ongoing healthy lifestyle including regular exercise, healthy diet and modest weight loss discussed and recommended    2. Screening for diabetes mellitus  - Glycosylated Hemoglobin A1c    3. Screening for hyperlipidemia  - Lipid Cascade    4. Allergy  - cetirizine (ZYRTEC) 10 MG tablet; Take 1 tablet (10 mg total) by mouth daily.  Dispense: 90 tablet; Refill: 3    5. Depression / Anxiety - [facility]  Stable continue current meds per psychiatry  - HM2(CBC w/o Differential)  - Thyroid Cascade  - Basic Metabolic Panel    6. Other migraine without status migrainosus, not intractable  Stable    7. STEVEN-CPAP    8. Irritable bowel syndrome with constipation  Stable, continue MiraLAX, dicyclomine    9. RBC microcytosis  Etiology uncertain, we have tried to add on a ferritin if this is not possible then I will have her come back for lab draw    The following high BMI interventions were performed this visit: encouragement to exercise and lifestyle education regarding diet    Return in about 1 year (around 5/25/2019) for annual physical.     Chief Complaint   Chief Complaint   Patient presents with     Annual Exam        Patient Profile   Social History     Social History Narrative    Lives with her , Chris.  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  Likes to swim and bikes to work.          Past Medical History   Patient Active Problem List   Diagnosis     Depression / Anxiety - [facility]     History of colonic polyps     Migraine without status migrainosus, not intractable     STEVEN-CPAP     Irritable bowel syndrome       Past Surgical History  She has a past surgical history that includes  [place] tooth extraction (1987).     History of Present Illness   This 48 y.o. old woman comes in for annual physical.  Her medical history was reviewed, electronic medical record was obtained to reflect this note.  She has no acute complaint    Review of Systems: A comprehensive review of systems was negative except as noted.     Medications and Allergies   Current Outpatient Prescriptions   Medication Sig Dispense Refill     acetaminophen (TYLENOL) 650 MG CR tablet Take 650 mg by mouth every 6 (six) hours as needed.       cetirizine (ZYRTEC) 10 MG tablet Take 1 tablet (10 mg total) by mouth daily. 90 tablet 3     cholecalciferol, vitamin D3, 2,000 unit cap Take 2,000 Units by mouth.       dicyclomine (BENTYL) 10 MG capsule Take 1 capsule by mouth 4 (four) times a day as needed.       fluticasone (FLONASE) 50 mcg/actuation nasal spray 1 spray into each nostril daily. 16 g 2     LORazepam (ATIVAN) 0.5 MG tablet Take 0.5 mg by mouth daily as needed.       omega-3 fatty acids-vitamin E 1,000 mg cap Take 1 capsule by mouth daily.       omeprazole (PRILOSEC) 20 MG capsule Take 40 mg by mouth daily.  8     POLYETHYLENE GLYCOL 1000 MISC Use 17 g As Directed daily.       PREVIDENT 5000 SENSITIVE 1.1-5 % Pste USE WITH TOOTHBRUSH QAM AND QHS  0     RECLIPSEN, 28, 0.15-0.03 mg per tablet Take 1 tablet by mouth at bedtime. 3 Package 3     risperiDONE (RISPERDAL) 0.25 MG tablet Alternating 1mg and 0.75mg QOD  2     sertraline (ZOLOFT) 100 MG tablet Take 100 mg by mouth bedtime.       sertraline (ZOLOFT) 50 MG tablet Take 50 mg by mouth bedtime.       traZODone (DESYREL) 100 MG tablet Take 100 mg by mouth bedtime.       No current facility-administered medications for this visit.      Allergies   Allergen Reactions     Amoxicillin-Pot Clavulanate Unknown     Diarrhea and vomiting.      Cefdinir Wheezing     Patient is able to tolerate Penicillin and Amoxicillin without any problems...     Latex Itching        Family and Social  "History   Family History   Problem Relation Age of Onset     Sudden death Mother      Glaucoma Father      Osteoarthritis Sister      hip     No Medical Problems Sister      No Medical Problems Sister      Breast cancer Paternal Aunt 60     Stroke Maternal Grandmother         Social History   Substance Use Topics     Smoking status: Never Smoker     Smokeless tobacco: Never Used     Alcohol use No        Physical Exam   General Appearance:   No acute distress    /60 (Patient Site: Left Arm, Patient Position: Sitting, Cuff Size: Adult Regular)  Pulse 78  Ht 5' 7.75\" (1.721 m)  Wt 172 lb (78 kg)  SpO2 98%  BMI 26.35 kg/m2    EYES: Eyelids, conjunctiva, and sclera were normal. Pupils were normal. Cornea, iris, and lens were normal bilaterally.  HEAD, EARS, NOSE, MOUTH, AND THROAT: Head and face were normal. Hearing was normal to voice and the ears were normal to external exam. Nose appearance was normal and there was no discharge. Oropharynx was normal.  NECK: Neck appearance was normal. There were no neck masses and the thyroid was not enlarged.  RESPIRATORY: Breathing pattern was normal and the chest moved symmetrically.  Percussion/auscultatory percussion was normal.  Lung sounds were normal and there were no abnormal sounds.  CARDIOVASCULAR: Heart rate and rhythm were normal.  S1 and S2 were normal and there were no extra sounds or murmurs. Peripheral pulses in arms and legs were normal.  Jugular venous pressure was normal.  There was no peripheral edema.  GASTROINTESTINAL: The abdomen was normal in contour.  Bowel sounds were present.  Percussion detected no organ enlargement or tenderness.  Palpation detected no tenderness, mass, or enlarged organs.   MUSCULOSKELETAL: Skeletal configuration was normal and muscle mass was normal for age. Joint appearance was overall normal.  LYMPHATIC: There were no enlarged nodes.  SKIN/HAIR/NAILS: Skin color was normal.  There were no skin lesions.  Hair and nails " were normal.  NEUROLOGIC: The patient was alert and oriented to person, place, time, and circumstance. Speech was normal. Cranial nerves were normal. Motor strength was normal for age. The patient was normally coordinated.  PSYCHIATRIC:  Mood and affect were normal and the patient had normal recent and remote memory. The patient's judgment and insight were normal.  CHEST WALL/BREASTS: No breast exam       Additional Information        Javier Amador MD  Internal Medicine  Contact me at [phone]

## 2021-06-19 NOTE — PROGRESS NOTES
Office Visit - Follow Up   Valentina Humphries   48 y.o. female    Date of Visit: 7/23/2018    Chief Complaint   Patient presents with     Lab Result Follow Up        Assessment and Plan   1. Iron deficiency  Likely related to ongoing menstrual blood loss, blood donation, and low iron intake she does not eat red meat.  She had a colonoscopy in 2017 and an EGD in 2014.  She is going to meet with GI in August as she is an established patient.  I do not necessarily think she needs repeat endoscopies at this time but I will also await opinion from gastroenterology.  In the meantime she will start ferrous sulfate 1-2 tablets a day and we will follow-up her hemoglobin in 2 months    2. Impaired fasting glucose  Discussed reducing carbohydrates, modest weight loss through exercise and diet    3. GERD (gastroesophageal reflux disease)  Stop omeprazole start ranitidine 150 mg twice daily, follow-up with GI    4. 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  She is learning new skills and this is becoming easier and more rewarding    The following high BMI interventions were performed this visit: encouragement to exercise and lifestyle education regarding diet    Return in about 2 months (around 9/23/2018) for recheck.     History of Present Illness   This 48 y.o. old woman comes in for follow-up.  Last visit we did some routine labs and she is found to be mildly iron deficient with a normal hemoglobin.  She has had an EGD in 2014 which was generally unremarkable.  She had a colonoscopy in 2017 with some polyps which were not adenomatous.  She has a repeat colonoscopy due in 2022.  She continues to have menstrual cycles every month.  She does not eat red meat.  She is donated blood twice this year.  She continues to have acid reflux symptoms and takes omeprazole.  Because of some of the popular press regarding safety of omeprazole she is wanting  "to stop this but significantly increases Tums when she stops it.  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  We also reviewed her hemoglobin A1c which is mildly elevated.  She has had lifelong issues with food.    Review of Systems: A comprehensive review of systems was negative except as noted.     Medications, Allergies and Problem List   Reviewed and updated     Physical Exam   General Appearance:   No acute distress    /58 (Patient Site: Left Arm, Patient Position: Sitting, Cuff Size: Adult Regular)  Pulse 81  Ht 5' 7.75\" (1.721 m)  Wt 176 lb (79.8 kg)  SpO2 97%  BMI 26.96 kg/m2    HEENT exam is unremarkable  Neck supple no thyromegaly or nodule palpable  Lymphatic no cervical lymphadenopathy  Cardiovascular regular rate and rhythm no murmur gallop or rub  Pulmonary lungs are clear to auscultation bilaterally  Gastrointestinall abdomen soft nontender nondistended no organomegaly  Neurologic exam is non focal  Psychiatric pleasant, no confusion or agitation        Additional Information   Current Outpatient Prescriptions   Medication Sig Dispense Refill     acetaminophen (TYLENOL) 650 MG CR tablet Take 650 mg by mouth every 6 (six) hours as needed.       cetirizine (ZYRTEC) 10 MG tablet Take 1 tablet (10 mg total) by mouth daily. 90 tablet 3     cholecalciferol, vitamin D3, 2,000 unit cap Take 2,000 Units by mouth.       dicyclomine (BENTYL) 10 MG capsule Take 1 capsule by mouth 4 (four) times a day as needed.       fluticasone (FLONASE) 50 mcg/actuation nasal spray 1 spray into each nostril daily. 16 g 2     LORazepam (ATIVAN) 0.5 MG tablet Take 0.5 mg by mouth daily as needed.       omega-3 fatty acids-vitamin E 1,000 mg cap Take 1 capsule by mouth daily.       POLYETHYLENE GLYCOL 1000 MISC Use 17 g As Directed daily.       PREVIDENT 5000 SENSITIVE 1.1-5 % Pste USE WITH TOOTHBRUSH " QAM AND  mL 11     RECLIPSEN, 28, 0.15-0.03 mg per tablet Take 1 tablet by mouth at bedtime. 3 Package 3     risperiDONE (RISPERDAL) 0.25 MG tablet Alternating 1mg and 0.75mg QOD  2     sertraline (ZOLOFT) 100 MG tablet Take 100 mg by mouth bedtime.       sertraline (ZOLOFT) 50 MG tablet Take 50 mg by mouth bedtime.       traZODone (DESYREL) 100 MG tablet Take 100 mg by mouth bedtime.       ferrous sulfate 325 (65 FE) MG tablet Take 1 tablet (325 mg total) by mouth 2 (two) times a day with meals. 60 tablet 2     ranitidine (ZANTAC) 150 MG tablet Take 1 tablet (150 mg total) by mouth 2 (two) times a day. 180 tablet 3     No current facility-administered medications for this visit.      Allergies   Allergen Reactions     Amoxicillin-Pot Clavulanate Unknown     Diarrhea and vomiting.      Cefdinir Wheezing     Patient is able to tolerate Penicillin and Amoxicillin without any problems...     Latex Itching     Social History   Substance Use Topics     Smoking status: Never Smoker     Smokeless tobacco: Never Used     Alcohol use No       Review and/or order of clinical lab tests:  Review and/or order of radiology tests:  Review and/or order of medicine tests:  Discussion of test results with performing physician:  Decision to obtain old records and/or obtain history from someone other than the patient:  Review and summarization of old records and/or obtaining history from someone other than the patient and.or discussion of case with another health care provider:  Independent visualization of image, tracing or specimen itself:    Time:      Javier Amador MD

## 2021-06-20 NOTE — PROGRESS NOTES
[facility] Daily Progress     Patient Name: Valentina Humphries  Date: 10/11/2018  Visit #:   PTA visit #:  1  Referral Diagnosis :left hip pain  Referring provider: Javier Amador MD  Visit Diagnosis:     ICD-10-CM    1. Chronic left hip pain M25.552     G89.29    2. Weakness of left hip R29.898    3. Decreased range of motion of right lower extremity M25.661    4. Acute left-sided low back pain with left-sided sciatica M54.42    5. Generalized muscle weakness M62.81    6. Decreased ROM of lumbar spine M53.86          Assessment:     Cues for HEP/posture needed  Complaint with HEP  Pt would benefit with continuing with skilled physical therapy    Goal Status: Ongoing  Pt. will demonstrate/verbalize independence in self-management of condition in : 6 weeks  Pt. will be independent with home exercise program in : 6 weeks  Patient will transfer: sit/stand;for in/out of chair;in 6 weeks  Comment: less pain with inital wB after sitting 60 min  Patient will increase : LEFS score;in 6 weeks;by _ points  by ___ points: 9  Pt will: be able to push WC with less discomfort in 6 weeks    Plan / Patient Education:     Continue with POC  Progress ex as able  Assess US and STM    Subjective:     Pain Ratin/10 left anterior hip pain constant  Iced in back but hip was sore when icing   Ex 2x/day  Sleeping is normal    Objective:   Reviewed HEP cues needed   Changed ITB stretch to standing   Added US and STM/MFR per treatment plan      Treatment Today   10/11/2018    TREATMENT MINUTES COMMENTS   Evaluation     Self-care/ Home management     Manual therapy 8 STM/MFR to left hip groin   Neuromuscular Re-education     Therapeutic Activity     Therapeutic Exercises 15 HEP exercises per flow sheet /instruction in bone structure discussed   Gait training     Modality_________________US_ 8 +3 set-up US to left ant hip/groin 1MHz 100% 1.0wts/cm2              Total 34    Blank areas are intentional and  mean the treatment did not include these items.       Dorota Hilario  10/11/2018

## 2021-06-20 NOTE — LETTER
Letter by Bora Hylton MD at      Author: Bora Hylton MD Service: -- Author Type: --    Filed:  Encounter Date: 12/16/2019 Status: Signed         December 16, 2019     Patient: Valentina Humphries   YOB: 1970   Date of Visit: 12/16/2019       To Whom It May Concern:     Valentina Humphries was seen in clinic today and may return to work on Wednesday December 18, 2019.    If you have any questions or concerns, please don't hesitate to call.    Sincerely,        Electronically signed by Bora Hylton MD

## 2021-06-20 NOTE — PROGRESS NOTES
"  Office Visit - Follow Up   Valentina Humphries   48 y.o. female    Date of Visit: 10/10/2018    Chief Complaint   Patient presents with     Nasal Congestion     Sneezing, congestion, little bough, runny nose since Sunday        Assessment and Plan   1. URI (upper respiratory infection)  Symptomatic measures discussed.  No indication for antibiotics at this time.  May have an allergic component, Flonase antihistamine discussed and recommended.    Return in about 2 weeks (around 10/24/2018) for recheck.     History of Present Illness   This 48 y.o. old woman comes in for evaluation of 3-4 days of nasal congestion, sneezing, coughing, mild shortness of breath.  This all seemed to start after she was vacuuming.  She is a history of allergies.  She is no fever.  No chills.  No ear pain.  No significant purulent drainage or productive cough of inflammatory sputum.    Review of Systems: A comprehensive review of systems was negative except as noted.     Medications, Allergies and Problem List   Reviewed, reconciled and updated     Physical Exam   General Appearance:   No acute distress    /72  Pulse 64  Temp 97  F (36.1  C)  Ht 5' 7.75\" (1.721 m)  Wt 177 lb (80.3 kg)  BMI 27.11 kg/m2    HEENT exam is unremarkable with the exception of some mild erythema posterior oropharynx  Neck supple no thyromegaly or nodule palpable  Lymphatic no cervical lymphadenopathy  Cardiovascular regular rate and rhythm no murmur gallop or rub  Pulmonary lungs are clear to auscultation bilaterally  Neurologic exam is non focal  Psychiatric pleasant, no confusion or agitation        Additional Information   Current Outpatient Prescriptions   Medication Sig Dispense Refill     acetaminophen (TYLENOL) 650 MG CR tablet Take 650 mg by mouth every 6 (six) hours as needed.       cholecalciferol, vitamin D3, 2,000 unit cap Take 2,000 Units by mouth.       dicyclomine (BENTYL) 10 MG capsule Take 1 capsule by mouth 4 (four) times a " day as needed.       ferrous sulfate 325 (65 FE) MG tablet Take 1 tablet (325 mg total) by mouth 2 (two) times a day with meals. 60 tablet 2     LORazepam (ATIVAN) 0.5 MG tablet Take 0.5 mg by mouth daily as needed.       omega-3 fatty acids-vitamin E 1,000 mg cap Take 1 capsule by mouth daily.       POLYETHYLENE GLYCOL 1000 MISC Use 17 g As Directed daily.       PREVIDENT 5000 SENSITIVE 1.1-5 % Pste USE WITH TOOTHBRUSH QAM AND  mL 11     ranitidine (ZANTAC) 150 MG tablet Take 1 tablet (150 mg total) by mouth 2 (two) times a day. (Patient taking differently: Take 150 mg by mouth daily. ) 180 tablet 3     RECLIPSEN, 28, 0.15-0.03 mg per tablet Take 1 tablet by mouth at bedtime. 3 Package 3     risperiDONE (RISPERDAL) 0.25 MG tablet Alternating 1mg and 0.75mg QOD  2     sertraline (ZOLOFT) 100 MG tablet Take 100 mg by mouth bedtime.       sertraline (ZOLOFT) 50 MG tablet Take 50 mg by mouth bedtime.       traZODone (DESYREL) 100 MG tablet Take 100 mg by mouth bedtime.       albuterol (PROAIR HFA;PROVENTIL HFA;VENTOLIN HFA) 90 mcg/actuation inhaler Inhale 1-2 puffs every 4 (four) hours as needed for wheezing. 1 Inhaler 11     cetirizine (ZYRTEC) 10 MG tablet Take 1 tablet (10 mg total) by mouth daily. 90 tablet 3     fluticasone (FLONASE) 50 mcg/actuation nasal spray 2 sprays into each nostril daily. 16 g 11     No current facility-administered medications for this visit.      Allergies   Allergen Reactions     Amoxicillin-Pot Clavulanate Unknown     Diarrhea and vomiting.      Cefdinir Wheezing     Patient is able to tolerate Penicillin and Amoxicillin without any problems...     Latex Itching     Social History   Substance Use Topics     Smoking status: Never Smoker     Smokeless tobacco: Never Used     Alcohol use No       Review and/or order of clinical lab tests:  Review and/or order of radiology tests:  Review and/or order of medicine tests:  Discussion of test results with performing physician:  Decision  to obtain old records and/or obtain history from someone other than the patient:  Review and summarization of old records and/or obtaining history from someone other than the patient and.or discussion of case with another health care provider:  Independent visualization of image, tracing or specimen itself:    Time:      Javier Amador MD

## 2021-06-20 NOTE — LETTER
Letter by Onesimo Fraga, PharmD at      Author: Onesimo Fraga, PharmD Service: -- Author Type: --    Filed:  Encounter Date: 12/18/2019 Status: Signed       To whom it may concern,       Valentina was scheduled for and attended two different visits at the [facility] today, 12/18/19, please excuse her from work and let us know if there are any questions or concerns.       Best,

## 2021-06-20 NOTE — LETTER
Letter by Rhonda Pittman RN at      Author: Rhonda Pittman RN Service: -- Author Type: --    Filed:  Encounter Date: 6/12/2020 Status: (Other)       6/12/2020        Valentina Humphries  [address]  [place] [address]    This letter provides a written record that you were tested for COVID-19 on 6/10/2020.     Your result was negative.    This means that we didnt find the virus that causes COVID-19 in your sample. A test may show negative when you do actually have the virus. This can happen when the virus is in the early stages of infection, before you feel illness symptoms.    Even if you dont have symptoms, they may still appear. For safety, its very important to follow these rules.    Keep yourself away from others (self-isolation):      Stay home. Dont go to work, school or anywhere else.     Stay in your own room (and use your own bathroom), if you can.    Stay away from others in your home. No hugging, kissing or shaking hands. No visitors.    Clean high touch surfaces often (doorknobs, counters, handles, etc.). Use a household cleaning spray or wipes.    Cover your mouth and nose with a mask, tissue or washcloth to avoid spreading germs.    Wash your hands and face often with soap and water.    Stay in self-isolation until you meet ALL of the guidelines below:    1. You have had no fever for at least 72 hours (that is 3 full days of no fever without the use of medicine that reduces fevers), AND  2. other symptoms (such as cough, shortness of breath) have gotten better, AND  3. at least 10 days have passed since your symptoms first appeared.    Going back to work  Check with your employer for any guidelines to follow for going back to work.    Employers: This document serves as formal notice that your employee tested negative for COVID-19, as of the testing date shown above.    For questions regarding this letter or your Negative COVID-19 result, call [phone] between 8A to 6:30P  (M-F) and 10A to 6:30P (weekends).

## 2021-06-20 NOTE — PROGRESS NOTES
Office Visit - Follow Up   Valentina Humphries   48 y.o. female    Date of Visit: 10/2/2018    Chief Complaint   Patient presents with     Labs Only     HGB and blood sugars check        Assessment and Plan   1. Impaired fasting glucose  Discussed Mediterranean type diet, regular exercise and modest weight loss  - Glycosylated Hemoglobin A1c    2. Need for prophylactic vaccination and inoculation against influenza  - Influenza, Seasonal Quad, Preservative Free 36+ Months    3. RBC microcytosis  Likely related to frequent blood donation, encouraged her to take an iron tablet daily, check labs as below  - Ferritin  - Tissue Transglutaminase,IgA & IgG  - HM2(CBC w/o Differential)    4. Hip pain, left  Not mentioned below, she has been having some stiffness and pain in the left hip especially if she initially stands up for activity.  - XR Hip Left 2 or More VWS; Future  - Ambulatory referral to Adult PT- Internal    Return in about 4 weeks (around 10/30/2018) for recheck.     History of Present Illness   This 48 y.o. old woman comes in for follow-up.  Last visit red blood cells were slightly microcytic.  She is not anemic.  I asked her to come in for further evaluation.  She is up-to-date on her colonoscopy.  No upper GI symptoms.  No blood in her stool or black tarry stools.  She does donate blood with some regularity this year.  She had donated initially before this blood check.  She also had a mildly elevated blood sugar.  She is quite active.  She is a bit overweight.  No symptoms of diabetes.  No recurrent urination and increased thirst.  No leg cramps.  No vision changes or numbness in her feet.    Review of Systems: A comprehensive review of systems was negative except as noted.     Medications, Allergies and Problem List   Reviewed, reconciled and updated     Physical Exam   General Appearance:   No acute distress    /62 (Patient Site: Right Arm, Patient Position: Sitting, Cuff Size: Adult  "Regular)  Pulse 72  Ht 5' 7.75\" (1.721 m)  Wt 173 lb (78.5 kg)  SpO2 95%  BMI 26.5 kg/m2    HEENT exam is unremarkable  Neck supple no thyromegaly or nodule palpable  Lymphatic no cervical lymphadenopathy  Cardiovascular regular rate and rhythm no murmur gallop or rub  Pulmonary lungs are clear to auscultation bilaterally  Gastrointestinall abdomen soft nontender nondistended no organomegaly  Neurologic exam is non focal  Psychiatric pleasant, no confusion or agitation        Additional Information   Current Outpatient Prescriptions   Medication Sig Dispense Refill     acetaminophen (TYLENOL) 650 MG CR tablet Take 650 mg by mouth every 6 (six) hours as needed.       cetirizine (ZYRTEC) 10 MG tablet Take 1 tablet (10 mg total) by mouth daily. 90 tablet 3     cholecalciferol, vitamin D3, 2,000 unit cap Take 2,000 Units by mouth.       dicyclomine (BENTYL) 10 MG capsule Take 1 capsule by mouth 4 (four) times a day as needed.       ferrous sulfate 325 (65 FE) MG tablet Take 1 tablet (325 mg total) by mouth 2 (two) times a day with meals. 60 tablet 2     LORazepam (ATIVAN) 0.5 MG tablet Take 0.5 mg by mouth daily as needed.       omega-3 fatty acids-vitamin E 1,000 mg cap Take 1 capsule by mouth daily.       POLYETHYLENE GLYCOL 1000 MISC Use 17 g As Directed daily.       PREVIDENT 5000 SENSITIVE 1.1-5 % Pste USE WITH TOOTHBRUSH QAM AND  mL 11     ranitidine (ZANTAC) 150 MG tablet Take 1 tablet (150 mg total) by mouth 2 (two) times a day. 180 tablet 3     RECLIPSEN, 28, 0.15-0.03 mg per tablet Take 1 tablet by mouth at bedtime. 3 Package 3     risperiDONE (RISPERDAL) 0.25 MG tablet Alternating 1mg and 0.75mg QOD  2     sertraline (ZOLOFT) 100 MG tablet Take 100 mg by mouth bedtime.       sertraline (ZOLOFT) 50 MG tablet Take 50 mg by mouth bedtime.       traZODone (DESYREL) 100 MG tablet Take 100 mg by mouth bedtime.       No current facility-administered medications for this visit.      Allergies   Allergen " Reactions     Amoxicillin-Pot Clavulanate Unknown     Diarrhea and vomiting.      Cefdinir Wheezing     Patient is able to tolerate Penicillin and Amoxicillin without any problems...     Latex Itching     Social History   Substance Use Topics     Smoking status: Never Smoker     Smokeless tobacco: Never Used     Alcohol use No       Review and/or order of clinical lab tests:  Review and/or order of radiology tests:  Review and/or order of medicine tests:  Discussion of test results with performing physician:  Decision to obtain old records and/or obtain history from someone other than the patient:  Review and summarization of old records and/or obtaining history from someone other than the patient and.or discussion of case with another health care provider:  Independent visualization of image, tracing or specimen itself:    Time:      Javier Amador MD

## 2021-06-20 NOTE — LETTER
Letter by Maddison Humphries RN at      Author: Maddison Humphries RN Service: -- Author Type: --    Filed:  Encounter Date: 6/27/2020 Status: (Other)       6/27/2020        Valentina Clement Yandel  [address]  [place] [address]    This letter provides a written record that you were tested for COVID-19 on 6/25/20.     Your result was negative. This means that we didnt find the virus that causes COVID-19 in your sample. A test may show negative when you do actually have the virus. This can happen when the virus is in the early stages of infection, before you feel illness symptoms.    If you have symptoms   Stay home and away from others (self-isolate) until you meet ALL of the guidelines below:    Youve had no fever--and no medicine that reduces fever--for 3 full days (72 hours). And ?    Your other symptoms have gotten better. For example, your cough or breathing has improved. And?    At least 10 days have passed since your symptoms started.    During this time:    Stay home. Dont go to work, school or anywhere else.     Stay in your own room, including for meals. Use your own bathroom if you can.    Stay away from others in your home. No hugging, kissing or shaking hands. No visitors.    Clean high touch surfaces often (doorknobs, counters, handles, etc.). Use a household cleaning spray or wipes. You can find a full list on the EPA website at www.epa.gov/pesticide-registration/list-n-disinfectants-use-against-sars-cov-2.    Cover your mouth and nose with a mask, tissue or washcloth to avoid spreading germs.    Wash your hands and face often with soap and water.    Going back to work  Check with your employer for any guidelines to follow for going back to work.    Employers: This document serves as formal notice that your employee tested negative for COVID-19, as of the testing date shown above.

## 2021-06-20 NOTE — LETTER
Letter by Kassie Wahl CNP at      Author: Kassie Wahl CNP Service: -- Author Type: --    Filed:  Encounter Date: 12/23/2019 Status: Signed         December 23, 2019     Patient: Valentina Humphries   YOB: 1970   Date of Visit: 12/23/2019       To Whom It May Concern:    It is my medical opinion that Valentina Humphries may return to work on 12/27/2019..    If you have any questions or concerns, please don't hesitate to call.    Sincerely,        Electronically signed by Kassie Wahl CNP

## 2021-06-20 NOTE — PROGRESS NOTES
[facility]   Hip Initial Evaluation    Patient Name: Valentina Humhpries  Precautions/Restrictions:  Recent Low back injury early 2018 with PT treatment  Date of evaluation:  10/9/2018  Referral Diagnosis: Left Hip Pain  Referring provider: Javier Amador MD  Visit Diagnosis:     ICD-10-CM    1. Chronic left hip pain M25.552     G89.29    2. Weakness of left hip R29.898    3. Decreased range of motion of right lower extremity M25.661        Assessment:        Skilled PT is required to modulate pain, increase ROM, flexibility, strength and function through manual therapy, modalities, exercise and education.  Pt. is appropriate for skilled PT intervention as outlined in the Plan of Care (POC).  Pt. is a good candidate for skilled PT services to improve pain levels and function.    Goals:  Pt. will demonstrate/verbalize independence in self-management of condition in : 6 weeks  Pt. will be independent with home exercise program in : 6 weeks  Patient will transfer: sit/stand;for in/out of chair;in 6 weeks  Comment: less pain with inital wB after sitting 60 min  Patient will increase : LEFS score;in 6 weeks;by _ points  by ___ points: 9  Pt will: be able to push WC with less discomfort in 6 weeks    Patient's expectations/goals are realistic.    Barriers to Learning or Achieving Goals:  Chronicity of the problem.  Co-morbidities or other medical factors.  recent low back episode           Plan / Patient Instructions:        Plan of Care:   Communication with: Referral Source  Patient Related Instruction: Nature of Condition;Treatment plan and rationale;Self Care instruction;Basis of treatment;Precautions;Next steps;Expected outcome  Times per Week: 2x/week for 3 weeks then 1x/week for 6 weeks  Number of Weeks: 9  Number of Visits: 12  Discharge Planning: INdependent HEP and self-management of symptoms  Precautions / Restrictions : Recent low back injury.  Therapeutic Exercise:  ROM;Stretching;Strengthening  Neuromuscular Reeducation: kinesio tape;core;balance/proprioception  Manual Therapy: soft tissue mobilization;myofascial release;joint mobilization  Modalities: ultrasound  Equipment: theraband;other  Equipment: kinesiotape    The goals and plan of care were established in collaboration with the patient.    Plan for next visit: see 2x/week for 5 more visits then reassess needs/progress.  Next:  Begin MFR to anterior hip/consider US, add clam  Or supine hip abduction/ER isometric.  Progress to unilateral bridge with bent knee lift.     Subjective:        Social information:   Occupation:  Occ[employer]   Work Status:  Full time   Equipment Available: orthotic custom    History of Present Illness:    Valentina Humphries is a 48 y.o. 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 Patient had episode of LB/disc following an injury 2/2018.  PT was helpful for this incident but but anterior left hip pain again became more noticeable after PT treatment and after decreasing her exercise to 1x/week. She reports intermittent left post LE to dorsum of foot and anterior left hip with increased stress and tension. She states her back issue is 80-85% resolved.  She does report a right hip flexor strain '89-90 when running competitively, and a left HS strain during High School.  Pt demo's signs and sx consistent with groin strain/capsule impingement.    Pain Rating current:6  Pain rating at worst: 8  Pain rating at best: 3  Pain description: ache with sharp pain with initial weightbearing  Functional limitations:  Initial WB after prolonged sitting, pushing WC at work.    Patient reports benefit from:  movement or exercise , stretches       Objective:      Note: Items left  blank indicates the item was not performed or not indicated at the time of the evaluation.    Patient Outcome Measures :    Lower Extremity Functional Scale (_/80): 58   Scores range from 0-80, where a score of 80 represents maximum function. The minimal clinically important difference is a positive change of 9 points.    Hip Examination  1. Chronic left hip pain     2. Weakness of left hip     3. Decreased range of motion of right lower extremity       Involved Side: Left    Posture Observation: Standing:  C-protraction, high cerv ext, left shoulder/scap/right iliac crest high; at supine-left LE rested in IR and appears shorter.       Assistive Device: None  Gait Observation: WNL but initial limp after rising from chair  Lumbar Clearing: Lumbar ROM:  Date: 10/9/2019     *Indicate scale AROM AROM AROM   Lumbar Flexion Nil loss, bilat knee pain     Lumbar Extension Min-mod loss, better      Right Left Right Left Right Left   Lumbar Sidebending Min loss, stretch, better Min loss, stretch, better       Lumbar Rotation Min-mod, stretch, better Min-mod loss, stretch, better       Thoracic Flexion      Thoracic Extension      Thoracic Sidebending         Thoracic Rotation           Distraction to left hip was painful.    Flexibility:  Gastrocsoleus/HS bilat WNL with some discomfort with max HS stretch    Palpation:  Moderate tenderness Left groin/inquinal and pubic bone, tightness right ITB    Hip ROM:  Able to cross ankles without hip pain  Date: 10/9/2018     Hip ROM( ) AROM in degrees AROM in degrees AROM in degrees    Right Left Right Left Right Left   Hip Flexion (0-120 ) 130 130 anterior hip pain       Hip Abduction (0-45 ) 40 30 pain groin and hs       Hip External Rotation (0-50 ) 32 28 mild pain       Hip Internal Rotation (0-40 ) 35 30 min pain       Hip Extension (0-15 )          PROM in degrees PROM in degrees PROM in degrees    Right Left Right Left Right Left   Hip Flexion (0-120 )         Hip Abduction  "(0-45 )         Hip External Rotation (0-50 )         Hip Internal Rotation (0-40 )         Hip Extension (0-15 )           Hip/Knee Strength     Date: 10/9/2018     Hip/Knee Strength (/5) MMT MMT MMT    Right Left Right Left Right Left   Hip Flexion 5 5       Hip Abduction 5 5       Hip Adduction         Hip Extension 4 4       Hip External Rotation 5 3+-4- PAIN       Hip Internal Rotation 5 5- mild pain       Knee Extension 5 5       Knee Flexion 5 5             Hip Special Tests     OA Right (+/-) Left (+/-) Intra Articular Right (+/-) Left (+/-)   Hip Scour   MARICEL - -   Test Cluster  -Hip pain  -Hip IR <15   -Hip Flex <115    FADIR  +   Test Cluster  -Painful Hip IR  ->50 years old  -Morning Stiffness <60 min   Passive Supine Rotation Test     Misc. Right (+/-) Left (+/-) Stinchfield Test (SLR Against Resistance)     Ely s   DEXRIT     Richelle s   DIRI     Trendelenburg   Posterior Rim Impingement     SIJ Right (+/-) Left (+/-) Lateral Rim Impingement     SIJ Compression   Other     SIJ Distraction   Other     POSH Test   Other     Sacral Thrust   Other       Today's HEP:  Exercises:  Exercise #1: 1/2 kneel hip flexor stretch, 20\"x1-H  Comment #1: Supine hip flexor stretch 10\"x1, trial with some pain  Exercise #2: ITB stretch at SL, 10\"x1, -H  Comment #2: Supine hip ER stretch with OP-H  some pain with figure 4 bringing knee to chest  Exercise #3: Bridge, 2x-H     Stretches were comfortable.    Treatment Today     TREATMENT MINUTES COMMENTS   Evaluation 30 Hip   Self-care/ Home management     Manual therapy     Neuromuscular Re-education     Therapeutic Activity     Therapeutic Exercises 25 See flow sheet   Gait training     Modality__________________                Total 55    Blank areas are intentional and mean the treatment did not include these items.     PT Evaluation Code: (Please list factors)  Patient History/Comorbidities: see above  Examination: see above  Clinical Presentation: uncomplicated  Clinical " Decision Making: low    Patient History/  Comorbidities Examination  (body structures and functions, activity limitations, and/or participation restrictions) Clinical Presentation Clinical Decision Making (Complexity)   No documented Comorbidities or personal factors 1-2 Elements Stable and/or uncomplicated Low   1-2 documented comorbidities or personal factor 3 Elements Evolving clinical presentation with changing characteristics Moderate   3-4 documented comorbidities or personal factors 4 or more Unstable and unpredictable High              Lola Sawant  10/9/2018  7:06 AM

## 2021-06-21 NOTE — PROGRESS NOTES
[facility] Discharge Summary  Patient Name: Valentina Humphries  Date: 12/14/2018  Referral Diagnosis: Left Hip Pain  Referring provider: Javier Amador MD  Visit Diagnosis:   1. Chronic left hip pain     2. Weakness of left hip     3. Decreased range of motion of right lower extremity         Goals:  Pt. will demonstrate/verbalize independence in self-management of condition in : Progressing toward  Pt. will be independent with home exercise program in : Progressing toward  Patient will transfer: Met  Comment: less pain with inital wB after sitting 60 min    Patient will increase : LEFS score;in 6 weeks;by _ points-unmet  by ___ points: 9  Pt will: better pushing WC-unmet      Patient was seen for 6 visits from 10/9/2018 to 11/9/2018 with 2 No Show and one Cancelled appointments.  The patient was instructed to follow up with physician's clinic due to plateau in progress.  Patient received a home program Hip/pelvic strengthening/hip/glut stretches  The patient was issued theraband and instructed in proper usage.  Patient met goals regarding exercise and made progress toward self-management of symptoms but Outcome Measure and pushing WC goals were unmet which is why she was referred back to MD due to improvement but [place] and no achieving other goals..    Therapy will be discontinued at this time.  The patient will need a new referral to resume.    Thank you for your referral.  Lola SWIFT Jese  12/14/2018  1:38 PM      [facility] Daily Progress     Patient Name: Valentina Humphries  Date: 11/9/2018  Visit #: 6/12  PTA visit #:  2  Referral Diagnosis :left hip pain  Referring provider: Javier Amador MD  Visit Diagnosis:     ICD-10-CM    1. Chronic left hip pain M25.552     G89.29    2. Weakness of left hip R29.898    3. Decreased range of motion of right lower extremity M25.661          Assessment:     Compliant with HEP and feels they are  beneficial.  Reports some decreased pain with sit to stand transfers and pushing wheelchairs.  Relief with US  Pt would benefit with continuing with skilled physical therapy.  Generally more sore at the end of work day due to walking 4 miles at work.  Weakness evident in hip extensors but feels improvement in flexibility.  LEFS did not demonstrate a statistically significant change.      Goal Status:   Pt. will demonstrate/verbalize independence in self-management of condition in : Progressing toward  Pt. will be independent with home exercise program in : Progressing toward  Patient will transfer: partially met less sharp pain  Comment: less pain with inital wB after sitting 60 min  Patient will increase : LEFS score;in 6 weeks;by _ points  by ___ points: 9  Pt will: better pushing WC-progress toward goal, better with good body mechanics    Plan / Patient Education:     Patient will return to MD due to persistent pain in left hip although she feels flexibility has improved.  She will call to schedule 4 more visits if desired after speaking with MD.  Will continue with MFR, hold US and try leg pulls and work to advance vigor of hip extension ex to unilateral bridging.    Subjective:     Pain Ratin/10 (6/10 at initial visit)  left anterior hip pain constant.  Overall improvement estimated at 30%.  Compliant with HEP and overall feels more flexible but pain persists.  Pain is worst with sit to stand transfers and stair climbing and still notes limping with initial walking after sitting.  Still tenderness more toward ASIS and adductors.  Reports relief with US   LEFS Outcome Measure:  Initial 58/80  Current 55/80    Objective:     Patient arrived 10 minutes late.  Palpation:  Tender along left inguinal area to pubic bone to inferior to ASIS and proximal groin musculature.  Hip ROM:  Flex/IR/abduction/extension painful  Weak hip extensors  Recommend more hip flexor stretching during the day.  Added yellow band to  clamshell, prone bent knee hip ext, and SL hip abduction.  Tolerated new/modified exercises well.    Treatment Today   11/9/2018    TREATMENT MINUTES COMMENTS   Evaluation     Self-care/ Home management     Manual therapy 8 MFR to left hip groin/quad/hip flexor   Neuromuscular Re-education     Therapeutic Activity     Therapeutic Exercises 20 See flow sheet; hip ROM   Gait training     Modality_________________US_  US to left ant hip/groin 1MHz 100% 1.5wts/cm2              Total 28    Blank areas are intentional and mean the treatment did not include these items.       Lola Sawant  11/9/2018

## 2021-06-21 NOTE — PROGRESS NOTES
[facility] Daily Progress     Patient Name: Valentina Humphries  Date: 10/23/2018  Visit #:   PTA visit #:  1  Referral Diagnosis :left hip pain  Referring provider: Javier Amador MD  Visit Diagnosis:     ICD-10-CM    1. Chronic left hip pain M25.552     G89.29    2. Weakness of left hip R29.898    3. Decreased range of motion of right lower extremity M25.661          Assessment:     Complaint with HEP, noting isometric resisted hip abduction is beneficial.  Pt would benefit with continuing with skilled physical therapy.  Generally more sore at the end of work day due to walking 4 miles at work.  Response to intervention  Is limited but reporting less pain.  LEFS Outcome Measure has not demonstrated a statistically significant change and a decrease in score.    Goal Status: Ongoing  Pt. will demonstrate/verbalize independence in self-management of condition in : 6 weeks  Pt. will be independent with home exercise program in : 6 weeks  Patient will transfer: sit/stand;for in/out of chair;in 6 weeks  Comment: less pain with inital wB after sitting 60 min  Patient will increase : LEFS score;in 6 weeks;by _ points  by ___ points: 9  Pt will: be able to push WC with less discomfort in 6 weeks    Plan / Patient Education:     Continue with POC  Progress ex as able to clamshell and/or unilateral bent knee lift.  Continue US and R.    Subjective:     Pain Ratin/10 left anterior hip pain constant.  Increased discomfort on Thursday when she twisted while sitting and felt a crunch in left hip which was sore for 2 days.  No longer has the sharp pain and now is more dull but feeling it more consistently now than before.  Compliant with HEP and continues to walk a  Lot at work but not swimming.   Will begin swimming workouts 3 days a week as she resumes next month.  Still unable to hold stretches for 30 sec before warming up with lesser holding time, progressing to longer holding  times.  Overall feels more flexible and able to walk better.  Using cold after incident ;but heat with seat warmers.  Now feeling some tenderness more toward ASIS and adductors and HS origin insertion  More sore after prolonged sitting then standing to walk and with stair climbing although on 10/19/2018 she had less pain with stair climbing.    Sleeping is normal  LEFS Outcome Measure:  Initial:  58/80  Current 51/80    Objective:       Palpation:  Tender along left inguinal area to pubic bone to inferior to ASIS and proximal groin musculature.  Hip ROM:             Date: 10/9/2018  10/23/2018     Hip ROM( ) AROM in degrees AROM in degrees AROM in degrees     Right Left Right Left Right Left   Hip Flexion (0-120 ) 130 130 anterior hip pain   130 central inguinal pain        Hip Abduction (0-45 ) 40 30 pain groin and hs    35 with adductor/groin pain       Hip External Rotation (0-50 ) 32 28 mild pain    30 NE       Hip Internal Rotation (0-40 ) 35 30 min pain    30 gorin pain       Hip Extension (0-15 )                 Recommend more hip flexor stretching during the day.  Less pain after treatment.    Treatment Today   10/23/2018    TREATMENT MINUTES COMMENTS   Evaluation     Self-care/ Home management     Manual therapy 8 MFR to left hip groin/quad/hip flexor   Neuromuscular Re-education     Therapeutic Activity     Therapeutic Exercises 5 ROM check.     Gait training     Modality_________________US_ 10 +3 set-up US to left ant hip/groin 1MHz 100% 1.5wts/cm2              Total 26    Blank areas are intentional and mean the treatment did not include these items.       Lola Sawant  10/23/2018

## 2021-06-21 NOTE — PROGRESS NOTES
[facility] Daily Progress     Patient Name: Valentina Humphries  Date: 10/30/2018  Visit #:   PTA visit #:  2  Referral Diagnosis :left hip pain  Referring provider: Javier Amador MD  Visit Diagnosis:     ICD-10-CM    1. Chronic left hip pain M25.552     G89.29    2. Weakness of left hip R29.898    3. Decreased range of motion of right lower extremity M25.661    4. Acute left-sided low back pain with left-sided sciatica M54.42    5. Generalized muscle weakness M62.81    6. Decreased ROM of lumbar spine M53.86          Assessment:   Reports decreased pain with transfers  Relief with US  Pt would benefit with continuing with skilled physical therapy.  Generally more sore at the end of work day due to walking 4 miles at work.      Goal Status: Ongoing  Pt. will demonstrate/verbalize independence in self-management of condition in : Progressing toward  Pt. will be independent with home exercise program in : Progressing toward  Patient will transfer: Met  Comment: less pain with inital wB after sitting 60 min  Patient will increase : LEFS score;in 6 weeks;by _ points  by ___ points: 9  Pt will: better pushing WC    Plan / Patient Education:     Continue with POC  Progress ex as unilateral bent knee lift.  Continue US and MFR.    Subjective:     Pain Ratin/10 left anterior hip pain constant.  Reports decreased pain with transfers  Overall feels more flexible and able to walk better.  Now feeling some tenderness more toward ASIS and adductors and HS origin insertion  Reports relief with US  Ex daily    Objective:       Palpation:  Tender along left inguinal area to pubic bone to inferior to ASIS and proximal groin musculature.  Hip ROM:    Recommend more hip flexor stretching during the day  Added clams to ex program.  Less pain after treatment.    Treatment Today   10/30/2018    TREATMENT MINUTES COMMENTS   Evaluation     Self-care/ Home management     Manual therapy 8 MFR to left hip  groin/quad/hip flexor   Neuromuscular Re-education     Therapeutic Activity     Therapeutic Exercises 5 Added SL clams    Gait training     Modality_________________US_ 10 +3 set-up US to left ant hip/groin 1MHz 100% 1.5wts/cm2              Total 26    Blank areas are intentional and mean the treatment did not include these items.       Dorota Hilario  10/30/2018

## 2021-06-21 NOTE — PROGRESS NOTES
"[facility] Daily Progress     Patient Name: Valentina Humphries  Date: 10/16/2018  Visit #: 3/12  PTA visit #:  1  Referral Diagnosis :left hip pain  Referring provider: Javier Amador MD  Visit Diagnosis:     ICD-10-CM    1. Chronic left hip pain M25.552     G89.29    2. Weakness of left hip R29.898    3. Decreased range of motion of right lower extremity M25.661          Assessment:     Complaint with HEP  Pt would benefit with continuing with skilled physical therapy.  More sore at the end of work day due to walking 4 miles at work.  Response to intervention  Is limited but reporting less pain.    Goal Status: Ongoing  Pt. will demonstrate/verbalize independence in self-management of condition in : 6 weeks  Pt. will be independent with home exercise program in : 6 weeks  Patient will transfer: sit/stand;for in/out of chair;in 6 weeks  Comment: less pain with inital wB after sitting 60 min  Patient will increase : LEFS score;in 6 weeks;by _ points  by ___ points: 9  Pt will: be able to push WC with less discomfort in 6 weeks    Plan / Patient Education:     Continue with POC  Progress ex as able to clamshell and/or unilateral bent knee lift.  Continue US and MFR.    Subjective:     Pain Ratin/10 left anterior hip pain constant.  More sore at the end of work day from walking 4 miles at work.    After initial visit had increased achiness likely due to prolonged stretch holds cummulative 90 sec.  Better with US at last visit.    Compliant with HEP 2x/day, holding stretches 10 seconds but didn't exercise as much over the past weekend.    Sleeping is normal    Objective:     Reviewed HEP for correct performance due to patient uncertainty.  Palpation:  Tender along left inguinal area to pubic bone to inferior to ASIS.    OPT EXERCISES 10/16/2018   Exercise #1 1/2 kneel hip flexor stretch, 20\"x1-modified to standing lunge stretch   Comment #1    Exercise #2 ITB stretch at SL, 10\"x1, - " "changed to standing ITB stretch-reinstructed   Comment #2 Supine hip ER stretch with OP-H  some pain with figure 4 bringing knee to chest-reinstructed   Exercise #3    Comment #3 Supine/seated hip ER/Abduction isometric, 5x5\"-H       Treatment Today   10/16/2018    TREATMENT MINUTES COMMENTS   Evaluation     Self-care/ Home management     Manual therapy 8 MFR to left hip groin/quad/hip flexor   Neuromuscular Re-education     Therapeutic Activity     Therapeutic Exercises 8 HEP exercises per flow sheet /instruction in bone structure discussed   Gait training     Modality_________________US_ 8 +3 set-up US to left ant hip/groin 1MHz 100% 1.0wts/cm2              Total 27    Blank areas are intentional and mean the treatment did not include these items.       Lola Sawant  10/16/2018    "

## 2021-06-22 NOTE — PROGRESS NOTES
[facility] Follow Up Note    Valentina FLAKITA Racquel Humphries   48 y.o. female    Date of Visit: 12/14/2018    Chief Complaint   Patient presents with     Dizziness     Lightheaded since yesterday     Subjective  This is a 48-year-old patient of Dr. Javier Amador.  She comes in today because of dizziness.  She reports that she had been in her usual state of health until yesterday when while at work she stood up after sitting for a while and felt dizzy.  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  Her fluid intake may not have been as good as it should have been and she may have consumed more coffee than she should have.  She reports that several years ago she had a similar episode one summer that was diagnosed as a vestibular disturbance.  She has not had any recurring episodes.  They do not seem to be any accompanying symptoms.  No significant headache and no blurred vision or double vision.  No chest pain or shortness of breath of significance.  Today she again noticed a little bit of the dizziness that is somewhat positional in nature.  No recent changes in medication.    ROS A comprehensive review of systems was performed and was otherwise negative    Medications, allergies, and problem list were reviewed and updated    Exam  General Appearance:   On examination her blood pressure is 100/60.  Weight is 179 pounds and height is 68 inches.  BMI is 27.42.    Lungs are clear.    Heart is in a sinus rhythm with a rate of 60 and no ectopy.    Carotid pulses are full with no bruits.    Cranial nerves are intact    Pupils are equal.  No nystagmus.    Hand eye coordination is normal.  Romberg test is negative.    The patient is alert and oriented x3.      Assessment/Plan  1. Dizziness       Dizziness.  This seems to be somewhat positional in nature and I think is most likely inner ear related.  I find nothing focally on examination.  I  discussed this with the patient and suggested she try to avoid caffeine through the weekend and that she get other fluids in and make sure she is eating normally.  I suggested that she could try some meclizine as needed for the dizziness.  I advised her to call if there is any change or worsening in the symptoms over the weekend and to follow-up next week if the symptoms have not cleared.  She seems fine with this approach.    Total time of this office visit was 25 minutes with greater than 50% of the time spent in care coordination of patient counseling.  Body Mass Index was not assessed due to Patient was in with an acute medical issue..    Javier Tee MD      Current Outpatient Medications on File Prior to Visit   Medication Sig     acetaminophen (TYLENOL) 650 MG CR tablet Take 650 mg by mouth every 6 (six) hours as needed.     albuterol (PROAIR HFA;PROVENTIL HFA;VENTOLIN HFA) 90 mcg/actuation inhaler Inhale 1-2 puffs every 4 (four) hours as needed for wheezing.     cetirizine (ZYRTEC) 10 MG tablet Take 1 tablet (10 mg total) by mouth daily.     cholecalciferol, vitamin D3, 2,000 unit cap Take 2,000 Units by mouth.     dicyclomine (BENTYL) 10 MG capsule Take 1 capsule by mouth 4 (four) times a day as needed.     ferrous sulfate 325 (65 FE) MG tablet Take 1 tablet (325 mg total) by mouth 2 (two) times a day with meals.     fluticasone (FLONASE) 50 mcg/actuation nasal spray 2 sprays into each nostril daily.     LORazepam (ATIVAN) 0.5 MG tablet Take 0.5 mg by mouth daily as needed.     omega-3 fatty acids-vitamin E 1,000 mg cap Take 1 capsule by mouth daily.     POLYETHYLENE GLYCOL 1000 MISC Use 17 g As Directed daily.     PREVIDENT 5000 SENSITIVE 1.1-5 % Pste USE WITH TOOTHBRUSH QAM AND QHS     ranitidine (ZANTAC) 150 MG tablet Take 1 tablet (150 mg total) by mouth 2 (two) times a day. (Patient taking differently: Take 150 mg by mouth daily. )     RECLIPSEN, 28, 0.15-0.03 mg per tablet Take 1 tablet by mouth  at bedtime.     risperiDONE (RISPERDAL) 0.25 MG tablet Alternating 1mg and 0.75mg QOD     sertraline (ZOLOFT) 100 MG tablet Take 100 mg by mouth bedtime.     sertraline (ZOLOFT) 50 MG tablet Take 50 mg by mouth bedtime.     traZODone (DESYREL) 100 MG tablet Take 100 mg by mouth bedtime.     No current facility-administered medications on file prior to visit.      Allergies   Allergen Reactions     Amoxicillin-Pot Clavulanate Unknown     Diarrhea and vomiting.      Cefdinir Wheezing     Patient is able to tolerate Penicillin and Amoxicillin without any problems...     Latex Itching     Social History     Tobacco Use     Smoking status: Never Smoker     Smokeless tobacco: Never Used   Substance Use Topics     Alcohol use: No     Drug use: No

## 2021-06-28 NOTE — PROGRESS NOTES
"Progress Notes by Kassie Wahl CNP at 12/23/2019  1:40 PM     Author: Kassie Wahl CNP Service: -- Author Type: Nurse Practitioner    Filed: 12/23/2019  5:07 PM Encounter Date: 12/23/2019 Status: Signed    : Kassie Wahl CNP (Nurse Practitioner)       Chief Complaint   Patient presents with   ? Rash     X 1+ week       HPI:  Valentina Humphries is a 49 y.o. female who presents today complaining of slow retrun of rash over past one week following a reaction to a hair oil used for which she was seen in the ER on 2/12/2019.     She reports concern due to persistent rash as she tapers prednisone course. Denies any shortness of breath, wheezing or other areas of rash noticed. Reports continued use of H2 blocker and zyrtec and cessation of causative agent.     History obtained from the patient.  LMP: 11/28/2019    Problem List:  2019-11: Microcytic anemia  2019-10: EBER (generalized anxiety disorder)  2017-02: Depression  2012-05: Irritable bowel syndrome  2011-12: STEVEN-CPAP  2011-08: Migraine without status migrainosus, not intractable  2010-02: History of colonic polyps      Past Medical History:   Diagnosis Date   ? Asthma    ? Depression    ? Migraine     Visual aura   ? Varicella        Social History     Tobacco Use   ? Smoking status: Never Smoker   ? Smokeless tobacco: Never Used   Substance Use Topics   ? Alcohol use: No       Review of Systems   Constitutional: Negative.    Respiratory: Negative for shortness of breath and wheezing.    Musculoskeletal: Negative for myalgias.   Skin: Positive for rash.   Neurological: Negative for headaches.   All other systems reviewed and are negative.      Vitals:    12/23/19 1441   Pulse: 74   Resp: 16   SpO2: 96%   Weight: 159 lb (72.1 kg)   Height: 5' 7\" (1.702 m)       Physical Exam  Constitutional:       General: She is not in acute distress.     Appearance: She is not ill-appearing, toxic-appearing or diaphoretic.   HENT:      Head: " Normocephalic.      Right Ear: Tympanic membrane, ear canal and external ear normal. There is no impacted cerumen.      Left Ear: Tympanic membrane, ear canal and external ear normal. There is no impacted cerumen.      Nose: Congestion and rhinorrhea present.      Mouth/Throat:      Mouth: Mucous membranes are moist.      Pharynx: Oropharynx is clear. No oropharyngeal exudate or posterior oropharyngeal erythema.   Neck:      Musculoskeletal: Neck supple.   Cardiovascular:      Rate and Rhythm: Normal rate and regular rhythm.      Pulses: Normal pulses.      Heart sounds: Normal heart sounds. No murmur. No friction rub. No gallop.    Pulmonary:      Effort: Pulmonary effort is normal.      Breath sounds: Normal breath sounds. No wheezing or rhonchi.   Lymphadenopathy:      Cervical: No cervical adenopathy.   Skin:     General: Skin is warm.      Capillary Refill: Capillary refill takes less than 2 seconds.      Findings: Rash present.      Comments: Anterior neck, through chest, excluding breast tissue but under breasts noted with warm, erythremic fine macular rash   Neurological:      General: No focal deficit present.      Mental Status: She is alert and oriented to person, place, and time. Mental status is at baseline.   Psychiatric:         Mood and Affect: Mood normal.         Behavior: Behavior normal.         No notes on file    Labs:  No results found for this or any previous visit (from the past 72 hour(s)).    Radiology:No results found.    Clinical Decision Making: At the end of the encounter, I discussed results, diagnosis, medications. Discussed red flags for immediate return to clinic/ER, as well as indications for follow up if no improvement. Strongly encouraged follow up with Derm specialist as scheduled. Encouraged maintenance of prednisone and follow taper as discussed. Patient understood and agreed to plan.     LES Arango, CNP       1. Allergic reaction, initial encounter  predniSONE  (DELTASONE) 10 mg tablet         Patient Instructions     Patient Education     General Allergic Reactions  An allergic reaction is a set of symptoms caused by an allergen. An allergen is something that causes a persons immune system to react. When a person comes in contact with an allergen, it causes the body to release chemicals. These include the chemical histamine. Histamine causes swelling and itching. It may affect the entire body. This is called a general allergic reaction. Often symptoms affect only 1 part of the body. This is called a local allergic reaction.  You are having an allergic reaction. Almost anything can cause one. Different people are allergic to different things. It is usually something that you ate or swallowed, came into contact with by getting or putting it on your skin or clothes, or something you breathed in the air. This can be very annoying and sometimes scary.  Most of us think of allergic reactions when we have a rash or itchy skin. Symptoms can include:    Itching of the eyes, nose, and roof of the mouth    Runny or stuffy nose    Watery eyes     Sneezing or coughing     A blocked feeling in the ear    Red, itchy rash called hives    Red and purple spots    Rash, redness, welts, blisters    Itching, burning, stinging, pain    Dry, flaky, cracking, scaly skin  Severe symptoms include:    Swelling of the face, lips, or other parts of the body    Hoarse voice    Trouble swallowing, feeling like your throat is closing    Trouble breathing, wheezing    Nausea, vomiting, diarrhea, stomach cramps    Feeling faint or lightheaded, rapid heart rate  Sometimes the cause may be obvious. But there are so many things that can cause a reaction that you may not be able to figure out. The most important things to help find your allergen are:    Remembering when it started    What you were doing at the time or just before that    Any activities you were involved in    Any new products or  contacts  Below are some common causes. But remember that almost anything can cause a reaction. You may not even be aware that you came into contact with one of these things:    Dust, mold, pollen    Plants (common ones are poison ivy and poison oak, but there are many others)     Animals    Foods such as shrimp, shellfish, peanuts, milk products, gluten, and eggs. Also food colorings, flavorings, and additives.    Insect bites or stings such as bees, mosquitos, fleas, ticks    Medicines such as penicillin, sulfa medicines, amoxicillin, aspirin, and ibuprofen. But any medicine can cause a reaction.    Jewelry such as nickel or gold. This can be new, or something youve worn for a while, including zippers and buttons.    Latex such as in gloves, clothes, toys, balloons, or some tapes. Some people allergic to latex may also have problems with foods like bananas, avocados, kiwi, papaya, or chestnuts.    Lotions, perfumes, cosmetics, soaps, shampoos, skincare products, nail products    Chemicals or dyes in clothing, linen, , hair dyes, soaps, iodine  Many viruses and common colds can cause a rash that is not an allergic reaction. Sometimes it is hard to tell the difference between allergies, sensitivity, or an intolerance to something. This is especially true with food. Many things can cause diarrhea, vomiting, stomach cramps, and skin irritation.  Home care    The goal of treatment is to help relieve the symptoms and get you feeling better. The rash will usually fade over several days. But it can sometimes last a couple of weeks. Over the next couple of days, there may be times when it is gets a little worse, and then better again. Here are some things to do:    If you know what you are allergic to, stay away from it. Future reactions could be worse than this one.    Avoid tight clothing and anything that heats up your skin (hot showers or baths, direct sunlight). Heat will make itching worse.    An ice pack  will relieve local areas of intense itching and redness. To make an ice pack, put ice cubes in a plastic bag that seals at the top. Wrap it in a thin, clean towel. Dont put the ice directly on the skin because it can damage the skin.    Oral diphenhydramine is an over-the-counter antihistamine sold at pharmacy and grocery stores. Unless a prescription antihistamine was given, diphenhydramine may be used to reduce itching if large areas of the skin are involved. It may make you sleepy. So be careful using it in the daytime or when going to school, working, or driving. Note: Dont use diphenhydramine if you have glaucoma or if you are a man with trouble urinating due to an enlarged prostate. There are other antihistamines that wont make you so sleepy. These are good choices for daytime use. Ask your pharmacist for suggestions.    Dont use diphenhydramine cream on your skin. It can cause a further reaction in some people.    To help prevent an infection, don't scratch the affected area. Scratching may worsen the reaction and damage your skin. It can also lead to an infection. Always check the affected for signs of an infection.    Call your healthcare provider and ask what you can use to help decrease the itching.    To decrease allergic reactions, try the following:      Use heat-steam to clean your home    Use high-efficiency particulate (HEPA) vacuums and filters    Stay away from food and pet triggers    Kill any cockroaches    Clean your house often  Follow-up care  Follow up with your healthcare provider, or as advised. If you had a severe reaction today, or if you have had several mild to medium allergic reactions in the past, ask your provider about allergy testing. This can help you find out what you are allergic to. If your reaction included dizziness, fainting, or trouble breathing or swallowing, ask your provider about carrying auto-injectable epinephrine.  Call 911  Call 911 if any of these  occur:    Trouble breathing or swallowing, wheezing    Cool, moist, pale skin    Shortness of breath    Hoarse voice or trouble speaking    Confused     Very drowsy or trouble awakening    Fainting or loss of consciousness    Rapid heart rate    Feeling of dizziness or weakness or a sudden drop in blood pressure    Feeling of doom    Feeling lightheaded    Severe nausea or vomiting, or diarrhea    Seizure    Swelling in the face, eyelids, lips, mouth, throat or tongue    Drooling  When to seek medical advice  Call your healthcare provider right away if any of these occur:    Spreading areas of itching, redness or swelling    Nausea or stomach cramps or abdominal pain    Continuing or recurring symptoms    Spreading areas of redness, swelling, or itching    Signs of infection at the affected site:  ? Spreading redness  ? Increased pain or swelling  ? Fluid or colored drainage from the site  ? Fever of 100.4 F (38 C) or above lasting for 24 to 48 hours, or as directed by your provider  Date Last Reviewed: 3/1/2017    5099-8366 [employer]. [address] [place] [address], [place] [address]. All rights reserved. This information is not intended as a substitute for professional medical care. Always follow your healthcare professional's instructions.

## 2021-06-28 NOTE — PROGRESS NOTES
Progress Notes by Kassie Wahl CNP at 11/26/2019 11:40 AM     Author: Kassie Wahl CNP Service: -- Author Type: Nurse Practitioner    Filed: 11/26/2019  2:34 PM Encounter Date: 11/26/2019 Status: Signed    : Kassie Wahl CNP (Nurse Practitioner)       Chief Complaint   Patient presents with   ? Eye Drainage     both   ? Fever     feel feverish       HPI:  Valentina Humphries is a 49 y.o. female who presents today complaining of fever and eye drainage/discharge for 2 days.     History obtained from the patient.    Problem List:  2019-10: EBER (generalized anxiety disorder)  2017-02: Depression  2012-05: Irritable bowel syndrome  2011-12: STEVEN-CPAP  2011-08: Migraine without status migrainosus, not intractable  2010-02: History of colonic polyps      Past Medical History:   Diagnosis Date   ? Anemia    ? Asthma    ? Depression    ? Hyperlipidemia    ? Migraine     Visual aura   ? Painful swelling of joint    ? Varicella        Social History     Tobacco Use   ? Smoking status: Never Smoker   ? Smokeless tobacco: Never Used   Substance Use Topics   ? Alcohol use: No       Review of Systems   Constitutional: Positive for fever. Negative for activity change, appetite change, chills and fatigue.   HENT: Positive for congestion and rhinorrhea. Negative for sore throat.    Eyes: Positive for discharge, redness and itching. Negative for photophobia, pain and visual disturbance.   Respiratory: Negative for cough.    Gastrointestinal: Negative for diarrhea and vomiting.   Genitourinary: Negative for dysuria.   All other systems reviewed and are negative.      Vitals:    11/26/19 1139   BP: 112/68   Pulse: 65   Resp: 14   Temp: 97.7  F (36.5  C)   SpO2: 96%   Weight: 162 lb 1.6 oz (73.5 kg)       Physical Exam  Constitutional:       General: She is not in acute distress.     Appearance: She is ill-appearing. She is not toxic-appearing or diaphoretic.   HENT:      Head: Normocephalic.      Right  Ear: Tympanic membrane, ear canal and external ear normal. There is no impacted cerumen.      Left Ear: Tympanic membrane, ear canal and external ear normal. There is no impacted cerumen.      Nose: Rhinorrhea present. No congestion.      Mouth/Throat:      Mouth: Mucous membranes are moist.      Pharynx: No oropharyngeal exudate or posterior oropharyngeal erythema.   Eyes:      General:         Right eye: Discharge present.         Left eye: Discharge present.     Extraocular Movements: Extraocular movements intact.      Pupils: Pupils are equal, round, and reactive to light.      Comments: Mildly injected with erythema     Neck:      Musculoskeletal: Neck supple. No muscular tenderness.   Cardiovascular:      Rate and Rhythm: Normal rate and regular rhythm.      Pulses: Normal pulses.      Heart sounds: Normal heart sounds. No murmur. No friction rub. No gallop.    Pulmonary:      Effort: Pulmonary effort is normal.      Breath sounds: Normal breath sounds. No wheezing or rhonchi.   Lymphadenopathy:      Cervical: Cervical adenopathy present.   Skin:     General: Skin is warm.      Capillary Refill: Capillary refill takes less than 2 seconds.      Findings: No rash.   Neurological:      General: No focal deficit present.      Mental Status: She is alert and oriented to person, place, and time. Mental status is at baseline.   Psychiatric:         Mood and Affect: Mood normal.         Behavior: Behavior normal.         No notes on file    Labs:  No results found for this or any previous visit (from the past 72 hour(s)).    Radiology:No results found.    Clinical Decision Making: At the end of the encounter, I discussed results, diagnosis, medications. Discussed red flags for immediate return to clinic/ER, as well as indications for follow up if no improvement. Discussed wait and see optho antibiotics. Education provided. Patient understood and agreed to plan.     LES Arango, CNP       1. Viral  conjunctivitis of both eyes  polymyxin B-trimethoprim (FOR POLYTRIM) 10,000 unit- 1 mg/mL Drop ophthalmic drops         Patient Instructions     Patient Education     Viral Conjunctivitis    Viral conjunctivitis (sometimes called pink eye) is a common infection of the eye. It is very contagious. Touching the infected eye, then touching another person passes this infection. It can also be spread from one eye to the other in this same way. The most common symptoms include redness, discharge from the eye, swollen eyelids, and a gritty or scratchy feeling in the eye.  This condition will take about 7 to 10 days to go away. Artificial tears (available without a prescription) are often recommended to moisten and clean the eyes. Antibiotic eye drops often are not recommended because they will not kill the virus. But sometimes they may be prescribed by eye doctors. This is to prevent a second, bacterial infection.  Home care    Apply a towel soaked in cool water to the affected eye 3 to 4 times a day (just before applying medicine to the eye).    It is common to have mucus drainage during the night, causing the eyelids to become crusted by morning. Use a warm, wet cloth to wipe this away.    Wash any cloths used to clean the eye after one use. Don't reuse them.    If antibiotic medicines are prescribed, take them exactly as directed. Don't stop taking them until you are told to.    You may use acetaminophen or ibuprofen to control pain, unless another medicine was prescribed. (Note: If you have chronic liver or kidney disease, or if you have ever had a stomach ulcer or gastrointestinal bleeding, talk with your healthcare provider before using these medicines.) Aspirin should never be used in anyone under 18 years of age who is ill with a fever. It may cause severe liver damage.    Wash your hands before and after touching the affected eye. This helps to prevent spreading the infection to your other eye and to others.    The  infected person should avoid sharing towels, washcloths, and bedding with others. This is to prevent spreading the infection.    This illness is contagious during the first week. Children with this illness should be kept out of day care and school until the redness clears.  Follow-up care  Follow up with your healthcare provider, or as advised.  When to seek medical advice  Call your healthcare provider right away if any of these occur:    Worsening vision    Increasing pain in the eye    Increasing swelling or redness of the eyelid    Redness spreading to the face around the eye    Large amount of green or yellow drainage from the eye    Severe itching in or around the eye    Fever of 100.4 F (38 C) or higher  Date Last Reviewed: 7/1/2017 2000-2017 [employer]. [address] [place] [address], [place] [address]. All rights reserved. This information is not intended as a substitute for professional medical care. Always follow your healthcare professional's instructions.

## 2021-07-19 PROBLEM — F41.8 DEPRESSION WITH ANXIETY: Status: ACTIVE | Noted: 2021-07-19

## 2021-08-18 NOTE — TELEPHONE ENCOUNTER
No I think it is okay for her to receive the second series, especially if it can be administered in the next 3 days as the original first series was given on 2/26/2020.  If so, no need to restart the process.  Thank you  
Patient is requesting 2nd Shingles shot.  Appt was originally scheduled in 5/6 due to clinic hibernating pt was advised to reschedule.  Does not appear to have re-scheduled.    Does patient need to restart shingles prevention process?    
no

## 2021-08-30 ENCOUNTER — OFFICE VISIT (OUTPATIENT)
Dept: FAMILY MEDICINE | Facility: CLINIC | Age: 51
End: 2021-08-30
Payer: COMMERCIAL

## 2021-08-30 VITALS
HEART RATE: 72 BPM | HEIGHT: 69 IN | SYSTOLIC BLOOD PRESSURE: 110 MMHG | DIASTOLIC BLOOD PRESSURE: 64 MMHG | BODY MASS INDEX: 25.67 KG/M2 | WEIGHT: 173.3 LBS

## 2021-08-30 DIAGNOSIS — Z30.41 ENCOUNTER FOR SURVEILLANCE OF CONTRACEPTIVE PILLS: ICD-10-CM

## 2021-08-30 DIAGNOSIS — D50.9 MICROCYTIC ANEMIA: ICD-10-CM

## 2021-08-30 DIAGNOSIS — Z12.4 SCREENING FOR MALIGNANT NEOPLASM OF CERVIX: ICD-10-CM

## 2021-08-30 DIAGNOSIS — K21.9 GASTROESOPHAGEAL REFLUX DISEASE WITHOUT ESOPHAGITIS: ICD-10-CM

## 2021-08-30 DIAGNOSIS — Z11.59 NEED FOR HEPATITIS C SCREENING TEST: ICD-10-CM

## 2021-08-30 DIAGNOSIS — K58.9 IRRITABLE BOWEL SYNDROME, UNSPECIFIED TYPE: ICD-10-CM

## 2021-08-30 DIAGNOSIS — G47.33 OSA (OBSTRUCTIVE SLEEP APNEA): ICD-10-CM

## 2021-08-30 DIAGNOSIS — Z13.220 LIPID SCREENING: ICD-10-CM

## 2021-08-30 DIAGNOSIS — Z00.01 ENCOUNTER FOR ROUTINE ADULT MEDICAL EXAM WITH ABNORMAL FINDINGS: Primary | ICD-10-CM

## 2021-08-30 DIAGNOSIS — M25.552 HIP PAIN, LEFT: ICD-10-CM

## 2021-08-30 DIAGNOSIS — Z12.31 VISIT FOR SCREENING MAMMOGRAM: ICD-10-CM

## 2021-08-30 DIAGNOSIS — F33.1 MODERATE EPISODE OF RECURRENT MAJOR DEPRESSIVE DISORDER (H): ICD-10-CM

## 2021-08-30 DIAGNOSIS — Z86.0100 HISTORY OF COLONIC POLYPS: ICD-10-CM

## 2021-08-30 PROBLEM — F41.8 DEPRESSION WITH ANXIETY: Status: RESOLVED | Noted: 2021-07-19 | Resolved: 2021-08-30

## 2021-08-30 PROBLEM — K64.8 OTHER HEMORRHOIDS: Status: RESOLVED | Noted: 2017-10-16 | Resolved: 2021-08-30

## 2021-08-30 PROBLEM — R10.9 UNSPECIFIED ABDOMINAL PAIN: Status: RESOLVED | Noted: 2021-01-18 | Resolved: 2021-08-30

## 2021-08-30 PROBLEM — K63.5 POLYP OF COLON: Status: RESOLVED | Noted: 2017-10-16 | Resolved: 2021-08-30

## 2021-08-30 PROCEDURE — 99213 OFFICE O/P EST LOW 20 MIN: CPT | Mod: 25 | Performed by: FAMILY MEDICINE

## 2021-08-30 PROCEDURE — 87624 HPV HI-RISK TYP POOLED RSLT: CPT | Performed by: FAMILY MEDICINE

## 2021-08-30 PROCEDURE — G0123 SCREEN CERV/VAG THIN LAYER: HCPCS | Performed by: FAMILY MEDICINE

## 2021-08-30 PROCEDURE — 99396 PREV VISIT EST AGE 40-64: CPT | Performed by: FAMILY MEDICINE

## 2021-08-30 RX ORDER — FLUTICASONE PROPIONATE 50 MCG
1 SPRAY, SUSPENSION (ML) NASAL DAILY PRN
COMMUNITY
Start: 2020-07-10 | End: 2022-11-21

## 2021-08-30 RX ORDER — NORETHINDRONE ACETATE AND ETHINYL ESTRADIOL 1MG-20(21)
1 KIT ORAL DAILY
Qty: 84 TABLET | Refills: 3 | Status: SHIPPED | OUTPATIENT
Start: 2021-08-30 | End: 2022-08-06

## 2021-08-30 RX ORDER — POLYETHYLENE GLYCOL 3350 17 G/17G
17 POWDER, FOR SOLUTION ORAL DAILY PRN
COMMUNITY
Start: 2019-09-18

## 2021-08-30 RX ORDER — EPINEPHRINE 0.3 MG/.3ML
0.3 INJECTION SUBCUTANEOUS
COMMUNITY
Start: 2019-12-12 | End: 2021-12-17

## 2021-08-30 ASSESSMENT — MIFFLIN-ST. JEOR: SCORE: 1456.49
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
## 2021-08-31 ENCOUNTER — MYC MEDICAL ADVICE (OUTPATIENT)
Dept: FAMILY MEDICINE | Facility: CLINIC | Age: 51
End: 2021-08-31

## 2021-09-01 ENCOUNTER — LAB (OUTPATIENT)
Dept: LAB | Facility: CLINIC | Age: 51
End: 2021-09-01
Payer: COMMERCIAL

## 2021-09-01 DIAGNOSIS — Z00.01 ENCOUNTER FOR ROUTINE ADULT MEDICAL EXAM WITH ABNORMAL FINDINGS: ICD-10-CM

## 2021-09-01 DIAGNOSIS — Z11.59 NEED FOR HEPATITIS C SCREENING TEST: ICD-10-CM

## 2021-09-01 DIAGNOSIS — F33.1 MODERATE EPISODE OF RECURRENT MAJOR DEPRESSIVE DISORDER (H): ICD-10-CM

## 2021-09-01 DIAGNOSIS — Z13.220 LIPID SCREENING: ICD-10-CM

## 2021-09-01 DIAGNOSIS — D50.9 MICROCYTIC ANEMIA: ICD-10-CM

## 2021-09-01 PROBLEM — Z30.41 ENCOUNTER FOR SURVEILLANCE OF CONTRACEPTIVE PILLS: Status: ACTIVE | Noted: 2021-09-01

## 2021-09-01 LAB
ANION GAP SERPL CALCULATED.3IONS-SCNC: 14 MMOL/L (ref 5–18)
BUN SERPL-MCNC: 13 MG/DL (ref 8–22)
CALCIUM SERPL-MCNC: 9.4 MG/DL (ref 8.5–10.5)
CHLORIDE BLD-SCNC: 105 MMOL/L (ref 98–107)
CHOLEST SERPL-MCNC: 216 MG/DL
CO2 SERPL-SCNC: 22 MMOL/L (ref 22–31)
CREAT SERPL-MCNC: 0.69 MG/DL (ref 0.6–1.1)
ERYTHROCYTE [DISTWIDTH] IN BLOOD BY AUTOMATED COUNT: 12.8 % (ref 10–15)
FASTING STATUS PATIENT QL REPORTED: ABNORMAL
GFR SERPL CREATININE-BSD FRML MDRD: >90 ML/MIN/1.73M2
GLUCOSE BLD-MCNC: 87 MG/DL (ref 70–125)
HCT VFR BLD AUTO: 39.8 % (ref 35–47)
HDLC SERPL-MCNC: 78 MG/DL
HGB BLD-MCNC: 13.8 G/DL (ref 11.7–15.7)
LDLC SERPL CALC-MCNC: 112 MG/DL
MCH RBC QN AUTO: 29.6 PG (ref 26.5–33)
MCHC RBC AUTO-ENTMCNC: 34.7 G/DL (ref 31.5–36.5)
MCV RBC AUTO: 85 FL (ref 78–100)
PLATELET # BLD AUTO: 149 10E3/UL (ref 150–450)
POTASSIUM BLD-SCNC: 4.4 MMOL/L (ref 3.5–5)
RBC # BLD AUTO: 4.66 10E6/UL (ref 3.8–5.2)
SODIUM SERPL-SCNC: 141 MMOL/L (ref 136–145)
TRIGL SERPL-MCNC: 130 MG/DL
WBC # BLD AUTO: 6.7 10E3/UL (ref 4–11)

## 2021-09-01 PROCEDURE — 85027 COMPLETE CBC AUTOMATED: CPT

## 2021-09-01 PROCEDURE — 80048 BASIC METABOLIC PNL TOTAL CA: CPT

## 2021-09-01 PROCEDURE — 83550 IRON BINDING TEST: CPT

## 2021-09-01 PROCEDURE — 86803 HEPATITIS C AB TEST: CPT

## 2021-09-01 PROCEDURE — 80061 LIPID PANEL: CPT

## 2021-09-01 PROCEDURE — 36415 COLL VENOUS BLD VENIPUNCTURE: CPT

## 2021-09-01 ASSESSMENT — ANXIETY QUESTIONNAIRES
3. WORRYING TOO MUCH ABOUT DIFFERENT THINGS: SEVERAL DAYS
GAD7 TOTAL SCORE: 13
IF YOU CHECKED OFF ANY PROBLEMS ON THIS QUESTIONNAIRE, HOW DIFFICULT HAVE THESE PROBLEMS MADE IT FOR YOU TO DO YOUR WORK, TAKE CARE OF THINGS AT HOME, OR GET ALONG WITH OTHER PEOPLE: VERY DIFFICULT
6. BECOMING EASILY ANNOYED OR IRRITABLE: NEARLY EVERY DAY
5. BEING SO RESTLESS THAT IT IS HARD TO SIT STILL: SEVERAL DAYS
2. NOT BEING ABLE TO STOP OR CONTROL WORRYING: SEVERAL DAYS
1. FEELING NERVOUS, ANXIOUS, OR ON EDGE: SEVERAL DAYS
7. FEELING AFRAID AS IF SOMETHING AWFUL MIGHT HAPPEN: NEARLY EVERY DAY

## 2021-09-01 ASSESSMENT — PATIENT HEALTH QUESTIONNAIRE - PHQ9
SUM OF ALL RESPONSES TO PHQ QUESTIONS 1-9: 13
5. POOR APPETITE OR OVEREATING: NEARLY EVERY DAY

## 2021-09-02 LAB
HUMAN PAPILLOMA VIRUS 16 DNA: NEGATIVE
HUMAN PAPILLOMA VIRUS 18 DNA: NEGATIVE
HUMAN PAPILLOMA VIRUS FINAL DIAGNOSIS: NORMAL
HUMAN PAPILLOMA VIRUS OTHER HR: NEGATIVE
IRON SATN MFR SERPL: 31 % (ref 15–46)
IRON SERPL-MCNC: 131 UG/DL (ref 35–180)
TIBC SERPL-MCNC: 425 UG/DL (ref 240–430)

## 2021-09-02 ASSESSMENT — ANXIETY QUESTIONNAIRES: GAD7 TOTAL SCORE: 13

## 2021-09-03 LAB
BKR LAB AP GYN ADEQUACY: NORMAL
BKR LAB AP GYN INTERPRETATION: NORMAL
BKR LAB AP HPV REFLEX: NORMAL
BKR LAB AP PREVIOUS ABNORMAL: NORMAL
HCV AB SERPL QL IA: NONREACTIVE
PATH REPORT.COMMENTS IMP SPEC: NORMAL
PATH REPORT.RELEVANT HX SPEC: NORMAL

## 2021-09-13 ENCOUNTER — TRANSFERRED RECORDS (OUTPATIENT)
Dept: HEALTH INFORMATION MANAGEMENT | Facility: CLINIC | Age: 51
End: 2021-09-13

## 2021-10-05 ENCOUNTER — IMMUNIZATION (OUTPATIENT)
Dept: FAMILY MEDICINE | Facility: CLINIC | Age: 51
End: 2021-10-05
Payer: COMMERCIAL

## 2021-10-05 PROCEDURE — 90682 RIV4 VACC RECOMBINANT DNA IM: CPT

## 2021-10-05 PROCEDURE — 90471 IMMUNIZATION ADMIN: CPT

## 2021-10-10 NOTE — TELEPHONE ENCOUNTER
"  Disp Refills Start End MIKE    desogestreL-ethinyl estradioL (APRI) 0.15-0.03 mg per tablet 84 tablet 2 1/18/2021  No   Sig - Route: Take 1 tablet by mouth daily. - Oral   Sent to pharmacy as: Apri 0.15 mg-0.03 mg tablet (desogestreL-ethinyl estradioL)   E-Prescribing Status: Receipt confirmed by pharmacy (1/18/2021 12:53 PM CST)       desogestreL-ethinyl estradioL (APRI) 0.15-0.03 mg per tablet [[id number]]    Electronically signed by: Suleman Guillen MD on 01/18/21 1253 Status: Active   Ordering user: Suleman Guillen MD 01/18/21 1253 Authorized by: Suleman Guillen MD   Frequency: DAILY 01/18/21 - Until Discontinued Released by: Monalisa Salazar [facility] 01/18/21 1253   Diagnoses  Encounter for contraceptive management, unspecified type [Z30.9]     Former patient of Mindy & has not established care with another provider.  Please assign refill request to covering provider per clinic standard process.    Routing refill request to provider for review/approval because:  No pcp    Last Written Prescription Date:  1/18/21  Last Fill Quantity: 84,  # refills: 2   Last office visit provider:  8/30/21     Requested Prescriptions   Pending Prescriptions Disp Refills     desogestrel-ethinyl estradiol (APRI) 0.15-30 MG-MCG tablet       Sig: Take 1 tablet by mouth       Contraceptives Protocol Failed - 10/8/2021 12:32 PM        Failed - Recent (12 mo) or future (30 days) visit within the authorizing provider's specialty     Patient has had an office visit with the authorizing provider or a provider within the authorizing providers department within the previous 12 mos or has a future within next 30 days. See \"Patient Info\" tab in inbasket, or \"Choose Columns\" in Meds & Orders section of the refill encounter.              Passed - Patient is not a current smoker if age is 35 or older        Passed - Medication is active on med list        Passed - No active pregnancy on record        Passed - No " positive pregnancy test in past 12 months             Chris Valdes RN 10/10/21 8:11 AM

## 2021-10-11 RX ORDER — DESOGESTREL AND ETHINYL ESTRADIOL 0.15-0.03
1 KIT ORAL
OUTPATIENT
Start: 2021-10-11

## 2021-10-11 NOTE — TELEPHONE ENCOUNTER
Dose was changed at her recent physical exam.  She should have a lower estrogen dose and a 1 year supply.

## 2021-11-29 DIAGNOSIS — Z30.41 ENCOUNTER FOR SURVEILLANCE OF CONTRACEPTIVE PILLS: Primary | ICD-10-CM

## 2021-11-29 DIAGNOSIS — Z30.9 ENCOUNTER FOR CONTRACEPTIVE MANAGEMENT: ICD-10-CM

## 2021-12-01 RX ORDER — DESOGESTREL AND ETHINYL ESTRADIOL 0.15-0.03
1 KIT ORAL DAILY
Qty: 84 TABLET | Refills: 2 | Status: SHIPPED | OUTPATIENT
Start: 2021-12-01 | End: 2021-12-17

## 2021-12-01 NOTE — TELEPHONE ENCOUNTER
"Outpatient Medication Detail     Disp Refills Start End MIKE   desogestreL-ethinyl estradioL (APRI) 0.15-0.03 mg per tablet 84 tablet 2 1/18/2021  No   Sig - Route: Take 1 tablet by mouth daily. - Oral   Sent to pharmacy as: Apri 0.15 mg-0.03 mg tablet (desogestreL-ethinyl estradioL)   E-Prescribing Status: Receipt confirmed by pharmacy (1/18/2021 12:53 PM CST)       Last office visit provider:  8/30/21     Requested Prescriptions   Pending Prescriptions Disp Refills     desogestrel-ethinyl estradiol (APRI) 0.15-30 MG-MCG tablet 28 tablet 11     Sig: Take 1 tablet by mouth daily       Contraceptives Protocol Passed - 11/29/2021  3:00 PM        Passed - Patient is not a current smoker if age is 35 or older        Passed - Recent (12 mo) or future (30 days) visit within the authorizing provider's specialty     Patient has had an office visit with the authorizing provider or a provider within the authorizing providers department within the previous 12 mos or has a future within next 30 days. See \"Patient Info\" tab in inbasket, or \"Choose Columns\" in Meds & Orders section of the refill encounter.              Passed - Medication is active on med list        Passed - No active pregnancy on record        Passed - No positive pregnancy test in past 12 months             Dorcas Mc RN 12/01/21 7:33 AM  "

## 2021-12-17 ENCOUNTER — OFFICE VISIT (OUTPATIENT)
Dept: FAMILY MEDICINE | Facility: CLINIC | Age: 51
End: 2021-12-17
Payer: COMMERCIAL

## 2021-12-17 VITALS
SYSTOLIC BLOOD PRESSURE: 98 MMHG | HEART RATE: 78 BPM | OXYGEN SATURATION: 96 % | TEMPERATURE: 97.4 F | WEIGHT: 176.1 LBS | DIASTOLIC BLOOD PRESSURE: 65 MMHG | BODY MASS INDEX: 26.19 KG/M2

## 2021-12-17 DIAGNOSIS — M16.12 ARTHRITIS OF LEFT HIP: ICD-10-CM

## 2021-12-17 DIAGNOSIS — Z01.818 PRE-OP EXAM: Primary | ICD-10-CM

## 2021-12-17 DIAGNOSIS — M25.552 HIP PAIN, LEFT: ICD-10-CM

## 2021-12-17 DIAGNOSIS — K21.9 GASTROESOPHAGEAL REFLUX DISEASE WITHOUT ESOPHAGITIS: ICD-10-CM

## 2021-12-17 DIAGNOSIS — F33.1 MODERATE EPISODE OF RECURRENT MAJOR DEPRESSIVE DISORDER (H): ICD-10-CM

## 2021-12-17 DIAGNOSIS — Z91.09 ENVIRONMENTAL ALLERGIES: ICD-10-CM

## 2021-12-17 DIAGNOSIS — G47.33 OSA (OBSTRUCTIVE SLEEP APNEA): ICD-10-CM

## 2021-12-17 DIAGNOSIS — F41.1 GAD (GENERALIZED ANXIETY DISORDER): ICD-10-CM

## 2021-12-17 LAB
ANION GAP SERPL CALCULATED.3IONS-SCNC: 10 MMOL/L (ref 5–18)
BUN SERPL-MCNC: 11 MG/DL (ref 8–22)
CALCIUM SERPL-MCNC: 8.8 MG/DL (ref 8.5–10.5)
CHLORIDE BLD-SCNC: 106 MMOL/L (ref 98–107)
CO2 SERPL-SCNC: 24 MMOL/L (ref 22–31)
CREAT SERPL-MCNC: 0.69 MG/DL (ref 0.6–1.1)
ERYTHROCYTE [DISTWIDTH] IN BLOOD BY AUTOMATED COUNT: 13 % (ref 10–15)
GFR SERPL CREATININE-BSD FRML MDRD: >90 ML/MIN/1.73M2
GLUCOSE BLD-MCNC: 84 MG/DL (ref 70–125)
HCT VFR BLD AUTO: 38.7 % (ref 35–47)
HGB BLD-MCNC: 13.4 G/DL (ref 11.7–15.7)
MCH RBC QN AUTO: 30 PG (ref 26.5–33)
MCHC RBC AUTO-ENTMCNC: 34.6 G/DL (ref 31.5–36.5)
MCV RBC AUTO: 87 FL (ref 78–100)
PLATELET # BLD AUTO: 159 10E3/UL (ref 150–450)
POTASSIUM BLD-SCNC: 4 MMOL/L (ref 3.5–5)
RBC # BLD AUTO: 4.47 10E6/UL (ref 3.8–5.2)
SODIUM SERPL-SCNC: 140 MMOL/L (ref 136–145)
WBC # BLD AUTO: 5.8 10E3/UL (ref 4–11)

## 2021-12-17 PROCEDURE — 85027 COMPLETE CBC AUTOMATED: CPT | Performed by: FAMILY MEDICINE

## 2021-12-17 PROCEDURE — 80048 BASIC METABOLIC PNL TOTAL CA: CPT | Performed by: FAMILY MEDICINE

## 2021-12-17 PROCEDURE — 36415 COLL VENOUS BLD VENIPUNCTURE: CPT | Performed by: FAMILY MEDICINE

## 2021-12-17 PROCEDURE — 99214 OFFICE O/P EST MOD 30 MIN: CPT | Performed by: FAMILY MEDICINE

## 2021-12-17 PROCEDURE — 93005 ELECTROCARDIOGRAM TRACING: CPT | Performed by: FAMILY MEDICINE

## 2021-12-17 RX ORDER — CEPHALEXIN 500 MG/1
CAPSULE ORAL
COMMUNITY
Start: 2021-12-07 | End: 2022-11-21

## 2021-12-17 RX ORDER — OXYCODONE HYDROCHLORIDE 5 MG/1
TABLET ORAL
COMMUNITY
Start: 2021-12-07 | End: 2022-11-21

## 2021-12-17 RX ORDER — MUPIROCIN 20 MG/G
OINTMENT TOPICAL
COMMUNITY
Start: 2021-12-07 | End: 2022-11-21

## 2021-12-17 RX ORDER — DULOXETIN HYDROCHLORIDE 30 MG/1
CAPSULE, DELAYED RELEASE ORAL
COMMUNITY
Start: 2021-11-18 | End: 2022-11-21

## 2021-12-17 RX ORDER — ONDANSETRON 4 MG/1
TABLET, ORALLY DISINTEGRATING ORAL
COMMUNITY
Start: 2021-12-07 | End: 2022-11-21

## 2021-12-17 RX ORDER — DULOXETIN HYDROCHLORIDE 60 MG/1
CAPSULE, DELAYED RELEASE ORAL
COMMUNITY
Start: 2021-11-10 | End: 2022-11-21

## 2021-12-17 RX ORDER — LORATADINE 10 MG/1
TABLET ORAL
COMMUNITY
Start: 2021-03-01 | End: 2022-11-21

## 2021-12-17 NOTE — PROGRESS NOTES
[facility]  [address]  [facility] [address]  Phone: [phone]  Fax: [phone]  Primary Provider: Marce Weldon        PREOPERATIVE EVALUATION:  Today's date: 12/17/2021    Valentina Humphries is a 51 year old female who presents for a preoperative evaluation.    Surgical Information:  Surgery/Procedure: [facility]: left hip replacement  Surgery Location: [facility]  Surgeon: DR. Gilman  Surgery Date: 12/29/21  Time of Surgery: TBD  Where patient plans to recover: At home with family Home/Family  Fax number for surgical facility: [phone]    Type of Anesthesia Anticipated: to be determined    Assessment & Plan     The proposed surgical procedure is considered INTERMEDIATE risk.    Pre-op exam  No contraindications or significant risk factors to planned procedure. She will return for Covid 19 test and pregnancy test closer to surgery. We will check CBC and basic metabolic panel today.  - EKG 12-lead, tracing only  - Asymptomatic COVID-19 Virus (Coronavirus) by PCR  - CBC with platelets  - Basic metabolic panel  (Ca, Cl, CO2, Creat, Gluc, K, Na, BUN)  - HCG Qual, Urine ([id number])    Arthritis of left hip  Okay to proceed with planned procedure    Hip pain, left  Okay to proceed with planned procedure    STEVEN (obstructive sleep apnea)  She is compliant with CPAP machine. She is in need of a new CPAP machine. Order placed for sleep medicine  - SLEEP EVALUATION & MANAGEMENT REFERRAL - ADULT -    Gastroesophageal reflux disease without esophagitis  Stable. Continue current medications    EBER (generalized anxiety disorder)  Stable. Continue current medications. Followed by psychiatry    Moderate episode of recurrent major depressive disorder (H)  Stable. Continue current medication    Environmental allergies  Stable. Continue current medications           Risks and Recommendations:  The patient has the following additional risks and recommendations for  perioperative complications:   - No identified additional risk factors other than previously addressed    Medication Instructions:  Patient is to take all scheduled medications on the day of surgery    RECOMMENDATION:  APPROVAL GIVEN to proceed with proposed procedure, without further diagnostic evaluation.                      Subjective     HPI related to upcoming procedure: She has severe osteoarthritis of the left hip causing left hip pain. She has failed conservative treatment and is now planning to proceed with left total hip replacement. We reviewed her current medications and allergies and health history. She has history of sleep apnea and is compliant with her CPAP machine. She has history of esophageal reflux, environmental allergies as well as anxiety, depression and insomnia. Current medical conditions are stable. She is currently feeling well and denies signs or symptoms of acute illness. Denies chest pain or pressure. No dyspnea with exertion. Review of systems is otherwise negative. No other questions today.    Preop Questions 12/15/2021   1. Have you ever had a heart attack or stroke? No   2. Have you ever had surgery on your heart or blood vessels, such as a stent placement, a coronary artery bypass, or surgery on an artery in your head, neck, heart, or legs? No   3. Do you have chest pain with activity? No   4. Do you have a history of  heart failure? No   5. Do you currently have a cold, bronchitis or symptoms of other infection? No   6. Do you have a cough, shortness of breath, or wheezing? No   7. Do you or anyone in your family have previous history of blood clots? No   8. Do you or does anyone in your family have a serious bleeding problem such as prolonged bleeding following surgeries or cuts? No   9. Have you ever had problems with anemia or been told to take iron pills? YES - history of anemia   10. Have you had any abnormal blood loss such as black, tarry or bloody stools, or abnormal  vaginal bleeding? No   11. Have you ever had a blood transfusion? No   12. Are you willing to have a blood transfusion if it is medically needed before, during, or after your surgery? Yes   13. Have you or any of your relatives ever had problems with anesthesia? YES - sister with nausea and took longer to wake up   14. Do you have sleep apnea, excessive snoring or daytime drowsiness? YES - compliant with CPAP   14a. Do you have a CPAP machine? Yes   15. Do you have any artifical heart valves or other implanted medical devices like a pacemaker, defibrillator, or continuous glucose monitor? No   16. Do you have artificial joints? No   17. Are you allergic to latex? YES:    18. Is there any chance that you may be pregnant? No       Health Care Directive:  Patient does not have a Health Care Directive or Living Will: Patient states has Advance Directive and will bring in a copy to clinic.    Preoperative Review of :   reviewed - controlled substances prescribed by other outside provider(s). Prescribed by surgeon for after surgery.        Status of Chronic Conditions:  See problem list for active medical problems.  Problems all longstanding and stable, except as noted/documented.  See ROS for pertinent symptoms related to these conditions.      Review of Systems  CONSTITUTIONAL: NEGATIVE for fever, chills, change in weight  INTEGUMENTARY/SKIN: NEGATIVE for worrisome rashes, moles or lesions  EYES: NEGATIVE for vision changes or irritation  ENT/MOUTH: NEGATIVE for ear, mouth and throat problems  RESP: NEGATIVE for significant cough or SOB  CV: NEGATIVE for chest pain, palpitations or peripheral edema  GI: NEGATIVE for nausea, abdominal pain, heartburn, or change in bowel habits  : NEGATIVE for frequency, dysuria, or hematuria  MUSCULOSKELETAL:left hip pain  NEURO: NEGATIVE for weakness, dizziness or paresthesias  ENDOCRINE: NEGATIVE for temperature intolerance, skin/hair changes  HEME: NEGATIVE for bleeding  problems  PSYCHIATRIC: NEGATIVE for changes in mood or affect    Patient Active Problem List    Diagnosis Date Noted     Encounter for surveillance of contraceptive pills 09/01/2021     Priority: Medium     Microcytic anemia 11/29/2019     Priority: Medium     EBER (generalized anxiety disorder) 10/22/2019     Priority: Medium     Gastroesophageal reflux disease without esophagitis 03/03/2017     Priority: Medium     Moderate episode of recurrent major depressive disorder (H) 02/10/2017     Priority: Medium     Dr. Bales at [facility] in [place]       Family history of glaucoma in father 02/29/2016     Priority: Medium     Myopia of both eyes 02/29/2016     Priority: Medium     Constipation, unspecified constipation type 11/04/2015     Priority: Medium     Nonspecific abdominal pain 11/04/2015     Priority: Medium     Family history of colonic polyps 06/30/2014     Priority: Medium     Gastritis and gastroduodenitis 06/30/2014     Priority: Medium     Irritable bowel syndrome 05/31/2012     Priority: Medium     Overview:   On Bentyl since being in urgent care 9/2012      Formatting of this note might be different from the original.  Overview:   On Bentyl since being in urgent care 9/2012  On Bentyl since being in urgent care 9/2012       Environmental allergies 02/16/2012     Priority: Medium     STEVEN (obstructive sleep apnea) 12/12/2011     Priority: Medium     [facility] 10/12/2011, AHI 5.2, RDI 5.2, AHI REM 22.6, Oxygen Edu 93%  Mild but felt CPAP would be helpful. CPAP started 12/2011       Migraine without status migrainosus, not intractable 08/30/2011     Priority: Medium     Overview:   Uses Excedrin for Migraines.   Gets these about every other month.         Migraine headache 08/30/2011     Priority: Medium     Overview:   Uses Excedrin for Migraines.   Gets these about every other month.      Formatting of this note might be different from the original.  Overview:   Uses Excedrin for Migraines.    Gets these about every other month.  Formatting of this note might be different from the original.  Overview:   Uses Excedrin for Migraines.   Gets these about every other month.  Uses Excedrin for Migraines.   Gets these about every other month.       Hemorrhoids 03/28/2011     Priority: Medium     History of colonic polyps 02/25/2010     Priority: Medium     Overview:   2/2009 found 3 large advanced, adenomatous, repeat 2 years or less.    3/2011, repeat 3 years  7/2014, multiple polyps repeat 3 years.       Formatting of this note might be different from the original.  Overview:   2/2009 found 3 large advanced, adenomatous, repeat 2 years or less.    3/2011, repeat 3 years  7/2014, multiple polyps repeat 3 years.  2/2009 found 3 large advanced, adenomatous, repeat 2 years or less.    3/2011, repeat 3 years  7/2014, multiple polyps repeat 3 years.        Past Medical History:   Diagnosis Date     Asthma      Depression     hospitalized x4 in the 1990's, no suicide attempts     Migraine     Visual aura     Varicella      Past Surgical History:   Procedure Laterality Date     ENDOSCOPY       WISDOM TOOTH EXTRACTION  1987     Current Outpatient Medications   Medication Sig Dispense Refill     cephALEXin (KEFLEX) 500 MG capsule        dicyclomine (BENTYL) 10 MG capsule        DULoxetine (CYMBALTA) 30 MG capsule        DULoxetine (CYMBALTA) 60 MG capsule        fluticasone (FLONASE) 50 MCG/ACT nasal spray Spray 1 spray in nostril daily as needed       loratadine (CLARITIN) 10 MG tablet        LORazepam (ATIVAN) 0.5 MG tablet Take 0.5 mg by mouth       mupirocin (BACTROBAN) 2 % external ointment        norethindrone-ethinyl estradiol (JUNEL FE 1/20) 1-20 MG-MCG tablet Take 1 tablet by mouth daily 84 tablet 3     omeprazole (PRILOSEC) 20 MG DR capsule Take 20 mg by mouth       ondansetron (ZOFRAN-ODT) 4 MG ODT tab        oxyCODONE (ROXICODONE) 5 MG tablet        polyethylene glycol (MIRALAX) 17 GM/Dose powder Take 17 g  by mouth daily as needed       risperiDONE (RISPERDAL) 1 MG tablet Takes one half to one whole tablet daily. Per Dr Gus Oliver Container (SHARPS-A-GATOR LOCKING BRACKET) MISC As directed. APAP [id number] , heated humidifier, mask, headgear, filters and tubing. For home use. Length of Need:99       Sod Fluoride-Potassium Nitrate (PREVIDENT 5000 SENSITIVE) 1.1-5 % PSTE Apply 1 Application topically 2 times daily       traZODone (DESYREL) 100 MG tablet Take 100 mg by mouth         Allergies   Allergen Reactions     Cefdinir Difficulty breathing     Patient is able to tolerate Penicillin and Amoxicillin without any problems     Latex Itching     Augmentin Nausea and Vomiting and Diarrhea        Social History     Tobacco Use     Smoking status: Never Smoker     Smokeless tobacco: Never Used   Substance Use Topics     Alcohol use: Yes     Comment: rare       History   Drug Use No         Objective     BP 98/65 (BP Location: Right arm, Patient Position: Left side, Cuff Size: Adult Large)   Pulse 78   Temp 97.4  F (36.3  C) (Oral)   Wt 79.9 kg (176 lb 1.6 oz)   SpO2 96%   BMI 26.19 kg/m      Physical Exam    GENERAL APPEARANCE: healthy, alert and no distress     EYES: EOMI, PERRL     HENT: ear canals and TM's normal and nose and mouth without ulcers or lesions     NECK: no adenopathy, no asymmetry, masses, or scars and thyroid normal to palpation     RESP: lungs clear to auscultation - no rales, rhonchi or wheezes     CV: regular rates and rhythm, normal S1 S2, no S3 or S4 and no murmur, click or rub     ABDOMEN:  soft, nontender, no HSM or masses and bowel sounds normal     MS: extremities normal- no gross deformities noted, no evidence of inflammation in joints, FROM in all extremities.     SKIN: no suspicious lesions or rashes     NEURO: Normal strength and tone, sensory exam grossly normal, mentation intact and speech normal     PSYCH: mentation appears normal. and affect normal/bright     LYMPHATICS: No  cervical adenopathy    Recent Labs   Lab Test 09/01/21  0833 01/18/21  1318   HGB 13.8 13.4   * 166     --    POTASSIUM 4.4  --    CR 0.69  --         Diagnostics:  Labs pending at this time.  Results will be reviewed when available.   EKG: appears normal, NSR    Revised Cardiac Risk Index (RCRI):  The patient has the following serious cardiovascular risks for perioperative complications:   - No serious cardiac risks = 0 points     RCRI Interpretation: 0 points: Class I (very low risk - 0.4% complication rate)           Signed Electronically by: Funmilayo Hwang MD  Copy of this evaluation report is provided to requesting physician.

## 2021-12-19 ENCOUNTER — HEALTH MAINTENANCE LETTER (OUTPATIENT)
Age: 51
End: 2021-12-19

## 2021-12-27 ENCOUNTER — LAB (OUTPATIENT)
Dept: LAB | Facility: CLINIC | Age: 51
End: 2021-12-27

## 2021-12-27 DIAGNOSIS — Z01.818 PRE-OP EXAM: ICD-10-CM

## 2021-12-27 LAB
HCG UR QL: NEGATIVE
SARS-COV-2 RNA RESP QL NAA+PROBE: NEGATIVE

## 2021-12-27 PROCEDURE — 81025 URINE PREGNANCY TEST: CPT

## 2021-12-27 PROCEDURE — U0003 INFECTIOUS AGENT DETECTION BY NUCLEIC ACID (DNA OR RNA); SEVERE ACUTE RESPIRATORY SYNDROME CORONAVIRUS 2 (SARS-COV-2) (CORONAVIRUS DISEASE [COVID-19]), AMPLIFIED PROBE TECHNIQUE, MAKING USE OF HIGH THROUGHPUT TECHNOLOGIES AS DESCRIBED BY CMS-2020-01-R: HCPCS

## 2021-12-27 PROCEDURE — U0005 INFEC AGEN DETEC AMPLI PROBE: HCPCS

## 2021-12-28 ENCOUNTER — ANCILLARY PROCEDURE (OUTPATIENT)
Dept: MAMMOGRAPHY | Facility: CLINIC | Age: 51
End: 2021-12-28
Attending: FAMILY MEDICINE
Payer: COMMERCIAL

## 2021-12-28 DIAGNOSIS — Z12.31 VISIT FOR SCREENING MAMMOGRAM: ICD-10-CM

## 2021-12-28 PROCEDURE — 77067 SCR MAMMO BI INCL CAD: CPT

## 2021-12-29 ENCOUNTER — TRANSFERRED RECORDS (OUTPATIENT)
Dept: HEALTH INFORMATION MANAGEMENT | Facility: CLINIC | Age: 51
End: 2021-12-29
Payer: COMMERCIAL

## 2022-01-12 ENCOUNTER — TRANSFERRED RECORDS (OUTPATIENT)
Dept: HEALTH INFORMATION MANAGEMENT | Facility: CLINIC | Age: 52
End: 2022-01-12
Payer: COMMERCIAL

## 2022-02-07 ENCOUNTER — TRANSFERRED RECORDS (OUTPATIENT)
Dept: HEALTH INFORMATION MANAGEMENT | Facility: CLINIC | Age: 52
End: 2022-02-07
Payer: COMMERCIAL

## 2022-03-04 ENCOUNTER — TRANSFERRED RECORDS (OUTPATIENT)
Dept: HEALTH INFORMATION MANAGEMENT | Facility: CLINIC | Age: 52
End: 2022-03-04
Payer: COMMERCIAL

## 2022-03-14 ENCOUNTER — TRANSFERRED RECORDS (OUTPATIENT)
Dept: HEALTH INFORMATION MANAGEMENT | Facility: CLINIC | Age: 52
End: 2022-03-14
Payer: COMMERCIAL

## 2022-04-08 ENCOUNTER — VIRTUAL VISIT (OUTPATIENT)
Dept: SLEEP MEDICINE | Facility: CLINIC | Age: 52
End: 2022-04-08
Attending: FAMILY MEDICINE
Payer: COMMERCIAL

## 2022-04-08 VITALS — WEIGHT: 170 LBS | BODY MASS INDEX: 25.18 KG/M2 | HEIGHT: 69 IN

## 2022-04-08 DIAGNOSIS — G47.33 OSA (OBSTRUCTIVE SLEEP APNEA): Primary | ICD-10-CM

## 2022-04-08 DIAGNOSIS — F33.1 MODERATE EPISODE OF RECURRENT MAJOR DEPRESSIVE DISORDER (H): ICD-10-CM

## 2022-04-08 PROCEDURE — 99204 OFFICE O/P NEW MOD 45 MIN: CPT | Mod: 95 | Performed by: INTERNAL MEDICINE

## 2022-04-08 ASSESSMENT — SLEEP AND FATIGUE QUESTIONNAIRES
HOW LIKELY ARE YOU TO NOD OFF OR FALL ASLEEP WHILE LYING DOWN TO REST IN THE AFTERNOON WHEN CIRCUMSTANCES PERMIT: HIGH CHANCE OF DOZING
HOW LIKELY ARE YOU TO NOD OFF OR FALL ASLEEP WHILE SITTING AND TALKING TO SOMEONE: WOULD NEVER DOZE
HOW LIKELY ARE YOU TO NOD OFF OR FALL ASLEEP IN A CAR, WHILE STOPPED FOR A FEW MINUTES IN TRAFFIC: WOULD NEVER DOZE
HOW LIKELY ARE YOU TO NOD OFF OR FALL ASLEEP WHILE SITTING QUIETLY AFTER LUNCH WITHOUT ALCOHOL: SLIGHT CHANCE OF DOZING
HOW LIKELY ARE YOU TO NOD OFF OR FALL ASLEEP WHILE WATCHING TV: SLIGHT CHANCE OF DOZING
HOW LIKELY ARE YOU TO NOD OFF OR FALL ASLEEP WHEN YOU ARE A PASSENGER IN A CAR FOR AN HOUR WITHOUT A BREAK: MODERATE CHANCE OF DOZING
HOW LIKELY ARE YOU TO NOD OFF OR FALL ASLEEP WHILE SITTING INACTIVE IN A PUBLIC PLACE: WOULD NEVER DOZE
HOW LIKELY ARE YOU TO NOD OFF OR FALL ASLEEP WHILE SITTING AND READING: SLIGHT CHANCE OF DOZING

## 2022-04-08 NOTE — PATIENT INSTRUCTIONS
Once you know the approximate date you can to be starting to use the replacement CPAP machine call our clinic and schedule an appointment for 8 to 10 weeks later.      For general sleep health questions:   http://sleepeducation.org    For tips about PAP and COVID-19:  https://www.[facility].org/patients/patient-resources/resources/covid-19-and-home-pap-therapy.pdf    For general info about COVID-19 including vaccines:  https://[employer]org/covid19        Continue PAP therapy every night, for all hours that you are sleeping (including naps.)  As always, try to get at least 8 hours of sleep or more each day, keep a regular sleep schedule, and avoid sleep deprivation. Avoid alcohol.    Reasons that you might need a change to your pressure therapy would be weight gain or loss, waking having inadvertently removed your PAP overnight, having previously felt refreshed by sleep with CPAP use and now waking un-refreshed, and return of daytime sleepiness. Also, the development of new medical problems  (such as heart failure, stroke, medications such as narcotics) can sometimes affect breathing at night and change your PAP therapy needs.    Please bring PAP with you if you are hospitalized.  If anticipating surgery be sure to discuss with your surgeon that you have sleep apnea and use PAP therapy.      Maintain your equipment as recommended which includes routine cleaning and replacement of supplies.      Call DME for any questions regarding supplies or maintenance.    [facility], [facility] [phone]      Do not drive on engage in potentially dangerous activities if feeling sleepy.    Please follow up in sleep clinic again in 2 months after using new machine.        Tips for your PAP use-    Mask fitting tips  Mask fitting exercise:    To improve your mask seal and your mobility at night, put mask on and secure in place.  Lie down in bed with full pressure and roll to one  side, adjust headgear while in that position to eliminate any leaks. Repeat process rolling to other side.     The mask seal does not have to be perfect:   CPAP machines are designed to make up for small leaks. However, you will not tolerate leaks blowing in your eyes so you will need to adjust.   Any leak should only be near or at the bottom of the mask.  We expect your mask to leak slightly at night.    Do not over-tighten the headgear straps, tighter IS NOT better, we expect minimal leak.    First try re-positioning the mask or headgear before tightening the headgear straps.  Mask leaks are expected due to changing sleeping positions. Try pulling the mask away from your skin allowing the cushion to re-inflate will minimize the leak.  If you struggle for a good fit, try turning the CPAP off and then readjust the mask by pulling it away from your face and then turning back on the CPAP.        Humidifier tips  Humidifiers can be adjusted to increase or decrease the amount of moisture according to your comfort level. You may need to adjust this frequently at first, but then might only change it with seasonal weather changes.     Try INCREASING the humidity if:  You experience a dry, irritated nasal passage or throat.  You have a runny, drippy nose or sneezing fits after using CPAP.  You experience nasal congestion during or after CPAP use.    Try DECREASING the humidity if:  You have excessive condensation or  rain out  in the tubing or mask.  Otherwise keep the tubing warm during the night by running it underneath the blankets or pillow.      Clinic visit after initial PAP set-up   Bring your equipment with you to your 5-8 week follow up clinic visit.  We will be extracting your data from the machine if not available from the cloud based [employer].        Travel  Always take your equipment with you when you travel.  If you fly with your equipment bring it on with you as a carry on.  Medical equipment does not count as a  carry on.    If you travel international the machines take 110-240v.  The only adapter needed is the adapter that will fit into the receptacle (outlet).    You may also want to bring an extension cord as many hotel rooms have limited outlets at the bedside.  Do not travel with water in your humidifier chamber.     Cleaning and Maintenance Guidelines    Equipment Frequency Cleaning Method   Mask First Day    Daily      Weekly Soak mask in hot soapy water for 30 minutes, rinse and air dry.  Wipe nasal cushion with a hot soapy (Ivory, baby shampoo) cloth and rinse.  Baby wipes may also be used.  Do not use anti-bacterial soaps,Rosaura  liquid soap, rubbing alcohol, bleach or ammonia.  Wash frame in hot soapy water (Ivory, baby shampoo) rinse and let air dry   Headgear Biweekly Wash in hot soapy water, rinse and air dry   Reusable Gray Filter Weekly Wash in hot soapy water, rinse, put in towel squeeze moisture out, let air dry   Disposable White Filter Check Weekly Replace when brown or gray in color; at least every 2 to 3 months   Humidifier Chamber Daily    Weekly Empty distilled water from humidifier and let air dry    Hand wash in hot soapy water, rinse and air dry   Tubing Weekly Wash in hot soapy water, rinse and let air dry   Mask, Tubing and Humidifier Chamber As needed Disinfect: Soak in 1 part distilled white vinegar to 3 parts hot water for 30 minutes, rinse well and air dry  Not the material headgear        MASK AND SUPPLY REORDERING and EQUIPMENT NEEDS through your DME and per your insurance  Reminder: Most insurance companies will allow for a new mask, headgear, tubing, and reusable gray filter every six months.  Disposable white ultra-fine filters are covered monthly.      HOME AND SAFETY INSTRUCTIONS    Do not use frayed or cracked electrical cords, multi plug adaptors, or switched receptacles    Do not immerse electrical equipment into water    Assure that electrical cords do not become a tripping  hazard

## 2022-04-08 NOTE — LETTER
4/8/2022         RE: Valentina Humphries  [address]  [place] [address]        Dear Colleague,    Thank you for referring your patient, Valentina Humphries, to the [facility]. Please see a copy of my visit note below.    Valentina is a 52 year old who is being evaluated via a billable video visit.      How would you like to obtain your AVS? MyChart  If the video visit is dropped, the invitation should be resent by: Text to cell phone: [phone]   Will anyone else be joining your video visit? No     Flu Vaccine: Patient states she had a flu shot this season.         Video Start Time: 9:58 AM  Video-Visit Details    Type of service:  Video Visit    Video End Time:10:29 AM    Originating Location (pt. Location): Home    Distant Location (provider location):  [facility]     Platform used for Video Visit: [place]       Chief complaint: Consultation requested by Funmilayo Hwang MD for reestablishing care for obstructive sleep apnea    History of Present Illness: 52-year-old female with history of severe depression and previous hospitalizations, irritable bowel syndrome and overall mild sleep apnea.  She was diagnosed with sleep apnea in 2011 after presenting with excessive daytime sleepiness.  Due to her symptoms of sleepiness and mood disorder treatment of mild sleep apnea was indicated.  She recalls that since using CPAP she has been less sleepy during the day.  She typically uses a nasal mask.  She rarely goes without it.  She does state that sometimes her irritable bowel symptoms are acting up which keeps her from taking her nighttime meds and so she might not sleep for a while and then eventually fall asleep without CPAP.  This is rare.    Her machine is a REMstar auto.  It was affected by the recall.  She wants to continue to use it as she does not want to sleep without it.  She does not use a commercially available  cleaning machine.  She gets her supplies from [place] home oxygen.  She typically sleeps on her sides.  Her  is in the same room.  He occasionally notices some snoring through the mask.  Her weight has been stable.  There is no history of restless legs, sleepwalking, sleep talking or dream enactment behavior.    Patient has a history of sudden death in her family.  Mother  in her sleep.  She is to snore really loud.  Patient worries that it was related to sleep apnea untreated.    [place] Sleepiness Scale   Sitting and reading: Slight chance of dozing   Watching TV: Slight chance of dozing   Sitting, inactive in a public place (e.g. a theatre or a meeting): Would never doze   As a passenger in a car for an hour without a break: Moderate chance of dozing   Lying down to rest in the afternoon when circumstances permit: High chance of dozing   Sitting and talking to someone: Would never doze   Sitting quietly after a lunch without alcohol: Slight chance of dozing   In a car, while stopped for a few minutes in traffic: Would never doze   Total score - Ambia: 8   (Less than 10 normal)    Insomnia Severity Scale  DEBORAH Total Score: 6  Total score categories:  0-7 = No clinically significant insomnia   8-14 = Subthreshold insomnia   15-21 = Clinical insomnia (moderate severity)  22-28 = Clinical insomnia (severe)    Past Medical History:   Diagnosis Date     Asthma      Depression     hospitalized x4 in the , no suicide attempts     Migraine     Visual aura     Varicella        Allergies   Allergen Reactions     Cefdinir Difficulty breathing     Patient is able to tolerate Penicillin and Amoxicillin without any problems     Latex Itching     Augmentin Nausea and Vomiting and Diarrhea       Current Outpatient Medications   Medication     dicyclomine (BENTYL) 10 MG capsule     DULoxetine (CYMBALTA) 30 MG capsule     DULoxetine (CYMBALTA) 60 MG capsule     fluticasone (FLONASE) 50 MCG/ACT nasal spray      loratadine (CLARITIN) 10 MG tablet     LORazepam (ATIVAN) 0.5 MG tablet     norethindrone-ethinyl estradiol (JUNEL FE 1/20) 1-20 MG-MCG tablet     omeprazole (PRILOSEC) 20 MG DR capsule     polyethylene glycol (MIRALAX) 17 GM/Dose powder     risperiDONE (RISPERDAL) 1 MG tablet     Sod Fluoride-Potassium Nitrate (PREVIDENT 5000 SENSITIVE) 1.1-5 % PSTE     traZODone (DESYREL) 100 MG tablet     cephALEXin (KEFLEX) 500 MG capsule     mupirocin (BACTROBAN) 2 % external ointment     ondansetron (ZOFRAN-ODT) 4 MG ODT tab     oxyCODONE (ROXICODONE) 5 MG tablet     Sharps Container (SHARPS-A-GATOR LOCKING BRACKET) MISC     No current facility-administered medications for this visit.       Social History     Socioeconomic History     Marital status:      Spouse name: Not on file     Number of children: Not on file     Years of education: Not on file     Highest education level: Not on file   Occupational History     Not on file   Tobacco Use     Smoking status: Never Smoker     Smokeless tobacco: Never Used   Vaping Use     Vaping Use: Never used   Substance and Sexual Activity     Alcohol use: Yes     Comment: rare     Drug use: No     Sexual activity: Yes     Partners: Male     Birth control/protection: OCP   Other Topics Concern     Not on file   Social History Narrative    Lives with her , Chris.  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  Likes to swim and bikes to work.       Social Determinants of Health     Financial Resource Strain: Not on file   Food Insecurity: Not on file   Transportation Needs: Not on file   Physical Activity: Not on file   Stress: Not on file   Social Connections: Not on file   Intimate Partner Violence: Not on file   Housing Stability: Not on file       Family History   Problem Relation Age of Onset     Sudden Death Mother 53     Glaucoma Father      Hemochromatosis Father   "    Osteoarthritis Sister         hip     Osteoarthritis Sister      No Known Problems Sister         faternal twin     Breast Cancer Paternal Aunt 60.00     Cancer Paternal Aunt      Cerebrovascular Disease Maternal Grandmother      Coronary Artery Disease No family hx of      Heart Disease No family hx of      Diabetes No family hx of          EXAM:  Ht 1.746 m (5' 8.75\")   Wt 77.1 kg (170 lb)   BMI 25.29 kg/m    GENERAL: Alert and no distress  EYES: Eyes grossly normal to inspection.  No discharge or erythema, or obvious scleral/conjunctival abnormalities.  RESP: No audible wheeze, cough, or visible cyanosis.  No visible retractions or increased work of breathing.    SKIN: Visible skin clear. No significant rash, abnormal pigmentation or lesions.  NEURO: Cranial nerves grossly intact.  Mentation and speech appropriate for age.  PSYCH: Mentation appears normal, affect flat, judgement and insight intact, normal speech and appearance well-groomed.       PSG 10/12/2011 [facility]   Weight 175 lbs, BMI 22.6  AHI 5.2, REM AHI 22.6, Lowest O2 sat 93%    PAP download not available    TSH   Date Value Ref Range Status   01/18/2021 2.62 0.30 - 5.00 uIU/mL Final       ASSESSMENT:  52-year-old female with history of major depression, daytime sleepiness, and insomnia, currently doing well being treated for mild sleep apnea with good clinical benefit.  Per patient report meeting compliance is as well.  However no download available.  Her machine is very old and is likely reaching the end of its life.  Also, it has been recalled by the .  Patient is not interested in oral appliances since she has had a very good result with CPAP.  Ongoing treatment of obstructive sleep apnea is medically indicated.  I do not think a repeat sleep study is required.    PLAN:  Orders generated for replacement CPAP machine auto mode, min pressure 6. max of 10.  Patient should follow-up with me approximately 8 weeks later to " review download and make further adjustments as needed.  Patient was cautioned to avoid purchasing a commercially available cleaning device.  Instead she should clean her supplies as usual and replace them regularly.  Avoid weight gain.  I tried to reassure patient that since she is been so compliant with Pap therapy since 2011 she is not experiencing increased risk associated with untreated sleep apnea at this time.      52 minutes spent on the date of the encounter doing chart review, history and exam, documentation and further activities per the note    Arielle Reed M.D.  Pulmonary/Critical Care/Sleep Medicine    [facility]   Floor 1, [address]   [address] [place] [address]. [place] [address]   Appointments: [phone]    The above note was dictated using voice recognition software and may include typographical errors. Please contact the author for any clarifications.                  Again, thank you for allowing me to participate in the care of your patient.        Sincerely,        Arielle Reed MD

## 2022-04-08 NOTE — PROGRESS NOTES
Valentina is a 52 year old who is being evaluated via a billable video visit.      How would you like to obtain your AVS? MyChart  If the video visit is dropped, the invitation should be resent by: Text to cell phone: [phone]   Will anyone else be joining your video visit? No     Flu Vaccine: Patient states she had a flu shot this season.         Video Start Time: 9:58 AM  Video-Visit Details    Type of service:  Video Visit    Video End Time:10:29 AM    Originating Location (pt. Location): Home    Distant Location (provider location):  [facility]     Platform used for Video Visit: [place]       Chief complaint: Consultation requested by Funmilayo Hwang MD for reestablishing care for obstructive sleep apnea    History of Present Illness: 52-year-old female with history of severe depression and previous hospitalizations, irritable bowel syndrome and overall mild sleep apnea.  She was diagnosed with sleep apnea in 2011 after presenting with excessive daytime sleepiness.  Due to her symptoms of sleepiness and mood disorder treatment of mild sleep apnea was indicated.  She recalls that since using CPAP she has been less sleepy during the day.  She typically uses a nasal mask.  She rarely goes without it.  She does state that sometimes her irritable bowel symptoms are acting up which keeps her from taking her nighttime meds and so she might not sleep for a while and then eventually fall asleep without CPAP.  This is rare.    Her machine is a REMstar auto.  It was affected by the recall.  She wants to continue to use it as she does not want to sleep without it.  She does not use a commercially available cleaning machine.  She gets her supplies from [place] home [facility].  She typically sleeps on her sides.  Her  is in the same room.  He occasionally notices some snoring through the mask.  Her weight has been stable.  There is no history of restless legs, sleepwalking, sleep talking or  dream enactment behavior.    Patient has a history of sudden death in her family.  Mother  in her sleep.  She is to snore really loud.  Patient worries that it was related to sleep apnea untreated.    [place] Sleepiness Scale   Sitting and reading: Slight chance of dozing   Watching TV: Slight chance of dozing   Sitting, inactive in a public place (e.g. a theatre or a meeting): Would never doze   As a passenger in a car for an hour without a break: Moderate chance of dozing   Lying down to rest in the afternoon when circumstances permit: High chance of dozing   Sitting and talking to someone: Would never doze   Sitting quietly after a lunch without alcohol: Slight chance of dozing   In a car, while stopped for a few minutes in traffic: Would never doze   Total score - [place]: 8   (Less than 10 normal)    Insomnia Severity Scale  DEBORAH Total Score: 6  Total score categories:  0-7 = No clinically significant insomnia   8-14 = Subthreshold insomnia   15-21 = Clinical insomnia (moderate severity)  22-28 = Clinical insomnia (severe)    Past Medical History:   Diagnosis Date     Asthma      Depression     hospitalized x4 in the , no suicide attempts     Migraine     Visual aura     Varicella        Allergies   Allergen Reactions     Cefdinir Difficulty breathing     Patient is able to tolerate Penicillin and Amoxicillin without any problems     Latex Itching     Augmentin Nausea and Vomiting and Diarrhea       Current Outpatient Medications   Medication     dicyclomine (BENTYL) 10 MG capsule     DULoxetine (CYMBALTA) 30 MG capsule     DULoxetine (CYMBALTA) 60 MG capsule     fluticasone (FLONASE) 50 MCG/ACT nasal spray     loratadine (CLARITIN) 10 MG tablet     LORazepam (ATIVAN) 0.5 MG tablet     norethindrone-ethinyl estradiol (JUNEL FE 1/20) 1-20 MG-MCG tablet     omeprazole (PRILOSEC) 20 MG DR capsule     polyethylene glycol (MIRALAX) 17 GM/Dose powder     risperiDONE (RISPERDAL) 1 MG tablet     Sod  Fluoride-Potassium Nitrate (PREVIDENT 5000 SENSITIVE) 1.1-5 % PSTE     traZODone (DESYREL) 100 MG tablet     cephALEXin (KEFLEX) 500 MG capsule     mupirocin (BACTROBAN) 2 % external ointment     ondansetron (ZOFRAN-ODT) 4 MG ODT tab     oxyCODONE (ROXICODONE) 5 MG tablet     Sharps Container (SHARPS-A-GATOR LOCKING BRACKET) MISC     No current facility-administered medications for this visit.       Social History     Socioeconomic History     Marital status:      Spouse name: Not on file     Number of children: Not on file     Years of education: Not on file     Highest education level: Not on file   Occupational History     Not on file   Tobacco Use     Smoking status: Never Smoker     Smokeless tobacco: Never Used   Vaping Use     Vaping Use: Never used   Substance and Sexual Activity     Alcohol use: Yes     Comment: rare     Drug use: No     Sexual activity: Yes     Partners: Male     Birth control/protection: OCP   Other Topics Concern     Not on file   Social History Narrative    Lives with her , Chris.  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  Likes to swim and bikes to work.       Social Determinants of Health     Financial Resource Strain: Not on file   Food Insecurity: Not on file   Transportation Needs: Not on file   Physical Activity: Not on file   Stress: Not on file   Social Connections: Not on file   Intimate Partner Violence: Not on file   Housing Stability: Not on file       Family History   Problem Relation Age of Onset     Sudden Death Mother 53     Glaucoma Father      Hemochromatosis Father      Osteoarthritis Sister         hip     Osteoarthritis Sister      No Known Problems Sister         faternal twin     Breast Cancer Paternal Aunt 60.00     Cancer Paternal Aunt      Cerebrovascular Disease Maternal Grandmother      Coronary Artery Disease No family hx of      Heart  "Disease No family hx of      Diabetes No family hx of          EXAM:  Ht 1.746 m (5' 8.75\")   Wt 77.1 kg (170 lb)   BMI 25.29 kg/m    GENERAL: Alert and no distress  EYES: Eyes grossly normal to inspection.  No discharge or erythema, or obvious scleral/conjunctival abnormalities.  RESP: No audible wheeze, cough, or visible cyanosis.  No visible retractions or increased work of breathing.    SKIN: Visible skin clear. No significant rash, abnormal pigmentation or lesions.  NEURO: Cranial nerves grossly intact.  Mentation and speech appropriate for age.  PSYCH: Mentation appears normal, affect flat, judgement and insight intact, normal speech and appearance well-groomed.       PSG 10/12/2011 [facility]   Weight 175 lbs, BMI 22.6  AHI 5.2, REM AHI 22.6, Lowest O2 sat 93%    PAP download not available    TSH   Date Value Ref Range Status   01/18/2021 2.62 0.30 - 5.00 uIU/mL Final       ASSESSMENT:  52-year-old female with history of major depression, daytime sleepiness, and insomnia, currently doing well being treated for mild sleep apnea with good clinical benefit.  Per patient report meeting compliance is as well.  However no download available.  Her machine is very old and is likely reaching the end of its life.  Also, it has been recalled by the .  Patient is not interested in oral appliances since she has had a very good result with CPAP.  Ongoing treatment of obstructive sleep apnea is medically indicated.  I do not think a repeat sleep study is required.    PLAN:  Orders generated for replacement CPAP machine auto mode, min pressure 6. max of 10.  Patient should follow-up with me approximately 8 weeks later to review download and make further adjustments as needed.  Patient was cautioned to avoid purchasing a commercially available cleaning device.  Instead she should clean her supplies as usual and replace them regularly.  Avoid weight gain.  I tried to reassure patient that since she is " been so compliant with Pap therapy since 2011 she is not experiencing increased risk associated with untreated sleep apnea at this time.      52 minutes spent on the date of the encounter doing chart review, history and exam, documentation and further activities per the note    Arielle Reed M.D.  Pulmonary/Critical Care/Sleep Medicine    [facility]   Floor 1, [address]   [address] [place] [address]. [place] [address]   Appointments: [phone]    The above note was dictated using voice recognition software and may include typographical errors. Please contact the author for any clarifications.

## 2022-04-15 ENCOUNTER — TRANSFERRED RECORDS (OUTPATIENT)
Dept: HEALTH INFORMATION MANAGEMENT | Facility: CLINIC | Age: 52
End: 2022-04-15
Payer: COMMERCIAL

## 2022-07-11 ENCOUNTER — TRANSFERRED RECORDS (OUTPATIENT)
Dept: HEALTH INFORMATION MANAGEMENT | Facility: CLINIC | Age: 52
End: 2022-07-11

## 2022-08-06 DIAGNOSIS — Z30.41 ENCOUNTER FOR SURVEILLANCE OF CONTRACEPTIVE PILLS: ICD-10-CM

## 2022-08-06 RX ORDER — NORETHINDRONE ACETATE AND ETHINYL ESTRADIOL AND FERROUS FUMARATE 1MG-20(21)
KIT ORAL
Qty: 84 TABLET | Refills: 0 | Status: SHIPPED | OUTPATIENT
Start: 2022-08-06 | End: 2022-11-01

## 2022-08-07 NOTE — TELEPHONE ENCOUNTER
"Last Written Prescription Date:  8/30/21  Last Fill Quantity: 84,  # refills: 3   Last office visit provider:  12/17/21     Requested Prescriptions   Pending Prescriptions Disp Refills     JUNEL FE 1/20 1-20 MG-MCG tablet [Pharmacy Med Name: JUNEL FE 1 MG-20 MCG TABLET] 84 tablet 3     Sig: TAKE 1 TABLET BY MOUTH EVERY DAY       Contraceptives Protocol Passed - 8/6/2022  5:36 PM        Passed - Patient is not a current smoker if age is 35 or older        Passed - Recent (12 mo) or future (30 days) visit within the authorizing provider's specialty     Patient has had an office visit with the authorizing provider or a provider within the authorizing providers department within the previous 12 mos or has a future within next 30 days. See \"Patient Info\" tab in inbasket, or \"Choose Columns\" in Meds & Orders section of the refill encounter.              Passed - Medication is active on med list        Passed - No active pregnancy on record        Passed - No positive pregnancy test in past 12 months             Maricruz Katz RN 08/06/22 11:29 PM  "

## 2022-09-26 ENCOUNTER — IMMUNIZATION (OUTPATIENT)
Dept: NURSING | Facility: CLINIC | Age: 52
End: 2022-09-26
Payer: COMMERCIAL

## 2022-09-26 PROCEDURE — 0124A COVID-19,PF,PFIZER BOOSTER BIVALENT: CPT

## 2022-09-26 PROCEDURE — 91312 COVID-19,PF,PFIZER BOOSTER BIVALENT: CPT

## 2022-10-15 ENCOUNTER — HEALTH MAINTENANCE LETTER (OUTPATIENT)
Age: 52
End: 2022-10-15

## 2022-10-24 ENCOUNTER — TRANSFERRED RECORDS (OUTPATIENT)
Dept: HEALTH INFORMATION MANAGEMENT | Facility: CLINIC | Age: 52
End: 2022-10-24

## 2022-11-01 DIAGNOSIS — Z30.41 ENCOUNTER FOR SURVEILLANCE OF CONTRACEPTIVE PILLS: ICD-10-CM

## 2022-11-01 RX ORDER — NORETHINDRONE ACETATE AND ETHINYL ESTRADIOL AND FERROUS FUMARATE 1MG-20(21)
KIT ORAL
Qty: 84 TABLET | Refills: 0 | Status: SHIPPED | OUTPATIENT
Start: 2022-11-01 | End: 2023-01-25

## 2022-11-01 NOTE — TELEPHONE ENCOUNTER
"Last Written Prescription Date:  8/6/22  Last Fill Quantity: 84,  # refills: 0   Last office visit provider:  12/17/21     Requested Prescriptions   Pending Prescriptions Disp Refills     JUNEL FE 1/20 1-20 MG-MCG tablet [Pharmacy Med Name: JUNEL FE 1 MG-20 MCG TABLET] 84 tablet 0     Sig: TAKE 1 TABLET BY MOUTH EVERY DAY       Contraceptives Protocol Passed - 11/1/2022 12:08 AM        Passed - Patient is not a current smoker if age is 35 or older        Passed - Recent (12 mo) or future (30 days) visit within the authorizing provider's specialty     Patient has had an office visit with the authorizing provider or a provider within the authorizing providers department within the previous 12 mos or has a future within next 30 days. See \"Patient Info\" tab in inbasket, or \"Choose Columns\" in Meds & Orders section of the refill encounter.              Passed - Medication is active on med list        Passed - No active pregnancy on record        Passed - No positive pregnancy test in past 12 months             Jenny Hicks RN 11/01/22 3:29 PM  "

## 2022-11-14 ENCOUNTER — TRANSFERRED RECORDS (OUTPATIENT)
Dept: HEALTH INFORMATION MANAGEMENT | Facility: CLINIC | Age: 52
End: 2022-11-14

## 2022-11-21 ENCOUNTER — OFFICE VISIT (OUTPATIENT)
Dept: FAMILY MEDICINE | Facility: CLINIC | Age: 52
End: 2022-11-21
Payer: COMMERCIAL

## 2022-11-21 VITALS
SYSTOLIC BLOOD PRESSURE: 100 MMHG | OXYGEN SATURATION: 98 % | BODY MASS INDEX: 23.95 KG/M2 | RESPIRATION RATE: 14 BRPM | DIASTOLIC BLOOD PRESSURE: 72 MMHG | HEIGHT: 68 IN | TEMPERATURE: 97 F | HEART RATE: 76 BPM | WEIGHT: 158 LBS

## 2022-11-21 DIAGNOSIS — Z13.220 SCREENING FOR LIPID DISORDERS: ICD-10-CM

## 2022-11-21 DIAGNOSIS — Z30.41 ENCOUNTER FOR SURVEILLANCE OF CONTRACEPTIVE PILLS: ICD-10-CM

## 2022-11-21 DIAGNOSIS — Z86.0100 HISTORY OF COLONIC POLYPS: ICD-10-CM

## 2022-11-21 DIAGNOSIS — R41.89 COGNITIVE CHANGES: ICD-10-CM

## 2022-11-21 DIAGNOSIS — D69.6 THROMBOCYTOPENIA (H): ICD-10-CM

## 2022-11-21 DIAGNOSIS — F41.1 GAD (GENERALIZED ANXIETY DISORDER): ICD-10-CM

## 2022-11-21 DIAGNOSIS — F33.1 MODERATE EPISODE OF RECURRENT MAJOR DEPRESSIVE DISORDER (H): ICD-10-CM

## 2022-11-21 DIAGNOSIS — Z00.01 ENCOUNTER FOR ROUTINE ADULT MEDICAL EXAM WITH ABNORMAL FINDINGS: Primary | ICD-10-CM

## 2022-11-21 DIAGNOSIS — Z12.11 SCREEN FOR COLON CANCER: ICD-10-CM

## 2022-11-21 PROBLEM — D50.9 MICROCYTIC ANEMIA: Status: RESOLVED | Noted: 2019-11-29 | Resolved: 2022-11-21

## 2022-11-21 LAB
ALBUMIN SERPL BCG-MCNC: 4.2 G/DL (ref 3.5–5.2)
ALP SERPL-CCNC: 45 U/L (ref 35–104)
ALT SERPL W P-5'-P-CCNC: 21 U/L (ref 10–35)
ANION GAP SERPL CALCULATED.3IONS-SCNC: 15 MMOL/L (ref 7–15)
AST SERPL W P-5'-P-CCNC: 20 U/L (ref 10–35)
BILIRUB SERPL-MCNC: 0.3 MG/DL
BUN SERPL-MCNC: 11.3 MG/DL (ref 6–20)
CALCIUM SERPL-MCNC: 8.9 MG/DL (ref 8.6–10)
CHLORIDE SERPL-SCNC: 105 MMOL/L (ref 98–107)
CHOLEST SERPL-MCNC: 161 MG/DL
CREAT SERPL-MCNC: 0.66 MG/DL (ref 0.51–0.95)
DEPRECATED HCO3 PLAS-SCNC: 22 MMOL/L (ref 22–29)
ERYTHROCYTE [DISTWIDTH] IN BLOOD BY AUTOMATED COUNT: 12.3 % (ref 10–15)
GFR SERPL CREATININE-BSD FRML MDRD: >90 ML/MIN/1.73M2
GLUCOSE SERPL-MCNC: 91 MG/DL (ref 70–99)
HCT VFR BLD AUTO: 39.9 % (ref 35–47)
HDLC SERPL-MCNC: 53 MG/DL
HGB BLD-MCNC: 13.8 G/DL (ref 11.7–15.7)
LDLC SERPL CALC-MCNC: 77 MG/DL
MCH RBC QN AUTO: 30 PG (ref 26.5–33)
MCHC RBC AUTO-ENTMCNC: 34.6 G/DL (ref 31.5–36.5)
MCV RBC AUTO: 87 FL (ref 78–100)
NONHDLC SERPL-MCNC: 108 MG/DL
PLATELET # BLD AUTO: 158 10E3/UL (ref 150–450)
POTASSIUM SERPL-SCNC: 4.2 MMOL/L (ref 3.4–5.3)
PROT SERPL-MCNC: 5.9 G/DL (ref 6.4–8.3)
RBC # BLD AUTO: 4.6 10E6/UL (ref 3.8–5.2)
SODIUM SERPL-SCNC: 142 MMOL/L (ref 136–145)
TRIGL SERPL-MCNC: 153 MG/DL
TSH SERPL DL<=0.005 MIU/L-ACNC: 1.58 UIU/ML (ref 0.3–4.2)
VIT B12 SERPL-MCNC: 481 PG/ML (ref 232–1245)
WBC # BLD AUTO: 8.1 10E3/UL (ref 4–11)

## 2022-11-21 PROCEDURE — 36415 COLL VENOUS BLD VENIPUNCTURE: CPT | Performed by: FAMILY MEDICINE

## 2022-11-21 PROCEDURE — 84443 ASSAY THYROID STIM HORMONE: CPT | Performed by: FAMILY MEDICINE

## 2022-11-21 PROCEDURE — 82607 VITAMIN B-12: CPT | Performed by: FAMILY MEDICINE

## 2022-11-21 PROCEDURE — 80053 COMPREHEN METABOLIC PANEL: CPT | Performed by: FAMILY MEDICINE

## 2022-11-21 PROCEDURE — 99396 PREV VISIT EST AGE 40-64: CPT | Mod: 25 | Performed by: FAMILY MEDICINE

## 2022-11-21 PROCEDURE — 85027 COMPLETE CBC AUTOMATED: CPT | Performed by: FAMILY MEDICINE

## 2022-11-21 PROCEDURE — 80061 LIPID PANEL: CPT | Performed by: FAMILY MEDICINE

## 2022-11-21 PROCEDURE — 90715 TDAP VACCINE 7 YRS/> IM: CPT | Performed by: FAMILY MEDICINE

## 2022-11-21 PROCEDURE — 99214 OFFICE O/P EST MOD 30 MIN: CPT | Mod: 25 | Performed by: FAMILY MEDICINE

## 2022-11-21 PROCEDURE — 90471 IMMUNIZATION ADMIN: CPT | Performed by: FAMILY MEDICINE

## 2022-11-21 RX ORDER — DESVENLAFAXINE 100 MG/1
100 TABLET, EXTENDED RELEASE ORAL EVERY 24 HOURS
COMMUNITY
Start: 2022-10-24 | End: 2023-03-05

## 2022-11-21 RX ORDER — CLINDAMYCIN HCL 300 MG
CAPSULE ORAL
COMMUNITY
Start: 2022-11-07 | End: 2024-01-03

## 2022-11-21 RX ORDER — CETIRIZINE HYDROCHLORIDE 10 MG/1
10 TABLET ORAL DAILY
COMMUNITY
End: 2023-08-08

## 2022-11-21 ASSESSMENT — ENCOUNTER SYMPTOMS
CHILLS: 0
DYSURIA: 0
ARTHRALGIAS: 1
HEARTBURN: 0
MYALGIAS: 1
NERVOUS/ANXIOUS: 1
DIZZINESS: 1
PARESTHESIAS: 0
CONSTIPATION: 1
DIARRHEA: 0
COUGH: 0
JOINT SWELLING: 0
ABDOMINAL PAIN: 1
SORE THROAT: 0
EYE PAIN: 0
SHORTNESS OF BREATH: 0
HEMATURIA: 0
WEAKNESS: 0
NAUSEA: 0
BREAST MASS: 0
HEADACHES: 0
PALPITATIONS: 0
FREQUENCY: 0
FEVER: 0
HEMATOCHEZIA: 0

## 2022-11-21 ASSESSMENT — PATIENT HEALTH QUESTIONNAIRE - PHQ9
SUM OF ALL RESPONSES TO PHQ QUESTIONS 1-9: 8
10. IF YOU CHECKED OFF ANY PROBLEMS, HOW DIFFICULT HAVE THESE PROBLEMS MADE IT FOR YOU TO DO YOUR WORK, TAKE CARE OF THINGS AT HOME, OR GET ALONG WITH OTHER PEOPLE: SOMEWHAT DIFFICULT
SUM OF ALL RESPONSES TO PHQ QUESTIONS 1-9: 8

## 2022-11-21 NOTE — PATIENT INSTRUCTIONS
Maybe after this pill pack, try stopping your birth control and see what happens.  You may be in menopause.  If you have heavy bleeding, you can resume taking this and let me know when you need a refill.  For your cognitive changes, there is a test called Neuropsychology testing that we can order if needed.      Preventive Health Recommendations  Female Ages 50 - 64    Yearly exam: See your health care provider every year in order to  Review health changes.   Discuss preventive care.    Review your medicines if your doctor has prescribed any.    Get a Pap test every three years (unless you have an abnormal result and your provider advises testing more often).  If you get Pap tests with HPV test, you only need to test every 5 years, unless you have an abnormal result.   You do not need a Pap test if your uterus was removed (hysterectomy) and you have not had cancer.  You should be tested each year for STDs (sexually transmitted diseases) if you're at risk.   Have a mammogram every 1 to 2 years.  Have a colonoscopy at age 50, or have a yearly FIT test (stool test). These exams screen for colon cancer.    Have a cholesterol test every 5 years, or more often if advised.  Have a diabetes test (fasting glucose) every three years. If you are at risk for diabetes, you should have this test more often.   If you are at risk for osteoporosis (brittle bone disease), think about having a bone density scan (DEXA).    Shots: Get a flu shot each year. Get a tetanus shot every 10 years.    Nutrition:   Eat at least 5 servings of fruits and vegetables each day.  Eat whole-grain bread, whole-wheat pasta and brown rice instead of white grains and rice.  Get adequate Calcium and Vitamin D.     Lifestyle  Exercise at least 150 minutes a week (30 minutes a day, 5 days a week). This will help you control your weight and prevent disease.  Limit alcohol to one drink per day.  No smoking.   Wear sunscreen to prevent skin cancer.   See your  dentist every six months for an exam and cleaning.  See your eye doctor every 1 to 2 years.

## 2022-11-21 NOTE — PROGRESS NOTES
SUBJECTIVE:   CC: Valentina is an 52 year old who presents for preventive health visit.     Patient has been advised of split billing requirements and indicates understanding: Yes     Healthy Habits:     Getting at least 3 servings of Calcium per day:  NO    Bi-annual eye exam:  Yes    Dental care twice a year:  Yes    Sleep apnea or symptoms of sleep apnea:  Sleep apnea    Diet:  Regular (no restrictions)    Frequency of exercise:  6-7 days/week    Duration of exercise:  30-45 minutes    Taking medications regularly:  Yes    Medication side effects:  Lightheadedness    PHQ-2 Total Score: 2    Additional concerns today:  Yes    Problems to Add:  1. Feeling a bit slower cognitively.  Father had dementia of unknown etiology.  Does not feel that cognitive difficulties are mood related.  2. Fingernails have a white appearance distally.  Wondering if this is related to fungal involvement.    Answers for HPI/ROS submitted by the patient on 11/21/2022  If you checked off any problems, how difficult have these problems made it for you to do your work, take care of things at home, or get along with other people?: Somewhat difficult  PHQ9 TOTAL SCORE: 8        Today's PHQ-2 Score:   PHQ-2 ( 1999 [employer]) 11/21/2022   Q1: Little interest or pleasure in doing things 1   Q2: Feeling down, depressed or hopeless 1   PHQ-2 Score 2   PHQ-2 Total Score (12-17 Years)- Positive if 3 or more points; Administer PHQ-A if positive -   Q1: Little interest or pleasure in doing things Several days   Q2: Feeling down, depressed or hopeless Several days   PHQ-2 Score 2           Social History     Tobacco Use     Smoking status: Never     Smokeless tobacco: Never   Substance Use Topics     Alcohol use: Yes     Comment: rare     If you drink alcohol do you typically have >3 drinks per day or >7 drinks per week? No    Alcohol Use 11/21/2022   Prescreen: >3 drinks/day or >7 drinks/week? Not Applicable   Prescreen: >3 drinks/day or >7 drinks/week?  -   No flowsheet data found.    Reviewed orders with patient.  Reviewed health maintenance and updated orders accordingly - Yes    BP Readings from Last 3 Encounters:   11/21/22 100/72   12/17/21 98/65   08/30/21 110/64    Wt Readings from Last 3 Encounters:   11/21/22 71.7 kg (158 lb)   04/08/22 77.1 kg (170 lb)   12/17/21 79.9 kg (176 lb 1.6 oz)                  Patient Active Problem List   Diagnosis     Moderate episode of recurrent major depressive disorder (H)     History of colonic polyps     Migraine without status migrainosus, not intractable     STEVEN (obstructive sleep apnea)     Irritable bowel syndrome     EBER (generalized anxiety disorder)     Microcytic anemia     Migraine headache     Constipation, unspecified constipation type     Family history of colonic polyps     Gastritis and gastroduodenitis     Gastroesophageal reflux disease without esophagitis     Nonspecific abdominal pain     Hemorrhoids     Environmental allergies     Family history of glaucoma in father     Myopia of both eyes     Encounter for surveillance of contraceptive pills     Past Surgical History:   Procedure Laterality Date     ENDOSCOPY       WISDOM TOOTH EXTRACTION  1987       Social History     Tobacco Use     Smoking status: Never     Smokeless tobacco: Never   Substance Use Topics     Alcohol use: Yes     Comment: rare     Family History   Problem Relation Age of Onset     Sudden Death Mother 53     Glaucoma Father      Hemochromatosis Father      Dementia Father      Pacemaker Father      Osteoarthritis Sister         hip     Sleep Apnea Sister      Osteoarthritis Sister      Sleep Apnea Sister         fraternal twin     Cerebrovascular Disease Maternal Grandmother      Pacemaker Maternal Grandmother      Breast Cancer Paternal Aunt 60     Cancer Paternal Aunt      Coronary Artery Disease No family hx of      Heart Disease No family hx of      Diabetes No family hx of          Current Outpatient Medications   Medication  Sig Dispense Refill     acetaminophen-codeine (TYLENOL #3) 300-30 MG tablet TAKE 1 TO 2 TABLETS BY MOUTH EVERY 6 HOURS AS NEEDED FOR PAIN       cetirizine (ZYRTEC) 10 MG tablet Take 10 mg by mouth daily       clindamycin (CLEOCIN) 300 MG capsule TAKE 2 CAPSULES BY MOUTH 1 HOUR PRIOR TO DENTAL APPOINTMENT.       desvenlafaxine (PRISTIQ) 100 MG 24 hr tablet Take by mouth every 24 hours       dicyclomine (BENTYL) 10 MG capsule        DULoxetine (CYMBALTA) 60 MG capsule        JUNEL FE 1/20 1-20 MG-MCG tablet TAKE 1 TABLET BY MOUTH EVERY DAY 84 tablet 0     LORazepam (ATIVAN) 0.5 MG tablet Take 0.5 mg by mouth       omeprazole (PRILOSEC) 20 MG DR capsule Take 20 mg by mouth       polyethylene glycol (MIRALAX) 17 GM/Dose powder Take 17 g by mouth daily as needed       risperiDONE (RISPERDAL) 1 MG tablet Takes one half to one whole tablet daily. Per Dr Weber       Sod Fluoride-Potassium Nitrate (PREVIDENT 5000 SENSITIVE) 1.1-5 % PSTE Apply 1 Application topically 2 times daily       traZODone (DESYREL) 100 MG tablet Take 100 mg by mouth       Allergies   Allergen Reactions     Cefdinir Difficulty breathing     Patient is able to tolerate Penicillin and Amoxicillin without any problems     Latex Itching     Augmentin Nausea and Vomiting and Diarrhea       Breast Cancer Screening:    FHS-7:   Breast CA Risk Assessment (FHS-7) 12/15/2021 12/28/2021 11/21/2022   Did any of your first-degree relatives have breast or ovarian cancer? - No No   Did any of your relatives have bilateral breast cancer? - No Unknown   Did any man in your family have breast cancer? No No No   Did any woman in your family have breast and ovarian cancer? Yes Yes Yes   Did any woman in your family have breast cancer before age 50 y? No No No   Do you have 2 or more relatives with breast and/or ovarian cancer? No No No   Do you have 2 or more relatives with breast and/or bowel cancer? No No No       Mammogram Screening: Recommended annual  "mammography  Pertinent mammograms are reviewed under the imaging tab.    History of abnormal Pap smear: NO - age 30-65 PAP every 5 years with negative HPV co-testing recommended  PAP / HPV Latest Ref Rng & Units 8/30/2021   PAP   Negative for Intraepithelial Lesion or Malignancy (NILM)   HPV16 Negative Negative   HPV18 Negative Negative   HRHPV Negative Negative     Reviewed and updated as needed this visit by clinical staff    Allergies  Meds              Reviewed and updated as needed this visit by Provider   Tobacco  Allergies  Meds  Problems  Med Hx  Surg Hx  Fam Hx           Review of Systems   Constitutional: Negative for chills and fever.   HENT: Negative for congestion, ear pain, hearing loss and sore throat.    Eyes: Negative for pain and visual disturbance.   Respiratory: Negative for cough and shortness of breath.    Cardiovascular: Positive for peripheral edema. Negative for chest pain and palpitations.   Gastrointestinal: Positive for abdominal pain and constipation. Negative for diarrhea, heartburn, hematochezia and nausea.   Breasts:  Negative for tenderness, breast mass and discharge.   Genitourinary: Negative for dysuria, frequency, genital sores, hematuria, pelvic pain, urgency, vaginal bleeding and vaginal discharge.   Musculoskeletal: Positive for arthralgias and myalgias. Negative for joint swelling.   Skin: Negative for rash.   Neurological: Positive for dizziness. Negative for weakness, headaches and paresthesias.   Psychiatric/Behavioral: Positive for mood changes. The patient is nervous/anxious.         OBJECTIVE:   /72 (BP Location: Right arm, Patient Position: Sitting, Cuff Size: Adult Regular)   Pulse 76   Temp 97  F (36.1  C) (Temporal)   Resp 14   Ht 1.715 m (5' 7.5\")   Wt 71.7 kg (158 lb)   SpO2 98%   BMI 24.38 kg/m    Physical Exam  GENERAL: healthy, alert and no distress  EYES: Eyes grossly normal to inspection, PERRL and conjunctivae and sclerae normal  HENT: " ear canals and TM's normal, nose and mouth without ulcers or lesions  NECK: no adenopathy, no asymmetry, masses, or scars and thyroid normal to palpation  RESP: lungs clear to auscultation - no rales, rhonchi or wheezes  BREAST: normal without masses, tenderness or nipple discharge and no palpable axillary masses or adenopathy  CV: regular rate and rhythm, normal S1 S2, no S3 or S4, no murmur, click or rub, no peripheral edema and peripheral pulses strong  ABDOMEN: soft, nontender, no hepatosplenomegaly, no masses and bowel sounds normal  MS: no gross musculoskeletal defects noted, no edema  SKIN: no suspicious lesions or rashes  NEURO: Normal strength and tone, mentation intact and speech normal  PSYCH: mentation appears normal, affect mildly flat, consistent with previous presentations    Diagnostic Test Results:  Labs reviewed in Epic  Results for orders placed or performed in visit on 11/21/22   Lipid panel reflex to direct LDL Fasting     Status: Abnormal   Result Value Ref Range    Cholesterol 161 <200 mg/dL    Triglycerides 153 (H) <150 mg/dL    Direct Measure HDL 53 >=50 mg/dL    LDL Cholesterol Calculated 77 <=100 mg/dL    Non HDL Cholesterol 108 <130 mg/dL    Narrative    Cholesterol  Desirable:  <200 mg/dL    Triglycerides  Normal:  Less than 150 mg/dL  Borderline High:  150-199 mg/dL  High:  200-499 mg/dL  Very High:  Greater than or equal to 500 mg/dL    Direct Measure HDL  Female:  Greater than or equal to 50 mg/dL   Male:  Greater than or equal to 40 mg/dL    LDL Cholesterol  Desirable:  <100mg/dL  Above Desirable:  100-129 mg/dL   Borderline High:  130-159 mg/dL   High:  160-189 mg/dL   Very High:  >= 190 mg/dL    Non HDL Cholesterol  Desirable:  130 mg/dL  Above Desirable:  130-159 mg/dL  Borderline High:  160-189 mg/dL  High:  190-219 mg/dL  Very High:  Greater than or equal to 220 mg/dL   CBC with platelets     Status: Normal   Result Value Ref Range    WBC Count 8.1 4.0 - 11.0 10e3/uL    RBC  Count 4.60 3.80 - 5.20 10e6/uL    Hemoglobin 13.8 11.7 - 15.7 g/dL    Hematocrit 39.9 35.0 - 47.0 %    MCV 87 78 - 100 fL    MCH 30.0 26.5 - 33.0 pg    MCHC 34.6 31.5 - 36.5 g/dL    RDW 12.3 10.0 - 15.0 %    Platelet Count 158 150 - 450 10e3/uL   Vitamin B12     Status: Normal   Result Value Ref Range    Vitamin B12 481 232 - 1,245 pg/mL   TSH with free T4 reflex     Status: Normal   Result Value Ref Range    TSH 1.58 0.30 - 4.20 uIU/mL   Comprehensive metabolic panel (BMP + Alb, Alk Phos, ALT, AST, Total. Bili, TP)     Status: Abnormal   Result Value Ref Range    Sodium 142 136 - 145 mmol/L    Potassium 4.2 3.4 - 5.3 mmol/L    Chloride 105 98 - 107 mmol/L    Carbon Dioxide (CO2) 22 22 - 29 mmol/L    Anion Gap 15 7 - 15 mmol/L    Urea Nitrogen 11.3 6.0 - 20.0 mg/dL    Creatinine 0.66 0.51 - 0.95 mg/dL    Calcium 8.9 8.6 - 10.0 mg/dL    Glucose 91 70 - 99 mg/dL    Alkaline Phosphatase 45 35 - 104 U/L    AST 20 10 - 35 U/L    ALT 21 10 - 35 U/L    Protein Total 5.9 (L) 6.4 - 8.3 g/dL    Albumin 4.2 3.5 - 5.2 g/dL    Bilirubin Total 0.3 <=1.2 mg/dL    GFR Estimate >90 >60 mL/min/1.73m2       ASSESSMENT/PLAN:   1. Encounter for routine adult medical exam with abnormal findings  Routine history and physical, updated in EMR.  Labs updated as below.  Immunizations updated today.  Pap smear is up to date, will be due in 2026.  Mammogram will be due next month.  Colon cancer screening is due, patient has this scheduled next week.  Plan repeat physical in 1 year.  - Lipid panel reflex to direct LDL Fasting    2. Cognitive changes  Labs updated as below and normal.  Discussed Neuropsychology testing to differentiate between mood symptoms and cognitive changes, patient wants to proceed.  Order placed.  - Adult Neuropsychology Referral; Future  - CBC with platelets  - Vitamin B12  - TSH with free T4 reflex  - Comprehensive metabolic panel (BMP + Alb, Alk Phos, ALT, AST, Total. Bili, TP)    3. Moderate episode of recurrent major  "depressive disorder (H)  Patient follows with [facility].  Labs updated as below.  - Adult Neuropsychology Referral; Future  - CBC with platelets  - TSH with free T4 reflex  - Comprehensive metabolic panel (BMP + Alb, Alk Phos, ALT, AST, Total. Bili, TP)    4. EBER (generalized anxiety disorder)  Patient follows with a Psychiatrist.  Labs updated as below.  - Adult Neuropsychology Referral; Future  - CBC with platelets  - TSH with free T4 reflex  - Comprehensive metabolic panel (BMP + Alb, Alk Phos, ALT, AST, Total. Bili, TP)    5. Thrombocytopenia (H)  Resolved based on labs today.  - CBC with platelets    6. Encounter for surveillance of contraceptive pills  Recommended trying off OCPs after current pill pack to see if withdrawal bleeds cease.  If she has irregular bleeding or heavy bleeding, can resume.    7. Screen for colon cancer  8. History of colonic polyps  Patient has this scheduled next week.  She is on a 5-year schedule.    9. Screening for lipid disorders  Fasting lipid profile updated today and within goal range.  - Lipid panel reflex to direct LDL Fasting      Patient has been advised of split billing requirements and indicates understanding: Yes      COUNSELING:  Reviewed preventive health counseling, as reflected in patient instructions  Special attention given to:        Regular exercise       Healthy diet/nutrition       Immunizations    Vaccinated for: TDAP         Contraception       Osteoporosis prevention/bone health       Colorectal Cancer Screening      BMI:   Estimated body mass index is 24.38 kg/m  as calculated from the following:    Height as of this encounter: 1.715 m (5' 7.5\").    Weight as of this encounter: 71.7 kg (158 lb).         She reports that she has never smoked. She has never used smokeless tobacco.      Marce Weldon MD  [facility]  "

## 2022-11-26 PROBLEM — R41.89 COGNITIVE CHANGES: Status: ACTIVE | Noted: 2022-11-26

## 2022-11-28 ENCOUNTER — TRANSFERRED RECORDS (OUTPATIENT)
Dept: HEALTH INFORMATION MANAGEMENT | Facility: CLINIC | Age: 52
End: 2022-11-28

## 2023-01-09 ENCOUNTER — ANCILLARY PROCEDURE (OUTPATIENT)
Dept: MAMMOGRAPHY | Facility: CLINIC | Age: 53
End: 2023-01-09
Attending: FAMILY MEDICINE
Payer: COMMERCIAL

## 2023-01-09 DIAGNOSIS — Z12.31 VISIT FOR SCREENING MAMMOGRAM: ICD-10-CM

## 2023-01-09 PROCEDURE — 77067 SCR MAMMO BI INCL CAD: CPT

## 2023-01-25 ENCOUNTER — ANCILLARY PROCEDURE (OUTPATIENT)
Dept: MAMMOGRAPHY | Facility: CLINIC | Age: 53
End: 2023-01-25
Attending: FAMILY MEDICINE
Payer: COMMERCIAL

## 2023-01-25 DIAGNOSIS — Z30.41 ENCOUNTER FOR SURVEILLANCE OF CONTRACEPTIVE PILLS: ICD-10-CM

## 2023-01-25 DIAGNOSIS — N64.89 BREAST ASYMMETRY: ICD-10-CM

## 2023-01-25 PROCEDURE — 77061 BREAST TOMOSYNTHESIS UNI: CPT | Mod: LT

## 2023-01-25 RX ORDER — NORETHINDRONE ACETATE AND ETHINYL ESTRADIOL AND FERROUS FUMARATE 1MG-20(21)
KIT ORAL
Qty: 84 TABLET | Refills: 2 | Status: SHIPPED | OUTPATIENT
Start: 2023-01-25 | End: 2023-04-13

## 2023-01-26 NOTE — TELEPHONE ENCOUNTER
"Last Written Prescription Date:  11/1/22  Last Fill Quantity: 84,  # refills: 0   Last office visit provider:  11/21/22     Requested Prescriptions   Pending Prescriptions Disp Refills     JUNEL FE 1/20 1-20 MG-MCG tablet [Pharmacy Med Name: JUNEL FE 1 MG-20 MCG TABLET] 84 tablet 0     Sig: TAKE 1 TABLET BY MOUTH EVERY DAY       Contraceptives Protocol Passed - 1/25/2023 12:08 AM        Passed - Patient is not a current smoker if age is 35 or older        Passed - Recent (12 mo) or future (30 days) visit within the authorizing provider's specialty     Patient has had an office visit with the authorizing provider or a provider within the authorizing providers department within the previous 12 mos or has a future within next 30 days. See \"Patient Info\" tab in inbasket, or \"Choose Columns\" in Meds & Orders section of the refill encounter.              Passed - Medication is active on med list        Passed - No active pregnancy on record        Passed - No positive pregnancy test in past 12 months             Maricruz Katz RN 01/25/23 7:35 PM  "

## 2023-03-05 ENCOUNTER — OFFICE VISIT (OUTPATIENT)
Dept: FAMILY MEDICINE | Facility: CLINIC | Age: 53
End: 2023-03-05
Payer: COMMERCIAL

## 2023-03-05 VITALS
HEART RATE: 83 BPM | WEIGHT: 146 LBS | OXYGEN SATURATION: 98 % | TEMPERATURE: 98.1 F | BODY MASS INDEX: 22.53 KG/M2 | SYSTOLIC BLOOD PRESSURE: 122 MMHG | DIASTOLIC BLOOD PRESSURE: 76 MMHG

## 2023-03-05 DIAGNOSIS — R09.81 NASAL CONGESTION: Primary | ICD-10-CM

## 2023-03-05 DIAGNOSIS — R07.0 THROAT PAIN: ICD-10-CM

## 2023-03-05 LAB
DEPRECATED S PYO AG THROAT QL EIA: NEGATIVE
GROUP A STREP BY PCR: NOT DETECTED

## 2023-03-05 PROCEDURE — U0005 INFEC AGEN DETEC AMPLI PROBE: HCPCS | Performed by: PHYSICIAN ASSISTANT

## 2023-03-05 PROCEDURE — 99214 OFFICE O/P EST MOD 30 MIN: CPT | Mod: CS | Performed by: PHYSICIAN ASSISTANT

## 2023-03-05 PROCEDURE — U0003 INFECTIOUS AGENT DETECTION BY NUCLEIC ACID (DNA OR RNA); SEVERE ACUTE RESPIRATORY SYNDROME CORONAVIRUS 2 (SARS-COV-2) (CORONAVIRUS DISEASE [COVID-19]), AMPLIFIED PROBE TECHNIQUE, MAKING USE OF HIGH THROUGHPUT TECHNOLOGIES AS DESCRIBED BY CMS-2020-01-R: HCPCS | Performed by: PHYSICIAN ASSISTANT

## 2023-03-05 PROCEDURE — 87651 STREP A DNA AMP PROBE: CPT | Performed by: PHYSICIAN ASSISTANT

## 2023-03-05 RX ORDER — FLUTICASONE PROPIONATE 50 MCG
1 SPRAY, SUSPENSION (ML) NASAL DAILY
Qty: 9.9 ML | Refills: 0 | Status: SHIPPED | OUTPATIENT
Start: 2023-03-05 | End: 2023-05-01

## 2023-03-05 RX ORDER — DESVENLAFAXINE 50 MG/1
1 TABLET, FILM COATED, EXTENDED RELEASE ORAL
COMMUNITY
Start: 2022-08-11 | End: 2023-04-13

## 2023-03-05 ASSESSMENT — ENCOUNTER SYMPTOMS
COUGH: 1
VOMITING: 0
SORE THROAT: 1
NAUSEA: 0
FEVER: 0

## 2023-03-05 NOTE — PATIENT INSTRUCTIONS
1) Increase fluids and rest  2) Try Robitussin, Flonase, and Neti pot over the counter for congestion  3) Continue taking Tylenol/Ibuprofen for fever/pain relief as needed.  4) Salt water gargles and lozenges can be helpful for throat relief  5) You will only be notified of the confirmatory strep or COVID results if they are positive. You will be able to see these results via [employer].   6) Don't take more than 4000 mg of Tylenol (acetaminophen) in a 24 hour time period.

## 2023-03-05 NOTE — PROGRESS NOTES
Patient presents with:  Pharyngitis: Sore throat and congestion 3 days ago. Negative covid yesterday and today      Clinical Decision Making:  Sick symptoms for 3 days.  Rapid strep test negative.  At home COVID test negative, COVID PCR test in process.  We discussed supportive cares.  Physical exam is benign.    Depression symptoms are worsening, but stable.  Patient already has therapist and psychiatrist.  Recommend close follow-up with them.    Patient also experiencing menopausal symptoms.  She was informed that this is outside the scope for walk-in care to treat and work-up.      ICD-10-CM    1. Nasal congestion  R09.81 Symptomatic COVID-19 Virus (Coronavirus) by PCR Nose     fluticasone (FLONASE) 50 MCG/ACT nasal spray      2. Throat pain  R07.0 Streptococcus A Rapid Screen w/Reflex to PCR - Clinic Collect     Group A Streptococcus PCR Throat Swab          Patient Instructions   1) Increase fluids and rest  2) Try Robitussin, Flonase, and Neti pot over the counter for congestion  3) Continue taking Tylenol/Ibuprofen for fever/pain relief as needed.  4) Salt water gargles and lozenges can be helpful for throat relief  5) You will only be notified of the confirmatory strep or COVID results if they are positive. You will be able to see these results via [employer].   6) Don't take more than 4000 mg of Tylenol (acetaminophen) in a 24 hour time period.         HPI:  Valentina Humphries is a 52 year old female who presents today complaining of sore throat and nasal congestion for the past 3 days.  Patient had a negative at-home COVID test today and yesterday.      Patient also having worsening depression.  She has a both a therapist and psychiatrist.  She denies any current suicidal or homicidal ideations, but is crying more often both at work and at home.    Patient also experiencing menopausal symptoms.  She is following up with OB/GYN.    Patient experiencing concerns regarding low platelets.  Her last  platelet count was WNL it was checked 3 months ago.  She is wondering if she can get in with her primary care provider any sooner due to this concern.  I did inform her that this is not concerning for medical urgency.  She will see her PCP next month.    History obtained from the patient.    Problem List:  2022-11: Cognitive changes  2021-09: Encounter for surveillance of contraceptive pills  2021-07: Depression with anxiety  2021-01: Unspecified abdominal pain  2019-11: Microcytic anemia  2019-10: EBER (generalized anxiety disorder)  2017-10: Other hemorrhoids  2017-10: Polyp of colon  2017-03: Gastroesophageal reflux disease without esophagitis  2017-02: Moderate episode of recurrent major depressive disorder (H)  2016-02: Family history of glaucoma in father  2016-02: Myopia of both eyes  2015-11: Advised about management of weight  2015-11: Constipation, unspecified constipation type  2015-11: Constipation  2015-11: Nonspecific abdominal pain  2014-06: Family history of colonic polyps  2014-06: Gastritis and gastroduodenitis  2012-05: Irritable bowel syndrome  2012-02: Environmental allergies  2011-12: STEVEN (obstructive sleep apnea)  2011-08: Migraine without status migrainosus, not intractable  2011-08: Migraine headache  2011-03: Benign neoplasm of colon  2011-03: Hemorrhoids  2010-02: History of colonic polyps  2009-09: Dietary counseling and surveillance  2009-09: Routine general medical examination at a health care facility      Past Medical History:   Diagnosis Date     Asthma      Depression     hospitalized x4 in the 1990's, no suicide attempts     Migraine     Visual aura     Varicella        Social History     Tobacco Use     Smoking status: Never     Smokeless tobacco: Never   Substance Use Topics     Alcohol use: Yes     Comment: rare       Review of Systems   Constitutional: Negative for fever.   HENT: Positive for congestion and sore throat. Negative for ear pain.    Respiratory: Positive for cough  (mild).    Gastrointestinal: Negative for nausea and vomiting.       Vitals:    03/05/23 1207   BP: 122/76   Pulse: 83   Temp: 98.1  F (36.7  C)   SpO2: 98%   Weight: 66.2 kg (146 lb)       Physical Exam  Vitals and nursing note reviewed.   Constitutional:       General: She is not in acute distress.     Appearance: She is not toxic-appearing or diaphoretic.   HENT:      Head: Normocephalic and atraumatic.      Right Ear: Tympanic membrane, ear canal and external ear normal.      Left Ear: Tympanic membrane, ear canal and external ear normal.      Mouth/Throat:      Pharynx: No posterior oropharyngeal erythema.   Eyes:      Conjunctiva/sclera: Conjunctivae normal.   Cardiovascular:      Rate and Rhythm: Normal rate and regular rhythm.      Heart sounds: No murmur heard.  Pulmonary:      Effort: Pulmonary effort is normal. No respiratory distress.      Breath sounds: No stridor. No wheezing, rhonchi or rales.   Neurological:      Mental Status: She is alert.   Psychiatric:         Mood and Affect: Mood normal.         Behavior: Behavior normal.         Thought Content: Thought content normal.         Judgment: Judgment normal.         Results:  Results for orders placed or performed in visit on 03/05/23   Streptococcus A Rapid Screen w/Reflex to PCR - Clinic Collect     Status: Normal    Specimen: Throat; Swab   Result Value Ref Range    Group A Strep antigen Negative Negative         At the end of the encounter, I discussed results, diagnosis, medications. Discussed red flags for immediate return to clinic/ER, as well as indications for follow up if no improvement. Patient understood and agreed to plan. Patient was stable for discharge.    30 minutes spent on the date of the encounter doing chart review, history and examination, documentation, and further activities as noted.

## 2023-03-06 LAB — SARS-COV-2 RNA RESP QL NAA+PROBE: NEGATIVE

## 2023-03-13 ENCOUNTER — TRANSFERRED RECORDS (OUTPATIENT)
Dept: HEALTH INFORMATION MANAGEMENT | Facility: CLINIC | Age: 53
End: 2023-03-13

## 2023-03-17 ENCOUNTER — TRANSFERRED RECORDS (OUTPATIENT)
Dept: HEALTH INFORMATION MANAGEMENT | Facility: CLINIC | Age: 53
End: 2023-03-17

## 2023-04-13 ENCOUNTER — VIRTUAL VISIT (OUTPATIENT)
Dept: FAMILY MEDICINE | Facility: CLINIC | Age: 53
End: 2023-04-13
Payer: COMMERCIAL

## 2023-04-13 DIAGNOSIS — F33.2 SEVERE EPISODE OF RECURRENT MAJOR DEPRESSIVE DISORDER, WITHOUT PSYCHOTIC FEATURES (H): Primary | ICD-10-CM

## 2023-04-13 DIAGNOSIS — N95.1 SYMPTOMATIC MENOPAUSAL OR FEMALE CLIMACTERIC STATES: ICD-10-CM

## 2023-04-13 PROBLEM — F50.9 EATING DISORDER: Status: ACTIVE | Noted: 2023-04-13

## 2023-04-13 PROCEDURE — 99213 OFFICE O/P EST LOW 20 MIN: CPT | Mod: VID | Performed by: FAMILY MEDICINE

## 2023-04-13 RX ORDER — LORAZEPAM 0.5 MG/1
0.5 TABLET ORAL
COMMUNITY
End: 2023-05-31

## 2023-04-13 RX ORDER — FLUTICASONE PROPIONATE 50 MCG
1 SPRAY, SUSPENSION (ML) NASAL
COMMUNITY
End: 2023-04-13

## 2023-04-13 RX ORDER — DICYCLOMINE HYDROCHLORIDE 10 MG/1
10 CAPSULE ORAL 4 TIMES DAILY PRN
COMMUNITY
End: 2023-11-06

## 2023-04-13 RX ORDER — POLYETHYLENE GLYCOL 3350 17 G/17G
17 POWDER, FOR SOLUTION ORAL DAILY
COMMUNITY
End: 2023-04-13

## 2023-04-13 RX ORDER — TRAZODONE HYDROCHLORIDE 50 MG/1
50 TABLET, FILM COATED ORAL AT BEDTIME
COMMUNITY
End: 2023-08-08

## 2023-04-13 RX ORDER — ZIPRASIDONE HYDROCHLORIDE 20 MG/1
20 CAPSULE ORAL 2 TIMES DAILY WITH MEALS
COMMUNITY
Start: 2023-04-06 | End: 2023-08-27 | Stop reason: DRUGHIGH

## 2023-04-13 RX ORDER — DESVENLAFAXINE 100 MG/1
1 TABLET, EXTENDED RELEASE ORAL
COMMUNITY
Start: 2023-03-13 | End: 2023-11-06

## 2023-04-13 RX ORDER — TRAZODONE HYDROCHLORIDE 100 MG/1
100-300 TABLET ORAL AT BEDTIME
COMMUNITY
End: 2024-02-06

## 2023-04-13 ASSESSMENT — ANXIETY QUESTIONNAIRES
6. BECOMING EASILY ANNOYED OR IRRITABLE: MORE THAN HALF THE DAYS
7. FEELING AFRAID AS IF SOMETHING AWFUL MIGHT HAPPEN: NEARLY EVERY DAY
IF YOU CHECKED OFF ANY PROBLEMS ON THIS QUESTIONNAIRE, HOW DIFFICULT HAVE THESE PROBLEMS MADE IT FOR YOU TO DO YOUR WORK, TAKE CARE OF THINGS AT HOME, OR GET ALONG WITH OTHER PEOPLE: VERY DIFFICULT
1. FEELING NERVOUS, ANXIOUS, OR ON EDGE: NEARLY EVERY DAY
GAD7 TOTAL SCORE: 17
2. NOT BEING ABLE TO STOP OR CONTROL WORRYING: MORE THAN HALF THE DAYS
GAD7 TOTAL SCORE: 17
3. WORRYING TOO MUCH ABOUT DIFFERENT THINGS: MORE THAN HALF THE DAYS
5. BEING SO RESTLESS THAT IT IS HARD TO SIT STILL: MORE THAN HALF THE DAYS

## 2023-04-13 ASSESSMENT — PATIENT HEALTH QUESTIONNAIRE - PHQ9
5. POOR APPETITE OR OVEREATING: NEARLY EVERY DAY
SUM OF ALL RESPONSES TO PHQ QUESTIONS 1-9: 23

## 2023-04-13 NOTE — PROGRESS NOTES
Valentina is a 53 year old who is being evaluated via a billable video visit.      How would you like to obtain your AVS? MyChart  If the video visit is dropped, the invitation should be resent by: Text to cell phone: [phone]  Will anyone else be joining your video visit? No          Assessment & Plan     Severe episode of recurrent major depressive disorder, without psychotic features (H)  Symptomatic menopausal or female climacteric states  Discussed staying on her Prempro for now and giving this a bit more time in terms of stabilization of her mood prior to changing her hormones further.  She is agreeable to this.  If after another 4 to 8 weeks, she still feels that her mood symptoms are related to menopausal hormones, she may wish to restart her old OCP.  We discussed risks versus benefits of estrogen use in postmenopause today.  She will continue the plan outlined by her current psychiatrist in terms of her mental health medications.               Depression Screening Follow Up        4/13/2023    10:31 AM   PHQ   PHQ-9 Total Score 23   Q9: Thoughts of better off dead/self-harm past 2 weeks Nearly every day         Follow Up      Follow Up Actions Taken  Patient is currently admitted inpatient mental health      Marce Weldon MD  [facility]   Valentina is a 53 year old, presenting for the following health issues:   Follow Up        11/21/2022     9:15 AM   Additional Questions   Roomed by [facility]     Depression and Anxiety Follow-Up    How are you doing with your depression since your last visit? Worsened     How are you doing with your anxiety since your last visit?  Worsened     Are you having other symptoms that might be associated with depression or anxiety? Yes:  suicidal ideation, currently inpatient at     Have you had a significant life event? Health Concerns     Do you have any concerns with your use of alcohol or other drugs? No    Social History  "    Tobacco Use     Smoking status: Never     Smokeless tobacco: Never   Vaping Use     Vaping status: Never Used     Passive vaping exposure: Yes   Substance Use Topics     Alcohol use: Yes     Comment: rare     Drug use: No         9/1/2021     3:57 PM 11/21/2022     9:07 AM 4/13/2023    10:31 AM   PHQ   PHQ-9 Total Score 13 8 23   Q9: Thoughts of better off dead/self-harm past 2 weeks Not at all Several days Nearly every day   F/U: Thoughts of suicide or self-harm  Yes    F/U: Self harm-plan  No    F/U: Self-harm action  No    F/U: Safety concerns  No          1/18/2021    12:00 PM 9/1/2021     3:57 PM 4/13/2023    10:33 AM   EBER-7 SCORE   Total Score 14 13 17       Patient presents today via video visit to discuss menopausal symptoms.  The chief complaint said \"hormone replacement\".  Interestingly, patient is currently inpatient at [facility] psychiatry for suicidal ideation.  She states that she will be discharging today.  She thinks that her mood has been disrupted by stopping her OCP.  When I last saw her in November, we discussed stopping her low-dose OCP to see if she would still have a withdrawal bleed.  She states that after stopping the OCP in a January timeframe, she never had a withdrawal bleed after that.  She then began having some night sweats, trouble sleeping through the night, and mood fluctuations, possibly related to not sleeping well.  She saw OB/GYN at [facility] and was prescribed Prempro.  She thinks this helped for the first couple of weeks, however thinks her symptoms then got worse.  She has only been taking the Prempro for about 2 weeks.  She then relates that crying spells have improved, night sweats are either improved or possibly gone, and she denies any daytime hot flashes.  She is wondering if anything else in terms of her physical health could be affecting her mental health.  We reviewed her labs that were done at her most recent physical exam, she is reassured by this.  "     Review of Systems   Constitutional, HEENT, cardiovascular, pulmonary, gi and gu systems are negative, except as otherwise noted.      Objective           Vitals:  No vitals were obtained today due to virtual visit.    Physical Exam   GENERAL: Healthy, alert and no distress  EYES: Eyes grossly normal to inspection.  No discharge or erythema, or obvious scleral/conjunctival abnormalities.  RESP: No audible wheeze, cough, or visible cyanosis.  No visible retractions or increased work of breathing.    SKIN: Visible skin clear. No significant rash, abnormal pigmentation or lesions.  NEURO: Cranial nerves grossly intact.  Mentation and speech appropriate for age.  PSYCH: Mentation appears normal, affect anxious and restricted, normal speech and appearance well-groomed.    Diagnostics: PHQ-9 = 23, EBER-7 = 17            Video-Visit Details    Type of service:  Video Visit     Originating Location (pt. Location): Other inpatient mental health facility ([facility])    Distant Location (provider location):  On-site  Platform used for Video Visit: Brandy

## 2023-04-14 PROBLEM — F33.2 SEVERE EPISODE OF RECURRENT MAJOR DEPRESSIVE DISORDER, WITHOUT PSYCHOTIC FEATURES (H): Status: ACTIVE | Noted: 2017-02-10

## 2023-04-14 PROBLEM — Z30.41 ENCOUNTER FOR SURVEILLANCE OF CONTRACEPTIVE PILLS: Status: RESOLVED | Noted: 2021-09-01 | Resolved: 2023-04-14

## 2023-04-14 PROBLEM — N95.1 SYMPTOMATIC MENOPAUSAL OR FEMALE CLIMACTERIC STATES: Status: ACTIVE | Noted: 2023-04-14

## 2023-05-01 DIAGNOSIS — R09.81 NASAL CONGESTION: ICD-10-CM

## 2023-05-01 RX ORDER — FLUTICASONE PROPIONATE 50 MCG
1 SPRAY, SUSPENSION (ML) NASAL DAILY
Qty: 9.9 ML | Refills: 5 | Status: SHIPPED | OUTPATIENT
Start: 2023-05-01 | End: 2023-08-08

## 2023-05-31 ENCOUNTER — OFFICE VISIT (OUTPATIENT)
Dept: INTERNAL MEDICINE | Facility: CLINIC | Age: 53
End: 2023-05-31
Payer: COMMERCIAL

## 2023-05-31 VITALS
HEIGHT: 68 IN | BODY MASS INDEX: 22.97 KG/M2 | OXYGEN SATURATION: 98 % | SYSTOLIC BLOOD PRESSURE: 110 MMHG | HEART RATE: 84 BPM | RESPIRATION RATE: 16 BRPM | TEMPERATURE: 98 F | WEIGHT: 151.6 LBS | DIASTOLIC BLOOD PRESSURE: 60 MMHG

## 2023-05-31 DIAGNOSIS — F33.2 SEVERE EPISODE OF RECURRENT MAJOR DEPRESSIVE DISORDER, WITHOUT PSYCHOTIC FEATURES (H): ICD-10-CM

## 2023-05-31 DIAGNOSIS — R21 RASH: Primary | ICD-10-CM

## 2023-05-31 DIAGNOSIS — R68.84 JAW PAIN: ICD-10-CM

## 2023-05-31 PROCEDURE — 96127 BRIEF EMOTIONAL/BEHAV ASSMT: CPT | Performed by: NURSE PRACTITIONER

## 2023-05-31 PROCEDURE — 99213 OFFICE O/P EST LOW 20 MIN: CPT | Performed by: NURSE PRACTITIONER

## 2023-05-31 RX ORDER — HYDROXYZINE HYDROCHLORIDE 10 MG/1
10-20 TABLET, FILM COATED ORAL DAILY PRN
COMMUNITY
Start: 2023-04-20 | End: 2024-06-07

## 2023-05-31 ASSESSMENT — PATIENT HEALTH QUESTIONNAIRE - PHQ9
10. IF YOU CHECKED OFF ANY PROBLEMS, HOW DIFFICULT HAVE THESE PROBLEMS MADE IT FOR YOU TO DO YOUR WORK, TAKE CARE OF THINGS AT HOME, OR GET ALONG WITH OTHER PEOPLE: VERY DIFFICULT
SUM OF ALL RESPONSES TO PHQ QUESTIONS 1-9: 10
SUM OF ALL RESPONSES TO PHQ QUESTIONS 1-9: 10

## 2023-05-31 NOTE — PROGRESS NOTES
"  Assessment & Plan     Rash  This appears to be resolving since discontinuing her lamotrigine about 2 weeks ago.  No need for intervention today    Severe episode of recurrent major depressive disorder, without psychotic features (H)  Currently doing day treatment programming at [facility] and has a follow-up appointment with her psychiatrist in a few days.  Her depression is a serious issue for her and she is tearful in the exam room.    She has access to resources and denies any active suicidal ideation today    Jaw pain  She has some pain in the right side.  Some associated popping and clicking.  She has a appointment with her dentist coming up soon.  Advised conservative measures such as Tylenol, and ice along with jaw arrest.    Patient Instructions   Your rash appears to be resolving.  I do not think we need to prescribe any new medications or creams today.    Use ice on your jaw for 15 minutes 2-3 times daily.  Soft foods.  Avoid meat or other chewy foods.    Follow-up with psychiatry later this week.    If you are interested in establishing care with me, come back and see me later this summer or fall for a \"establish care\" visit where we can review your chart and go over your medical history.      Liborio Butler, [facility]    Kath Montgomery is a 53 year old, presenting for the following health issues:    rash (From medication ) and Jaw Pain (Right side on/off X 6 month, popping when she opens her mouth)        5/31/2023     7:58 AM   Additional Questions   Roomed by      History of Present Illness       Reason for visit:  Rash from lamortrigine medication , jaw pain    She eats 4 or more servings of fruits and vegetables daily.She consumes 0 sweetened beverage(s) daily.She exercises with enough effort to increase her heart rate 10 to 19 minutes per day.  She exercises with enough effort to increase her heart rate 4 days per week.   She is taking " "medications regularly.    Today's PHQ-9         PHQ-9 Total Score: 10    PHQ-9 Q9 Thoughts of better off dead/self-harm past 2 weeks :   Several days  Thoughts of suicide or self harm: (P) Yes  Self-harm Plan:   (P) No  Self-harm Action:     (P) No  Safety concerns for self or others: (P) No    How difficult have these problems made it for you to do your work, take care of things at home, or get along with other people: Very difficult         Review of Systems   Constitutional, HEENT, cardiovascular, pulmonary, gi and gu systems are negative, except as otherwise noted.      Objective    /60 (BP Location: Right arm, Patient Position: Sitting, Cuff Size: Adult Regular)   Pulse 84   Temp 98  F (36.7  C) (Oral)   Resp 16   Ht 1.715 m (5' 7.5\")   Wt 68.8 kg (151 lb 9.6 oz)   SpO2 98%   BMI 23.39 kg/m    Body mass index is 23.39 kg/m .  Physical Exam    Skin appears normal bilateral arms.  She has some mild clicking with palpation to the right jaw area but no obvious asymmetry or swelling.      "

## 2023-08-08 ENCOUNTER — OFFICE VISIT (OUTPATIENT)
Dept: PSYCHIATRY | Facility: CLINIC | Age: 53
End: 2023-08-08
Payer: COMMERCIAL

## 2023-08-08 ENCOUNTER — OFFICE VISIT (OUTPATIENT)
Dept: INTERNAL MEDICINE | Facility: CLINIC | Age: 53
End: 2023-08-08
Payer: COMMERCIAL

## 2023-08-08 VITALS
SYSTOLIC BLOOD PRESSURE: 110 MMHG | DIASTOLIC BLOOD PRESSURE: 72 MMHG | RESPIRATION RATE: 16 BRPM | HEIGHT: 68 IN | OXYGEN SATURATION: 98 % | HEART RATE: 80 BPM | BODY MASS INDEX: 23.04 KG/M2 | WEIGHT: 152 LBS | TEMPERATURE: 98.1 F

## 2023-08-08 DIAGNOSIS — K21.9 GASTROESOPHAGEAL REFLUX DISEASE WITHOUT ESOPHAGITIS: Primary | ICD-10-CM

## 2023-08-08 DIAGNOSIS — G47.33 OSA (OBSTRUCTIVE SLEEP APNEA): ICD-10-CM

## 2023-08-08 DIAGNOSIS — F33.2 SEVERE EPISODE OF RECURRENT MAJOR DEPRESSIVE DISORDER, WITHOUT PSYCHOTIC FEATURES (H): ICD-10-CM

## 2023-08-08 DIAGNOSIS — F33.2 SEVERE RECURRENT MAJOR DEPRESSION WITHOUT PSYCHOTIC FEATURES (H): Primary | ICD-10-CM

## 2023-08-08 DIAGNOSIS — B07.0 PLANTAR WART: ICD-10-CM

## 2023-08-08 DIAGNOSIS — F41.1 GENERALIZED ANXIETY DISORDER: ICD-10-CM

## 2023-08-08 PROCEDURE — 99214 OFFICE O/P EST MOD 30 MIN: CPT | Mod: 25 | Performed by: NURSE PRACTITIONER

## 2023-08-08 PROCEDURE — 17110 DESTRUCTION B9 LES UP TO 14: CPT | Performed by: NURSE PRACTITIONER

## 2023-08-08 RX ORDER — ESTROGEN,CON/M-PROGEST ACET 0.625-2.5
1 TABLET ORAL DAILY
COMMUNITY
Start: 2023-08-04 | End: 2024-03-15

## 2023-08-08 RX ORDER — FAMOTIDINE 40 MG/1
40 TABLET, FILM COATED ORAL DAILY
Qty: 90 TABLET | Refills: 1 | Status: SHIPPED | OUTPATIENT
Start: 2023-08-08 | End: 2023-09-20

## 2023-08-08 RX ORDER — MIRTAZAPINE 15 MG/1
15 TABLET, FILM COATED ORAL AT BEDTIME
COMMUNITY
Start: 2023-08-04 | End: 2024-03-15

## 2023-08-08 RX ORDER — DESIPRAMINE HYDROCHLORIDE 50 MG/1
50 TABLET ORAL AT BEDTIME
COMMUNITY
Start: 2023-08-04 | End: 2024-01-09

## 2023-08-08 ASSESSMENT — PATIENT HEALTH QUESTIONNAIRE - PHQ9
SUM OF ALL RESPONSES TO PHQ QUESTIONS 1-9: 11
5. POOR APPETITE OR OVEREATING: MORE THAN HALF THE DAYS

## 2023-08-08 ASSESSMENT — ANXIETY QUESTIONNAIRES
GAD7 TOTAL SCORE: 9
1. FEELING NERVOUS, ANXIOUS, OR ON EDGE: SEVERAL DAYS
7. FEELING AFRAID AS IF SOMETHING AWFUL MIGHT HAPPEN: MORE THAN HALF THE DAYS
IF YOU CHECKED OFF ANY PROBLEMS ON THIS QUESTIONNAIRE, HOW DIFFICULT HAVE THESE PROBLEMS MADE IT FOR YOU TO DO YOUR WORK, TAKE CARE OF THINGS AT HOME, OR GET ALONG WITH OTHER PEOPLE: SOMEWHAT DIFFICULT
2. NOT BEING ABLE TO STOP OR CONTROL WORRYING: SEVERAL DAYS
3. WORRYING TOO MUCH ABOUT DIFFERENT THINGS: SEVERAL DAYS
6. BECOMING EASILY ANNOYED OR IRRITABLE: SEVERAL DAYS
5. BEING SO RESTLESS THAT IT IS HARD TO SIT STILL: SEVERAL DAYS
GAD7 TOTAL SCORE: 9

## 2023-08-08 NOTE — PATIENT INSTRUCTIONS
Famotidine sent into pharmacy.  Take a 40 mg tablet daily for the next few days.  If you still have room for improvement, you could increase to twice daily dosing.    You can restart the omeprazole at a later date if the famotidine is not addressing your acid reflux symptoms.    Continue to follow-up with psychiatry for your mood.    Purchase some Compound W wart solution at the pharmacy follow the package instructions for use.    Follow-up in 3 months for complete physical exam

## 2023-08-08 NOTE — PROGRESS NOTES
Assessment & Plan     Gastroesophageal reflux disease without esophagitis  She is trying to wean off the omeprazole.  She would like to try Pepcid.  - famotidine (PEPCID) 40 MG tablet; Take 1 tablet (40 mg) by mouth daily    Plantar wart  5 freeze/thaw cycles completed today with liquid nitrogen.  Also advised Compound W    STEVEN (obstructive sleep apnea)  Compliant with CPAP    Severe episode of recurrent major depressive disorder, without psychotic features (H)  She has an appointment later today with the treatment resistant depression clinic at the [facility] in [place]    There had been a discussion about switching from her Prempro back to her original OCP but I will defer to her gynecologist on this.    Patient Instructions   Famotidine sent into pharmacy.  Take a 40 mg tablet daily for the next few days.  If you still have room for improvement, you could increase to twice daily dosing.    You can restart the omeprazole at a later date if the famotidine is not addressing your acid reflux symptoms.    Continue to follow-up with psychiatry for your mood.    Purchase some Compound W wart solution at the pharmacy follow the package instructions for use.    Follow-up in 3 months for complete physical exam      Liborio Butler [facility]    Kath Montgomery is a 53 year old, presenting for the following health issues:    Establish Care (Wart on left foot, depression)      8/8/2023     8:09 AM   Additional Questions   Roomed by [facility]       History of Present Illness       Reason for visit:  Establish care    She eats 4 or more servings of fruits and vegetables daily.She consumes 0 sweetened beverage(s) daily.She exercises with enough effort to increase her heart rate 30 to 60 minutes per day.  She exercises with enough effort to increase her heart rate 4 days per week.   She is taking medications regularly.         Review of Systems   Constitutional,  "HEENT, cardiovascular, pulmonary, gi and gu systems are negative, except as otherwise noted.      Objective    /72 (BP Location: Right arm, Patient Position: Sitting)   Pulse 80   Temp 98.1  F (36.7  C)   Resp 16   Ht 1.715 m (5' 7.5\")   Wt 68.9 kg (152 lb)   SpO2 98%   BMI 23.46 kg/m    Body mass index is 23.46 kg/m .  Physical Exam     5 freeze/thaw cycles performed on left foot without difficulty.  No complications.        "
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
## 2023-08-23 ENCOUNTER — VIRTUAL VISIT (OUTPATIENT)
Dept: PSYCHIATRY | Facility: CLINIC | Age: 53
End: 2023-08-23
Payer: COMMERCIAL

## 2023-08-23 VITALS — HEIGHT: 67 IN | BODY MASS INDEX: 23.54 KG/M2 | WEIGHT: 150 LBS

## 2023-08-23 DIAGNOSIS — F33.2 SEVERE RECURRENT MAJOR DEPRESSION WITHOUT PSYCHOTIC FEATURES (H): Primary | ICD-10-CM

## 2023-08-23 ASSESSMENT — PATIENT HEALTH QUESTIONNAIRE - PHQ9: SUM OF ALL RESPONSES TO PHQ QUESTIONS 1-9: 16

## 2023-08-23 ASSESSMENT — PAIN SCALES - GENERAL: PAINLEVEL: MILD PAIN (3)

## 2023-08-23 NOTE — NURSING NOTE
"  Patient scored 16 on PHQ-9.       Is the patient currently in the state of [place]? YES    Visit mode:VIDEO    If the visit is dropped, the patient can be reconnected by: VIDEO VISIT: Send to e-mail at: [email or web address]    Will anyone else be joining the visit? YES: How would they like to receive their invitation? Send to e-mail: [email or web address]  [facility]   (If patient encounters technical issues they should call [phone])    How would you like to obtain your AVS? MyChart    Are changes needed to the allergy or medication list? No    Reason for visit: Consult (Patient said, \" feels like on too many medications and wants to reduce them.\")      Julieta White VVF     "

## 2023-08-23 NOTE — CONFIDENTIAL NOTE
"    Patient:  Valentina Humphries  :  1970   Age:  53 year old   Today's Office Visit:  2023    [facility]                                                              [address]   [place], [place]  [address]       [facility] Treatment Resistant Depression (TRD) Program   Medication Therapy Management   Video Visit Note  This video visit is from my home to the patient's home  via [employer] to reduce exposure to COVID. We used InSample in case we get disconnected, we will use patient email [email or web address], or [email or web address] [phone].        Identifying Information and Introduction:                               Valentina is a 53 year old  white woman who prefers the name Valentina and uses pronouns she, her. Patient is seen in clinic today for a [facility] TRD MTM Consultation.       Patient lives in [place], [place]                                                                                                       This patient is a new adult to the [facility] TRD program.       Psychiatrist is: Dr. Blake Bryan who will see the patient in person on 2023 which was communicated to the patient.                                                                                                                                         Chief Complaint                                   \" Depression, anxiety, trauma history, eating disorder history, migraines with caffeine withdrawal. \"    Reason for Consultation                                Rating medication trials for antidepressant failure and assessment of antidepressant drugs and their history.                                                  Informants include Patient, review of past medical records.  Please be aware that at time of visit I was not in the complete position of all past medical mental records of this patient.    Pertinent Background                                          " [initial eval 08/23/23]   Highest degree of this patient is graduate school.  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  Patient is actually interested in TMS.    Psych pertinent item history includes suicidal ideation, SIB , trauma hx, eating disorder , and mutiple psychotropic trials     Medication Information                                                    Current Outpatient Medications   Medication Sig Dispense Refill    clindamycin (CLEOCIN) 300 MG capsule TAKE 2 CAPSULES BY MOUTH 1 HOUR PRIOR TO DENTAL APPOINTMENT. (Patient not taking: Reported on 5/31/2023)      desipramine (NORPRAMIN) 50 MG tablet       desvenlafaxine (PRISTIQ) 100 MG 24 hr tablet Take 1 tablet by mouth daily at 2 pm      dicyclomine (BENTYL) 10 MG capsule Take 10 mg by mouth 4 times daily as needed      famotidine (PEPCID) 40 MG tablet Take 1 tablet (40 mg) by mouth daily 90 tablet 1    hydrOXYzine (ATARAX) 10 MG tablet Take 10-20 mg by mouth as needed      LORazepam (ATIVAN) 0.5 MG tablet Take 0.5 mg by mouth      mirtazapine (REMERON) 15 MG tablet       polyethylene glycol (MIRALAX) 17 GM/Dose powder Take 17 g by mouth daily as needed      PREMPRO 0.625-2.5 MG tablet       Sod Fluoride-Potassium Nitrate (PREVIDENT 5000 SENSITIVE) 1.1-5 % PSTE Apply 1 Application topically 2 times daily      traZODone (DESYREL) 100 MG tablet Take 100-300 mg by mouth At Bedtime      vitamin D3 (CHOLECALCIFEROL) 125 MCG (5000 UT) tablet Take 1 tablet by mouth daily      ziprasidone (GEODON) 20 MG capsule Take 20 mg by mouth 2 times daily (with meals)              AM     PM      Current Psychotropic   Total Daily Dosage    Desipramine/Norpramin            50  50 mg    Desvenlafaxine/Pristiq           100  100 mg   Hydroxyzine/Atarax      10 mg PRN         not used for couple of months    Lorazepam      0.5 mg PRN        Last time 2 months ago    Mirtazapine/Remeron             15   15 mg   Ziprasidone/Geodon     20        20  40 mg   Trazodone            50 to 150   150 mg max                       Herbal Remedies:   Light box 5000 units in the morning.                                                                                                          2.  Nutritional yeast.  3.  Lexaclear  Drug to Drug Interaction      Drugs:  Documentation: Summary:    DESIPRAMINE HYDROCHLORIDE -- ZIPRASIDONE HYDROCHLORIDE     Fair Concurrent use of ZIPRASIDONE and SEROTONERGIC DRUGS THAT PROLONG QT INTERVAL may result in increased risk of QT-interval prolongation and increased risk of serotonin syndrome (hypertension, hyperthermia, myoclonus, mental status changes).   MIRTAZAPINE -- ZIPRASIDONE HYDROCHLORIDE     Fair Concurrent use of ZIPRASIDONE and QT INTERVAL PROLONGING DRUGS may result in increased risk of QT-interval prolongation.   TRAZODONE HYDROCHLORIDE -- ZIPRASIDONE HYDROCHLORIDE     Fair Concurrent use of ZIPRASIDONE and SEROTONERGIC DRUGS THAT PROLONG QT INTERVAL may result in increased risk of QT-interval prolongation and increased risk of serotonin syndrome (hypertension, hyperthermia, myoclonus, mental status changes).   DESIPRAMINE HYDROCHLORIDE -- MIRTAZAPINE     Fair Concurrent use of MIRTAZAPINE and SEROTONERGIC AGENTS may result in increased risk of serotonin syndrome.   DESIPRAMINE HYDROCHLORIDE -- DESVENLAFAXINE SUCCINATE    Fair Concurrent use of DESVENLAFAXINE and CYP2D6 SUBSTRATES AND SEROTONERGIC AGENTS may result in increased CYP2D6 substrate exposure; increased risk of serotonin syndrome (hypertension, tachycardia, hyperthermia, myoclonus, mental status changes).   DESIPRAMINE HYDROCHLORIDE -- TRAZODONE HYDROCHLORIDE      Fair Concurrent use of TRAZODONE and SEROTONERGIC DRUGS THAT PROLONG QT INTERVAL may result in increased risk of serotonin syndrome and increased risk of QT-interval prolongation.   DESVENLAFAXINE SUCCINATE -- TRAZODONE HYDROCHLORIDE    Fair Concurrent use  of DESVENLAFAXINE and SEROTONERGIC AGENTS may result in increased risk of serotonin syndrome (hypertension, tachycardia, hyperthermia, myoclonus, mental status changes).   DESVENLAFAXINE SUCCINATE -- ZIPRASIDONE HYDROCHLORIDE    Fair Concurrent use of ZIPRASIDONE and SEROTONERGIC DRUGS may result in increased risk for serotonin syndrome (hypertension, hyperthermia, myoclonus, mental status changes).   MIRTAZAPINE -- DESVENLAFAXINE SUCCINATE    Fair Concurrent use of DESVENLAFAXINE and SEROTONERGIC AGENTS may result in increased risk of serotonin syndrome (hypertension, tachycardia, hyperthermia, myoclonus, mental status changes).   MIRTAZAPINE -- TRAZODONE HYDROCHLORIDE    Fair Concurrent use of MIRTAZAPINE and SEROTONERGIC AGENTS may result in increased risk of serotonin syndrome.   DESIPRAMINE HYDROCHLORIDE -- CONJUGATED ESTROGENS/ MEDROXYPROGESTERONE ACETATE    Excellent Concurrent use of TRICYCLIC ANTIDEPRESSANTS and ESTROGENS may result in possible attenuation of antidepressant effectiveness; tricyclic toxicity (drowsiness, hypotension, akathisia).     Current Reported Side Effects    Patient reports side effects to current psychiatric medications:  Yes Desvenlafaxine, patient lost 30 pounds and as a result her levels could have increased. Desvenlafaxine her cognitive function decreased and therefore she takes it at night.    With mirtazapine 15 mg at bedtime she gained 15 pounds back.  Gene testing:  Yes Patient will get a copy to us.  Results:     ALLERGIES   Cefdinir, Latex, Amoxicillin-pot clavulanate, and Lamotrigine caused a rash when the dose was increased to 50 mg/day.  Lithium caused a rash in her 20s.    MEDICAL HISTORY     Patient Active Problem List   Diagnosis    Severe episode of recurrent major depressive disorder, without psychotic features (H)    History of colonic polyps    Migraine without status migrainosus, not intractable    STEVEN (obstructive sleep apnea)    Irritable bowel syndrome    EBER  "(generalized anxiety disorder)    Constipation, unspecified constipation type    Family history of colonic polyps    Gastritis and gastroduodenitis    Gastroesophageal reflux disease without esophagitis    Hemorrhoids    Environmental allergies    Family history of glaucoma in father    Myopia of both eyes    Cognitive changes    Eating disorder    Symptomatic menopausal or female climacteric states                               Psychiatric pertinent history                          Psychiatric pertinent history includes :3 lifetime episodes of depression a\"kind of hypomanic and cycles a lot.      Depression History  1. First time experienced:   at Age 7     2. First time Antidepressant Drug started:Age 22 Drug: Clomipramine /Anafranil: Patient's legs swelled up.    3. Last Episode started: Her depression got worse last year which coincided with her hormonal changes.      4. Hospitalization for severe (SI) suicidal ideation or (SA) suicide attempt   Yes Date: 2023  Comments: At [facility].  Treatments: Partial hospitalization followed by day treatment in 2023, issue of perimenopause and relationship stress.  Also IOP, IOP, ED treatment.  5.  Suicidal ideation current    Yes Only passive thoughts but no intent.  6.  (CBT) Cognitive behavioral therapy  Yes. Effective:  Yes Somewhat  7.  (DBT) Dialectical behavior therapy    Yes. Effective:  Yes Somewhat  8.  The EMDR therapist was good.  9. Support system with severe psychiatric decompensation: Spouse, [facility] community members, or Other niece and members in the recovery Muslim.           Social and Environmental Aspects                          A. Living situation is: lives with their spouse  B. Does patient have a pet  Yes. Pet cat  C. Marital Status:                                 Pharmacotherapy Indicators: Pharmaceutical Aspects:       1.  [employer] Assessment: The patient has prescription coverage with her insurance plan through 's work  Prescription " drug copayment is $: Manageable                                      The cost of obtaining prescribed medications:  Does not interfere with compliance.     Comment:  2. Patient's Pharmacy: [facility]                                                              3. Compliance assessment shows that the patient is Independent.   4. Historian: the patient is a good historian  5. Pill box:  Set up by patient  c. The type of pillbox used is: Weekly   d. Misses Medications: adherent                               Pharmacokinetic                  Habits and Chemical Use  A. Alcohol: Occasionally, but normally does not drink in support of her .  B. Tobacco: Patient does not use Tobacco products.  C. Caffeine: 2 cups/day of tea  D. Recreational Drugs: Never    E. Exercise: She would walk, bike, swim, canoeing, to excess as punishment in her 20s.  According to the patient she was then happier.  F. Hobbies:  Art work, sports, gardening, listening to music and playing the [employer].      Rating of Antidepressant Drugs:                  Selective serotonin reuptake inhibitor:   Fluoxetine/Prozac in 2002 for 14 days prior to menses it was tried.    Sertraline/Zoloft in December 1992 and retried till 2016 off and on max dose 150 mg/day; patient stopped binging;  times the dose would give it a rating of 3; side effects harder to orgasm.    Serotonin - Noradrenaline  reuptake inhibitor:   Desvenlafaxine/Pristiq in 2023 max dose of 100 mg/day which would give it a rating of 4.    Duloxetine Cymbalta between 2021 and 2022 max dose 90 mg/day response was ineffective dose would give a rating of 4.    Venlafaxine/Effexor was tried max dose 75 mg.    Serotonin Modulator and Stimulator: negative    Noradrenaline and Dopamine reuptake inhibitor:   Wellbutrin/bupropion patient got manic.    Auvelity /dextromethorphan/bupropion 45 mg / 105 mg Insurance did not cover it.    Tricyclic Antidepressants (TCA):   Amitriptyline/Elavil between 2016 and  2021 max dose 10 mg./d    Clomipramine/Anafranil in 1992 patient experienced legs swelling up, vasculitis.    Desipramine/Norpramin was tried in August 2023 50 mg/d.    Tetracyclic Antidepressants:   Mirtazapine/Remeron : August 2023 max dose 15 mg/day would give it a rating of 2.    Trazodone since 2000 to present off and on most of the time on, max dose 200 mg/day which she would give it a rating of 2.  Monoamine Oxidase Inhibitor (MAOI): negative    Augmentation/Bipolar Therapy:    Lithium she was on a few days in her 20s is not well-tolerated she got a quite a severe rash.  Lamotrigine caused a rash.         Miscellaneous Augmentation Therapy:    Antipsychotics:   Aripiprazole/Abilify gave her more energy but she was more anxious and could not control thoughts.    Lurasidone/Latuda was tried for a month or 2 in 2019: Patient got more anxious    Quetiapine Seroquel: 25 mg was used for anxiety.    Risperidone/ Risperdal: in 2001 and again in 2014 until 2023 max dose 1.5 mg at bedtime was used and was better than the Seroquel.  Ziprasidone /Geodon max dose 40 mg/day in 2023.        Stimulants: negative       Benzodiazepines:  Diazepam/ Valium for back pain, patient was afraid of addiction  Lorazepam/ Ativan max dose 1.5 mg/day for anxiety as needed.     Miscellaneous:   Gabapentin/Neurontin was used for back pain caused incoherent thinking.  Omega 3 triglycerides/fish oil currently used  Hydroxyzine Atarax 20 mg, was used in April 2023 for itching or sedation.  [place] Wart was tried a couple of times in the past  Estrogen/testosterone : Prempro 0.625-5 mg was used  Oral contraceptives were used in 1997 or 1998 to even out her hormones and it was helpful.     Miscellaneous Sleep Aides:   Diphenhydramine/Benadryl it worked for sleep but the patient was groggy the next day.  Gabapentin was tried  Trazodone was tried  Mirtazapine was tried  Amitriptyline was tried       Ketamine Treatment: negative     Other  Reported Treatment for Depression and Related Mood Disorder History:  Light box is used since 2009 to present as needed does not do a lot.  CPAP is used and it works.  TMS negative  ECT negative  VNS negative     Comments:      Patient will follow MTM as needed.   All MTM findings will be shared with clinic psychiatrist.   Patient was instructed to  return for upcoming appointment with Dr. Blake Bryan, for recommendations and future treatment options.       VALERIY DIANA, PHARMD    Pharmaceutical Care Coordinator   Time spent was 2 hours without the patient and 69  min with the patient.

## 2023-08-23 NOTE — PROGRESS NOTES
Depression Response    Patient completed the PHQ-9 assessment for depression and scored >9? Yes  Question 9 on the PHQ-9 was positive for suicidality? Yes  Does patient have current mental health provider? Yes    Is this a virtual visit? Yes   Does patient have suicidal ideation (positive question 9)? No - offer to place Mental Health Referral.  Patient declined referral/not needed    I personally notified the following: visit provider        Virtual Visit Details    Type of service:  Video Visit     Originating Location (pt. Location): Home    Distant Location (provider location):  Off-site  Platform used for Video Visit: Wendi

## 2023-08-24 ENCOUNTER — TRANSFERRED RECORDS (OUTPATIENT)
Dept: HEALTH INFORMATION MANAGEMENT | Facility: CLINIC | Age: 53
End: 2023-08-24

## 2023-08-27 ENCOUNTER — HOSPITAL ENCOUNTER (EMERGENCY)
Facility: CLINIC | Age: 53
Discharge: HOME | End: 2023-08-27
Attending: EMERGENCY MEDICINE | Admitting: EMERGENCY MEDICINE
Payer: COMMERCIAL

## 2023-08-27 VITALS
SYSTOLIC BLOOD PRESSURE: 112 MMHG | WEIGHT: 150 LBS | BODY MASS INDEX: 23.54 KG/M2 | DIASTOLIC BLOOD PRESSURE: 82 MMHG | HEART RATE: 74 BPM | TEMPERATURE: 97.9 F | OXYGEN SATURATION: 98 % | HEIGHT: 67 IN | RESPIRATION RATE: 16 BRPM

## 2023-08-27 DIAGNOSIS — R45.851 SUICIDAL THOUGHTS: ICD-10-CM

## 2023-08-27 DIAGNOSIS — F33.2 MDD (MAJOR DEPRESSIVE DISORDER), RECURRENT SEVERE, WITHOUT PSYCHOSIS (H): ICD-10-CM

## 2023-08-27 PROCEDURE — 99284 EMERGENCY DEPT VISIT MOD MDM: CPT | Performed by: NURSE PRACTITIONER

## 2023-08-27 PROCEDURE — 90791 PSYCH DIAGNOSTIC EVALUATION: CPT

## 2023-08-27 PROCEDURE — 99285 EMERGENCY DEPT VISIT HI MDM: CPT | Mod: 25

## 2023-08-27 PROCEDURE — 250N000013 HC RX MED GY IP 250 OP 250 PS 637: Performed by: NURSE PRACTITIONER

## 2023-08-27 RX ORDER — ZIPRASIDONE HYDROCHLORIDE 40 MG/1
40 CAPSULE ORAL 2 TIMES DAILY WITH MEALS
Qty: 60 CAPSULE | Refills: 0
Start: 2023-08-27 | End: 2023-11-06

## 2023-08-27 RX ORDER — ZIPRASIDONE HYDROCHLORIDE 20 MG/1
20 CAPSULE ORAL 2 TIMES DAILY WITH MEALS
Status: DISCONTINUED | OUTPATIENT
Start: 2023-08-27 | End: 2023-08-27 | Stop reason: HOSPADM

## 2023-08-27 RX ADMIN — ZIPRASIDONE HYDROCHLORIDE 20 MG: 20 CAPSULE ORAL at 19:25

## 2023-08-27 ASSESSMENT — ACTIVITIES OF DAILY LIVING (ADL)
ADLS_ACUITY_SCORE: 35

## 2023-08-27 NOTE — PROGRESS NOTES
53 year old female with history of Anxiety,Depression received from ED due to Increasing depression. Reports extreme sadness. Supriya reported that this symptoms started when he started menopausal. Denies SI/HI and hallucination. Patient has an appointment this coming 9/20/23 with a new provider referred by treatment resistant clinic. Nursing and risk assessments completed. Assessments reviewed with LMHP and physician. Admission information reviewed with patient. Patient given a tour of [facility] and instructions on using the facility. Questions regarding [facility] addressed. Pt safety search completed.

## 2023-08-27 NOTE — ED PROVIDER NOTES
History     Chief Complaint:  Psychiatric Evaluation       The history is provided by the patient.      Valentina Humphries is a 53 year old female with history of depression, eating disorder, and EBER who presents to the ED for a psych eval. The patient reports that she has been experiencing intrusive thoughts of hurting herself but denies acting upon them. She states that she has experienced this before and was inpatient in April. She states that she had a recent increase in her Desipramine which helped minorly at first but is no longer helping. She reports that she wants to go to [facility]. She denies alcohol use, drug use, hearing abnormalities, hallucinations, cold, cough, or rhinorrhea.     The patient denies Ativan that was offered.      Independent Historian:   None - Patient Only    Review of External Notes:   None      Medications:    Desipramine  Desvenlafaxine  Hydroxyzine  Lorazepam   Mirtazapine  Omeprazole  Prempro   Trazodone  Ziprasidone  Clindamycin  Dicyclomine  Famotidine  Polyethylene glycol  Sod Fluoride-Potassium Nitrate  Vitamin D3     Past Medical History:    Asthma  Depression  Migraine  Varicella  Severe episode of recurrent major depressive disorder, without psychotic features (H)  History of colonic polyps  Migraine without status migrainosus, not intractable  STEVEN (obstructive sleep apnea)  Irritable bowel syndrome  EBER (generalized anxiety disorder)  Gastritis and gastroduodenitis  Gastroesophageal reflux disease without esophagitis  Hemorrhoids  Environmental allergies  Myopia of both eyes  Cognitive changes  Eating disorder  Symptomatic menopausal or female climacteric states  Hypertriglyceridemia   Gastric ulcer   Menorrhagia   Microcytic anemia   Benign neoplasm of colon   Tooth decay   Dental carries   Gastric reflux   Sensitive dentin   Seasonal affective disorder     Past Surgical History:    Endoscopy   Total hip arthroplasty   [place] tooth extraction   EGD  Colonoscopy  "diagnostic     Physical Exam   Patient Vitals for the past 24 hrs:   BP Temp Pulse Resp SpO2 Height Weight   08/27/23 1310 123/78 97.9  F (36.6  C) 84 16 98 % 1.702 m (5' 7\") 68 kg (150 lb)        Physical Exam  Constitutional: Vital signs reviewed.  Pleasant.   HEENT: Moist mucous membranes  Cardiovascular: Regular rate and rhythm  Pulmonary/Chest: Breathing comfortably on room air.  No audible wheezing  Musculoskeletal/Extremities: No bony deformities.  Moves all 4 extremities without difficulty.  Neurological: Alert.  No focal deficits.  Endo: No pitting edema  Skin: No visible rash.  Psychiatric: Pleasant. Flat affect; tearful. Admits to intrusive thoughts and depression.     Emergency Department Course   Emergency Department Course & Assessments:    PSS-3      Date and Time Over the past 2 weeks have you felt down, depressed, or hopeless? Over the past 2 weeks have you had thoughts of killing yourself? Have you ever attempted to kill yourself? When did this last happen? User   08/27/23 1311 yes yes no -- CMK          C-SSRS ([place])      Date and Time Q1 Wished to be Dead (Past Month) Q2 Suicidal Thoughts (Past Month) Q3 Suicidal Thought Method Q4 Suicidal Intent without Specific Plan Q5 Suicide Intent with Specific Plan Q6 Suicide Behavior (Lifetime) Within the Past 3 Months? RETIRED: Level of Risk per Screen Screening Not Complete User   08/27/23 1311 yes yes no no no no -- -- -- CMK                  Interventions:  Medications - No data to display       Independent Interpretation (X-rays, CTs, rhythm strip):  None    Assessments/Consultations/Discussion of Management or Tests:  ED Course as of 08/27/23 1522   Sun Aug 27, 2023   1432 I obtained history and examined the patient as noted above.        Social Determinants of Health affecting care:   None    Disposition:  The patient was transferred to [facility].     Impression & Plan    Medical Decision Making:  Patient presents with her  with increasing " intrusive thoughts of suicidality.  She has not acted on these thoughts.  She does not have an active plan.  She is on medications but she feels like these are not working as well as they used to.  They go up to one of her medications a few weeks ago and it seemed to get better for a while but now she is falling back a bit.  No new events that have caused increased stress.  She is very tearful and anxious here.  She has heard of the [facility] unit was requested by name.  She denies any ingestions or alcohol.  I do think she is a great candidate for [facility].  She will be brought over there once a bed is available.    Critical Care time:  was 0 minutes for this patient excluding procedures.    Diagnosis:    ICD-10-CM    1. Depression with anxiety  F41.8            Discharge Medications:  New Prescriptions    No medications on file          Scribe Disclosure:  I, Yolie Narciso, am serving as a scribe at 3:08 PM on 8/27/2023 to document services personally performed by Aníbal Obrien MD based on my observations and the provider's statements to me.     8/27/2023   Aníbal Obrien MD Walters, Brent Aaron, MD  08/27/23 1418

## 2023-08-27 NOTE — ED NOTES
[facility]  ED to [facility] Checklist:      Goal for [facility]: Depression management    Current Behavior: Sad    Safety Concerns: Suicidal, no plan    Legal Hold Status: Voluntary    Medically Cleared by ED provider: Yes    Patient Therapeutically Searched: Not searched - Currently in triage    Belongings: Remain with patient    Independent Ambulation at Baseline: Yes/No: Yes    Participates in Care/Conversation: Yes/No: Yes    Patient Informed about [facility]: Yes/No: Yes    DEC: Not ordered    Patient Ready to be Transferred to [facility]? Yes/No: Yes

## 2023-08-28 NOTE — PROGRESS NOTES
Patient agreed  to discharge plan. Discharge instructions reviewed with patient including follow-up care plan. Changes in medication discussed, questions encouraged and answered. Reviewed safety plan and outpatient resources. Denies SI and HI. All belongings that were brought into the hospital have been returned to patient. Escorted off the unit at door no. 4 accompanied by [facility] staff. Discharged to home/self care via car with spouse.

## 2023-08-28 NOTE — ED PROVIDER NOTES
[facility] Unit - Psychiatric Consultation  [facility]    Valentina Humphries MRN: [id number]   Age: 53 year old YOB: 1970     History     Chief Complaint   Patient presents with    Psychiatric Evaluation     HPI  Valentina Humphries is a 53 year old female with history notable for PMDD, depression and anxiety.  Presented the emergency department for increase in depression, passive suicidal ideation and egodystonic intrusive thoughts.  Patient was evaluated by the ED provider, who medically cleared patient to transfer to [facility] for psychiatric assessment, this is reviewed along with all pertinent labs and tests performed.    Patient with a history of depression with suicidal ideation and two in patient admissions in young adult.  Has been on medication since early 1990's and essentially stable for years.  Started with menopausal symptoms fall of 2022 and mental health symptoms exacerbated.  She was hospitalized in April 2023 at [facility], discharged to their Partial Hospitalization Program and subsequent Intensive Outpnt Program.  She completed the series August 7, 2023.  Has an appointment with the Interventional psychiatric clinic on September 20 with  psychiatry after having met with their  and pharmacist.  She is interested in TMS/ECT/or ketamine due to the intensity of her symptoms.  Endorses anhedonia, feeling down or depressed, hypersomnia, anergia, appetite changes, low self-esteem, trouble concentrating, psychomotor  slowing, hopelessness, helplessness, worthlessness, ruminations, irritability.  Endorses passive SI, no intent or plan and intrusive, egodystonic homicidal ideations (no identified person).  Her current symptoms have caused functional impairment in multiple domains.         PATIENT TEAM:  Therapist: currently with two therapists, one doing EMDR and second individual.  Appointment with both tomorrow at 1100 and 1300  respectively that she is motivated to attend  Psychiatry: [facility], next appointment 9/1/2023 and will have evaluation at the Interventional Psychiatry Clinic on 9/20/2023       CURRENT MEDICATION SIDE EFFECTS REPORTED:  she is questioning if the increase in cognitive sluggishness and inability to focus on multiple tasks is a result of depression or the medication. In further discussion, does not correlate with any of the medication       Current Facility-Administered Medications:     ziprasidone (GEODON) capsule 20 mg, 20 mg, Oral, BID w/meals, Mi Hodges, DNP, 20 mg at 08/27/23 1925    Current Outpatient Medications:     desipramine (NORPRAMIN) 50 MG tablet, , Disp: , Rfl:     desvenlafaxine (PRISTIQ) 100 MG 24 hr tablet, Take 1 tablet by mouth daily at 2 pm, Disp: , Rfl:     dicyclomine (BENTYL) 10 MG capsule, Take 10 mg by mouth 4 times daily as needed, Disp: , Rfl:     hydrOXYzine (ATARAX) 10 MG tablet, Take 10-20 mg by mouth as needed, Disp: , Rfl:     LORazepam (ATIVAN) 0.5 MG tablet, Take 0.5 mg by mouth, Disp: , Rfl:     mirtazapine (REMERON) 15 MG tablet, , Disp: , Rfl:     OMEPRAZOLE PO, , Disp: , Rfl:     polyethylene glycol (MIRALAX) 17 GM/Dose powder, Take 17 g by mouth daily as needed, Disp: , Rfl:     PREMPRO 0.625-2.5 MG tablet, , Disp: , Rfl:     Sod Fluoride-Potassium Nitrate (PREVIDENT 5000 SENSITIVE) 1.1-5 % PSTE, Apply 1 Application topically 2 times daily, Disp: , Rfl:     traZODone (DESYREL) 100 MG tablet, Take 100-300 mg by mouth At Bedtime, Disp: , Rfl:     vitamin D3 (CHOLECALCIFEROL) 125 MCG (5000 UT) tablet, Take 1 tablet by mouth daily, Disp: , Rfl:     ziprasidone (GEODON) 20 MG capsule, Take 20 mg by mouth 2 times daily (with meals), Disp: , Rfl:     clindamycin (CLEOCIN) 300 MG capsule, TAKE 2 CAPSULES BY MOUTH 1 HOUR PRIOR TO DENTAL APPOINTMENT. (Patient not taking: Reported on 5/31/2023), Disp: , Rfl:     famotidine (PEPCID) 40 MG tablet, Take 1 tablet (40 mg) by  mouth daily, Disp: 90 tablet, Rfl: 1    NOTES ABOUT CURRENT PSYCHOTROPIC MEDICATIONS:   Desvenlafaxine 100  Desipramine 50 mg  Ziprasidone 20 mg twice a day  Trazodone 100-300 mg  Mirtazapine 15 mg at 30 mg had binge eating    Started HRT    PAST PSYCHOTROPIC MEDICATIONS:  Report multiple medication trials      Past Medical History  Past Medical History:   Diagnosis Date    Anxiety     Asthma     Depression     hospitalized x4 in the 1990's, no suicide attempts    Migraine     Visual aura    Varicella      Past Surgical History:   Procedure Laterality Date    ENDOSCOPY      TOTAL HIP ARTHROPLASTY Left 12/29/2021    WISDOM TOOTH EXTRACTION  1987     desipramine (NORPRAMIN) 50 MG tablet  desvenlafaxine (PRISTIQ) 100 MG 24 hr tablet  dicyclomine (BENTYL) 10 MG capsule  hydrOXYzine (ATARAX) 10 MG tablet  LORazepam (ATIVAN) 0.5 MG tablet  mirtazapine (REMERON) 15 MG tablet  OMEPRAZOLE PO  polyethylene glycol (MIRALAX) 17 GM/Dose powder  PREMPRO 0.625-2.5 MG tablet  Sod Fluoride-Potassium Nitrate (PREVIDENT 5000 SENSITIVE) 1.1-5 % PSTE  traZODone (DESYREL) 100 MG tablet  vitamin D3 (CHOLECALCIFEROL) 125 MCG (5000 UT) tablet  ziprasidone (GEODON) 20 MG capsule  clindamycin (CLEOCIN) 300 MG capsule  famotidine (PEPCID) 40 MG tablet      Allergies   Allergen Reactions    Cefdinir Difficulty breathing     Patient is able to tolerate Penicillin and Amoxicillin without any problems    Latex Itching    Amoxicillin-Pot Clavulanate Nausea and Vomiting and Diarrhea    Lamotrigine Rash     Family History  Family History   Problem Relation Age of Onset    Sudden Death Mother 53    Glaucoma Father     Hemochromatosis Father     Dementia Father     Pacemaker Father     Osteoarthritis Sister         hip    Sleep Apnea Sister     Osteoarthritis Sister     Sleep Apnea Sister         fraternal twin    Cerebrovascular Disease Maternal Grandmother     Pacemaker Maternal Grandmother     Breast Cancer Paternal Aunt 60    Cancer Paternal Aunt   "   Coronary Artery Disease No family hx of     Heart Disease No family hx of     Diabetes No family hx of      Social History   Social History     Tobacco Use    Smoking status: Never    Smokeless tobacco: Never   Vaping Use    Vaping Use: Never used   Substance Use Topics    Alcohol use: Yes     Comment: rare    Drug use: No          Review of Systems  A medically appropriate review of systems was performed with pertinent positives and negatives noted in the HPI, and all other systems negative.    Physical Examination   BP: 123/78  Pulse: 84  Temp: 97.9  F (36.6  C)  Resp: 16  Height: 170.2 cm (5' 7\")  Weight: 68 kg (150 lb)  SpO2: 98 %    Physical Exam  General: Appears stated age.   Neuro: Alert and fully oriented. Extremities appear to demonstrate normal strength on visual inspection.   Integumentary/Skin: no rash visualized, normal color    Psychiatric Examination   Appearance: awake, alert  Attitude:  cooperative  Eye Contact:  fair  Mood:  depressed  Affect:  intensity is blunted  Speech:  decreased prosody  Psychomotor Behavior:  no evidence of tardive dyskinesia, dystonia, or tics  Thought Process:  goal oriented  Associations:  no loose associations  Thought Content:  passive suicidal ideation present, denies auditory hallucinations or visual hallucinations   Insight:  good  Judgement:  intact  Oriented to:  time, person, and place  Attention Span and Concentration:  fair  Recent and Remote Memory:  fair  Language: able to name/identify objects without impairment  Fund of Knowledge: intact with awareness of current and past events    ED Course     ED Course as of 08/27/23 2011   [place] Aug 27, 2023   1432 I obtained history and examined the patient as noted above.        Labs Ordered and Resulted from Time of ED Arrival to Time of ED Departure - No data to display    Assessments & Plan (with Medical Decision Making)   Patient presenting with increase in depression, passive suicidal ideation and egodystonic " intrusive thoughts. Nursing notes reviewed. No acute issues.     I have reviewed the DEC assessment complete by [facility] dated today.    Serial assessments of the patient's psychiatric condition were performed. Nursing notes were reviewed. During the observation period, the patient did not require medications for agitation, and did not require restraints/seclusion for patient and/or provider safety.     After a period of working with the treatment team on the [facility]PATH unit, the patient's mental state improved to allow a safe transition to outpatient care. After counseling on the diagnosis, work-up, and treatment plan, the patient was discharged. Close follow-up with a psychiatrist and/or therapist was recommended and community psychiatric resources were provided. Patient is to return to the ED if any urgent or potentially life-threatening concerns.       Discharge Diagnoses:   Final diagnoses:   MDD (major depressive disorder), recurrent severe, without psychosis (H)   Suicidal thoughts       Treatment Plan:    -At this time, patient does not meet criteria to be placed under an involuntary hold and will be discharged home per patient request.  -increase the ziprasidone to 40 mg twice a day.  She recently picked up a 90 day supply of the 20 mg and will use this supply to start.  Follow-up [facility] as planned on 9/1/2023  -They report gaining benefit from the therapeutic milieu of the unit.  -Problem focused, supportive therapy provided around patients stressors and symptoms related to their diagnosis.  Validated patients emotions and problems solved with patient around stress management and self care strategies. Patient was receptive.   -Medication education provided this visit includes, rationale for medication, importance of compliance, medication interactions, and common side effects. Patient agreeable.  -The patient was educated regarding their differential diagnoses and the importance of adhering to their treatment  plan and outpatient follow up appointments to aid with diagnostic clarity.    I, Mi Hodges DNP, APRN, [facility], have personally performed an examination of this patient on 08/27/23.  I have edited the note to reflect all relevant changes.  I have discussed this patient with the care team.  I have reviewed all vitals and laboratory findings.      At the time of discharge, the patient's acute suicide risk was determined to be low due to the following factors: Reduction in the intensity of mood/anxiety symptoms that preceded the admission, denial of suicidal thoughts, denies feeling helpless or helpless, not currently under the influence of alcohol or illicit substances, denies experiencing command hallucinations, no immediate access to firearms. The patient's acute risk could be higher if noncompliant with their treatment plan, medications, follow-up appointments or using illicit substances or alcohol.     -    --  Mi Hodges DNP   [facility] EMERGENCY DEPT  EmPATH Unit       Mi Hodges DNP  08/27/23 2026

## 2023-08-28 NOTE — CONSULTS
Diagnostic Evaluation Consultation  Crisis Assessment    Patient Name: Valentina Humphries  Age:  53 year old  Legal Sex: female  Gender Identity: female  Pronouns: she/her  Race: White  Ethnicity: Not  or   Language: English      Patient was assessed: In person      Patient location: [facility] EMERGENCY DEPT                             [id number]    Referral Data and Chief Complaint  Valentina Humphries presents to the ED with family/friends. Patient is presenting to the ED for the following concerns: Depression, Suicidal ideation, Anxiety.   Factors that make the mental health crisis life threatening or complex are:  Pt presents to the ED today with her  Chris, due to worsening depression and increased passive SI. Pt shares she has really struggled with managing her mental health since starting menopause last Fall, noting she was hospitalized in April. Pt then participated in 2 day treatments following this hospitalization, and had her last program day on 8/7/23. Pt shares over the past three days, she has been crying more, and feels she cannot go to work on Tuesday, although she really enjoys work. Pt also shares increased passive SI, but denies any active plans or intent. Pt states she experienced physical complaints, but HRT has been helpful in alleviating physical symptoms from menopause. Pt states she feels her current mental health crisis is related to menopause, but so far medications have been ineffective. Pt also notes increased fleeting images of hurting others or her cat; Pt state she is not having any active plans to harm a specific person, but more experiencing intrusive images. Pt denies any other thought disturbances; denies any AH or VH; denies paranoia, grandiose thoughts and delusional thinking. Pt shares she is hoping her medications can be be adjusted, as well as seeking additional support in starting the treatment resistent depression program  sooner than her 9/20/23 intake date.      Informed Consent and Assessment Methods  Explained the crisis assessment process, including applicable information disclosures and limits to confidentiality, assessed understanding of the process, and obtained consent to proceed with the assessment.  Assessment methods included conducting a formal interview with patient, review of medical records, collaboration with medical staff, and obtaining relevant collateral information from family and community providers when available.     Patient response to interventions: verbalizes understanding, eager to participate  Coping skills were attempted to reduce the crisis:  Talking with supports, attending therapy, completing IOP and PHP programs     History of the Crisis   Pt shares history of depression, anxiety, and disordered eating behaviors during her 20s; Pt shares she was hospitalized twice during her 20s, both times coinciding with the start of her menstrual cycle. Pt states her mental health had been relatively stable up until she started menopause. Pt shares past history of obsessive tendencies related to competitive running, noting she would binge eat before running intensely. Pt shares family history significant for alcoholism, cousin with schizophrenia, and notes her dad also struggled with obsessive qualities related to running. Pt denies any family history of successful or attempted suicide, bipolar, or other major mental health concerns.    Brief Psychosocial History  Family:  , Children no  Support System:  , Other (specify) (Friend Eleanor; [facility])  Employment Status:  employed part-time (Has reduced hours and role to reduce stress)  Source of Income:  salary/wages  Financial Environmental Concerns:   (Pt shares financial stress due to reduced hours, as well as her  nearing [employer] age and feeling they don't have enough money for [employer].)  Current Hobbies:  arts/crafts,  exercise/fitness, interaction with pets, outdoor activities  Barriers in Personal Life:  mental health concerns    Significant Clinical History  Current Anxiety Symptoms:  excessive worry, shortness of breath or racing heart, anxious, racing thoughts  Current Depression/Trauma:  crying or feels like crying, thoughts of death/suicide, excessive guilt, sadness, hoplessness, helplessness, impaired decision making, low self esteem (anhedonia)  Current Somatic Symptoms:  excessive worry, racing thoughts, shortness of breath or racing heart, somatic symptoms (abdominal pain, headache, tension), anxious  Current Psychosis/Thought Disturbance:  psychomotor retardation, inattentive  Current Eating Symptoms:   (no current concerns)  Chemical Use History:  Alcohol: None  Benzodiazepines: None  Opiates: None  Cocaine: None  Marijuana: None  Other Use: None  Withdrawal Symptoms:  (NA)  Addictions:  (NA)   Past diagnosis:  Anxiety Disorder, Depression, Eating Disorder  Family history:  Schizophrenia, Substance Use Disorder  Past treatment:  Individual therapy, Psychiatric Medication Management, Day Treatment, Inpatient Hospitalization  Details of most recent treatment:  Pt was hospitalized at [facility] 4/10-4/13/23 due to worsening depression with SI. Pt then stepped down and completed two day treatment programs; Pt is currently scheduled to start the treatment resistent depression program on 9/20/23.  Other relevant history:          Collateral Information  Is there collateral information: Yes     Collateral information name, relationship, phone number:  Chris Humphries, , [phone]    What happened today: She has been crying a lot and can't stop. This morning before going to [employer], she said if she didn't feel better she may need to go to the hospital because she didn't want to be alone tonight.     What is different about patient's functioning: Has been struggling more since starting menopause; last summer  (2022), first started noticing she was depressed. She's really been struggling for the past year     Concern about alcohol/drug use:      What do you think the patient needs:      Has patient made comments about wanting to kill themselves/others: no (No explicit statements)    If d/c is recommended, can they take part in safety/aftercare planning:  yes    Additional collateral information:  She's also been upset and discouraged about work anxiety and stress; I think the anxiety at work contributed to her depression. She has really been upset she is not currently able to use her education and training because she changed roles at her job to reduce stress. It helped reduce stress, but then she's been feeling more hopeless and down.     Risk Assessment  Ranson Suicide Severity Rating Scale Full Clinical Version:  Suicidal Ideation  Q1 Wish to be Dead (Lifetime): Yes  Q2 Non-Specific Active Suicidal Thoughts (Lifetime): Yes  3. Active Suicidal Ideation with any Methods (Not Plan) Without Intent to Act (Lifetime): No  Q5 Active Suicidal Ideation with Specific Plan and Intent (Lifetime): No  Q6 Suicide Behavior (Lifetime): no     Suicidal Behavior (Lifetime)  Actual Attempt (Lifetime): No  Has subject engaged in non-suicidal self-injurious behavior? (Lifetime): Yes (Pt reports during 20s, would binge eat)  Interrupted Attempts (Lifetime): No  Aborted or Self-Interrupted Attempt (Lifetime): No  Preparatory Acts or Behavior (Lifetime): No    Ranson Suicide Severity Rating Scale Recent:   Suicidal Ideation (Recent)  Q1 Wished to be Dead (Past Month): yes  Q2 Suicidal Thoughts (Past Month): yes  Q3 Suicidal Thought Method: no  Q4 Suicidal Intent without Specific Plan: no  Q5 Suicide Intent with Specific Plan: no  Level of Risk per Screen: low risk  Intensity of Ideation (Recent)  Most Severe Ideation Rating (Past 1 Month): 4  Frequency (Past 1 Month): 2-5 times in week  Duration (Past 1 Month): 1-4 hours/a lot of  time  Controllability (Past 1 Month): Can control thoughts with a lot of difficulty  Deterrents (Past 1 Month): Deterrents definitely stopped you from attempting suicide  Reasons for Ideation (Past 1 Month): Completely to end or stop the pain (You couldn't go on living with the pain or how you were feeling)  Suicidal Behavior (Recent)  Actual Attempt (Past 3 Months): No  Total Number of Actual Attempts (Past 3 Months): 0  Has subject engaged in non-suicidal self-injurious behavior? (Past 3 Months): No  Interrupted Attempts (Past 3 Months): No  Total Number of Interrupted Attempts (Past 3 Months): 0  Aborted or Self-Interrupted Attempt (Past 3 Months): No  Total Number of Aborted or Self-Interrupted Attempts (Past 3 Months): 0  Preparatory Acts or Behavior (Past 3 Months): No  Total Number of Preparatory Acts (Past 3 Months): 0    Environmental or Psychosocial Events:    Protective Factors:      Does the patient have thoughts of harming others? Feels Like Hurting Others: no  Previous Attempt to Hurt Others: no  Is the patient engaging in sexually inappropriate behavior?: no    Is the patient engaging in sexually inappropriate behavior?  no        Mental Status Exam   Affect: Blunted, Flat  Appearance: Appropriate  Attention Span/Concentration: Attentive  Eye Contact: Engaged    Fund of Knowledge:     Language /Speech Content: Fluent  Language /Speech Volume: Soft, Normal  Language /Speech Rate/Productions: Normal  Recent Memory: Intact  Remote Memory: Intact  Mood: Anxious, Sad, Depressed  Orientation to Person: Yes   Orientation to Place: Yes  Orientation to Time of Day: Yes  Orientation to Date: Yes     Situation (Do they understand why they are here?): Yes  Psychomotor Behavior: Underactive (Pt reports feeling sluggish)  Thought Content: Suicidal  Thought Form: Intact     Mini-Cog Assessment  Number of Words Recalled:    Clock-Drawing Test:     Three Item Recall:    Mini-Cog Total Score:        Medication  Psychotropic medications:   Medication Orders - Psychiatric (From admission, onward)      Start     Dose/Rate Route Frequency Ordered Stop    08/27/23 1900  ziprasidone (GEODON) capsule 20 mg         20 mg Oral 2 TIMES DAILY WITH MEALS 08/27/23 1825               Current Care Team  Patient Care Team:  Liborio Butler CNP as PCP - General (Nurse Practitioner - Family)  Arielle Reed MD as Assigned Pulmonology Provider  Marce Weldon MD as Assigned PCP  Gely Masterson [facility] as Pharmacist    Diagnosis  Patient Active Problem List   Diagnosis Code    Severe episode of recurrent major depressive disorder, without psychotic features (H) F33.2    History of colonic polyps Z86.010    Migraine without status migrainosus, not intractable G43.909    STEVEN (obstructive sleep apnea) G47.33    Irritable bowel syndrome K58.9    EBER (generalized anxiety disorder) F41.1    Constipation, unspecified constipation type K59.00    Family history of colonic polyps Z83.71    Gastritis and gastroduodenitis K29.70, K29.90    Gastroesophageal reflux disease without esophagitis K21.9    Hemorrhoids K64.9    Environmental allergies Z91.09    Family history of glaucoma in father Z83.511    Myopia of both eyes H52.13    Cognitive changes R41.89    Eating disorder F50.9    Symptomatic menopausal or female climacteric states N95.1       Primary Problem This Admission  Active Hospital Problems    EBER (generalized anxiety disorder)      Severe episode of recurrent major depressive disorder, without psychotic features (H)        Clinical Summary and Substantiation of Recommendations   Pt presents to the ED today with her  due to worsening depression, uncontrollable crying, and feeling her current medication are not effectively managing her mental health symptoms. Pt identifies multiple efforts to reduce mental health symptoms that have increased since starting menopause; Pt is help-seeking and  future-oriented, but reports increased hopelessness currently. Pt was able to meet with both [facility] and psychiatric provider, and was receptive to feedback and recommendations. Pt is expressing desire to discharge after meeting with providers, which is appropriate as there are no imminent safety concerns present.      Patient coping skills attempted to reduce the crisis:  Talking with supports, attending therapy, completing IOP and PHP programs    Disposition  Recommended disposition: Individual Therapy, Medication Management        Reviewed case and recommendations with attending provider. Attending Name: Mi Hodges DNP       Attending concurs with disposition: yes       Patient and/or validated legal guardian concurs with disposition:   yes       Final disposition:  discharge    Legal status on admission: Voluntary/Patient has signed consent for treatment    Assessment Details   Total duration spent on the patient case in minutes: 31 min     CPT code(s) utilized: 27752 - Psychotherapy for Crisis - 60 (30-74*) min    BABATUNDE LANDIN, Psychotherapist  [facility] - Triage & Transition Services  Callback: [phone]

## 2023-08-28 NOTE — ED NOTES
"[facility] Group Progress Note    Client Name: Valentina Humphries  Date: August 27, 2023  Service Type:  Group Therapy  Session Start Time:  7:05 pm  Session End Time: 7:50 pm  Session Length: 45 minutes  Attendees: Patient and other group members  Facilitator: EARNEST Rabago     Topic:   Writer introduced the topic of mindfulness and its definition, and how it can be helpful with mental health. Writer led group through a guided body scan meditation. After the meditation, members reflected on their experience with the experience and discussed ways they have incorporated mindfulness into their mental health wellness and how they plan to utilize it going forward.    Intervention:    Group process: support, challenge, affirm, psycho-education.     Response:  Patient did participate in group. Behavior in group was to engage with group members, participate in the guided meditation, and share her reflections with others during the group. Patient shared that she found it made her feel \"confident\" to tune into her breath and shared that she uses prayer as a form of mindfulness in her day-to-day life. Pt shared that she would like to continue utilizing mindfulness to help with her depressive symptoms.        EARNEST Rabago      "

## 2023-08-28 NOTE — DISCHARGE INSTRUCTIONS
Aftercare Plan    Follow up with established providers and supports as scheduled. Continue taking medications as prescribed. Abstain from drugs and alcohol. Utilize your [facility] mental health crisis team as needed. They are available 24/7. Contact information is listed below.     If I am feeling unsafe or I am in a crisis, I will:   Contact my established care providers   Call the [place] Suicide Prevention Lifeline: [phone]   Go to the nearest emergency room   Call 911     Warning signs that I or other people might notice when a crisis is developing for me: changes to sleep, appetite or mood, increased anger, agitation or irritability, feeling depressed or hopeless, spending more time alone or talking less, increased crying, decreased productivity, seeing or hearing things that aren't there, thoughts of not wanting to live anymore or of actually killing myself, thoughts of hurting others    Things I am able to do on my own to cope or help me feel better: watching a favorite tv show or movie, listening to music I enjoy, going outside and breathing fresh air, going for a walk or exercising, taking a shower or bath, a cold or hot beverage, a healthy snack, drawing/coloring/painting, journaling, singing or dancing, deep breathing     I can try practicing square breathing when I begin to feel anxious - inhale through the nose for the count of 4 and the first line on the square. Exhale through the mouth for the count of 4 for the second line of the square. Repeat to complete the square. Repeat the square as many times as needed.    I can also use my five senses to practice mindfulness and grounding. What are five things I can see, four things I can hear, three things I can feel, two things I can smell, and one thing I can taste.     Things that I am able to do with others to cope or help me feel better: sometimes just talking or spending time with someone else, sharing a meal or having coffee, watching a movie or  "playing a game, going for a walk or exercising    I can also use community resources including mental health hotlines, [facility] crisis teams, or apps.     Things I can use or do for distraction: movies/tv, music, reading, games, drawing/coloring/painting or other art, essential oils, exercise, cleaning/organizing, puzzles, crossword puzzles, word search, Sudoku       I can also download a meditation or relaxation tina, like Calm, Headspace, or Insight Timer (all three offer a free version)    Changes I can make to support my mental health and wellness: Attend scheduled mental health therapy and psychiatric appointments. Take my medications as prescribed. Maintain a daily schedule/routine. Abstain from all mood altering substances, including drugs, alcohol, or medications not currently prescribed to me. Implement a self-care routine.      People in my life that I can ask for help: friends or family, trusted teachers/staff/colleagues, trusted members of my community or place of [employer], mental health crisis lines, or 911    Your [facility] has a mental health crisis team you can call 24/7: [facility], [phone]    Other things that are important when I m in crisis: to remember that the feelings I am having right now are temporary, and it won't feel like this forever, and that it is okay and important to ask for help    Crisis Lines  Crisis Text Line  Text 546551  You will be connected with a trained live crisis counselor to provide support.    Por espanol, texto  ANGEL a 368817 o texto a 442-AYUDAME en WhatsApp    [place] Hope Line  1.800.SUICIDE [6775730]      Community Resources  Fast Tracker  Linking people to mental health and substance use disorder resources  fasttrackermn.org     [place] Mental Health Warm Line  Peer to peer support  Monday thru Saturday, 12 pm to 10 pm  292.789.5424 or 1.769.110.9549  Text \"Support\" to 26782    [employer] [place] on Mental Illness (SAMANTA)  082.371.8982 or " 1.888.SAMANTA.HELPS      Mental Health Apps  My3  https://myhybrispp.org/    VirtualHopeBox  https://[employer]/apps/virtual-hope-box/      Additional Information  Today you were seen by a licensed mental health professional through Triage and Transition services, [facility])  for a crisis assessment in the Emergency Department at [facility].  It is recommended that you follow up with your established providers (psychiatrist, mental health therapist, and/or primary care doctor - as relevant) as soon as possible. Coordinators from [facility] will be calling you in the next 24-48 hours to ensure that you have the resources you need.  You can also contact [facility] coordinators directly at [phone]. You may have been scheduled for or offered an appointment with a mental health provider. [facility] maintains an extensive network of licensed behavioral health providers to connect patients with the services they need.  We do not charge providers a fee to participate in our referral network.  We match patients with providers based on a patient's specific needs, insurance coverage, and location.  Our first effort will be to refer you to a provider within your care system, and will utilize providers outside your care system as needed.

## 2023-09-20 ENCOUNTER — OFFICE VISIT (OUTPATIENT)
Dept: PSYCHIATRY | Facility: CLINIC | Age: 53
End: 2023-09-20
Payer: COMMERCIAL

## 2023-09-20 VITALS
WEIGHT: 160.1 LBS | SYSTOLIC BLOOD PRESSURE: 130 MMHG | TEMPERATURE: 98.2 F | BODY MASS INDEX: 25.08 KG/M2 | HEART RATE: 85 BPM | DIASTOLIC BLOOD PRESSURE: 74 MMHG

## 2023-09-20 DIAGNOSIS — F33.1 MODERATE RECURRENT MAJOR DEPRESSION (H): Primary | ICD-10-CM

## 2023-09-20 ASSESSMENT — ANXIETY QUESTIONNAIRES
GAD7 TOTAL SCORE: 10
3. WORRYING TOO MUCH ABOUT DIFFERENT THINGS: SEVERAL DAYS
5. BEING SO RESTLESS THAT IT IS HARD TO SIT STILL: SEVERAL DAYS
6. BECOMING EASILY ANNOYED OR IRRITABLE: SEVERAL DAYS
2. NOT BEING ABLE TO STOP OR CONTROL WORRYING: SEVERAL DAYS
7. FEELING AFRAID AS IF SOMETHING AWFUL MIGHT HAPPEN: NEARLY EVERY DAY
GAD7 TOTAL SCORE: 10
1. FEELING NERVOUS, ANXIOUS, OR ON EDGE: SEVERAL DAYS

## 2023-09-20 ASSESSMENT — PATIENT HEALTH QUESTIONNAIRE - PHQ9
SUM OF ALL RESPONSES TO PHQ QUESTIONS 1-9: 12
5. POOR APPETITE OR OVEREATING: MORE THAN HALF THE DAYS
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
## 2023-10-24 ENCOUNTER — ALLIED HEALTH/NURSE VISIT (OUTPATIENT)
Dept: PSYCHIATRY | Facility: CLINIC | Age: 53
End: 2023-10-24
Payer: COMMERCIAL

## 2023-10-24 ENCOUNTER — TELEPHONE (OUTPATIENT)
Dept: PSYCHIATRY | Facility: CLINIC | Age: 53
End: 2023-10-24

## 2023-10-24 ENCOUNTER — OFFICE VISIT (OUTPATIENT)
Dept: PSYCHIATRY | Facility: CLINIC | Age: 53
End: 2023-10-24
Payer: COMMERCIAL

## 2023-10-24 DIAGNOSIS — F33.2 SEVERE RECURRENT MAJOR DEPRESSION WITHOUT PSYCHOTIC FEATURES (H): Primary | ICD-10-CM

## 2023-10-24 ASSESSMENT — PATIENT HEALTH QUESTIONNAIRE - PHQ9: SUM OF ALL RESPONSES TO PHQ QUESTIONS 1-9: 17

## 2023-10-24 NOTE — PROGRESS NOTES
TMS Education     Valentina Humphries MRN# [id number]  Age: 53 year old year old YOB: 1970        Patient is here in clinic for TMS education and consenting.  Patient will be starting TMS today on the Booster Pack.  Writer and patient reviewed mechanics of TMS.  Discussed using magnetic pulses to stimulate and induce an electrical field inside the brain.  Discussed the difference between F8 and H1 coil.  Patient was able to verbalize understanding of this.  Discussed that the idea is that we are treating the DLPFC of the brain, as it has been shown by in studies that people who have depression have decreased activity in this part of the brain, the idea is that we stimulate this part of the brain to increase activity.       Reviewed processing of mapping and how visit today would go.  Encouraged patient to communicate with staff if treatment at anytime became painful.         Discussed side effects of TMS.  Side effects include headaches after treatment, hearing loss, lightheadedness/dizziness, short lived vision changes, and seizures.  Discussed ways that TMS Clinic helps with preventing these side effects.  Such as retesting motor thresholds and having patient wear ear plugs.  Instructed patient that she can take OTC pain relievers such as tylenol or IBU if she has a headache after today's treatment.  Reviewed safety concerns regarding sleep and use of illegal drugs/alcohol.        Patient was able to ask questions regarding procedure.  Patient informed of 10 minute late policy and attendance policy.  Consent was signed by patient today.

## 2023-10-24 NOTE — TELEPHONE ENCOUNTER
Writer returned call to patient, as she has her first TMS treatment today.        She states that she might have felt a little less depressed, but also is feeling sad.  We discussed that mood will fluctuate during treatment and that this can be completely normal.      Also discussed trying to think of more positive things during treatment.  Patient will be here tomorrow.

## 2023-10-24 NOTE — TELEPHONE ENCOUNTER
What is the concern that needs to be addressed by a nurse?     Patient is requesting a call back. Patient is stating that she is sad after treatment.     May a detailed message be left on voicemail? Yes         Message routed to: [facility]

## 2023-10-24 NOTE — PROGRESS NOTES
Interventional Psychiatry Program  [address], [address]  [place] [address]  TMS Procedure Note   Valentina Humphries MRN# [id number]  Age: 53 year old year old YOB: 1970    Pre-Procedure:  History and Physical: Reviewed in medical record  Consent Signed by: Valentina Humphries for this course of treatment.  On:     Reviewed possible side effects associated with treatment as well as questions regarding possible course of treatments.  Patient agreed to procedure and was able to reflect implications.    Valentina Humphries comes into clinic today at the request of Blake Bryan MD Ordering Provider for TMS.      Clinical Narrative:  Patient presents to initiate an acute course of TMS for management of her symptoms of resistant depression.    Indications for TMS:  MDD, recurrent, severe; 4+ medication trials (from 2+ classes) ineffective; Psychotherapy ineffective     Procedure Diagnosis:  No diagnosis found.    Treatment Hx:  Treatment number this series:  1  Total lifetime treatment number: 1    Allergies   Allergen Reactions    Cefdinir Difficulty breathing     Patient is able to tolerate Penicillin and Amoxicillin without any problems    Latex Itching    Latuda [Lurasidone] Swelling     Facial Swelling    Amoxicillin-Pot Clavulanate Nausea and Vomiting and Diarrhea    Lamotrigine Rash      There were no vitals taken for this visit.    Pause for the Cause  Right patient: Yes  Right procedure/correct coil:  Yes; rTMS; [id number]; H1 coil.   Earplugs in place:  Yes    Procedure  Patient was seated in procedure chair. Identity and procedure was verified. Ear plugs were placed in ears and patient-specific cap was placed on head and tightened appropriately. Ruler locations were verified. Bone conducting headphones were not used.  Coil was placed at 0 - eyebrow - 16 and stimulator was set to 59% (90% of MT).  Initial train was well tolerated so 55 trains were  delivered.  Patient tolerated procedure well with minimal right eyebrow movement.    Motor Threshold Determination  Distance from nasion to inion: 37cm  MT 1: 0 - 6 - 16 @ 65% on 10/24/2023    Standard LDLPFC    Frequency: 18  Train Duration: 2  Total pulses delivered: 1980  Inter-train interval: 20  Tx Loc: 0 - eye - 16  Energy: 59% (90%MT)  Trains: 55    Date MADRS QIDS PHQ-9   10/24/23 32 19 17    --               8/23/2023     8:36 AM 9/20/2023     1:48 PM 9/20/2023     1:51 PM   PHQ-9 SCORE   PHQ-9 Total Score 16 12 12       Plan   - increase energy as tolerated  -cont treatment    This service provided today was under the supervising provider of the day LOUISA Jenkins MD, who determined motort hreshold andwas available if needed.    Floresita Garcia, TMS Technician  [facility]

## 2023-10-25 ENCOUNTER — OFFICE VISIT (OUTPATIENT)
Dept: PSYCHIATRY | Facility: CLINIC | Age: 53
End: 2023-10-25
Payer: COMMERCIAL

## 2023-10-25 DIAGNOSIS — F33.2 SEVERE RECURRENT MAJOR DEPRESSION WITHOUT PSYCHOTIC FEATURES (H): Primary | ICD-10-CM

## 2023-10-25 ASSESSMENT — PATIENT HEALTH QUESTIONNAIRE - PHQ9: SUM OF ALL RESPONSES TO PHQ QUESTIONS 1-9: 12

## 2023-10-25 NOTE — PROGRESS NOTES
Interventional Psychiatry Program  [address], [address]  [place] [address]  TMS Procedure Note   Valentina Humphries MRN# [id number]  Age: 53 year old year old YOB: 1970    Pre-Procedure:  History and Physical: Reviewed in medical record  Consent Signed by: Valentina Humphries for this course of treatment.  On:     Reviewed possible side effects associated with treatment as well as questions regarding possible course of treatments.  Patient agreed to procedure and was able to reflect implications.    Valentina Humphries comes into clinic today at the request of Blake Bryan MD Ordering Provider for TMS.      Clinical Narrative:  Patient tolerated treatment. Increased to 95% after 18 trains, tolerated well. Increased again to 100% for final 20 trains.    Indications for TMS:  MDD, recurrent, severe; 4+ medication trials (from 2+ classes) ineffective; Psychotherapy ineffective     Procedure Diagnosis:  MDD, recurrent, severe; F33.2    Treatment Hx:  Treatment number this series:  2  Total lifetime treatment number: 2    Allergies   Allergen Reactions    Cefdinir Difficulty breathing     Patient is able to tolerate Penicillin and Amoxicillin without any problems    Latex Itching    Latuda [Lurasidone] Swelling     Facial Swelling    Amoxicillin-Pot Clavulanate Nausea and Vomiting and Diarrhea    Lamotrigine Rash      There were no vitals taken for this visit.    Pause for the Cause  Right patient: Yes  Right procedure/correct coil:  Yes; rTMS; [id number]; H1 coil.   Earplugs in place:  Yes    Procedure  Patient was seated in procedure chair. Identity and procedure was verified. Ear plugs were placed in ears and patient-specific cap was placed on head and tightened appropriately. Ruler locations were verified. Bone conducting headphones were not used.  Coil was placed at 0 - eyebrow - 16 and stimulator was set to 65% (100% of MT).  Initial train was well  tolerated so 55 trains were delivered.  Patient tolerated procedure well with minimal right eyebrow movement.    Motor Threshold Determination  Distance from nasion to inion: 37cm  MT 1: 0 - 6 - 16 @ 65% on 10/24/2023    Standard LDLPFC    Frequency: 18  Train Duration: 2  Total pulses delivered: 1980  Inter-train interval: 20  Tx Loc: 0 - eye - 16  Energy: 65% (100%MT)  Trains: 55    Date MADRS QIDS PHQ-9   10/24/23 32 19 17    --               9/20/2023     1:51 PM 10/24/2023     2:25 PM 10/25/2023     3:41 PM   PHQ-9 SCORE   PHQ-9 Total Score 12 17 12       Plan   - increase energy as tolerated  -cont treatment    This service provided today was under the supervising provider of the day Blake Bryan MD, who was available if needed.    Floresita Garcia, TMS Technician  [facility]

## 2023-10-26 ENCOUNTER — OFFICE VISIT (OUTPATIENT)
Dept: PSYCHIATRY | Facility: CLINIC | Age: 53
End: 2023-10-26
Payer: COMMERCIAL

## 2023-10-26 DIAGNOSIS — F33.2 SEVERE RECURRENT MAJOR DEPRESSION WITHOUT PSYCHOTIC FEATURES (H): Primary | ICD-10-CM

## 2023-10-26 ASSESSMENT — PATIENT HEALTH QUESTIONNAIRE - PHQ9: SUM OF ALL RESPONSES TO PHQ QUESTIONS 1-9: 13

## 2023-10-26 NOTE — PROGRESS NOTES
Interventional Psychiatry Program  [address], [address]  [place] [address]  TMS Procedure Note   Valentina Humphries MRN# [id number]  Age: 53 year old   YOB: 1970    Pre-Procedure:  History and Physical: Reviewed in medical record  Consent signed by: Valentina Humphries for this course of treatment on: 10/24/2023    Valentina Humphries comes into clinic today at the request of, Blake Bryan MD, ordering provider for TMS treatments.    Clinical Narrative:  Patient tolerated treatment. Patient reports no changes.    Indications for TMS:  MDD, recurrent, severe; 4+ medication trials (from 2+ classes) ineffective; Psychotherapy ineffective     Procedure Diagnosis:  MDD, recurrent, severe; F33.2    Treatment Hx:  Treatment number this series: 3  Total lifetime treatment number: 3    Allergies   Allergen Reactions    Cefdinir Difficulty breathing     Patient is able to tolerate Penicillin and Amoxicillin without any problems    Latex Itching    Latuda [Lurasidone] Swelling     Facial Swelling    Amoxicillin-Pot Clavulanate Nausea and Vomiting and Diarrhea    Lamotrigine Rash      There were no vitals taken for this visit.    Pause for the Cause  Right patient: Yes  Right procedure/correct coil:  Yes; rTMS; [id number]; H1 coil.   Earplugs in place:  Yes    Procedure  Patient was seated in procedure chair. Identity and procedure was verified. Ear plugs were placed in ears and patient-specific cap was placed on head and tightened appropriately. Ruler locations were verified. Bone conducting headphones were not used.  Coil was placed at 0 - eyebrow - 16 and stimulator was set to 65% (100% of MT).  Initial train was well tolerated so 55 trains were delivered.  Patient tolerated procedure well with minimal right eyebrow movement.    Motor Threshold Determination  Distance from nasion to inion: 37cm  MT 1: 0 - 6 - 16 @ 65% on 10/24/2023    Standard  LDLPFC  Frequency: 18  Train Duration: 2  Total pulses delivered: 1980  Inter-train interval: 20  Tx Loc: 0 - eye - 16  Energy: 78% (120%MT)  Trains: 55    Date MADRS QIDS PHQ-9   10/24/23 32 19 17    --               9/20/2023     1:51 PM 10/24/2023     2:25 PM 10/25/2023     3:41 PM   PHQ-9 SCORE   PHQ-9 Total Score 12 17 12       Plan   -Increase energy as tolerated  -Continue treatment    This service provided today was under the supervising provider of the day, LOUISA Jenkins MD, who was available if needed.    Cecy Boone, TMS Technician  [facility] Neuromodulation

## 2023-10-27 ENCOUNTER — OFFICE VISIT (OUTPATIENT)
Dept: PSYCHIATRY | Facility: CLINIC | Age: 53
End: 2023-10-27
Payer: COMMERCIAL

## 2023-10-27 DIAGNOSIS — F33.2 SEVERE RECURRENT MAJOR DEPRESSION WITHOUT PSYCHOTIC FEATURES (H): Primary | ICD-10-CM

## 2023-10-27 ASSESSMENT — PATIENT HEALTH QUESTIONNAIRE - PHQ9: SUM OF ALL RESPONSES TO PHQ QUESTIONS 1-9: 13

## 2023-10-27 NOTE — PROGRESS NOTES
Interventional Psychiatry Program  [address], [address]  [place] [address]  TMS Procedure Note   Valentina Humphries MRN# [id number]  Age: 53 year old   YOB: 1970    Pre-Procedure:  History and Physical: Reviewed in medical record  Consent signed by: Valentina Humphries for this course of treatment on: 10/24/2023    Valentina Humphries comes into clinic today at the request of, Blake Bryan MD, ordering provider for TMS treatments.    Clinical Narrative:  Patient tolerated treatment. Started at 120% ( 4 trains)and noticed fair amount of hand movement, decreased to 110% for 20 trains before increasing back to 120% for remaining 31 trains. Noted on PHQ9 feeling persistently and clearly angry.     Indications for TMS:  MDD, recurrent, severe; 4+ medication trials (from 2+ classes) ineffective; Psychotherapy ineffective     Procedure Diagnosis:  MDD, recurrent, severe; F33.2    Treatment Hx:  Treatment number this series: 4  Total lifetime treatment number: 4    Allergies   Allergen Reactions    Cefdinir Difficulty breathing     Patient is able to tolerate Penicillin and Amoxicillin without any problems    Latex Itching    Latuda [Lurasidone] Swelling     Facial Swelling    Amoxicillin-Pot Clavulanate Nausea and Vomiting and Diarrhea    Lamotrigine Rash      There were no vitals taken for this visit.    Pause for the Cause  Right patient: Yes  Right procedure/correct coil:  Yes; rTMS; [id number]; H1 coil.   Earplugs in place:  Yes    Procedure  Patient was seated in procedure chair. Identity and procedure was verified. Ear plugs were placed in ears and patient-specific cap was placed on head and tightened appropriately. Ruler locations were verified. Bone conducting headphones were not used.  Coil was placed at 0 - eyebrow - 16 and stimulator was set to 78% (120% of MT).  Initial train was well tolerated so 55 trains were delivered.  Patient tolerated  procedure well with minimal right eyebrow movement.    Motor Threshold Determination  Distance from nasion to inion: 37cm  MT 1: 0 - 6 - 16 @ 65% on 10/24/2023    Standard LDLPFC  Frequency: 18  Train Duration: 2  Total pulses delivered: 1980  Inter-train interval: 20  Tx Loc: 0 - eye - 16  Energy: 78% (120%MT)  Trains: 55    Date MADRS QIDS PHQ-9   10/24/23 32 19 17    --               10/24/2023     2:25 PM 10/25/2023     3:41 PM 10/26/2023     4:09 PM   PHQ-9 SCORE   PHQ-9 Total Score 17 12 13       Plan   -Continue treatment    This service provided today was under the supervising provider of the day, Christiano Chaudhry MD, who was available if needed.    Floresita Garcia, TMS Technician  [facility]  [facility]

## 2023-10-30 ENCOUNTER — OFFICE VISIT (OUTPATIENT)
Dept: PSYCHIATRY | Facility: CLINIC | Age: 53
End: 2023-10-30
Payer: COMMERCIAL

## 2023-10-30 DIAGNOSIS — F33.2 SEVERE RECURRENT MAJOR DEPRESSION WITHOUT PSYCHOTIC FEATURES (H): Primary | ICD-10-CM

## 2023-10-30 ASSESSMENT — PATIENT HEALTH QUESTIONNAIRE - PHQ9: SUM OF ALL RESPONSES TO PHQ QUESTIONS 1-9: 8

## 2023-10-30 NOTE — PROGRESS NOTES
Interventional Psychiatry Program  [address], [address]  [place] [address]  TMS Procedure Note   Valentina Humphries MRN# [id number]  Age: 53 year old   YOB: 1970    Pre-Procedure:  History and Physical: Reviewed in medical record  Consent signed by: Valentina Humphries for this course of treatment on: 10/24/2023    Valentina Humphries comes into clinic today at the request of, Blake Bryan MD, ordering provider for TMS treatments.    Clinical Narrative:  Patient tolerated treatment. Patient reports no changes.     Indications for TMS:  MDD, recurrent, severe; 4+ medication trials (from 2+ classes) ineffective; Psychotherapy ineffective     Procedure Diagnosis:  MDD, recurrent, severe; F33.2    Treatment Hx:  Treatment number this series: 5  Total lifetime treatment number: 5    Allergies   Allergen Reactions    Cefdinir Difficulty breathing     Patient is able to tolerate Penicillin and Amoxicillin without any problems    Latex Itching    Latuda [Lurasidone] Swelling     Facial Swelling    Amoxicillin-Pot Clavulanate Nausea and Vomiting and Diarrhea    Lamotrigine Rash      There were no vitals taken for this visit.    Pause for the Cause  Right patient: Yes  Right procedure/correct coil:  Yes; rTMS; [id number]; H1 coil.   Earplugs in place:  Yes    Procedure  Patient was seated in procedure chair. Identity and procedure was verified. Ear plugs were placed in ears and patient-specific cap was placed on head and tightened appropriately. Ruler locations were verified. Bone conducting headphones were not used.  Coil was placed at 0 - eyebrow - 16 and stimulator was set to 78% (120% of MT).  Initial train was well tolerated so 55 trains were delivered.  Patient tolerated procedure well with minimal right eyebrow movement.    Motor Threshold Determination  Distance from nasion to inion: 37cm  MT 1: 0 - 6 - 16 @ 65% on 10/24/2023    Standard  LDLPFC  Frequency: 18  Train Duration: 2  Total pulses delivered: 1980  Inter-train interval: 20  Tx Loc: 0 - eye - 16  Energy: 78% (120%MT)  Trains: 55    Date MADRS QIDS PHQ-9   10/24/23 32 19 17    --               10/25/2023     3:41 PM 10/26/2023     4:09 PM 10/27/2023     9:41 AM   PHQ-9 SCORE   PHQ-9 Total Score 12 13 13       Plan   -Continue treatment    This service provided today was under the supervising provider of the day, Suellen Zhang MD, who was available if needed.    Cecy Boone, TMS Technician  [facility] Neuromodulation

## 2023-10-31 ENCOUNTER — OFFICE VISIT (OUTPATIENT)
Dept: PSYCHIATRY | Facility: CLINIC | Age: 53
End: 2023-10-31
Payer: COMMERCIAL

## 2023-10-31 ENCOUNTER — TELEPHONE (OUTPATIENT)
Dept: PSYCHIATRY | Facility: CLINIC | Age: 53
End: 2023-10-31

## 2023-10-31 DIAGNOSIS — F33.2 SEVERE RECURRENT MAJOR DEPRESSION WITHOUT PSYCHOTIC FEATURES (H): Primary | ICD-10-CM

## 2023-10-31 ASSESSMENT — PATIENT HEALTH QUESTIONNAIRE - PHQ9: SUM OF ALL RESPONSES TO PHQ QUESTIONS 1-9: 9

## 2023-10-31 NOTE — PROGRESS NOTES
Interventional Psychiatry Program  [address], [address]  [place] [address]  TMS Procedure Note   Valentina Humphries MRN# [id number]  Age: 53 year old   YOB: 1970    Pre-Procedure:  History and Physical: Reviewed in medical record  Consent signed by: Valentina Humphries for this course of treatment on: 10/24/2023    Valentina Humphries comes into clinic today at the request of, Blake Bryan MD, ordering provider for TMS treatments.    Clinical Narrative:  Patient tolerated treatment. Patient had a fair amount of facial twitching and some hand movement after a number of coil adjustments. Kept treatment at 100% for today.     Indications for TMS:  MDD, recurrent, severe; 4+ medication trials (from 2+ classes) ineffective; Psychotherapy ineffective     Procedure Diagnosis:  MDD, recurrent, severe; F33.2    Treatment Hx:  Treatment number this series: 6  Total lifetime treatment number: 6    Allergies   Allergen Reactions    Cefdinir Difficulty breathing     Patient is able to tolerate Penicillin and Amoxicillin without any problems    Latex Itching    Latuda [Lurasidone] Swelling     Facial Swelling    Amoxicillin-Pot Clavulanate Nausea and Vomiting and Diarrhea    Lamotrigine Rash      There were no vitals taken for this visit.    Pause for the Cause  Right patient: Yes  Right procedure/correct coil:  Yes; rTMS; [id number]; H1 coil.   Earplugs in place:  Yes    Procedure  Patient was seated in procedure chair. Identity and procedure was verified. Ear plugs were placed in ears and patient-specific cap was placed on head and tightened appropriately. Ruler locations were verified. Bone conducting headphones were not used.  Coil was placed at 0 - eyebrow - 16 and stimulator was set to 78% (120% of MT).  Initial train was well tolerated so 55 trains were delivered.  Patient tolerated procedure well with minimal right eyebrow movement.    Motor Threshold  Determination  Distance from nasion to inion: 37cm  MT 1: 0 - 6 - 16 @ 65% on 10/24/2023    Standard LDLPFC  Frequency: 18  Train Duration: 2  Total pulses delivered: 1980  Inter-train interval: 20  Tx Loc: 0 - eye - 16  Energy: 78% (120%MT)  Trains: 55    Date MADRS QIDS PHQ-9   10/24/23 32 19 17    --               10/27/2023     9:41 AM 10/30/2023     3:34 PM 10/31/2023     3:36 PM   PHQ-9 SCORE   PHQ-9 Total Score 13 8 9       Plan   -Continue treatment    This service provided today was under the supervising provider of the day, LOUISA Jenkins MD, who was available if needed.    Floresita Garcia, TMS Technician  [facility]

## 2023-10-31 NOTE — TELEPHONE ENCOUNTER
During treatment patient mentioned wanting to speak with Dr. Bryan about her lack of sleep lately and if he might be able to prescribe something while she is getting TMS treatment, next available appt for provider isn't until March. Patient is also wanting to discuss the intensity of treatment.

## 2023-11-01 ENCOUNTER — OFFICE VISIT (OUTPATIENT)
Dept: PSYCHIATRY | Facility: CLINIC | Age: 53
End: 2023-11-01
Payer: COMMERCIAL

## 2023-11-01 DIAGNOSIS — F33.2 SEVERE RECURRENT MAJOR DEPRESSION WITHOUT PSYCHOTIC FEATURES (H): Primary | ICD-10-CM

## 2023-11-01 ASSESSMENT — PATIENT HEALTH QUESTIONNAIRE - PHQ9: SUM OF ALL RESPONSES TO PHQ QUESTIONS 1-9: 8

## 2023-11-01 NOTE — TELEPHONE ENCOUNTER
She's only at 6 treatments so still pretty early, haven't even gotten to 120% of MT yet. She reports having a hard time staying asleep most nights of the week. I also believe we may need to ReMT, which I am going to try to get done this week.

## 2023-11-01 NOTE — PROGRESS NOTES
Interventional Psychiatry Program  [address], [address]  [place] [address]  TMS Procedure Note   Valentina Humphries MRN# [id number]  Age: 53 year old   YOB: 1970    Pre-Procedure:  History and Physical: Reviewed in medical record  Consent signed by: Valentina Humphries for this course of treatment on: 10/24/2023    Valentina Humphries comes into clinic today at the request of, Blake Bryan MD, ordering provider for TMS treatments.    Clinical Narrative:  Patient tolerated treatment. Patient reports getting more sleep last night.     Indications for TMS:  MDD, recurrent, severe; 4+ medication trials (from 2+ classes) ineffective; Psychotherapy ineffective     Procedure Diagnosis:  MDD, recurrent, severe; F33.2    Treatment Hx:  Treatment number this series: 7  Total lifetime treatment number: 7    Allergies   Allergen Reactions    Cefdinir Difficulty breathing     Patient is able to tolerate Penicillin and Amoxicillin without any problems    Latex Itching    Latuda [Lurasidone] Swelling     Facial Swelling    Amoxicillin-Pot Clavulanate Nausea and Vomiting and Diarrhea    Lamotrigine Rash      There were no vitals taken for this visit.    Pause for the Cause  Right patient: Yes  Right procedure/correct coil:  Yes; rTMS; [id number]; H1 coil.   Earplugs in place:  Yes    Procedure  Patient was seated in procedure chair. Identity and procedure was verified. Ear plugs were placed in ears and patient-specific cap was placed on head and tightened appropriately. Ruler locations were verified. Bone conducting headphones were not used.  Coil was placed at 0 - eyebrow - 16 and stimulator was set to 78% (120% of MT).  Initial train was well tolerated so 55 trains were delivered.  Patient tolerated procedure well with minimal right eyebrow movement.    Motor Threshold Determination  Distance from nasion to inion: 37cm  MT 1: 0 - 6 - 16 @ 65% on  10/24/2023    Standard LDLPFC  Frequency: 18  Train Duration: 2  Total pulses delivered: 1980  Inter-train interval: 20  Tx Loc: 0 - eye - 16  Energy: 78% (120%MT)  Trains: 55    Date MADRS QIDS PHQ-9   10/24/23 32 19 17    --               10/27/2023     9:41 AM 10/30/2023     3:34 PM 10/31/2023     3:36 PM   PHQ-9 SCORE   PHQ-9 Total Score 13 8 9       Plan   -Continue treatment    This service provided today was under the supervising provider of the day, Blake Bryan MD, who was available if needed.    Cecy Boone, TMS Technician  [facility] Neuromodulation

## 2023-11-02 ENCOUNTER — OFFICE VISIT (OUTPATIENT)
Dept: PSYCHIATRY | Facility: CLINIC | Age: 53
End: 2023-11-02
Payer: COMMERCIAL

## 2023-11-02 DIAGNOSIS — F33.2 SEVERE RECURRENT MAJOR DEPRESSION WITHOUT PSYCHOTIC FEATURES (H): Primary | ICD-10-CM

## 2023-11-02 ASSESSMENT — PATIENT HEALTH QUESTIONNAIRE - PHQ9: SUM OF ALL RESPONSES TO PHQ QUESTIONS 1-9: 7

## 2023-11-02 NOTE — PROGRESS NOTES
Interventional Psychiatry Program  [address], [address]  [place] [address]  TMS Procedure Note   Valentina Humphries MRN# [id number]  Age: 53 year old   YOB: 1970    Pre-Procedure:  History and Physical: Reviewed in medical record  Consent signed by: Valentina Humphries for this course of treatment on: 10/24/2023    Valentina Humphries comes into clinic today at the request of, Blake Bryan MD, ordering provider for TMS treatments.    Clinical Narrative:  Patient tolerated treatment. No changes reported. ReMT'd today, MT stayed the same.     Indications for TMS:  MDD, recurrent, severe; 4+ medication trials (from 2+ classes) ineffective; Psychotherapy ineffective     Procedure Diagnosis:  MDD, recurrent, severe; F33.2    Treatment Hx:  Treatment number this series: 8  Total lifetime treatment number: 8    Allergies   Allergen Reactions    Cefdinir Difficulty breathing     Patient is able to tolerate Penicillin and Amoxicillin without any problems    Latex Itching    Latuda [Lurasidone] Swelling     Facial Swelling    Amoxicillin-Pot Clavulanate Nausea and Vomiting and Diarrhea    Lamotrigine Rash      There were no vitals taken for this visit.    Pause for the Cause  Right patient: Yes  Right procedure/correct coil:  Yes; rTMS; [id number]; H1 coil.   Earplugs in place:  Yes    Procedure  Patient was seated in procedure chair. Identity and procedure was verified. Ear plugs were placed in ears and patient-specific cap was placed on head and tightened appropriately. Ruler locations were verified. Bone conducting headphones were not used.  Coil was placed at 0 - eyebrow - 16 and stimulator was set to 65% (100% of MT).  Initial train was well tolerated so 55 trains were delivered.  Patient tolerated procedure well with minimal right eyebrow movement.    Motor Threshold Determination  Distance from nasion to inion: 37cm  MT 1: 0 - 6 - 16 @ 65% on  10/24/2023  MT 2: 0 - 6 - 16 @ 65% on 11/2/2023    Standard LDLPFC  Frequency: 18  Train Duration: 2  Total pulses delivered: 1980  Inter-train interval: 20  Tx Loc: 0 - eye - 16  Energy: 65% (100%MT)  Trains: 55    Date MADRS QIDS PHQ-9   10/24/23 32 19 17    --               10/31/2023     3:36 PM 11/1/2023     3:25 PM 11/2/2023     3:40 PM   PHQ-9 SCORE   PHQ-9 Total Score 9 8 7       Plan   -Increase energy as tolerated  -Continue treatment    This service provided today was under the supervising provider of the day, LOUISA Jenkins MD, who was available if needed.    Floresita Garcia, TMS Technician  [facility]

## 2023-11-03 ENCOUNTER — OFFICE VISIT (OUTPATIENT)
Dept: PSYCHIATRY | Facility: CLINIC | Age: 53
End: 2023-11-03
Payer: COMMERCIAL

## 2023-11-03 DIAGNOSIS — F33.2 SEVERE RECURRENT MAJOR DEPRESSION WITHOUT PSYCHOTIC FEATURES (H): Primary | ICD-10-CM

## 2023-11-03 ASSESSMENT — PATIENT HEALTH QUESTIONNAIRE - PHQ9: SUM OF ALL RESPONSES TO PHQ QUESTIONS 1-9: 7

## 2023-11-03 NOTE — PROGRESS NOTES
Interventional Psychiatry Program  [address], [address]  [place] [address]  TMS Procedure Note   Valentina Humphries MRN# [id number]  Age: 53 year old   YOB: 1970    Pre-Procedure:  History and Physical: Reviewed in medical record  Consent signed by: Valentina Humphries for this course of treatment on: 10/24/2023    Valentina Humphries comes into clinic today at the request of, Blake Bryan MD, ordering provider for TMS treatments.    Clinical Narrative:  Patient tolerated treatment. No changes reported.      Indications for TMS:  MDD, recurrent, severe; 4+ medication trials (from 2+ classes) ineffective; Psychotherapy ineffective     Procedure Diagnosis:  MDD, recurrent, severe; F33.2    Treatment Hx:  Treatment number this series: 9  Total lifetime treatment number: 9    Allergies   Allergen Reactions    Cefdinir Difficulty breathing     Patient is able to tolerate Penicillin and Amoxicillin without any problems    Latex Itching    Latuda [Lurasidone] Swelling     Facial Swelling    Amoxicillin-Pot Clavulanate Nausea and Vomiting and Diarrhea    Lamotrigine Rash      There were no vitals taken for this visit.    Pause for the Cause  Right patient: Yes  Right procedure/correct coil:  Yes; rTMS; [id number]; H1 coil.   Earplugs in place:  Yes    Procedure  Patient was seated in procedure chair. Identity and procedure was verified. Ear plugs were placed in ears and patient-specific cap was placed on head and tightened appropriately. Ruler locations were verified. Bone conducting headphones were not used.  Coil was placed at 0 - eyebrow - 16 and stimulator was set to 68% (105% of MT).  Initial train was well tolerated so 55 trains were delivered.  Patient tolerated procedure well with minimal right eyebrow movement.    Motor Threshold Determination  Distance from nasion to inion: 37cm  MT 1: 0 - 6 - 16 @ 65% on 10/24/2023  MT 2: 0 - 6 - 16 @ 65% on  11/2/2023    Standard LDLPFC  Frequency: 18  Train Duration: 2  Total pulses delivered: 1980  Inter-train interval: 20  Tx Loc: 0 - eye - 16  Energy: 68% (105%MT)  Trains: 55    Date MADRS QIDS PHQ-9   10/24/23 32 19 17   11/3/23  15 7             10/31/2023     3:36 PM 11/1/2023     3:25 PM 11/2/2023     3:40 PM   PHQ-9 SCORE   PHQ-9 Total Score 9 8 7       Plan   -Increase energy as tolerated  -Continue treatment    This service provided today was under the supervising provider of the day, Jesus Graham MD, who was available if needed.    Cecy Boone, TMS Technician  [facility]

## 2023-11-05 ASSESSMENT — ENCOUNTER SYMPTOMS
JOINT SWELLING: 1
HEMATURIA: 0
HEMATOCHEZIA: 0
ABDOMINAL PAIN: 1
PARESTHESIAS: 0
NAUSEA: 1
NERVOUS/ANXIOUS: 1
WEAKNESS: 1
CONSTIPATION: 1
SHORTNESS OF BREATH: 0
FEVER: 0
FREQUENCY: 0
SORE THROAT: 0
ARTHRALGIAS: 1
MYALGIAS: 1
CHILLS: 0
HEADACHES: 0
DYSURIA: 0
BREAST MASS: 0
PALPITATIONS: 0
DIARRHEA: 0
HEARTBURN: 0
EYE PAIN: 1
COUGH: 0
DIZZINESS: 1

## 2023-11-06 ENCOUNTER — OFFICE VISIT (OUTPATIENT)
Dept: INTERNAL MEDICINE | Facility: CLINIC | Age: 53
End: 2023-11-06
Payer: COMMERCIAL

## 2023-11-06 ENCOUNTER — TELEPHONE (OUTPATIENT)
Dept: INTERNAL MEDICINE | Facility: CLINIC | Age: 53
End: 2023-11-06

## 2023-11-06 ENCOUNTER — OFFICE VISIT (OUTPATIENT)
Dept: PSYCHIATRY | Facility: CLINIC | Age: 53
End: 2023-11-06
Payer: COMMERCIAL

## 2023-11-06 VITALS
DIASTOLIC BLOOD PRESSURE: 64 MMHG | WEIGHT: 162 LBS | BODY MASS INDEX: 24.55 KG/M2 | SYSTOLIC BLOOD PRESSURE: 100 MMHG | HEART RATE: 81 BPM | RESPIRATION RATE: 16 BRPM | TEMPERATURE: 98.1 F | OXYGEN SATURATION: 99 % | HEIGHT: 68 IN

## 2023-11-06 DIAGNOSIS — E78.5 HYPERLIPIDEMIA, UNSPECIFIED HYPERLIPIDEMIA TYPE: ICD-10-CM

## 2023-11-06 DIAGNOSIS — K58.9 IRRITABLE BOWEL SYNDROME, UNSPECIFIED TYPE: ICD-10-CM

## 2023-11-06 DIAGNOSIS — K59.00 CONSTIPATION, UNSPECIFIED CONSTIPATION TYPE: ICD-10-CM

## 2023-11-06 DIAGNOSIS — F41.1 GAD (GENERALIZED ANXIETY DISORDER): ICD-10-CM

## 2023-11-06 DIAGNOSIS — B35.3 TINEA PEDIS OF BOTH FEET: ICD-10-CM

## 2023-11-06 DIAGNOSIS — K21.9 GASTROESOPHAGEAL REFLUX DISEASE WITHOUT ESOPHAGITIS: ICD-10-CM

## 2023-11-06 DIAGNOSIS — F33.2 SEVERE RECURRENT MAJOR DEPRESSION WITHOUT PSYCHOTIC FEATURES (H): Primary | ICD-10-CM

## 2023-11-06 DIAGNOSIS — Z00.00 ANNUAL PHYSICAL EXAM: ICD-10-CM

## 2023-11-06 DIAGNOSIS — G47.33 OSA (OBSTRUCTIVE SLEEP APNEA): Primary | ICD-10-CM

## 2023-11-06 DIAGNOSIS — Z86.0100 HISTORY OF COLONIC POLYPS: ICD-10-CM

## 2023-11-06 DIAGNOSIS — Z13.1 SCREENING FOR DIABETES MELLITUS: ICD-10-CM

## 2023-11-06 DIAGNOSIS — Z51.81 ENCOUNTER FOR THERAPEUTIC DRUG MONITORING: ICD-10-CM

## 2023-11-06 DIAGNOSIS — F33.2 SEVERE EPISODE OF RECURRENT MAJOR DEPRESSIVE DISORDER, WITHOUT PSYCHOTIC FEATURES (H): ICD-10-CM

## 2023-11-06 DIAGNOSIS — Z13.220 SCREENING FOR LIPOID DISORDERS: ICD-10-CM

## 2023-11-06 DIAGNOSIS — K02.9 TOOTH DECAY: ICD-10-CM

## 2023-11-06 LAB
ALBUMIN SERPL BCG-MCNC: 4.2 G/DL (ref 3.5–5.2)
ALP SERPL-CCNC: 55 U/L (ref 35–104)
ALT SERPL W P-5'-P-CCNC: 18 U/L (ref 0–50)
ANION GAP SERPL CALCULATED.3IONS-SCNC: 11 MMOL/L (ref 7–15)
AST SERPL W P-5'-P-CCNC: 23 U/L (ref 0–45)
BILIRUB SERPL-MCNC: 0.3 MG/DL
BUN SERPL-MCNC: 12.2 MG/DL (ref 6–20)
CALCIUM SERPL-MCNC: 8.9 MG/DL (ref 8.6–10)
CHLORIDE SERPL-SCNC: 105 MMOL/L (ref 98–107)
CHOLEST SERPL-MCNC: 220 MG/DL
CREAT SERPL-MCNC: 0.74 MG/DL (ref 0.51–0.95)
DEPRECATED HCO3 PLAS-SCNC: 25 MMOL/L (ref 22–29)
EGFRCR SERPLBLD CKD-EPI 2021: >90 ML/MIN/1.73M2
GLUCOSE SERPL-MCNC: 94 MG/DL (ref 70–99)
HBA1C MFR BLD: 5.2 % (ref 0–5.6)
HDLC SERPL-MCNC: 95 MG/DL
LDLC SERPL CALC-MCNC: 112 MG/DL
NONHDLC SERPL-MCNC: 125 MG/DL
POTASSIUM SERPL-SCNC: 4.5 MMOL/L (ref 3.4–5.3)
PROT SERPL-MCNC: 6.2 G/DL (ref 6.4–8.3)
SODIUM SERPL-SCNC: 141 MMOL/L (ref 135–145)
TRIGL SERPL-MCNC: 65 MG/DL

## 2023-11-06 PROCEDURE — 83036 HEMOGLOBIN GLYCOSYLATED A1C: CPT | Performed by: NURSE PRACTITIONER

## 2023-11-06 PROCEDURE — 36415 COLL VENOUS BLD VENIPUNCTURE: CPT | Performed by: NURSE PRACTITIONER

## 2023-11-06 PROCEDURE — 99396 PREV VISIT EST AGE 40-64: CPT | Performed by: NURSE PRACTITIONER

## 2023-11-06 PROCEDURE — 99214 OFFICE O/P EST MOD 30 MIN: CPT | Mod: 25 | Performed by: NURSE PRACTITIONER

## 2023-11-06 PROCEDURE — 80053 COMPREHEN METABOLIC PANEL: CPT | Performed by: NURSE PRACTITIONER

## 2023-11-06 PROCEDURE — 80061 LIPID PANEL: CPT | Performed by: NURSE PRACTITIONER

## 2023-11-06 RX ORDER — DESVENLAFAXINE 100 MG/1
100 TABLET, EXTENDED RELEASE ORAL
Qty: 90 TABLET | Refills: 1 | Status: SHIPPED | OUTPATIENT
Start: 2023-11-06 | End: 2024-03-15

## 2023-11-06 RX ORDER — OMEGA-3 FATTY ACIDS/FISH OIL 300-1000MG
2 CAPSULE ORAL DAILY
COMMUNITY
End: 2023-12-15

## 2023-11-06 RX ORDER — DICYCLOMINE HYDROCHLORIDE 10 MG/1
10 CAPSULE ORAL 4 TIMES DAILY PRN
Qty: 30 CAPSULE | Refills: 3 | Status: SHIPPED | OUTPATIENT
Start: 2023-11-06

## 2023-11-06 RX ORDER — SODIUM FLUORIDE 5 MG/G
1 PASTE, DENTIFRICE ORAL 2 TIMES DAILY
Qty: 113 G | Refills: 1 | Status: SHIPPED | OUTPATIENT
Start: 2023-11-06 | End: 2024-01-03

## 2023-11-06 RX ORDER — LORAZEPAM 0.5 MG/1
0.5 TABLET ORAL EVERY 8 HOURS PRN
Qty: 30 TABLET | Refills: 1 | Status: SHIPPED | OUTPATIENT
Start: 2023-11-06 | End: 2023-11-07

## 2023-11-06 RX ORDER — KETOCONAZOLE 20 MG/G
CREAM TOPICAL
Qty: 15 G | Refills: 0 | Status: SHIPPED | OUTPATIENT
Start: 2023-11-06 | End: 2024-02-09

## 2023-11-06 ASSESSMENT — ENCOUNTER SYMPTOMS
DIZZINESS: 1
PARESTHESIAS: 0
FEVER: 0
EYE PAIN: 1
BREAST MASS: 0
HEMATOCHEZIA: 0
HEADACHES: 0
WEAKNESS: 1
NERVOUS/ANXIOUS: 1
MYALGIAS: 1
ABDOMINAL PAIN: 1
NAUSEA: 1
CHILLS: 0
COUGH: 0
JOINT SWELLING: 1
HEARTBURN: 0
SHORTNESS OF BREATH: 0
ARTHRALGIAS: 1
DIARRHEA: 0
CONSTIPATION: 1
HEMATURIA: 0
PALPITATIONS: 0
SORE THROAT: 0
FREQUENCY: 0
DYSURIA: 0

## 2023-11-06 ASSESSMENT — PATIENT HEALTH QUESTIONNAIRE - PHQ9: SUM OF ALL RESPONSES TO PHQ QUESTIONS 1-9: 7

## 2023-11-06 NOTE — PATIENT INSTRUCTIONS
Blood pressure and other vital signs look good.    Weight looks good.    Colonoscopy is due in 2029.    Pap smear due in 2026.    Fasting cholesterol labs today.    Screening for diabetes labs today.    Recheck liver and kidney labs.    I recommend comprehensive health care for women as OB/GYN provider.    Come back and see me in 6 months, sooner if needed

## 2023-11-06 NOTE — PROGRESS NOTES
SUBJECTIVE:   CC: Valentina is an 53 year old who presents for preventive health visit.       11/6/2023     8:19 AM   Additional Questions   Roomed by Luly BOGGS       Healthy Habits:     Getting at least 3 servings of Calcium per day:  Yes    Bi-annual eye exam:  Yes    Dental care twice a year:  Yes    Sleep apnea or symptoms of sleep apnea:  Daytime drowsiness and Sleep apnea    Diet:  Other    Frequency of exercise:  2-3 days/week    Duration of exercise:  15-30 minutes    Taking medications regularly:  Yes    Medication side effects:  Lightheadedness and Other    Additional concerns today:  Yes        Social History     Tobacco Use    Smoking status: Never    Smokeless tobacco: Never   Substance Use Topics    Alcohol use: Yes     Comment: rare             11/5/2023     9:56 PM   Alcohol Use   Prescreen: >3 drinks/day or >7 drinks/week? Not Applicable     Reviewed orders with patient.  Reviewed health maintenance and updated orders accordingly - Yes  Lab work is in process  Labs reviewed in EPIC    Breast Cancer Screening:    FHS-7:       12/15/2021     5:03 PM 12/28/2021    12:15 PM 11/21/2022     9:13 AM 1/9/2023     2:45 PM 1/9/2023     3:00 PM 1/25/2023     8:18 AM 11/5/2023     9:58 PM   Breast CA Risk Assessment (FHS-7)   Did any of your first-degree relatives have breast or ovarian cancer?  No No Yes Yes No No   Did any of your relatives have bilateral breast cancer?  No Unknown No No No Yes   Did any man in your family have breast cancer? No No No No No No No   Did any woman in your family have breast and ovarian cancer? Yes Yes Yes Yes Yes No Yes   Did any woman in your family have breast cancer before age 50 y? No No No No No No No   Do you have 2 or more relatives with breast and/or ovarian cancer? No No No No No No No   Do you have 2 or more relatives with breast and/or bowel cancer? No No No No No No No     click delete button to remove this line now  Mammogram Screening: Recommended annual  mammography  Pertinent mammograms are reviewed under the imaging tab.    History of abnormal Pap smear: NO - age 30-65 PAP every 5 years with negative HPV co-testing recommended      Latest Ref Rng & Units 8/30/2021    12:28 PM   PAP / HPV   PAP  Negative for Intraepithelial Lesion or Malignancy (NILM)    HPV 16 DNA Negative Negative    HPV 18 DNA Negative Negative    Other HR HPV Negative Negative      Reviewed and updated as needed this visit by clinical staff   Tobacco  Allergies  Meds              Reviewed and updated as needed this visit by Provider                 Past Medical History:   Diagnosis Date    Anxiety     Asthma     Depression     hospitalized x4 in the 1990's, no suicide attempts    Migraine     Visual aura    Varicella       Past Surgical History:   Procedure Laterality Date    ENDOSCOPY      TOTAL HIP ARTHROPLASTY Left 12/29/2021    WISDOM TOOTH EXTRACTION  1987       Review of Systems   Constitutional:  Negative for chills and fever.   HENT:  Negative for congestion, ear pain, hearing loss and sore throat.    Eyes:  Positive for pain and visual disturbance.   Respiratory:  Negative for cough and shortness of breath.    Cardiovascular:  Positive for peripheral edema. Negative for chest pain and palpitations.   Gastrointestinal:  Positive for abdominal pain, constipation and nausea. Negative for diarrhea, heartburn and hematochezia.   Breasts:  Negative for tenderness, breast mass and discharge.   Genitourinary:  Positive for urgency. Negative for dysuria, frequency, genital sores, hematuria, pelvic pain, vaginal bleeding and vaginal discharge.   Musculoskeletal:  Positive for arthralgias, joint swelling and myalgias.   Skin:  Negative for rash.   Neurological:  Positive for dizziness and weakness. Negative for headaches and paresthesias.   Psychiatric/Behavioral:  Positive for mood changes. The patient is nervous/anxious.      CONSTITUTIONAL: NEGATIVE for fever, chills, change in  "weight  INTEGUMENTARY/SKIN: NEGATIVE for worrisome rashes, moles or lesions  EYES: NEGATIVE for vision changes or irritation  ENT: NEGATIVE for ear, mouth and throat problems  RESP: NEGATIVE for significant cough or SOB  BREAST: NEGATIVE for masses, tenderness or discharge  CV: NEGATIVE for chest pain, palpitations or peripheral edema  GI: NEGATIVE for nausea, abdominal pain, heartburn, or change in bowel habits  : NEGATIVE for unusual urinary or vaginal symptoms. No vaginal bleeding.  MUSCULOSKELETAL: NEGATIVE for significant arthralgias or myalgia  NEURO: NEGATIVE for weakness, dizziness or paresthesias  PSYCHIATRIC: NEGATIVE for changes in mood or affect      OBJECTIVE:   /64 (BP Location: Right arm, Patient Position: Sitting, Cuff Size: Adult Regular)   Pulse 81   Temp 98.1  F (36.7  C)   Resp 16   Ht 1.721 m (5' 7.75\")   Wt 73.5 kg (162 lb)   SpO2 99%   BMI 24.81 kg/m    Physical Exam  GENERAL: healthy, alert and no distress  NECK: no adenopathy, no asymmetry, masses, or scars and thyroid normal to palpation  RESP: lungs clear to auscultation - no rales, rhonchi or wheezes  CV: regular rate and rhythm, normal S1 S2, no S3 or S4, no murmur, click or rub, no peripheral edema and peripheral pulses strong  ABDOMEN: soft, nontender, no hepatosplenomegaly, no masses and bowel sounds normal  MS: no gross musculoskeletal defects noted, no edema    Diagnostic Test Results:  Labs reviewed in Epic    ASSESSMENT/PLAN:     1. Annual physical exam  Chronic medical issues reviewed    2. STEVEN (obstructive sleep apnea)  She thinks she needs a new CPAP machine and has been quite sometime since she has seen a sleep medicine provider.  Referral to sleep medicine to establish care with new sleep medicine provider as well as to obtain new CPAP device/supplies  - Adult Sleep Eval & Management Referral; Future    3. Constipation, unspecified constipation type  Good results with daily MiraLAX, stable    4. " Gastroesophageal reflux disease without esophagitis  Stable on daily omeprazole  - omeprazole (PRILOSEC) 20 MG DR capsule; Take 1 capsule (20 mg) by mouth daily  Dispense: 90 capsule; Refill: 3    5. Hyperlipidemia, unspecified hyperlipidemia type  Recheck fasting cholesterol labs today    6. Severe episode of recurrent major depressive disorder, without psychotic features (H)  She lost her psych provider and is looking for a referral to establish care with new psychiatrist.  I did say I could refill her meds for the time being.  History of severe treatment resistant depression, currently doing transcranial stimulation therapy  - [facility] Referral; Future  - desvenlafaxine (PRISTIQ) 100 MG 24 hr tablet; Take 1 tablet (100 mg) by mouth daily at 2 pm  Dispense: 90 tablet; Refill: 1    7. EBER (generalized anxiety disorder)  Has not been using lorazepam as much with the transcranial stimulation therapy  - [facility] Referral; Future  - LORazepam (ATIVAN) 0.5 MG tablet; Take 1 tablet (0.5 mg) by mouth every 8 hours as needed for anxiety  Dispense: 30 tablet; Refill: 1    8. History of colonic polyps  Due for colonoscopy in 2029    9. Tinea pedis of both feet  Possible tinea pedis although mild.  We will try topical ketoconazole  - ketoconazole (NIZORAL) 2 % external cream; Apply to lateral feet twice daily for ten days  Dispense: 15 g; Refill: 0    10. Tooth decay  She uses prescription sodium fluoride  - sodium fluoride (CLINPRO 5000) 1.1 % PSTE dental paste; Apply 2 mLs to affected area 2 times daily  Dispense: 113 g; Refill: 1    11. Irritable bowel syndrome, unspecified type  Rare use of Bentyl.  30 capsules generally last for quite some time  - dicyclomine (BENTYL) 10 MG capsule; Take 1 capsule (10 mg) by mouth 4 times daily as needed  Dispense: 30 capsule; Refill: 3    12. Encounter for therapeutic drug monitoring  - Comprehensive metabolic panel; Future    13. Screening for  diabetes mellitus  - Hemoglobin A1c; Future    14. Screening for lipoid disorders  - Lipid Profile (Chol, Trig, HDL, LDL calc); Future    Patient Instructions   Blood pressure and other vital signs look good.    Weight looks good.    Colonoscopy is due in 2029.    Pap smear due in 2026.    Fasting cholesterol labs today.    Screening for diabetes labs today.    Recheck liver and kidney labs.    I recommend comprehensive health care for women as OB/GYN provider.    Come back and see me in 6 months, sooner if needed        Patient has been advised of split billing requirements and indicates understanding: Yes      COUNSELING:  Reviewed preventive health counseling, as reflected in patient instructions       Regular exercise       Healthy diet/nutrition       Vision screening        She reports that she has never smoked. She has never used smokeless tobacco.          Liborio Butler, CNP  [facility]

## 2023-11-06 NOTE — COMMUNITY RESOURCES LIST (ENGLISH)
11/06/2023   [facility] - Outpatient Clinics  N/A  For additional resource needs, please contact your health insurance member services or your primary care team.  Phone: [phone]   Email: N/A   Address: [address] [place] [address]   Hours: N/A        Financial Stability       Rent and mortgage payment assistance  1  [facility] - Security Deposit Assistance Distance: 0.16 miles      In-Person   [address] [facility] [address] [place] [address]  Language: English  Hours: Mon - Fri 8:00 AM - 4:30 PM  Fees: Free   Phone: (660) 472-6631 Email: [email or web address]. Website: https://www.[facility]./[facility]-government/departments/health-and-wellness     2  [facility] [employer] [place] - Multifamily Rental Assistance Program Distance: 0.38 miles      Phone/Virtual   [address] [facility] [address] [place] [address]  Language: English  Hours: Mon - Fri 8:00 AM - 5:00 PM Appt. Only  Fees: Free   Phone: (265) 736-1888 Email: m[place].Intervolve@[facility] Website: https://www.[facility].gov/index.html          Food and Nutrition       Food pantry  3  [facility] and Service [place] Distance: 1.13 miles      [address] [address]  Language: English, Hmong, Adrian, Argentine  Hours: Mon 11:00 AM - 3:00 PM , Wed 11:00 AM - 3:00 PM , Fri 11:00 AM - 3:00 PM  Fees: Free, Self Pay   Phone: (261) 949-4201 Email: Cameron@[facility].org Website: http://[facility].org/[facility]/hp-nwqc-p-[facility] [place]/     4  [facility] [address] [facility] Distance: 1.43 miles      [facility]   [address] [place] [address]  Language: English, Argentine  Hours: Mon 9:00 AM - 6:00 PM Appt. Only, Tue - Fri 9:00 AM - 5:00 PM Appt. Only  Fees: Free   Phone: (764) 962-7819 Email: info@[employer].org Website: http://www.[employer].org/site     SNAP application assistance  5  [employer] [place] Distance: 0.69 miles       Phone/Virtual   [address] [facility] [address] [place] [address]  Language: English, Hmong, Kuwaiti, Greenlandic, Bermudian  Hours: Mon - Fri 8:30 AM - 4:30 PM  Fees: Free   Phone: (408) 108-4394 Email: helpline@[employer].org Website: https://www.hungersoSalorixions.org/programs/[place]-food-helpline/     6  [facility] Health and Wellness Distance: 0.16 miles      In-Person, Phone/Virtual   [address] [facility] [address] [place] [address]  Language: English  Hours: Mon - Fri 8:00 AM - 4:30 PM  Fees: Free   Phone: (789) 174-4789 Email: leatha@[facility]. Website: https://www.[facility]./[facility]/departments/health-and-wellness     Soup kitchen or free meals  7  [facility] - [facility] and [facility] Distance: 1.48 miles      [address] [address]  Language: English  Hours: Mon - Fri 5:00 PM - 6:00 PM  Fees: Free   Phone: (120) 815-9244 Email: alvaro@[facility] [place] [facility] Website: http://www.[facility] [place].org/     8  [facility] Distance: 1.71 miles      [facility]   [address] [place] [address]  Language: English  Hours: Mon - Fri 11:45 AM - 12:45 PM  Fees: Free   Phone: (985) 331-1638 Email: yulisa@[facility].org Website: http://[facility] Hospitalarmynorth.org/community/[facility]/          Important Numbers & Websites       [facility]   211 211[address]way.org  Poison Control   (641) 246-4155 [place]  Suicide and Crisis Lifeline   987 8lifeline.org  Childhelp National Child Abuse Hotline   643.561.8501 Childhelphotline.org  National Sexual Assault Hotline   (701) 673-5839 (HOPE) Rainn.org  National Runaway Safeline   (302) 305-5429 (RUNAWAY) [facility] Lakerunaway.org  Pregnancy & Postpartum Support [place]   Call/text 199-738-2112 Ppsupportmn.org  Substance Abuse National Helpline ([facility]   482-747-HELP (3804) Findtreatment.gov  Emergency Services   912

## 2023-11-06 NOTE — PROGRESS NOTES
Interventional Psychiatry Program  [address], [address]  [place] [address]  TMS Procedure Note   Valentina Humphries MRN# [id number]  Age: 53 year old   YOB: 1970    Pre-Procedure:  History and Physical: Reviewed in medical record  Consent signed by: Valentina Humphries for this course of treatment on: 10/24/2023    Valentina Humphries comes into clinic today at the request of, Blake Bryan MD, ordering provider for TMS treatments.    Clinical Narrative:  Patient tolerated treatment. No changes reported.      Indications for TMS:  MDD, recurrent, severe; 4+ medication trials (from 2+ classes) ineffective; Psychotherapy ineffective     Procedure Diagnosis:  MDD, recurrent, severe; F33.2    Treatment Hx:  Treatment number this series: 10  Total lifetime treatment number: 10    Allergies   Allergen Reactions    Cefdinir Difficulty breathing     Patient is able to tolerate Penicillin and Amoxicillin without any problems    Latex Itching    Latuda [Lurasidone] Swelling     Facial Swelling    Amoxicillin-Pot Clavulanate Nausea and Vomiting and Diarrhea    Lamotrigine Rash      There were no vitals taken for this visit.    Pause for the Cause  Right patient: Yes  Right procedure/correct coil:  Yes; rTMS; [id number]; H1 coil.   Earplugs in place:  Yes    Procedure  Patient was seated in procedure chair. Identity and procedure was verified. Ear plugs were placed in ears and patient-specific cap was placed on head and tightened appropriately. Ruler locations were verified. Bone conducting headphones were not used.  Coil was placed at 0 - eyebrow - 16 and stimulator was set to 68% (105% of MT).  Initial train was well tolerated so 55 trains were delivered.  Patient tolerated procedure well with minimal right eyebrow movement.    Motor Threshold Determination  Distance from nasion to inion: 37cm  MT 1: 0 - 6 - 16 @ 65% on 10/24/2023  MT 2: 0 - 6 - 16 @ 65% on  11/2/2023    Standard LDLPFC  Frequency: 18  Train Duration: 2  Total pulses delivered: 1980  Inter-train interval: 20  Tx Loc: 0 - eye - 16  Energy: 68% (105%MT)  Trains: 55    Date MADRS QIDS PHQ-9   10/24/23 32 19 17   11/3/23  15 7             11/2/2023     3:40 PM 11/3/2023    11:38 AM 11/6/2023     3:36 PM   PHQ-9 SCORE   PHQ-9 Total Score 7 7 7       Plan   -Increase energy as tolerated  -Continue treatment    This service provided today was under the supervising provider of the day, Suellen Zhang MD, who was available if needed.    lForesita Garcia, TMS Technician  [facility]

## 2023-11-06 NOTE — COMMUNITY RESOURCES LIST (ENGLISH)
11/06/2023   [facility]  N/A  For questions about this resource list or additional care needs, please contact your primary care clinic or care manager.  Phone: [phone]   Email: N/A   Address: [address] [place] [address]   Hours: N/A        Financial Stability       Rent and mortgage payment assistance  1  [facility] - Security Deposit Assistance Distance: 0.16 miles      In-Person   [address] [place] [address]  Language: English  Hours: Mon - Fri 8:00 AM - 4:30 PM  Fees: Free   Phone: (705) 745-3902 Email: [email or web address]. Website: https://www.[facility]./[facility]/departments/health-and-wellness     2  [facility] [employer] [place] - Multifamily Rental Assistance Program Distance: 0.38 miles      Phone/Virtual   [address] [facility] [address] [place] [address]  Language: English  Hours: Mon - Fri 8:00 AM - 5:00 PM Appt. Only  Fees: Free   Phone: (464) 719-2271 Email: mn.housing@[facility] Website: https://www.[facility] Hospital[facility].gov/index.html          Food and Nutrition       Food pantry  3  [facility] Service [place] Distance: 1.13 miles      [address] [address]  Language: English, Hmong, Bengali, Kittitian  Hours: Mon 11:00 AM - 3:00 PM , Wed 11:00 AM - 3:00 PM , Fri 11:00 AM - 3:00 PM  Fees: Free, Self Pay   Phone: (915) 389-3080 Email: Cameron@[facility].org Website: http://[facility] Massachusettsynorth.org/[facility]/ps-dbzt-g-[facility]/     4  [facility] [facility], [facility] Distance: 1.43 miles      [facility], [facility]   [address] [place] [address]  Language: English, Kittitian  Hours: Mon 9:00 AM - 6:00 PM Appt. Only, Tue - Fri 9:00 AM - 5:00 PM Appt. Only  Fees: Free   Phone: (882) 629-4519 Email: info@[employer] Website: http://www.[employer].org/site     SNAP application assistance  5  [facility] - Health and Wellness Distance: 0.16  miles      In-Person, Phone/Virtual   [address] [place] [address]  Language: English  Hours: Mon - Fri 8:00 AM - 4:30 PM  Fees: Free   Phone: (815) 386-9672 Email: lilomagno@Psychiatric. Website: https://www.Psychiatric./your-government/departments/health-and-wellness     6  [place] Department of Human Services - MNFoodHelper (SNAP) Distance: 0.25 miles      Phone/Virtual   [address] [address] [place] [address]  Language: English, Hmong, Italian, Mauritian, Portuguese, Albanian  Hours: Mon - Fri 9:00 AM - 5:00 PM  Fees: Free   Phone: (987) 628-4707 Website: https://[place].gov/dhs/people-we-serve/adults/economic-assistance/food-nutrition/programs-and-services/supplemental-nutrition-assistance-program.jsp     Soup kitchen or free meals  7  [facility] - [facility] Distance: 1.48 miles      [address] [address]  Language: English  Hours: Mon - Fri 5:00 PM - 6:00 PM  Fees: Free   Phone: (947) 334-3146 Email: alvaro@[facility] [place].[facility] Website: http://www.[facility] [place].[employer]/     8  [facility] and Service [place] Distance: 1.71 miles      [facility]   [address] [place] [address]  Language: English  Hours: Mon - Fri 11:45 AM - 12:45 PM  Fees: Free   Phone: (378) 106-5852 Email: yulisa@[facility] HospitalAxcelis Technologiesy.org Website: http://[facility].org/[facility]/          Important Numbers & Websites       Emergency Services   911  City Services   311  Poison Control   (453) 316-6383  Suicide Prevention Lifeline   (797) 173-7045 (TALK)  Child Abuse Hotline   (509) 246-3466 (4-A-Child)  Sexual Assault Hotline   (116) 717-3335 (HOPE)  National Runaway Safeline   (783) 505-5192 (RUNAWAY)  All-Options Talkline   (586) 300-3347  Substance Abuse Referral   (681) 951-9806 (HELP)

## 2023-11-07 ENCOUNTER — OFFICE VISIT (OUTPATIENT)
Dept: PSYCHIATRY | Facility: CLINIC | Age: 53
End: 2023-11-07
Payer: COMMERCIAL

## 2023-11-07 DIAGNOSIS — F33.2 SEVERE RECURRENT MAJOR DEPRESSION WITHOUT PSYCHOTIC FEATURES (H): Primary | ICD-10-CM

## 2023-11-07 RX ORDER — LORAZEPAM 0.5 MG/1
0.5 TABLET ORAL EVERY 8 HOURS PRN
Qty: 30 TABLET | Refills: 1 | Status: SHIPPED | OUTPATIENT
Start: 2023-11-07 | End: 2024-06-07

## 2023-11-07 ASSESSMENT — PATIENT HEALTH QUESTIONNAIRE - PHQ9: SUM OF ALL RESPONSES TO PHQ QUESTIONS 1-9: 6

## 2023-11-07 NOTE — TELEPHONE ENCOUNTER
LORazepam (ATIVAN) 0.5 MG tablet    Product Backordered/unavailable: Please check with a non [facility] pharmacy or send alternative

## 2023-11-07 NOTE — PROGRESS NOTES
Interventional Psychiatry Program  [address] [facility] [facility], [address]  [place] [address]  TMS Procedure Note   Valentina Humphries MRN# [id number]  Age: 53 year old   YOB: 1970    Pre-Procedure:  History and Physical: Reviewed in medical record  Consent signed by: Valentina Humphries for this course of treatment on: 10/24/2023    Valentina Humphries comes into clinic today at the request of, Blake Bryan MD, ordering provider for TMS treatments.    Clinical Narrative:  Patient tolerated treatment. PHQ9 was a 6 today. Tilted helmet to only 15 today and patient tolerated treatment better. Increased to 115%     Indications for TMS:  MDD, recurrent, severe; 4+ medication trials (from 2+ classes) ineffective; Psychotherapy ineffective     Procedure Diagnosis:  MDD, recurrent, severe; F33.2    Treatment Hx:  Treatment number this series: 11  Total lifetime treatment number: 11    Allergies   Allergen Reactions    Cefdinir Difficulty breathing     Patient is able to tolerate Penicillin and Amoxicillin without any problems    Latex Itching    Latuda [Lurasidone] Swelling     Facial Swelling    Amoxicillin-Pot Clavulanate Nausea and Vomiting and Diarrhea    Lamotrigine Rash      There were no vitals taken for this visit.    Pause for the Cause  Right patient: Yes  Right procedure/correct coil:  Yes; rTMS; [id number]; H1 coil.   Earplugs in place:  Yes    Procedure  Patient was seated in procedure chair. Identity and procedure was verified. Ear plugs were placed in ears and patient-specific cap was placed on head and tightened appropriately. Ruler locations were verified. Bone conducting headphones were not used.  Coil was placed at 0 - eyebrow - 16 and stimulator was set to 75% (115% of MT).  Initial train was well tolerated so 55 trains were delivered.  Patient tolerated procedure well with minimal right eyebrow movement.    Motor Threshold Determination  Distance from  nasion to inion: 37cm  MT 1: 0 - 6 - 16 @ 65% on 10/24/2023  MT 2: 0 - 6 - 16 @ 65% on 11/2/2023    Standard LDLPFC  Frequency: 18  Train Duration: 2  Total pulses delivered: 1980  Inter-train interval: 20  Tx Loc: 0 - eye - 15(adjusted for comfort)  Energy: 75% (115%MT)  Trains: 55    Date MADRS QIDS PHQ-9   10/24/23 32 19 17   11/3/23  15 7             11/3/2023    11:38 AM 11/6/2023     3:36 PM 11/7/2023     3:41 PM   PHQ-9 SCORE   PHQ-9 Total Score 7 7 6       Plan   -Increase energy as tolerated  -Continue treatment    This service provided today was under the supervising provider of the day, LOUISA Jenkins MD, who was available if needed.    Floresita Garcia, TMS Technician  [facility]  Mental [facility] Neuromodulation

## 2023-11-07 NOTE — TELEPHONE ENCOUNTER
11-7-23  Pt called back & is requesting to switch pharmacies for:  LORazepam (ATIVAN) 0.5 MG tablet   Pt wants to use:  [facility]  [place]

## 2023-11-08 ENCOUNTER — OFFICE VISIT (OUTPATIENT)
Dept: PSYCHIATRY | Facility: CLINIC | Age: 53
End: 2023-11-08
Payer: COMMERCIAL

## 2023-11-08 DIAGNOSIS — F33.2 SEVERE RECURRENT MAJOR DEPRESSION WITHOUT PSYCHOTIC FEATURES (H): Primary | ICD-10-CM

## 2023-11-08 ASSESSMENT — PATIENT HEALTH QUESTIONNAIRE - PHQ9: SUM OF ALL RESPONSES TO PHQ QUESTIONS 1-9: 4

## 2023-11-08 NOTE — PROGRESS NOTES
Interventional Psychiatry Program  [address] [facility], [address]  [place] [address]  TMS Procedure Note   Valentina Humphries MRN# [id number]  Age: 53 year old   YOB: 1970    Pre-Procedure:  History and Physical: Reviewed in medical record  Consent signed by: Valentina Humphries for this course of treatment on: 10/24/2023    Valentina Humphries comes into clinic today at the request of, Blake Bryan MD, ordering provider for TMS treatments.    Clinical Narrative:  Patient tolerated treatment. Patient reports sleep and appetite have improved.     Indications for TMS:  MDD, recurrent, severe; 4+ medication trials (from 2+ classes) ineffective; Psychotherapy ineffective     Procedure Diagnosis:  MDD, recurrent, severe; F33.2    Treatment Hx:  Treatment number this series: 12  Total lifetime treatment number: 12    Allergies   Allergen Reactions    Cefdinir Difficulty breathing     Patient is able to tolerate Penicillin and Amoxicillin without any problems    Latex Itching    Latuda [Lurasidone] Swelling     Facial Swelling    Amoxicillin-Pot Clavulanate Nausea and Vomiting and Diarrhea    Lamotrigine Rash      There were no vitals taken for this visit.    Pause for the Cause  Right patient: Yes  Right procedure/correct coil:  Yes; rTMS; [id number]; H1 coil.   Earplugs in place:  Yes    Procedure  Patient was seated in procedure chair. Identity and procedure was verified. Ear plugs were placed in ears and patient-specific cap was placed on head and tightened appropriately. Ruler locations were verified. Bone conducting headphones were not used.  Coil was placed at 0 - eyebrow - 16 and stimulator was set to 75% (115% of MT).  Initial train was well tolerated so 55 trains were delivered.  Patient tolerated procedure well with minimal right eyebrow movement.    Motor Threshold Determination  Distance from nasion to inion: 37cm  MT 1: 0 - 6 - 16 @ 65% on 10/24/2023  MT  2: 0 - 6 - 16 @ 65% on 11/2/2023    Standard LDLPFC  Frequency: 18  Train Duration: 2  Total pulses delivered: 1980  Inter-train interval: 20  Tx Loc: 0 - eye - 15 (adjusted for comfort)  Energy: 75% (115%MT)  Trains: 55    Date MADRS QIDS PHQ-9   10/24/23 32 19 17   11/3/23  15 7             11/3/2023    11:38 AM 11/6/2023     3:36 PM 11/7/2023     3:41 PM   PHQ-9 SCORE   PHQ-9 Total Score 7 7 6       Plan   -Increase energy as tolerated  -Continue treatment    This service provided today was under the supervising provider of the day, Blake Bryan MD, who was available if needed.    Cecy Boone, TMS Technician  [facility] Neuromodulation

## 2023-11-09 ENCOUNTER — OFFICE VISIT (OUTPATIENT)
Dept: PSYCHIATRY | Facility: CLINIC | Age: 53
End: 2023-11-09
Payer: COMMERCIAL

## 2023-11-09 DIAGNOSIS — F33.2 SEVERE RECURRENT MAJOR DEPRESSION WITHOUT PSYCHOTIC FEATURES (H): Primary | ICD-10-CM

## 2023-11-09 ASSESSMENT — PATIENT HEALTH QUESTIONNAIRE - PHQ9: SUM OF ALL RESPONSES TO PHQ QUESTIONS 1-9: 6

## 2023-11-09 NOTE — PROGRESS NOTES
Interventional Psychiatry Program  [address] [facility], [address]  [place] [address]  TMS Procedure Note   Valentina Humphries MRN# [id number]  Age: 53 year old   YOB: 1970    Pre-Procedure:  History and Physical: Reviewed in medical record  Consent signed by: Valentina Humphries for this course of treatment on: 10/24/2023    Valentina Humphries comes into clinic today at the request of, Blake Bryan MD, ordering provider for TMS treatments.    Clinical Narrative:  Patient tolerated treatment. No changes reported.     Indications for TMS:  MDD, recurrent, severe; 4+ medication trials (from 2+ classes) ineffective; Psychotherapy ineffective     Procedure Diagnosis:  MDD, recurrent, severe; F33.2    Treatment Hx:  Treatment number this series: 13  Total lifetime treatment number: 13    Allergies   Allergen Reactions    Cefdinir Difficulty breathing     Patient is able to tolerate Penicillin and Amoxicillin without any problems    Latex Itching    Latuda [Lurasidone] Swelling     Facial Swelling    Amoxicillin-Pot Clavulanate Nausea and Vomiting and Diarrhea    Lamotrigine Rash      There were no vitals taken for this visit.    Pause for the Cause  Right patient: Yes  Right procedure/correct coil:  Yes; rTMS; [id number]; H1 coil.   Earplugs in place:  Yes    Procedure  Patient was seated in procedure chair. Identity and procedure was verified. Ear plugs were placed in ears and patient-specific cap was placed on head and tightened appropriately. Ruler locations were verified. Bone conducting headphones were not used.  Coil was placed at 0 - eyebrow - 16 and stimulator was set to 78% (120% of MT).  Initial train was well tolerated so 55 trains were delivered.  Patient tolerated procedure well with minimal right eyebrow movement.    Motor Threshold Determination  Distance from nasion to inion: 37cm  MT 1: 0 - 6 - 16 @ 65% on 10/24/2023  MT 2: 0 - 6 - 16 @ 65% on  11/2/2023    Standard LDLPFC  Frequency: 18  Train Duration: 2  Total pulses delivered: 1980  Inter-train interval: 20  Tx Loc: 0 - eye - 15 (adjusted for comfort)  Energy: 78% (120%MT)  Trains: 55  *USE SPACER*    Date MADRS QIDS PHQ-9   10/24/23 32 19 17   11/3/23  15 7             11/7/2023     3:41 PM 11/8/2023     3:42 PM 11/9/2023     3:39 PM   PHQ-9 SCORE   PHQ-9 Total Score 6 4 6       Plan   -Continue treatment    This service provided today was under the supervising provider of the day, LOUISA Jenkins MD, who was available if needed.    Floresita Garcia, TMS Technician  [facility] Neuromodulation

## 2023-11-10 ENCOUNTER — OFFICE VISIT (OUTPATIENT)
Dept: PSYCHIATRY | Facility: CLINIC | Age: 53
End: 2023-11-10
Payer: COMMERCIAL

## 2023-11-10 DIAGNOSIS — N95.1 SYMPTOMATIC MENOPAUSAL OR FEMALE CLIMACTERIC STATES: ICD-10-CM

## 2023-11-10 DIAGNOSIS — F33.2 SEVERE RECURRENT MAJOR DEPRESSION WITHOUT PSYCHOTIC FEATURES (H): Primary | ICD-10-CM

## 2023-11-10 ASSESSMENT — PATIENT HEALTH QUESTIONNAIRE - PHQ9: SUM OF ALL RESPONSES TO PHQ QUESTIONS 1-9: 6

## 2023-11-10 NOTE — PROGRESS NOTES
Interventional Psychiatry Program  [address], [address]  [place] [address]  TMS Procedure Note   Valentina Humphries MRN# [id number]  Age: 53 year old   YOB: 1970    Pre-Procedure:  History and Physical: Reviewed in medical record  Consent signed by: Valentina Humphries for this course of treatment on: 10/24/2023    Valentina Humphries comes into clinic today at the request of, Blake Bryan MD, ordering provider for TMS treatments.    Clinical Narrative:  Patient tolerated treatment. Patient reports sleeping a little better and eating a little better.  And more focused.       Indications for TMS:  MDD, recurrent, severe; 4+ medication trials (from 2+ classes) ineffective; Psychotherapy ineffective     Procedure Diagnosis:  MDD, recurrent, severe; F33.2    Treatment Hx:  Treatment number this series: 14  Total lifetime treatment number: 14    Allergies   Allergen Reactions     Cefdinir Difficulty breathing     Patient is able to tolerate Penicillin and Amoxicillin without any problems     Latex Itching     Latuda [Lurasidone] Swelling     Facial Swelling     Amoxicillin-Pot Clavulanate Nausea and Vomiting and Diarrhea     Lamotrigine Rash      There were no vitals taken for this visit.    Pause for the Cause  Right patient: Yes  Right procedure/correct coil:  Yes; rTMS; [id number]; H1 coil.   Earplugs in place:  Yes    Procedure  Patient was seated in procedure chair. Identity and procedure was verified. Ear plugs were placed in ears and patient-specific cap was placed on head and tightened appropriately. Ruler locations were verified. Bone conducting headphones were not used.  Coil was placed at 0 - eyebrow - 16 and stimulator was set to 78% (120% of MT).  Initial train was well tolerated so 55 trains were delivered.  Patient tolerated procedure well with minimal right eyebrow movement.    Motor Threshold Determination  Distance from nasion to inion:  37cm  MT 1: 0 - 6 - 16 @ 65% on 10/24/2023  MT 2: 0 - 6 - 16 @ 65% on 11/2/2023    Standard LDLPFC  Frequency: 18  Train Duration: 2  Total pulses delivered: 1980  Inter-train interval: 20  Tx Loc: 0 - eye - 15 (adjusted for comfort)  Energy: 78% (120%MT)  Trains: 55  *USE SPACER*    Date MADRS QIDS PHQ-9   10/24/23 32 19 17   11/3/23  15 7   11/10/23  9 6             11/7/2023     3:41 PM 11/8/2023     3:42 PM 11/9/2023     3:39 PM   PHQ-9 SCORE   PHQ-9 Total Score 6 4 6       Plan   -Continue treatment    This service provided today was under the supervising provider of the day, Jesus Graham MD, who was available if needed.    Elizabeth Chan, Psychometrist  [facility]  Mental Health Neuromodulation

## 2023-11-13 ENCOUNTER — OFFICE VISIT (OUTPATIENT)
Dept: PSYCHIATRY | Facility: CLINIC | Age: 53
End: 2023-11-13
Payer: COMMERCIAL

## 2023-11-13 ENCOUNTER — ALLIED HEALTH/NURSE VISIT (OUTPATIENT)
Dept: PSYCHIATRY | Facility: CLINIC | Age: 53
End: 2023-11-13
Payer: COMMERCIAL

## 2023-11-13 DIAGNOSIS — F33.2 SEVERE RECURRENT MAJOR DEPRESSION WITHOUT PSYCHOTIC FEATURES (H): Primary | ICD-10-CM

## 2023-11-13 ASSESSMENT — PATIENT HEALTH QUESTIONNAIRE - PHQ9: SUM OF ALL RESPONSES TO PHQ QUESTIONS 1-9: 10

## 2023-11-13 NOTE — Clinical Note
15 sessions in, noted agitation over the weekend that was difficult for her to deal with this.  Please read note for more details and let me know your thoughts.   Abbey  · Continue prehospital BiPAP nightly

## 2023-11-13 NOTE — PROGRESS NOTES
Interventional Psychiatry Program  [address] [facility], [address]  [place] [address]  TMS Procedure Note   Valentina Humphries MRN# [id number]  Age: 53 year old   YOB: 1970    Pre-Procedure:  History and Physical: Reviewed in medical record  Consent signed by: Valentina Humphries for this course of treatment on: 10/24/2023    Valentina Humphries comes into clinic today at the request of, Blake Bryan MD, ordering provider for TMS treatments.    Clinical Narrative:  Patient tolerated treatment. Patient reports feeling more agitated, especially on the weekends.    Indications for TMS:  MDD, recurrent, severe; 4+ medication trials (from 2+ classes) ineffective; Psychotherapy ineffective     Procedure Diagnosis:  MDD, recurrent, severe; F33.2    Treatment Hx:  Treatment number this series: 15  Total lifetime treatment number: 15    Allergies   Allergen Reactions    Cefdinir Difficulty breathing     Patient is able to tolerate Penicillin and Amoxicillin without any problems    Latex Itching    Latuda [Lurasidone] Swelling     Facial Swelling    Amoxicillin-Pot Clavulanate Nausea and Vomiting and Diarrhea    Lamotrigine Rash      There were no vitals taken for this visit.    Pause for the Cause  Right patient: Yes  Right procedure/correct coil:  Yes; rTMS; [id number]; H1 coil.   Earplugs in place:  Yes    Procedure  Patient was seated in procedure chair. Identity and procedure was verified. Ear plugs were placed in ears and patient-specific cap was placed on head and tightened appropriately. Ruler locations were verified. Bone conducting headphones were not used.  Coil was placed at 0 - eyebrow - 16 and stimulator was set to 78% (120% of MT).  Initial train was well tolerated so 55 trains were delivered.  Patient tolerated procedure well with minimal right eyebrow movement.    Motor Threshold Determination  Distance from nasion to inion: 37cm  MT 1: 0 - 6 - 16 @ 65%  on 10/24/2023  MT 2: 0 - 6 - 16 @ 65% on 11/2/2023    Standard LDLPFC  Frequency: 18  Train Duration: 2  Total pulses delivered: 1980  Inter-train interval: 20  Tx Loc: 0 - eye - 15 (adjusted for comfort)  Energy: 78% (120%MT)  Trains: 55  *USE SPACER*    Date MADRS QIDS PHQ-9   10/24/23 32 19 17   11/3/23  15 7   11/10/23  9 6             11/9/2023     3:39 PM 11/10/2023     9:17 AM 11/13/2023     3:39 PM   PHQ-9 SCORE   PHQ-9 Total Score 6 6 10       Plan   -Continue treatment    This service provided today was under the supervising provider of the day, Suellen Zhang MD, who was available if needed.    Floresita Garcia, TMS Technician  [facility]  Mental [facility] Neuromodulation

## 2023-11-14 ENCOUNTER — OFFICE VISIT (OUTPATIENT)
Dept: PSYCHIATRY | Facility: CLINIC | Age: 53
End: 2023-11-14
Payer: COMMERCIAL

## 2023-11-14 DIAGNOSIS — F33.2 SEVERE RECURRENT MAJOR DEPRESSION WITHOUT PSYCHOTIC FEATURES (H): Primary | ICD-10-CM

## 2023-11-14 ASSESSMENT — PATIENT HEALTH QUESTIONNAIRE - PHQ9: SUM OF ALL RESPONSES TO PHQ QUESTIONS 1-9: 4

## 2023-11-14 NOTE — PROGRESS NOTES
Interventional Psychiatry Program  [address] [facility], [address]  [place] [address]  TMS Procedure Note   Valentina Humphries MRN# [id number]  Age: 53 year old   YOB: 1970    Pre-Procedure:  History and Physical: Reviewed in medical record  Consent signed by: Valentina Humphries for this course of treatment on: 10/24/2023    Valentina Humphries comes into clinic today at the request of, Blake Bryan MD, ordering provider for TMS treatments.    Clinical Narrative:  Patient tolerated treatment. Patient reports feeling much better today.    Indications for TMS:  MDD, recurrent, severe; 4+ medication trials (from 2+ classes) ineffective; Psychotherapy ineffective     Procedure Diagnosis:  MDD, recurrent, severe; F33.2    Treatment Hx:  Treatment number this series: 16  Total lifetime treatment number: 16    Allergies   Allergen Reactions    Cefdinir Difficulty breathing     Patient is able to tolerate Penicillin and Amoxicillin without any problems    Latex Itching    Latuda [Lurasidone] Swelling     Facial Swelling    Amoxicillin-Pot Clavulanate Nausea and Vomiting and Diarrhea    Lamotrigine Rash      There were no vitals taken for this visit.    Pause for the Cause  Right patient: Yes  Right procedure/correct coil:  Yes; rTMS; [id number]; H1 coil.   Earplugs in place:  Yes    Procedure  Patient was seated in procedure chair. Identity and procedure was verified. Ear plugs were placed in ears and patient-specific cap was placed on head and tightened appropriately. Ruler locations were verified. Bone conducting headphones were not used.  Coil was placed at 0 - eyebrow - 16 and stimulator was set to 78% (120% of MT).  Initial train was well tolerated so 55 trains were delivered.  Patient tolerated procedure well with minimal right eyebrow movement.    Motor Threshold Determination  Distance from nasion to inion: 37cm  MT 1: 0 - 6 - 16 @ 65% on 10/24/2023  MT 2: 0 -  6 - 16 @ 65% on 11/2/2023    Standard LDLPFC  Frequency: 18  Train Duration: 2  Total pulses delivered: 1980  Inter-train interval: 20  Tx Loc: 0 - eye - 15 (adjusted for comfort)  Energy: 78% (120%MT)  Trains: 55  *USE SPACER*    Date MADRS QIDS PHQ-9   10/24/23 32 19 17   11/3/23  15 7   11/10/23  9 6             11/10/2023     9:17 AM 11/13/2023     3:39 PM 11/14/2023     3:35 PM   PHQ-9 SCORE   PHQ-9 Total Score 6 10 4       Plan   -Continue treatment    This service provided today was under the supervising provider of the day, LOUISA Jenkins MD, who was available if needed.    Floresita Garcia, TMS Technician  [facility]  Mental [facility] Neuromodulation

## 2023-11-14 NOTE — PROGRESS NOTES
" Physicians:  Care Coordination Note     SITUATION   Valentina Humphries is a 53 year old female who has been receiving Transcranial Magnetic Stimulation (TMS) at the request of Blake Bryan MD.    BACKGROUND     During course of TMS    ASSESSMENT        Met with patient today to check on her during TMS course.    During today's discussion writer and patient discussed:    Patient is reporting concerns regarding how she is responding to TMS.  She stated that up until this past weekend she had felt that TMS was helping.  However she notes that over the weekend she felt very agitated.  She notes that since starting TMS her feelings have become more clear whether it is sadness or agitation.  However notes that this past weekend was the first time she has felt the agitation.  She has concerns on whether TMS is causing agitation and was questioning her \"dose\" of TMS.  We discussed this and that she had a ReMT done not to long ago and her MT was the same.  She states that she does have some outside stressors with finding a new psychiatrist and her  potentially retiring.  She has concerns about her ability to continue to handle the agitation if it continues.  Does note that she is sleeping ok, and denies any changes to her sleep at this time.  Discussed that TMS can be activating and that writer would send a message to Dr. Bryan.          PHQ 9: 10  # of completed TMS Sessions: 15  Toleration of TMS: well          PLAN       Nursing Interventions: education on TMS    Follow-up plan:  Continue TMS wile awaiting response from Dr. Irvin Prather, YULIANA   "

## 2023-11-15 ENCOUNTER — OFFICE VISIT (OUTPATIENT)
Dept: PSYCHIATRY | Facility: CLINIC | Age: 53
End: 2023-11-15
Payer: COMMERCIAL

## 2023-11-15 DIAGNOSIS — F33.2 SEVERE RECURRENT MAJOR DEPRESSION WITHOUT PSYCHOTIC FEATURES (H): Primary | ICD-10-CM

## 2023-11-15 ASSESSMENT — PATIENT HEALTH QUESTIONNAIRE - PHQ9: SUM OF ALL RESPONSES TO PHQ QUESTIONS 1-9: 10

## 2023-11-15 NOTE — PROGRESS NOTES
Interventional Psychiatry Program  [address], [address]  [place] [address]  TMS Procedure Note   Valentina Humphries MRN# [id number]  Age: 53 year old   YOB: 1970    Pre-Procedure:  History and Physical: Reviewed in medical record  Consent signed by: Valentina Humphries for this course of treatment on: 10/24/2023    Valentina Humphries comes into clinic today at the request of, Blake Bryan MD, ordering provider for TMS treatments.    Clinical Narrative:  Patient tolerated treatment. Patient reports feeling more down today.    Indications for TMS:  MDD, recurrent, severe; 4+ medication trials (from 2+ classes) ineffective; Psychotherapy ineffective     Procedure Diagnosis:  MDD, recurrent, severe; F33.2    Treatment Hx:  Treatment number this series: 17  Total lifetime treatment number: 17    Allergies   Allergen Reactions    Cefdinir Difficulty breathing     Patient is able to tolerate Penicillin and Amoxicillin without any problems    Latex Itching    Latuda [Lurasidone] Swelling     Facial Swelling    Amoxicillin-Pot Clavulanate Nausea and Vomiting and Diarrhea    Lamotrigine Rash      There were no vitals taken for this visit.    Pause for the Cause  Right patient: Yes  Right procedure/correct coil:  Yes; rTMS; [id number]; H1 coil.   Earplugs in place:  Yes    Procedure  Patient was seated in procedure chair. Identity and procedure was verified. Ear plugs were placed in ears and patient-specific cap was placed on head and tightened appropriately. Ruler locations were verified. Bone conducting headphones were not used.  Coil was placed at 0 - eyebrow - 16 and stimulator was set to 78% (120% of MT).  Initial train was well tolerated so 55 trains were delivered.  Patient tolerated procedure well with minimal right eyebrow movement.    Motor Threshold Determination  Distance from nasion to inion: 37cm  MT 1: 0 - 6 - 16 @ 65% on 10/24/2023  MT 2: 0 - 6  - 16 @ 65% on 11/2/2023    Standard LDLPFC  Frequency: 18  Train Duration: 2  Total pulses delivered: 1980  Inter-train interval: 20  Tx Loc: 0 - eye - 15 (adjusted for comfort)  Energy: 78% (120%MT)  Trains: 55  *USE SPACER*    Date MADRS QIDS PHQ-9   10/24/23 32 19 17   11/3/23  15 7   11/10/23  9 6             11/10/2023     9:17 AM 11/13/2023     3:39 PM 11/14/2023     3:35 PM   PHQ-9 SCORE   PHQ-9 Total Score 6 10 4       Plan   -Continue treatment    This service provided today was under the supervising provider of the day, Suellen Zhang MD, who was available if needed.    Cecy Boone, TMS Technician  [facility] Neuromodulation

## 2023-11-16 ENCOUNTER — OFFICE VISIT (OUTPATIENT)
Dept: PSYCHIATRY | Facility: CLINIC | Age: 53
End: 2023-11-16
Payer: COMMERCIAL

## 2023-11-16 DIAGNOSIS — F33.2 SEVERE RECURRENT MAJOR DEPRESSION WITHOUT PSYCHOTIC FEATURES (H): Primary | ICD-10-CM

## 2023-11-16 ASSESSMENT — PATIENT HEALTH QUESTIONNAIRE - PHQ9: SUM OF ALL RESPONSES TO PHQ QUESTIONS 1-9: 4

## 2023-11-16 NOTE — PROGRESS NOTES
Interventional Psychiatry Program  [address] [facility], [address]  [place] [address]  TMS Procedure Note   Valentina Humphries MRN# [id number]  Age: 53 year old   YOB: 1970    Pre-Procedure:  History and Physical: Reviewed in medical record  Consent signed by: Valentina Humphries for this course of treatment on: 10/24/2023    Valentina Humphries comes into clinic today at the request of, Blake Bryan MD, ordering provider for TMS treatments.    Clinical Narrative:  Patient tolerated treatment. No changes reported.    Indications for TMS:  MDD, recurrent, severe; 4+ medication trials (from 2+ classes) ineffective; Psychotherapy ineffective     Procedure Diagnosis:  MDD, recurrent, severe; F33.2    Treatment Hx:  Treatment number this series: 18  Total lifetime treatment number: 18    Allergies   Allergen Reactions    Cefdinir Difficulty breathing     Patient is able to tolerate Penicillin and Amoxicillin without any problems    Latex Itching    Latuda [Lurasidone] Swelling     Facial Swelling    Amoxicillin-Pot Clavulanate Nausea and Vomiting and Diarrhea    Lamotrigine Rash      There were no vitals taken for this visit.    Pause for the Cause  Right patient: Yes  Right procedure/correct coil:  Yes; rTMS; [id number]; H1 coil.   Earplugs in place:  Yes    Procedure  Patient was seated in procedure chair. Identity and procedure was verified. Ear plugs were placed in ears and patient-specific cap was placed on head and tightened appropriately. Ruler locations were verified. Bone conducting headphones were not used.  Coil was placed at 0 - eyebrow - 16 and stimulator was set to 78% (120% of MT).  Initial train was well tolerated so 55 trains were delivered.  Patient tolerated procedure well with minimal right eyebrow movement.    Motor Threshold Determination  Distance from nasion to inion: 37cm  MT 1: 0 - 6 - 16 @ 65% on 10/24/2023  MT 2: 0 - 6 - 16 @ 65% on  11/2/2023    Standard LDLPFC  Frequency: 18  Train Duration: 2  Total pulses delivered: 1980  Inter-train interval: 20  Tx Loc: 0 - eye - 15 (adjusted for comfort)  Energy: 78% (120%MT)  Trains: 55  *USE SPACER*    Date MADRS QIDS PHQ-9   10/24/23 32 19 17   11/3/23  15 7   11/10/23  9 6             11/14/2023     3:35 PM 11/15/2023     3:36 PM 11/16/2023     3:35 PM   PHQ-9 SCORE   PHQ-9 Total Score 4 10 4       Plan   -Continue treatment    This service provided today was under the supervising provider of the day, LOUISA Jenkins MD, who was available if needed.    Floresita Garcia, TMS Technician  [facility] Neuromodulation

## 2023-11-17 ENCOUNTER — OFFICE VISIT (OUTPATIENT)
Dept: PSYCHIATRY | Facility: CLINIC | Age: 53
End: 2023-11-17
Payer: COMMERCIAL

## 2023-11-17 DIAGNOSIS — F33.2 SEVERE RECURRENT MAJOR DEPRESSION WITHOUT PSYCHOTIC FEATURES (H): Primary | ICD-10-CM

## 2023-11-17 ASSESSMENT — PATIENT HEALTH QUESTIONNAIRE - PHQ9: SUM OF ALL RESPONSES TO PHQ QUESTIONS 1-9: 6

## 2023-11-17 NOTE — PROGRESS NOTES
Interventional Psychiatry Program  [address] [facility] [address] [facility], [address]  [place] [address]  TMS Procedure Note   Valentina Humphries MRN# [id number]  Age: 53 year old   YOB: 1970    Pre-Procedure:  History and Physical: Reviewed in medical record  Consent signed by: Valentina Humphries for this course of treatment on: 10/24/2023    Valentina Humphries comes into clinic today at the request of, Blake Bryan MD, ordering provider for TMS treatments.    Clinical Narrative:  Patient tolerated treatment. Valentina reports it's easier getting up in the mornings.  She is still having trouble concentrating, but thinks that is due to medication.    Indications for TMS:  MDD, recurrent, severe; 4+ medication trials (from 2+ classes) ineffective; Psychotherapy ineffective     Procedure Diagnosis:  MDD, recurrent, severe; F33.2    Treatment Hx:  Treatment number this series: 19  Total lifetime treatment number: 19    Allergies   Allergen Reactions     Cefdinir Difficulty breathing     Patient is able to tolerate Penicillin and Amoxicillin without any problems     Latex Itching     Latuda [Lurasidone] Swelling     Facial Swelling     Amoxicillin-Pot Clavulanate Nausea and Vomiting and Diarrhea     Lamotrigine Rash      There were no vitals taken for this visit.    Pause for the Cause  Right patient: Yes  Right procedure/correct coil:  Yes; rTMS; [id number]; H1 coil.   Earplugs in place:  Yes    Procedure  Patient was seated in procedure chair. Identity and procedure was verified. Ear plugs were placed in ears and patient-specific cap was placed on head and tightened appropriately. Ruler locations were verified. Bone conducting headphones were not used.  Coil was placed at 0 - eyebrow - 16 and stimulator was set to 78% (120% of MT).  Initial train was well tolerated so 55 trains were delivered.  Patient tolerated procedure well with minimal right eyebrow movement.    Motor Threshold  Determination  Distance from nasion to inion: 37cm  MT 1: 0 - 6 - 16 @ 65% on 10/24/2023  MT 2: 0 - 6 - 16 @ 65% on 11/2/2023    Standard LDLPFC  Frequency: 18  Train Duration: 2  Total pulses delivered: 1980  Inter-train interval: 20  Tx Loc: 0 - eye - 15 (adjusted for comfort)  Energy: 78% (120%MT)  Trains: 55  *USE SPACER*    Date MADRS QIDS PHQ-9   10/24/23 32 19 17   11/3/23  15 7   11/10/23  9 6   11/17/23  10 6             11/14/2023     3:35 PM 11/15/2023     3:36 PM 11/16/2023     3:35 PM   PHQ-9 SCORE   PHQ-9 Total Score 4 10 4       Plan   -Continue treatment    This service provided today was under the supervising provider of the day, Jesus Graham MD, who was available if needed.    Elizabeth Chan, Psychometrist  [facility]  Mental Health Neuromodulation

## 2023-11-20 ENCOUNTER — OFFICE VISIT (OUTPATIENT)
Dept: PSYCHIATRY | Facility: CLINIC | Age: 53
End: 2023-11-20
Payer: COMMERCIAL

## 2023-11-20 ENCOUNTER — TELEPHONE (OUTPATIENT)
Dept: PSYCHIATRY | Facility: CLINIC | Age: 53
End: 2023-11-20
Payer: COMMERCIAL

## 2023-11-20 DIAGNOSIS — F33.2 SEVERE RECURRENT MAJOR DEPRESSION WITHOUT PSYCHOTIC FEATURES (H): Primary | ICD-10-CM

## 2023-11-20 ASSESSMENT — PATIENT HEALTH QUESTIONNAIRE - PHQ9: SUM OF ALL RESPONSES TO PHQ QUESTIONS 1-9: 10

## 2023-11-20 NOTE — TELEPHONE ENCOUNTER
PSYCHIATRY CLINIC PHONE INTAKE     SERVICES REQUESTED / INTERESTED IN          Med Management    Presenting Problem and Brief History                              What would you like to be seen for? (brief description):  Pt specifically interested in medication management with Dr. Linton due to menopausal psychiatric complications. Specifically a major depressive episode. Pt has hx with mood issues in conjunction with her menstrual cycle.     Pt currently is receiving TMS treatment at [facility] clinic and was referred by Dr. Bryan/Ruby Vargas, [facility] to Dr. Linton specifically.     Have you received a mental health diagnosis? Yes   Which one (s): Eating disorder, Anxiety, Depression  Is there any history of developmental delay?  No   Are you currently seeing a mental health provider?  Yes            Who / month last seen:  Getting TMS at [facility]  Do you have mental health records elsewhere?  Yes  Will you sign a release so we can obtain them?  Yes    Have you ever been hospitalized for psychiatric reasons?  Yes  Describe:  Pt was hospitalized in April 2023, and in 1994 and 1996    Do you have current thoughts of self-harm?  No  Pt cites some Passive SI  Do you currently have thoughts of harming others?  No    Do you have any safety concerns? No   If yes to these, offer to reach out to a  for follow up.      Substance Use History     Do you have any history of alcohol / illicit drug use?  No  Describe:  N/A  Have you ever received treatment for this?  No    Describe:  N/A     Social History     Who is the patient's a guardian?  Yes    Name / number: Self  Have you had an ACT team in last 12 months?  No  Describe: N/A   OK to leave a detailed voicemail?  Yes    Would you be interested in learning more about research opportunities for which you or your child may qualify? We can connect you with a team member for more information.  No  If yes, send an [employer] message to Esther Grimm    Medical/ Surgical History                                    Patient Active Problem List   Diagnosis    Severe episode of recurrent major depressive disorder, without psychotic features (H)    History of colonic polyps    Migraine without status migrainosus, not intractable    STEVEN (obstructive sleep apnea)    Irritable bowel syndrome    EBER (generalized anxiety disorder)    Constipation, unspecified constipation type    Family history of colonic polyps    Gastritis and gastroduodenitis    Gastroesophageal reflux disease without esophagitis    Hemorrhoids    Environmental allergies    Family history of glaucoma in father    Myopia of both eyes    Cognitive changes    Eating disorder    Symptomatic menopausal or female climacteric states    Hyperlipidemia          Medications             Have you taken >3 psychiatric medications in your past?  Yes  Do you currently take 5 or more medications, including prescriptions, supplements, and other over the counter products?  Yes    If YES to at least one of these questions:   As part of your evaluation in our clinic, we have specially trained pharmacists as part of your care team. Your provider would like for you to meet with one of our pharmacists to review your current and past medications, ensure your med list is up to date, and queue up any questions or concerns you have about medications. They will review all of your medications, not just for mental health, to help ensure you know what you re taking and that everything is working together.     Please schedule patient in [facility] PSY[facility]IATRY (Leena Gomez or Brigitte Spencer) for 60m MTM in any green space as virtual (video), telephone, or in person (designated in person days per Epic templates).  -Appt notes can say  Psych eval on xx/xx   -Route telephone encounter to the pharmacist who will be seeing the patient.  If patient has questions about insurance coverage or billing, please still schedule the visit and refer them to call the MTM  coordinators at [phone].    Current Outpatient Medications   Medication Sig Dispense Refill    clindamycin (CLEOCIN) 300 MG capsule       desipramine (NORPRAMIN) 50 MG tablet       desvenlafaxine (PRISTIQ) 100 MG 24 hr tablet Take 1 tablet (100 mg) by mouth daily at 2 pm 90 tablet 1    dicyclomine (BENTYL) 10 MG capsule Take 1 capsule (10 mg) by mouth 4 times daily as needed 30 capsule 3    hydrOXYzine (ATARAX) 10 MG tablet Take 10-20 mg by mouth as needed      ketoconazole (NIZORAL) 2 % external cream Apply to lateral feet twice daily for ten days 15 g 0    LORazepam (ATIVAN) 0.5 MG tablet Take 1 tablet (0.5 mg) by mouth every 8 hours as needed for anxiety 30 tablet 1    mirtazapine (REMERON) 15 MG tablet       omega 3 1000 MG CAPS Take 2 capsules by mouth daily      omeprazole (PRILOSEC) 20 MG DR capsule Take 1 capsule (20 mg) by mouth daily 90 capsule 3    polyethylene glycol (MIRALAX) 17 GM/Dose powder Take 17 g by mouth daily as needed      PREMPRO 0.625-2.5 MG tablet       Sod Fluoride-Potassium Nitrate (PREVIDENT 5000 SENSITIVE) 1.1-5 % PSTE Apply 1 Application topically 2 times daily      sodium fluoride (CLINPRO 5000) 1.1 % PSTE dental paste Apply 2 mLs to affected area 2 times daily 113 g 1    traZODone (DESYREL) 100 MG tablet Take 100-300 mg by mouth At Bedtime      vitamin D3 (CHOLECALCIFEROL) 125 MCG (5000 UT) tablet Take 1 tablet by mouth daily           DISPOSITION      Phone screen completed with pt by [facility] Ar[facility] - [facility]  on 11/20/23. Pt scheduled for [facility] eval on 12/15/23 with Dr. Linton. NP sent Via [employer]

## 2023-11-20 NOTE — PROGRESS NOTES
Interventional Psychiatry Program  [address] [facility] [facility], [address]  [place] [address]  TMS Procedure Note   Valentina Humphries MRN# [id number]  Age: 53 year old   YOB: 1970    Pre-Procedure:  History and Physical: Reviewed in medical record  Consent signed by: Valentina Humphries for this course of treatment on: 10/24/2023    Valentina Humphries comes into clinic today at the request of, Blake Bryan MD, ordering provider for TMS treatments.    Clinical Narrative:  Patient tolerated treatment. Went dancing over the weekend.    Indications for TMS:  MDD, recurrent, severe; 4+ medication trials (from 2+ classes) ineffective; Psychotherapy ineffective     Procedure Diagnosis:  MDD, recurrent, severe; F33.2    Treatment Hx:  Treatment number this series: 20  Total lifetime treatment number: 20    Allergies   Allergen Reactions    Cefdinir Difficulty breathing     Patient is able to tolerate Penicillin and Amoxicillin without any problems    Latex Itching    Latuda [Lurasidone] Swelling     Facial Swelling    Amoxicillin-Pot Clavulanate Nausea and Vomiting and Diarrhea    Lamotrigine Rash      There were no vitals taken for this visit.    Pause for the Cause  Right patient: Yes  Right procedure/correct coil:  Yes; rTMS; [id number]; H1 coil.   Earplugs in place:  Yes    Procedure  Patient was seated in procedure chair. Identity and procedure was verified. Ear plugs were placed in ears and patient-specific cap was placed on head and tightened appropriately. Ruler locations were verified. Bone conducting headphones were not used.  Coil was placed at 0 - eyebrow - 16 and stimulator was set to 78% (120% of MT).  Initial train was well tolerated so 55 trains were delivered.  Patient tolerated procedure well with minimal right eyebrow movement.    Motor Threshold Determination  Distance from nasion to inion: 37cm  MT 1: 0 - 6 - 16 @ 65% on 10/24/2023  MT 2: 0 - 6 - 16 @ 65%  on 11/2/2023    Standard LDLPFC  Frequency: 18  Train Duration: 2  Total pulses delivered: 1980  Inter-train interval: 20  Tx Loc: 0 - eye - 15 (adjusted for comfort)  Energy: 78% (120%MT)  Trains: 55  *USE SPACER*    Date MADRS QIDS PHQ-9   10/24/23 32 19 17   11/3/23  15 7   11/10/23  9 6   11/17/23  10 6             11/16/2023     3:35 PM 11/17/2023     9:09 AM 11/20/2023     3:38 PM   PHQ-9 SCORE   PHQ-9 Total Score 4 6 10       Plan   -Continue treatment    This service provided today was under the supervising provider of the day, Suellen Zhang MD, who was available if needed.    Floresita Garcia, TMS Technician  [facility]

## 2023-11-21 ENCOUNTER — OFFICE VISIT (OUTPATIENT)
Dept: PSYCHIATRY | Facility: CLINIC | Age: 53
End: 2023-11-21
Payer: COMMERCIAL

## 2023-11-21 DIAGNOSIS — F33.2 SEVERE RECURRENT MAJOR DEPRESSION WITHOUT PSYCHOTIC FEATURES (H): Primary | ICD-10-CM

## 2023-11-21 ASSESSMENT — PATIENT HEALTH QUESTIONNAIRE - PHQ9: SUM OF ALL RESPONSES TO PHQ QUESTIONS 1-9: 7

## 2023-11-21 NOTE — PROGRESS NOTES
Interventional Psychiatry Program  [address] [facility], [address]  [place] [address]  TMS Procedure Note   Valentina Humphries MRN# [id number]  Age: 53 year old   YOB: 1970    Pre-Procedure:  History and Physical: Reviewed in medical record  Consent signed by: Valentina Humphries for this course of treatment on: 10/24/2023    Valentina Humphries comes into clinic today at the request of, Blake Bryan MD, ordering provider for TMS treatments.    Clinical Narrative:  Patient tolerated treatment. No changes reported.    Indications for TMS:  MDD, recurrent, severe; 4+ medication trials (from 2+ classes) ineffective; Psychotherapy ineffective     Procedure Diagnosis:  MDD, recurrent, severe; F33.2    Treatment Hx:  Treatment number this series: 21  Total lifetime treatment number: 21    Allergies   Allergen Reactions    Cefdinir Difficulty breathing     Patient is able to tolerate Penicillin and Amoxicillin without any problems    Latex Itching    Latuda [Lurasidone] Swelling     Facial Swelling    Amoxicillin-Pot Clavulanate Nausea and Vomiting and Diarrhea    Lamotrigine Rash      There were no vitals taken for this visit.    Pause for the Cause  Right patient: Yes  Right procedure/correct coil:  Yes; rTMS; [id number]; H1 coil.   Earplugs in place:  Yes    Procedure  Patient was seated in procedure chair. Identity and procedure was verified. Ear plugs were placed in ears and patient-specific cap was placed on head and tightened appropriately. Ruler locations were verified. Bone conducting headphones were not used.  Coil was placed at 0 - eyebrow - 16 and stimulator was set to 78% (120% of MT).  Initial train was well tolerated so 55 trains were delivered.  Patient tolerated procedure well with minimal right eyebrow movement.    Motor Threshold Determination  Distance from nasion to inion: 37cm  MT 1: 0 - 6 - 16 @ 65% on 10/24/2023  MT 2: 0 - 6 - 16 @ 65% on  11/2/2023    Standard LDLPFC  Frequency: 18  Train Duration: 2  Total pulses delivered: 1980  Inter-train interval: 20  Tx Loc: 0 - eye - 15 (adjusted for comfort)  Energy: 78% (120%MT)  Trains: 55  *USE SPACER*    Date MADRS QIDS PHQ-9   10/24/23 32 19 17   11/3/23  15 7   11/10/23  9 6   11/17/23  10 6             11/17/2023     9:09 AM 11/20/2023     3:38 PM 11/21/2023     3:35 PM   PHQ-9 SCORE   PHQ-9 Total Score 6 10 7       Plan   -Continue treatment    This service provided today was under the supervising provider of the day, LOUISA Jenkins MD, who was available if needed.    Floresita Garcia, TMS Technician  [facility]  Mental [facility] Neuromodulation

## 2023-11-22 ENCOUNTER — OFFICE VISIT (OUTPATIENT)
Dept: PSYCHIATRY | Facility: CLINIC | Age: 53
End: 2023-11-22
Payer: COMMERCIAL

## 2023-11-22 DIAGNOSIS — F33.2 SEVERE RECURRENT MAJOR DEPRESSION WITHOUT PSYCHOTIC FEATURES (H): Primary | ICD-10-CM

## 2023-11-22 ASSESSMENT — PATIENT HEALTH QUESTIONNAIRE - PHQ9: SUM OF ALL RESPONSES TO PHQ QUESTIONS 1-9: 5

## 2023-11-22 NOTE — PROGRESS NOTES
Interventional Psychiatry Program  [address] [facility], [address]  [place] [address]  TMS Procedure Note   Valentina Humphries MRN# [id number]  Age: 53 year old   YOB: 1970    Pre-Procedure:  History and Physical: Reviewed in medical record  Consent signed by: Valentina Humphries for this course of treatment on: 10/24/2023    Valentian Humphries comes into clinic today at the request of, Blake Bryan MD, ordering provider for TMS treatments.    Clinical Narrative:  Patient tolerated treatment. No changes reported.    Indications for TMS:  MDD, recurrent, severe; 4+ medication trials (from 2+ classes) ineffective; Psychotherapy ineffective     Procedure Diagnosis:  MDD, recurrent, severe; F33.2    Treatment Hx:  Treatment number this series: 22  Total lifetime treatment number: 22    Allergies   Allergen Reactions    Cefdinir Difficulty breathing     Patient is able to tolerate Penicillin and Amoxicillin without any problems    Latex Itching    Latuda [Lurasidone] Swelling     Facial Swelling    Amoxicillin-Pot Clavulanate Nausea and Vomiting and Diarrhea    Lamotrigine Rash      There were no vitals taken for this visit.    Pause for the Cause  Right patient: Yes  Right procedure/correct coil:  Yes; rTMS; [id number]; H1 coil.   Earplugs in place:  Yes    Procedure  Patient was seated in procedure chair. Identity and procedure was verified. Ear plugs were placed in ears and patient-specific cap was placed on head and tightened appropriately. Ruler locations were verified. Bone conducting headphones were not used.  Coil was placed at 0 - eyebrow - 16 and stimulator was set to 78% (120% of MT).  Initial train was well tolerated so 55 trains were delivered.  Patient tolerated procedure well with minimal right eyebrow movement.    Motor Threshold Determination  Distance from nasion to inion: 37cm  MT 1: 0 - 6 - 16 @ 65% on 10/24/2023  MT 2: 0 - 6 - 16 @ 65% on  11/2/2023    Standard LDLPFC  Frequency: 18  Train Duration: 2  Total pulses delivered: 1980  Inter-train interval: 20  Tx Loc: 0 - eye - 16 (adjusted for comfort)  Energy: 78% (120%MT)  Trains: 55  *USE SPACER*    Date MADRS QIDS PHQ-9   10/24/23 32 19 17   11/3/23  15 7   11/10/23  9 6   11/17/23  10 6             11/20/2023     3:38 PM 11/21/2023     3:35 PM 11/22/2023     3:32 PM   PHQ-9 SCORE   PHQ-9 Total Score 10 7 5       Plan   -Continue treatment    This service provided today was under the supervising provider of the day, Jesus Graham MD, who was available if needed.    Floresita Garcia, TMS Technician  [facility]

## 2023-11-24 ENCOUNTER — OFFICE VISIT (OUTPATIENT)
Dept: PSYCHIATRY | Facility: CLINIC | Age: 53
End: 2023-11-24
Payer: COMMERCIAL

## 2023-11-24 DIAGNOSIS — F33.2 SEVERE RECURRENT MAJOR DEPRESSION WITHOUT PSYCHOTIC FEATURES (H): Primary | ICD-10-CM

## 2023-11-24 ASSESSMENT — PATIENT HEALTH QUESTIONNAIRE - PHQ9: SUM OF ALL RESPONSES TO PHQ QUESTIONS 1-9: 10

## 2023-11-24 NOTE — PROGRESS NOTES
Interventional Psychiatry Program  [address] [facility], [address]  [place] [address]  TMS Procedure Note   Valentina Humphries MRN# [id number]  Age: 53 year old   YOB: 1970    Pre-Procedure:  History and Physical: Reviewed in medical record  Consent signed by: Valentina Humphries for this course of treatment on: 10/24/2023    Valentina Humphries comes into clinic today at the request of, Blake Bryan MD, ordering provider for TMS treatments.    Clinical Narrative:  Patient tolerated treatment. No changes reported.    Indications for TMS:  MDD, recurrent, severe; 4+ medication trials (from 2+ classes) ineffective; Psychotherapy ineffective     Procedure Diagnosis:  MDD, recurrent, severe; F33.2    Treatment Hx:  Treatment number this series: 23  Total lifetime treatment number: 23    Allergies   Allergen Reactions    Cefdinir Difficulty breathing     Patient is able to tolerate Penicillin and Amoxicillin without any problems    Latex Itching    Latuda [Lurasidone] Swelling     Facial Swelling    Amoxicillin-Pot Clavulanate Nausea and Vomiting and Diarrhea    Lamotrigine Rash      There were no vitals taken for this visit.    Pause for the Cause  Right patient: Yes  Right procedure/correct coil:  Yes; rTMS; [id number]; H1 coil.   Earplugs in place:  Yes    Procedure  Patient was seated in procedure chair. Identity and procedure was verified. Ear plugs were placed in ears and patient-specific cap was placed on head and tightened appropriately. Ruler locations were verified. Bone conducting headphones were not used.  Coil was placed at 0 - eyebrow - 16 and stimulator was set to 78% (120% of MT).  Initial train was well tolerated so 55 trains were delivered.  Patient tolerated procedure well with minimal right eyebrow movement.    Motor Threshold Determination  Distance from nasion to inion: 37cm  MT 1: 0 - 6 - 16 @ 65% on 10/24/2023  MT 2: 0 - 6 - 16 @ 65% on  11/2/2023    Standard LDLPFC  Frequency: 18  Train Duration: 2  Total pulses delivered: 1980  Inter-train interval: 20  Tx Loc: 0 - eye - 16 (adjusted for comfort)  Energy: 78% (120%MT)  Trains: 55  *USE SPACER*    Date MADRS QIDS PHQ-9   10/24/23 32 19 17   11/3/23  15 7   11/10/23  9 6   11/17/23  10 6   11/24/23  12 5             11/20/2023     3:38 PM 11/21/2023     3:35 PM 11/22/2023     3:32 PM   PHQ-9 SCORE   PHQ-9 Total Score 10 7 5       Plan   -Continue treatment    This service provided today was under the supervising provider of the day, Jesus Graham MD, who was available if needed.    Ale Zuleta RN  [facility]  Mental Health Neuromodulation

## 2023-11-27 ENCOUNTER — OFFICE VISIT (OUTPATIENT)
Dept: PSYCHIATRY | Facility: CLINIC | Age: 53
End: 2023-11-27
Payer: COMMERCIAL

## 2023-11-27 ENCOUNTER — ALLIED HEALTH/NURSE VISIT (OUTPATIENT)
Dept: PSYCHIATRY | Facility: CLINIC | Age: 53
End: 2023-11-27
Payer: COMMERCIAL

## 2023-11-27 DIAGNOSIS — F33.2 SEVERE RECURRENT MAJOR DEPRESSION WITHOUT PSYCHOTIC FEATURES (H): Primary | ICD-10-CM

## 2023-11-27 ASSESSMENT — PATIENT HEALTH QUESTIONNAIRE - PHQ9: SUM OF ALL RESPONSES TO PHQ QUESTIONS 1-9: 14

## 2023-11-27 NOTE — PROGRESS NOTES
"[facility]:  Care Coordination Note     SITUATION   Valentina Humphries is a 53 year old female who has been receiving Transcranial Magnetic Stimulation (TMS) at the request of Blake Bryan MD.    BACKGROUND     During course of TMS    ASSESSMENT        Met with patient today to check on her during TMS course.    During today's discussion writer and patient discussed:    Patient reports that she had a really difficult weekend.  Reports that she spent all day yesterday and most of today crying; she does state that holidays are usually fairly difficult for her.  Patient was concerned as she was having a good response to TMS until this point.  We discussed that TMS cannot treat stressors, and that bad days do continue to happen.  We also spent some time discussing ways to challenge some of the thoughts such as \"what if it continues to get worse.\"  Encouraged patient to fact check her negative thoughts.  We also discussed her coming up with something to put in place of TMS once it is completed.  She does report that her  is a good support and drove her to TMS today.      PHQ 9: 14  # of completed TMS Sessions: 24  Toleration of TMS: well          PLAN       Nursing Interventions: education on TMS    Follow-up plan:  RN to continue to follow during TMS, 1 week post TMS call scheduled.     Sierra Prather RN   "

## 2023-11-27 NOTE — PROGRESS NOTES
Interventional Psychiatry Program  [address], [address]  [place] [address]  TMS Procedure Note   Valentina Humphries MRN# [id number]  Age: 53 year old   YOB: 1970    Pre-Procedure:  History and Physical: Reviewed in medical record  Consent signed by: Valentina Humphries for this course of treatment on: 10/24/2023    Valentina Humphries comes into clinic today at the request of, Blake Bryan MD, ordering provider for TMS treatments.    Clinical Narrative:  Patient tolerated treatment. Had a bad weekend, cried a lot.    Indications for TMS:  MDD, recurrent, severe; 4+ medication trials (from 2+ classes) ineffective; Psychotherapy ineffective     Procedure Diagnosis:  MDD, recurrent, severe; F33.2    Treatment Hx:  Treatment number this series: 24  Total lifetime treatment number: 24    Allergies   Allergen Reactions    Cefdinir Difficulty breathing     Patient is able to tolerate Penicillin and Amoxicillin without any problems    Latex Itching    Latuda [Lurasidone] Swelling     Facial Swelling    Amoxicillin-Pot Clavulanate Nausea and Vomiting and Diarrhea    Lamotrigine Rash      There were no vitals taken for this visit.    Pause for the Cause  Right patient: Yes  Right procedure/correct coil:  Yes; rTMS; [id number]; H1 coil.   Earplugs in place:  Yes    Procedure  Patient was seated in procedure chair. Identity and procedure was verified. Ear plugs were placed in ears and patient-specific cap was placed on head and tightened appropriately. Ruler locations were verified. Bone conducting headphones were not used.  Coil was placed at 0 - eyebrow - 16 and stimulator was set to 78% (120% of MT).  Initial train was well tolerated so 55 trains were delivered.  Patient tolerated procedure well with minimal right eyebrow movement.    Motor Threshold Determination  Distance from nasion to inion: 37cm  MT 1: 0 - 6 - 16 @ 65% on 10/24/2023  MT 2: 0 - 6 - 16 @  65% on 11/2/2023    Standard LDLPFC  Frequency: 18  Train Duration: 2  Total pulses delivered: 1980  Inter-train interval: 20  Tx Loc: 0 - eye - 16 (adjusted for comfort)  Energy: 78% (120%MT)  Trains: 55  *USE SPACER*    Date MADRS QIDS PHQ-9   10/24/23 32 19 17   11/3/23  15 7   11/10/23  9 6   11/17/23  10 6   11/24/23  12 5             11/22/2023     3:32 PM 11/24/2023    12:18 PM 11/27/2023     3:41 PM   PHQ-9 SCORE   PHQ-9 Total Score 5 10 14       Plan   -Continue treatment    This service provided today was under the supervising provider of the day, Suellen Zhang MD, who was available if needed.    Floresita Garcia, TMS Technician  [facility]  Mental [facility] Neuromodulation

## 2023-11-27 NOTE — Clinical Note
Some holiday and money stressors.  However TMS seems to be providing some benefit.  2.5 weeks left.   Abbey

## 2023-11-28 ENCOUNTER — OFFICE VISIT (OUTPATIENT)
Dept: PSYCHIATRY | Facility: CLINIC | Age: 53
End: 2023-11-28
Payer: COMMERCIAL

## 2023-11-28 DIAGNOSIS — F33.2 SEVERE RECURRENT MAJOR DEPRESSION WITHOUT PSYCHOTIC FEATURES (H): Primary | ICD-10-CM

## 2023-11-28 ASSESSMENT — PATIENT HEALTH QUESTIONNAIRE - PHQ9: SUM OF ALL RESPONSES TO PHQ QUESTIONS 1-9: 8

## 2023-11-28 NOTE — PROGRESS NOTES
Interventional Psychiatry Program  [address], [address]  [place] [address]  TMS Procedure Note   Valentina Humphries MRN# [id number]  Age: 53 year old   YOB: 1970    Pre-Procedure:  History and Physical: Reviewed in medical record  Consent signed by: Valentina Humphries for this course of treatment on: 10/24/2023    Valentina Humphries comes into clinic today at the request of, Blake Bryan MD, ordering provider for TMS treatments.    Clinical Narrative:  Patient tolerated treatment. Had a bad weekend, cried a lot.    Indications for TMS:  MDD, recurrent, severe; 4+ medication trials (from 2+ classes) ineffective; Psychotherapy ineffective     Procedure Diagnosis:  MDD, recurrent, severe; F33.2    Treatment Hx:  Treatment number this series: 25  Total lifetime treatment number: 25    Allergies   Allergen Reactions    Cefdinir Difficulty breathing     Patient is able to tolerate Penicillin and Amoxicillin without any problems    Latex Itching    Latuda [Lurasidone] Swelling     Facial Swelling    Amoxicillin-Pot Clavulanate Nausea and Vomiting and Diarrhea    Lamotrigine Rash      There were no vitals taken for this visit.    Pause for the Cause  Right patient: Yes  Right procedure/correct coil:  Yes; rTMS; [id number]; H1 coil.   Earplugs in place:  Yes    Procedure  Patient was seated in procedure chair. Identity and procedure was verified. Ear plugs were placed in ears and patient-specific cap was placed on head and tightened appropriately. Ruler locations were verified. Bone conducting headphones were not used.  Coil was placed at 0 - eyebrow - 16 and stimulator was set to 78% (120% of MT).  Initial train was well tolerated so 55 trains were delivered.  Patient tolerated procedure well with minimal right eyebrow movement.    Motor Threshold Determination  Distance from nasion to inion: 37cm  MT 1: 0 - 6 - 16 @ 65% on 10/24/2023  MT 2: 0 - 6 - 16 @  65% on 11/2/2023    Standard LDLPFC  Frequency: 18  Train Duration: 2  Total pulses delivered: 1980  Inter-train interval: 20  Tx Loc: 0 - eye - 16 (adjusted for comfort)  Energy: 78% (120%MT)  Trains: 55  *USE SPACER*    Date MADRS QIDS PHQ-9   10/24/23 32 19 17   11/3/23  15 7   11/10/23  9 6   11/17/23  10 6   11/24/23  12 5             11/24/2023    12:18 PM 11/27/2023     3:41 PM 11/28/2023     3:40 PM   PHQ-9 SCORE   PHQ-9 Total Score 10 14 8       Plan   -Continue treatment    This service provided today was under the supervising provider of the day, LOUISA Jenkins MD, who was available if needed.    Ale Zuleta RN  [facility]  Mental Health Neuromodulation

## 2023-11-29 ENCOUNTER — OFFICE VISIT (OUTPATIENT)
Dept: PSYCHIATRY | Facility: CLINIC | Age: 53
End: 2023-11-29
Payer: COMMERCIAL

## 2023-11-29 DIAGNOSIS — F33.2 SEVERE RECURRENT MAJOR DEPRESSION WITHOUT PSYCHOTIC FEATURES (H): Primary | ICD-10-CM

## 2023-11-29 ASSESSMENT — PATIENT HEALTH QUESTIONNAIRE - PHQ9: SUM OF ALL RESPONSES TO PHQ QUESTIONS 1-9: 13

## 2023-11-29 NOTE — PROGRESS NOTES
Interventional Psychiatry Program  [address], [address]  [place] [address]  TMS Procedure Note   Valentina Humphries MRN# [id number]  Age: 53 year old   YOB: 1970    Pre-Procedure:  History and Physical: Reviewed in medical record  Consent signed by: Valentina Humphries for this course of treatment on: 10/24/2023    Valentina Humphries comes into clinic today at the request of, Blake Bryan MD, ordering provider for TMS treatments.    Clinical Narrative:  Patient tolerated treatment. Having a rough day.    Indications for TMS:  MDD, recurrent, severe; 4+ medication trials (from 2+ classes) ineffective; Psychotherapy ineffective     Procedure Diagnosis:  MDD, recurrent, severe; F33.2    Treatment Hx:  Treatment number this series: 26  Total lifetime treatment number: 26    Allergies   Allergen Reactions    Cefdinir Difficulty breathing     Patient is able to tolerate Penicillin and Amoxicillin without any problems    Latex Itching    Latuda [Lurasidone] Swelling     Facial Swelling    Amoxicillin-Pot Clavulanate Nausea and Vomiting and Diarrhea    Lamotrigine Rash      There were no vitals taken for this visit.    Pause for the Cause  Right patient: Yes  Right procedure/correct coil:  Yes; rTMS; [id number]; H1 coil.   Earplugs in place:  Yes    Procedure  Patient was seated in procedure chair. Identity and procedure was verified. Ear plugs were placed in ears and patient-specific cap was placed on head and tightened appropriately. Ruler locations were verified. Bone conducting headphones were not used.  Coil was placed at 0 - eyebrow - 16 and stimulator was set to 78% (120% of MT).  Initial train was well tolerated so 55 trains were delivered.  Patient tolerated procedure well with minimal right eyebrow movement.    Motor Threshold Determination  Distance from nasion to inion: 37cm  MT 1: 0 - 6 - 16 @ 65% on 10/24/2023  MT 2: 0 - 6 - 16 @ 65% on  11/2/2023    Standard LDLPFC  Frequency: 18  Train Duration: 2  Total pulses delivered: 1980  Inter-train interval: 20  Tx Loc: 0 - eye - 16 (adjusted for comfort)  Energy: 78% (120%MT)  Trains: 55  *USE SPACER*    Date MADRS QIDS PHQ-9   10/24/23 32 19 17   11/3/23  15 7   11/10/23  9 6   11/17/23  10 6   11/24/23  12 5             11/27/2023     3:41 PM 11/28/2023     3:40 PM 11/29/2023     3:33 PM   PHQ-9 SCORE   PHQ-9 Total Score 14 8 13       Plan   -Continue treatment    This service provided today was under the supervising provider of the day, Blake Bryan MD, who was available if needed.    Floresita Garcia, TMS Technician  [facility]  Mental [facility] Neuromodulation

## 2023-11-30 ENCOUNTER — TELEPHONE (OUTPATIENT)
Dept: PSYCHIATRY | Facility: CLINIC | Age: 53
End: 2023-11-30

## 2023-11-30 ENCOUNTER — OFFICE VISIT (OUTPATIENT)
Dept: PSYCHIATRY | Facility: CLINIC | Age: 53
End: 2023-11-30
Payer: COMMERCIAL

## 2023-11-30 DIAGNOSIS — F33.2 SEVERE RECURRENT MAJOR DEPRESSION WITHOUT PSYCHOTIC FEATURES (H): Primary | ICD-10-CM

## 2023-11-30 ASSESSMENT — PATIENT HEALTH QUESTIONNAIRE - PHQ9: SUM OF ALL RESPONSES TO PHQ QUESTIONS 1-9: 6

## 2023-11-30 NOTE — TELEPHONE ENCOUNTER
Writer called to follow up on referral for TRD group. Reached pt by phone, pt said they were interested in group but needed to speak with their supervisor at work. Writer reviewed basic information of group and let pt know they can call the clinic when they have decided or let their TMS tech know who will pass along the information.    Rick East, PhD LP

## 2023-11-30 NOTE — PROGRESS NOTES
Interventional Psychiatry Program  [address], [address]  [place] [address]  TMS Procedure Note   Valentina Humphries MRN# [id number]  Age: 53 year old   YOB: 1970    Pre-Procedure:  History and Physical: Reviewed in medical record  Consent signed by: Valentina Humphries for this course of treatment on: 10/24/2023    Valentina Humphries comes into clinic today at the request of, Blake Bryan MD, ordering provider for TMS treatments.    Clinical Narrative:  Patient tolerated treatment. Patient feeling better today.    Indications for TMS:  MDD, recurrent, severe; 4+ medication trials (from 2+ classes) ineffective; Psychotherapy ineffective     Procedure Diagnosis:  MDD, recurrent, severe; F33.2    Treatment Hx:  Treatment number this series: 27  Total lifetime treatment number: 27    Allergies   Allergen Reactions     Cefdinir Difficulty breathing     Patient is able to tolerate Penicillin and Amoxicillin without any problems     Latex Itching     Latuda [Lurasidone] Swelling     Facial Swelling     Amoxicillin-Pot Clavulanate Nausea and Vomiting and Diarrhea     Lamotrigine Rash      There were no vitals taken for this visit.    Pause for the Cause  Right patient: Yes  Right procedure/correct coil:  Yes; rTMS; [id number]; H1 coil.   Earplugs in place:  Yes    Procedure  Patient was seated in procedure chair. Identity and procedure was verified. Ear plugs were placed in ears and patient-specific cap was placed on head and tightened appropriately. Ruler locations were verified. Bone conducting headphones were not used.  Coil was placed at 0 - eyebrow - 16 and stimulator was set to 78% (120% of MT).  Initial train was well tolerated so 55 trains were delivered.  Patient tolerated procedure well with minimal right eyebrow movement.    Motor Threshold Determination  Distance from nasion to inion: 37cm  MT 1: 0 - 6 - 16 @ 65% on 10/24/2023  MT 2: 0 - 6 - 16 @  65% on 11/2/2023    Standard LDLPFC  Frequency: 18  Train Duration: 2  Total pulses delivered: 1980  Inter-train interval: 20  Tx Loc: 0 - eye - 16 (adjusted for comfort)  Energy: 78% (120%MT)  Trains: 55  *USE SPACER*    Date MADRS QIDS PHQ-9   10/24/23 32 19 17   11/3/23  15 7   11/10/23  9 6   11/17/23  10 6   11/24/23  12 5             11/27/2023     3:41 PM 11/28/2023     3:40 PM 11/29/2023     3:33 PM   PHQ-9 SCORE   PHQ-9 Total Score 14 8 13       Plan   -Continue treatment    This service provided today was under the supervising provider of the day, PASQUALE Jenkins MD, who was available if needed.    Elizabeth Chan, Psychometrist  [facility]  Mental Health Neuromodulation

## 2023-12-01 ENCOUNTER — OFFICE VISIT (OUTPATIENT)
Dept: PSYCHIATRY | Facility: CLINIC | Age: 53
End: 2023-12-01
Payer: COMMERCIAL

## 2023-12-01 DIAGNOSIS — F33.2 SEVERE RECURRENT MAJOR DEPRESSION WITHOUT PSYCHOTIC FEATURES (H): Primary | ICD-10-CM

## 2023-12-01 ASSESSMENT — PATIENT HEALTH QUESTIONNAIRE - PHQ9: SUM OF ALL RESPONSES TO PHQ QUESTIONS 1-9: 4

## 2023-12-01 NOTE — PROGRESS NOTES
Interventional Psychiatry Program  [address] [facility], [address]  [place] [address]  TMS Procedure Note   Valentina Humphries MRN# [id number]  Age: 53 year old   YOB: 1970    Pre-Procedure:  History and Physical: Reviewed in medical record  Consent signed by: Valentina Humphries for this course of treatment on: 10/24/2023    Valentina Humphries comes into clinic today at the request of, Blake Bryan MD, ordering provider for TMS treatments.    Clinical Narrative:  Patient tolerated treatment. Patient feeling better today.    Indications for TMS:  MDD, recurrent, severe; 4+ medication trials (from 2+ classes) ineffective; Psychotherapy ineffective     Procedure Diagnosis:  MDD, recurrent, severe; F33.2    Treatment Hx:  Treatment number this series: 28  Total lifetime treatment number: 28    Allergies   Allergen Reactions     Cefdinir Difficulty breathing     Patient is able to tolerate Penicillin and Amoxicillin without any problems     Latex Itching     Latuda [Lurasidone] Swelling     Facial Swelling     Amoxicillin-Pot Clavulanate Nausea and Vomiting and Diarrhea     Lamotrigine Rash      There were no vitals taken for this visit.    Pause for the Cause  Right patient: Yes  Right procedure/correct coil:  Yes; rTMS; [id number]; H1 coil.   Earplugs in place:  Yes    Procedure  Patient was seated in procedure chair. Identity and procedure was verified. Ear plugs were placed in ears and patient-specific cap was placed on head and tightened appropriately. Ruler locations were verified. Bone conducting headphones were not used.  Coil was placed at 0 - eyebrow - 16 and stimulator was set to 78% (120% of MT).  Initial train was well tolerated so 55 trains were delivered.  Patient tolerated procedure well with minimal right eyebrow movement.    Motor Threshold Determination  Distance from nasion to inion: 37cm  MT 1: 0 - 6 - 16 @ 65% on 10/24/2023  MT 2: 0 - 6 - 16 @  65% on 11/2/2023    Standard LDLPFC  Frequency: 18  Train Duration: 2  Total pulses delivered: 1980  Inter-train interval: 20  Tx Loc: 0 - eye - 16 (adjusted for comfort)  Energy: 78% (120%MT)  Trains: 55  *USE SPACER*    Date MADRS QIDS PHQ-9   10/24/23 32 19 17   11/3/23  15 7   11/10/23  9 6   11/17/23  10 6   11/24/23  12 5   12/1/23  12 4             11/28/2023     3:40 PM 11/29/2023     3:33 PM 11/30/2023     3:34 PM   PHQ-9 SCORE   PHQ-9 Total Score 8 13 6       Plan   -Continue treatment    This service provided today was under the supervising provider of the day, Jesus Graham MD, who was available if needed.    Elizabeth Chan, Psychometrist  [facility]  Mental Health Neuromodulation

## 2023-12-04 ENCOUNTER — OFFICE VISIT (OUTPATIENT)
Dept: PSYCHIATRY | Facility: CLINIC | Age: 53
End: 2023-12-04
Payer: COMMERCIAL

## 2023-12-04 DIAGNOSIS — F33.2 SEVERE RECURRENT MAJOR DEPRESSION WITHOUT PSYCHOTIC FEATURES (H): Primary | ICD-10-CM

## 2023-12-04 ASSESSMENT — PATIENT HEALTH QUESTIONNAIRE - PHQ9: SUM OF ALL RESPONSES TO PHQ QUESTIONS 1-9: 7

## 2023-12-04 NOTE — PROGRESS NOTES
Interventional Psychiatry Program  [address] [facility], [address]  [place] [address]  TMS Procedure Note   Valentina Humphries MRN# [id number]  Age: 53 year old   YOB: 1970    Pre-Procedure:  History and Physical: Reviewed in medical record  Consent signed by: Valentina Humphries for this course of treatment on: 10/24/2023    Valentina Humphries comes into clinic today at the request of, Blake Bryan MD, ordering provider for TMS treatments.    Clinical Narrative:  Patient tolerated treatment. Went dancing over the weekend.    Indications for TMS:  MDD, recurrent, severe; 4+ medication trials (from 2+ classes) ineffective; Psychotherapy ineffective     Procedure Diagnosis:  MDD, recurrent, severe; F33.2    Treatment Hx:  Treatment number this series: 29  Total lifetime treatment number: 29    Allergies   Allergen Reactions    Cefdinir Difficulty breathing     Patient is able to tolerate Penicillin and Amoxicillin without any problems    Latex Itching    Latuda [Lurasidone] Swelling     Facial Swelling    Amoxicillin-Pot Clavulanate Nausea and Vomiting and Diarrhea    Lamotrigine Rash      There were no vitals taken for this visit.    Pause for the Cause  Right patient: Yes  Right procedure/correct coil:  Yes; rTMS; [id number]; H1 coil.   Earplugs in place:  Yes    Procedure  Patient was seated in procedure chair. Identity and procedure was verified. Ear plugs were placed in ears and patient-specific cap was placed on head and tightened appropriately. Ruler locations were verified. Bone conducting headphones were not used.  Coil was placed at 0 - eyebrow - 16 and stimulator was set to 78% (120% of MT).  Initial train was well tolerated so 55 trains were delivered.  Patient tolerated procedure well with minimal right eyebrow movement.    Motor Threshold Determination  Distance from nasion to inion: 37cm  MT 1: 0 - 6 - 16 @ 65% on 10/24/2023  MT 2: 0 - 6 - 16 @ 65%  on 11/2/2023    Standard LDLPFC  Frequency: 18  Train Duration: 2  Total pulses delivered: 1980  Inter-train interval: 20  Tx Loc: 0 - eye - 16 (adjusted for comfort)  Energy: 78% (120%MT)  Trains: 55  *USE SPACER*    Date MADRS QIDS PHQ-9   10/24/23 32 19 17   11/3/23  15 7   11/10/23  9 6   11/17/23  10 6   11/24/23  12 5   12/1/23  12 4             11/30/2023     3:34 PM 12/1/2023     9:08 AM 12/4/2023     3:40 PM   PHQ-9 SCORE   PHQ-9 Total Score 6 4 7       Plan   -Continue treatment    This service provided today was under the supervising provider of the day, Jesus Graham MD, who was available if needed.    Floresita Garcia, TMS Technician  [facility]  Mental [facility] Neuromodulation

## 2023-12-05 ENCOUNTER — OFFICE VISIT (OUTPATIENT)
Dept: PSYCHIATRY | Facility: CLINIC | Age: 53
End: 2023-12-05
Payer: COMMERCIAL

## 2023-12-05 DIAGNOSIS — F33.2 SEVERE EPISODE OF RECURRENT MAJOR DEPRESSIVE DISORDER, WITHOUT PSYCHOTIC FEATURES (H): Primary | ICD-10-CM

## 2023-12-06 ENCOUNTER — OFFICE VISIT (OUTPATIENT)
Dept: PSYCHIATRY | Facility: CLINIC | Age: 53
End: 2023-12-06
Payer: COMMERCIAL

## 2023-12-06 DIAGNOSIS — F33.2 SEVERE EPISODE OF RECURRENT MAJOR DEPRESSIVE DISORDER, WITHOUT PSYCHOTIC FEATURES (H): Primary | ICD-10-CM

## 2023-12-06 ASSESSMENT — PATIENT HEALTH QUESTIONNAIRE - PHQ9: SUM OF ALL RESPONSES TO PHQ QUESTIONS 1-9: 5

## 2023-12-06 NOTE — PROGRESS NOTES
Interventional Psychiatry Program  [address] [facility], [address]  [place] [address]  TMS Procedure Note   Valentina Humphries MRN# [id number]  Age: 53 year old   YOB: 1970    Pre-Procedure:  History and Physical: Reviewed in medical record  Consent signed by: Valentina Humphries for this course of treatment on: 10/24/2023    Valentina Humphries comes into clinic today at the request of, Blake Bryan MD, ordering provider for TMS treatments.    Clinical Narrative:  Patient tolerated treatment. Had good time at [facility] group yesterday.    Indications for TMS:  MDD, recurrent, severe; 4+ medication trials (from 2+ classes) ineffective; Psychotherapy ineffective     Procedure Diagnosis:  MDD, recurrent, severe; F33.2    Treatment Hx:  Treatment number this series: 30  Total lifetime treatment number: 30    Allergies   Allergen Reactions    Cefdinir Difficulty breathing     Patient is able to tolerate Penicillin and Amoxicillin without any problems    Latex Itching    Latuda [Lurasidone] Swelling     Facial Swelling    Amoxicillin-Pot Clavulanate Nausea and Vomiting and Diarrhea    Lamotrigine Rash      There were no vitals taken for this visit.    Pause for the Cause  Right patient: Yes  Right procedure/correct coil:  Yes; rTMS; [id number]; H1 coil.   Earplugs in place:  Yes    Procedure  Patient was seated in procedure chair. Identity and procedure was verified. Ear plugs were placed in ears and patient-specific cap was placed on head and tightened appropriately. Ruler locations were verified. Bone conducting headphones were not used.  Coil was placed at 0 - eyebrow - 16 and stimulator was set to 78% (120% of MT).  Initial train was well tolerated so 55 trains were delivered.  Patient tolerated procedure well with minimal right eyebrow movement.    Motor Threshold Determination  Distance from nasion to inion: 37cm  MT 1: 0 - 6 - 16 @ 65% on 10/24/2023  MT 2: 0 - 6 -  16 @ 65% on 11/2/2023    Standard LDLPFC  Frequency: 18  Train Duration: 2  Total pulses delivered: 1980  Inter-train interval: 20  Tx Loc: 0 - eye - 16 (adjusted for comfort)  Energy: 78% (120%MT)  Trains: 55  *USE SPACER*    Date MADRS QIDS PHQ-9   10/24/23 32 19 17   11/3/23  15 7   11/10/23  9 6   11/17/23  10 6   11/24/23  12 5   12/1/23  12 4             12/1/2023     9:08 AM 12/4/2023     3:40 PM 12/6/2023     3:39 PM   PHQ-9 SCORE   PHQ-9 Total Score 4 7 5       Plan   -Continue treatment    This service provided today was under the supervising provider of the day, Blake Bryan MD, who was available if needed.    Floresita Garcia, TMS Technician  [facility]  Mental [facility] Neuromodulation

## 2023-12-06 NOTE — PROGRESS NOTES
Interventional Psychiatry Program   Treatment Resistant Depression (TRD) Group  [facility]  Patient: Valentina Humphries (1970)     MRN: [id number]  Today's Date: 12.05.23  Next 90-day Review Date: 3.04.24     Date of Initial Service: 12.05.23    Date of INTIAL Treatment Plan: 12.06.23     Number of Participants: 7  Group Time: 90 minutes  Group Format: Hybrid, in-person and Amwell  Facilitators: Rick East, PhD LP      The treatment resistant depression (TRD) group is based on the cognitive behavioral therapy model that uses psychoeducation, cognitive restructuring, problem solving techniques, and social skills.       Diagnostic criteria for group participation: Major depressive disorder, bipolar disorder and current patient in TRD program      Group Topic for Today: Introduction to group guidelines and structure of group, plan for mutual support and processing by and from group members.      Description: Pt was an active participant and engaged in the discussion with other group members.  Their emotional presentation was cooperative. Pt's mood was Depressed, Normal, and their affect was Tearful at times, but generally calm.  Plan: Continue with group goals to minimize impact of depressive symptoms and maintain stability          Outpatient Treatment Plan      Today's Date: Dec 6, 2023                 90-Day Review Date: 3.04.24     Date of Initial Service: 12.05.23     Diagnosis:  Major depressive disorder, severe, recurrent, without psychotic features     Current symptoms and circumstances that substantiate the diagnosis:  From DEE Bryan MD 9/20/23: crying and not being able to stop crying. Hopelessness, feeling stuck, lack of enthusiasm. See note for more details     How symptoms and/or behaviors are affecting level of function:  Not working, social isolation, limited engagement in activities                  SYMPTOMS; PROBLEMS    MEASURABLE GOALS;    FUNCTIONAL  IMPROVEMENT INTERVENTIONS Gains Made:      Depression reduce depressive symptoms and find enjoyment at least once a day cognitive behavioral therapy; BA; mindfulness practice N/A                                   Frequency of Sessions: Weekly      Discharge and Aftercare Goals: see measurable goals above     Expected duration of treatment: eight weeks     Patient verbally consented to the treatment plan above.             Rick East, PhD LP on 12/6/2023 at 9:34 AM

## 2023-12-07 ENCOUNTER — OFFICE VISIT (OUTPATIENT)
Dept: PSYCHIATRY | Facility: CLINIC | Age: 53
End: 2023-12-07
Payer: COMMERCIAL

## 2023-12-07 DIAGNOSIS — F33.2 SEVERE EPISODE OF RECURRENT MAJOR DEPRESSIVE DISORDER, WITHOUT PSYCHOTIC FEATURES (H): Primary | ICD-10-CM

## 2023-12-07 ASSESSMENT — PATIENT HEALTH QUESTIONNAIRE - PHQ9: SUM OF ALL RESPONSES TO PHQ QUESTIONS 1-9: 5

## 2023-12-07 NOTE — PROGRESS NOTES
Interventional Psychiatry Program  [address], [address]  [place] [address]  TMS Procedure Note   Valentina Humphries MRN# [id number]  Age: 53 year old   YOB: 1970    Pre-Procedure:  History and Physical: Reviewed in medical record  Consent signed by: Valentina Humphries for this course of treatment on: 10/24/2023    Valentina Humphries comes into clinic today at the request of, Blake Bryan MD, ordering provider for TMS treatments.    Clinical Narrative:  Patient tolerated treatment. Patient reports they are looking into getting a new job as symptoms continue to improve.    Indications for TMS:  MDD, recurrent, severe; 4+ medication trials (from 2+ classes) ineffective; Psychotherapy ineffective     Procedure Diagnosis:  MDD, recurrent, severe; F33.2    Treatment Hx:  Treatment number this series: 31  Total lifetime treatment number: 31    Allergies   Allergen Reactions    Cefdinir Difficulty breathing     Patient is able to tolerate Penicillin and Amoxicillin without any problems    Latex Itching    Latuda [Lurasidone] Swelling     Facial Swelling    Amoxicillin-Pot Clavulanate Nausea and Vomiting and Diarrhea    Lamotrigine Rash      There were no vitals taken for this visit.    Pause for the Cause  Right patient: Yes  Right procedure/correct coil:  Yes; rTMS; [id number]; H1 coil.   Earplugs in place:  Yes    Procedure  Patient was seated in procedure chair. Identity and procedure was verified. Ear plugs were placed in ears and patient-specific cap was placed on head and tightened appropriately. Ruler locations were verified. Bone conducting headphones were not used.  Coil was placed at 0 - eyebrow - 16 and stimulator was set to 78% (120% of MT).  Initial train was well tolerated so 55 trains were delivered.  Patient tolerated procedure well with minimal right eyebrow movement.    Motor Threshold Determination  Distance from nasion to inion: 37cm  MT  1: 0 - 6 - 16 @ 65% on 10/24/2023  MT 2: 0 - 6 - 16 @ 65% on 11/2/2023    Standard LDLPFC  Frequency: 18  Train Duration: 2  Total pulses delivered: 1980  Inter-train interval: 20  Tx Loc: 0 - eye - 16 (adjusted for comfort)  Energy: 78% (120%MT)  Trains: 55  *USE SPACER*    Date MADRS QIDS PHQ-9   10/24/23 32 19 17   11/3/23  15 7   11/10/23  9 6   11/17/23  10 6   11/24/23  12 5   12/1/23  12 4             12/1/2023     9:08 AM 12/4/2023     3:40 PM 12/6/2023     3:39 PM   PHQ-9 SCORE   PHQ-9 Total Score 4 7 5       Plan   -Continue treatment    This service provided today was under the supervising provider of the day, Jesus Graham MD, who was available if needed.    Cecy Boone, TMS Technician  [facility]  Mental [facility] Neuromodulation

## 2023-12-08 ENCOUNTER — OFFICE VISIT (OUTPATIENT)
Dept: PSYCHIATRY | Facility: CLINIC | Age: 53
End: 2023-12-08
Payer: COMMERCIAL

## 2023-12-08 DIAGNOSIS — F33.2 SEVERE EPISODE OF RECURRENT MAJOR DEPRESSIVE DISORDER, WITHOUT PSYCHOTIC FEATURES (H): Primary | ICD-10-CM

## 2023-12-08 ASSESSMENT — PATIENT HEALTH QUESTIONNAIRE - PHQ9: SUM OF ALL RESPONSES TO PHQ QUESTIONS 1-9: 3

## 2023-12-08 NOTE — PROGRESS NOTES
Interventional Psychiatry Program  [address] [facility] [address], [address]  [place] [address]  TMS Procedure Note   Valentina Humphries MRN# [id number]  Age: 53 year old   YOB: 1970    Pre-Procedure:  History and Physical: Reviewed in medical record  Consent signed by: Valentina Humphries for this course of treatment on: 10/24/2023    Valentina Humphries comes into clinic today at the request of, Blake Bryan MD, ordering provider for TMS treatments.    Clinical Narrative:  Patient tolerated treatment.     Indications for TMS:  MDD, recurrent, severe; 4+ medication trials (from 2+ classes) ineffective; Psychotherapy ineffective     Procedure Diagnosis:  MDD, recurrent, severe; F33.2    Treatment Hx:  Treatment number this series: 32  Total lifetime treatment number: 32    Allergies   Allergen Reactions     Cefdinir Difficulty breathing     Patient is able to tolerate Penicillin and Amoxicillin without any problems     Latex Itching     Latuda [Lurasidone] Swelling     Facial Swelling     Amoxicillin-Pot Clavulanate Nausea and Vomiting and Diarrhea     Lamotrigine Rash      There were no vitals taken for this visit.    Pause for the Cause  Right patient: Yes  Right procedure/correct coil:  Yes; rTMS; [id number]; H1 coil.   Earplugs in place:  Yes    Procedure  Patient was seated in procedure chair. Identity and procedure was verified. Ear plugs were placed in ears and patient-specific cap was placed on head and tightened appropriately. Ruler locations were verified. Bone conducting headphones were not used.  Coil was placed at 0 - eyebrow - 16 and stimulator was set to 78% (120% of MT).  Initial train was well tolerated so 55 trains were delivered.  Patient tolerated procedure well with minimal right eyebrow movement.    Motor Threshold Determination  Distance from nasion to inion: 37cm  MT 1: 0 - 6 - 16 @ 65% on 10/24/2023  MT 2: 0 - 6 - 16 @ 65% on 11/2/2023    Standard  LDLPFC  Frequency: 18  Train Duration: 2  Total pulses delivered: 1980  Inter-train interval: 20  Tx Loc: 0 - eye - 16 (adjusted for comfort)  Energy: 78% (120%MT)  Trains: 55  *USE SPACER*    Date MADRS QIDS PHQ-9   10/24/23 32 19 17   11/3/23  15 7   11/10/23  9 6   11/17/23  10 6   11/24/23  12 5   12/1/23  12 4   12/8/23  7 3                   12/4/2023     3:40 PM 12/6/2023     3:39 PM 12/7/2023     3:42 PM   PHQ-9 SCORE   PHQ-9 Total Score 7 5 5       Plan   -Continue treatment    This service provided today was under the supervising provider of the day, Jesus Graham MD, who was available if needed.    Elizabeth Chan, Psychometrist  [facility]  Mental Health Neuromodulation

## 2023-12-11 ENCOUNTER — OFFICE VISIT (OUTPATIENT)
Dept: PSYCHIATRY | Facility: CLINIC | Age: 53
End: 2023-12-11
Payer: COMMERCIAL

## 2023-12-11 DIAGNOSIS — F33.2 SEVERE EPISODE OF RECURRENT MAJOR DEPRESSIVE DISORDER, WITHOUT PSYCHOTIC FEATURES (H): Primary | ICD-10-CM

## 2023-12-11 ASSESSMENT — PATIENT HEALTH QUESTIONNAIRE - PHQ9: SUM OF ALL RESPONSES TO PHQ QUESTIONS 1-9: 5

## 2023-12-11 NOTE — PROGRESS NOTES
Interventional Psychiatry Program  [address] [facility], [address]  [place] [address]  TMS Procedure Note   Valentina Humphries MRN# [id number]  Age: 53 year old   YOB: 1970    Pre-Procedure:  History and Physical: Reviewed in medical record  Consent signed by: Valentina Humphries for this course of treatment on: 10/24/2023    Valentina Humphries comes into clinic today at the request of, Blake Bryan MD, ordering provider for TMS treatments.    Clinical Narrative:  Patient tolerated treatment. Patient stated that they had a good morning. No complaints.     Indications for TMS:  MDD, recurrent, severe; 4+ medication trials (from 2+ classes) ineffective; Psychotherapy ineffective     Procedure Diagnosis:  MDD, recurrent, severe; F33.2    Treatment Hx:  Treatment number this series: 33  Total lifetime treatment number: 33    Allergies   Allergen Reactions    Cefdinir Difficulty breathing     Patient is able to tolerate Penicillin and Amoxicillin without any problems    Latex Itching    Latuda [Lurasidone] Swelling     Facial Swelling    Amoxicillin-Pot Clavulanate Nausea and Vomiting and Diarrhea    Lamotrigine Rash      There were no vitals taken for this visit.    Pause for the Cause  Right patient: Yes  Right procedure/correct coil:  Yes; rTMS; [id number]; H1 coil.   Earplugs in place:  Yes    Procedure  Patient was seated in procedure chair. Identity and procedure was verified. Ear plugs were placed in ears and patient-specific cap was placed on head and tightened appropriately. Ruler locations were verified. Bone conducting headphones were not used.  Coil was placed at 0 - eyebrow - 16 and stimulator was set to 78% (120% of MT).  Initial train was well tolerated so 55 trains were delivered.  Patient tolerated procedure well with minimal right eyebrow movement.    Motor Threshold Determination  Distance from nasion to inion: 37cm  MT 1: 0 - 6 - 16 @ 65% on  10/24/2023  MT 2: 0 - 6 - 16 @ 65% on 11/2/2023    Standard LDLPFC  Frequency: 18  Train Duration: 2  Total pulses delivered: 1980  Inter-train interval: 20  Tx Loc: 0 - eye - 16 (adjusted for comfort)  Energy: 78% (120%MT)  Trains: 55  *USE SPACER*    Date MADRS QIDS PHQ-9   10/24/23 32 19 17   11/3/23  15 7   11/10/23  9 6   11/17/23  10 6   11/24/23  12 5   12/1/23  12 4   12/8/23  7 3                   12/7/2023     3:42 PM 12/8/2023    10:18 AM 12/11/2023     3:47 PM   PHQ-9 SCORE   PHQ-9 Total Score 5 3 5       Plan   -Continue treatment    This service provided today was under the supervising provider of the day, LEANNE Zhang MD, who was available if needed.    Ale Zuleta RN  [facility]  Mental Health Neuromodulation

## 2023-12-12 ENCOUNTER — OFFICE VISIT (OUTPATIENT)
Dept: PSYCHIATRY | Facility: CLINIC | Age: 53
End: 2023-12-12
Payer: COMMERCIAL

## 2023-12-12 DIAGNOSIS — F33.2 SEVERE EPISODE OF RECURRENT MAJOR DEPRESSIVE DISORDER, WITHOUT PSYCHOTIC FEATURES (H): Primary | ICD-10-CM

## 2023-12-13 ENCOUNTER — OFFICE VISIT (OUTPATIENT)
Dept: PSYCHIATRY | Facility: CLINIC | Age: 53
End: 2023-12-13
Payer: COMMERCIAL

## 2023-12-13 DIAGNOSIS — F33.2 SEVERE EPISODE OF RECURRENT MAJOR DEPRESSIVE DISORDER, WITHOUT PSYCHOTIC FEATURES (H): Primary | ICD-10-CM

## 2023-12-13 ASSESSMENT — PATIENT HEALTH QUESTIONNAIRE - PHQ9: SUM OF ALL RESPONSES TO PHQ QUESTIONS 1-9: 3

## 2023-12-13 NOTE — PROGRESS NOTES
Interventional Psychiatry Program  [address] [facility] [address] [facility], [address]  [place] [address]  TMS Procedure Note   Valentina Humphries MRN# [id number]  Age: 53 year old   YOB: 1970    Pre-Procedure:  History and Physical: Reviewed in medical record  Consent signed by: Valentina Humphries for this course of treatment on: 10/24/2023    Valentina Humphries comes into clinic today at the request of, Blake Bryan MD, ordering provider for TMS treatments.    Clinical Narrative:  Patient tolerated treatment. Patient is really enjoying Group on Tuesdays.    Indications for TMS:  MDD, recurrent, severe; 4+ medication trials (from 2+ classes) ineffective; Psychotherapy ineffective     Procedure Diagnosis:  MDD, recurrent, severe; F33.2    Treatment Hx:  Treatment number this series: 34  Total lifetime treatment number: 34    Allergies   Allergen Reactions    Cefdinir Difficulty breathing     Patient is able to tolerate Penicillin and Amoxicillin without any problems    Latex Itching    Latuda [Lurasidone] Swelling     Facial Swelling    Amoxicillin-Pot Clavulanate Nausea and Vomiting and Diarrhea    Lamotrigine Rash      There were no vitals taken for this visit.    Pause for the Cause  Right patient: Yes  Right procedure/correct coil:  Yes; rTMS; [id number]; H1 coil.   Earplugs in place:  Yes    Procedure  Patient was seated in procedure chair. Identity and procedure was verified. Ear plugs were placed in ears and patient-specific cap was placed on head and tightened appropriately. Ruler locations were verified. Bone conducting headphones were not used.  Coil was placed at 0 - eyebrow - 16 and stimulator was set to 78% (120% of MT).  Initial train was well tolerated so 55 trains were delivered.  Patient tolerated procedure well with minimal right eyebrow movement.    Motor Threshold Determination  Distance from nasion to inion: 37cm  MT 1: 0 - 6 - 16 @ 65% on 10/24/2023  MT 2: 0  - 6 - 16 @ 65% on 11/2/2023    Standard LDLPFC  Frequency: 18  Train Duration: 2  Total pulses delivered: 1980  Inter-train interval: 20  Tx Loc: 0 - eye - 16 (adjusted for comfort)  Energy: 78% (120%MT)  Trains: 55  *USE SPACER*    Date MADRS QIDS PHQ-9   10/24/23 32 19 17   11/3/23  15 7   11/10/23  9 6   11/17/23  10 6   11/24/23  12 5   12/1/23  12 4   12/8/23  7 3                   12/8/2023    10:18 AM 12/11/2023     3:47 PM 12/13/2023     3:46 PM   PHQ-9 SCORE   PHQ-9 Total Score 3 5 3       Plan   -Continue treatment    This service provided today was under the supervising provider of the day, Blake Bryan MD, who was available if needed.    Floresita Garcia, TMS Technician  [facility]  Mental [facility]

## 2023-12-13 NOTE — PROGRESS NOTES
Interventional Psychiatry Program   Treatment Resistant Depression (TRD) Group  [facility], [place]  Patient: Valentina Humphries (1970)     MRN: [id number]  Today's Date: 12.12.23  Next 90-day Review Date: 3.04.24     Date of Initial Service: 12.05.23    Date of INTIAL Treatment Plan: 12.06.23     Number of Participants: 6  Group Time: 90 minutes  Group Format: Hybrid, in-person and Amwell  Facilitators: Rick East, PhD LP      The treatment resistant depression (TRD) group is based on the cognitive behavioral therapy model that uses psychoeducation, cognitive restructuring, problem solving techniques, and social skills.       Diagnostic criteria for group participation: Major depressive disorder, bipolar disorder and current patient in TRD program      Group Topic for Today: Check in on intentions and goals from the previous week, psychoeducation on psychotherapy for depression and group discussion on group members experience with therapy including benefits and non-beneficial experiences.       Description: Pt was an active participant and engaged in the discussion with other group members.  Their emotional presentation was cooperative. Pt's mood was Depressed, Normal, and their affect was calm / composed.  Plan: Continue with group goals to minimize impact of depressive symptoms and maintain stability          Outpatient Treatment Plan      Today's Date: Dec 6, 2023                 90-Day Review Date: 3.04.24     Date of Initial Service: 12.05.23     Diagnosis:  Major depressive disorder, severe, recurrent, without psychotic features     Current symptoms and circumstances that substantiate the diagnosis:  From DEE Bryan MD 9/20/23: crying and not being able to stop crying. Hopelessness, feeling stuck, lack of enthusiasm. See note for more details     How symptoms and/or behaviors are affecting level of function:  Not working, social isolation, limited engagement in  activities                  SYMPTOMS; PROBLEMS    MEASURABLE GOALS;    FUNCTIONAL IMPROVEMENT INTERVENTIONS Gains Made:      Depression reduce depressive symptoms and find enjoyment at least once a day cognitive behavioral therapy; BA; mindfulness practice N/A                                   Frequency of Sessions: Weekly      Discharge and Aftercare Goals: see measurable goals above     Expected duration of treatment: eight weeks     Patient verbally consented to the treatment plan above.             Rick East, PhD LP on 12/6/2023 at 9:34 AM

## 2023-12-14 ENCOUNTER — OFFICE VISIT (OUTPATIENT)
Dept: PSYCHIATRY | Facility: CLINIC | Age: 53
End: 2023-12-14
Payer: COMMERCIAL

## 2023-12-14 DIAGNOSIS — F33.2 SEVERE EPISODE OF RECURRENT MAJOR DEPRESSIVE DISORDER, WITHOUT PSYCHOTIC FEATURES (H): Primary | ICD-10-CM

## 2023-12-14 ASSESSMENT — PATIENT HEALTH QUESTIONNAIRE - PHQ9: SUM OF ALL RESPONSES TO PHQ QUESTIONS 1-9: 1
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
## 2023-12-14 NOTE — PROGRESS NOTES
Interventional Psychiatry Program  [address], [address]  [place] [address]  TMS Procedure Note   Valentina Humphries MRN# [id number]  Age: 53 year old   YOB: 1970    Pre-Procedure:  History and Physical: Reviewed in medical record  Consent signed by: Valentina Humphries for this course of treatment on: 10/24/2023    Valentina Humphries comes into clinic today at the request of, Blake Bryan MD, ordering provider for TMS treatments.    Clinical Narrative:  Patient tolerated treatment. Patient reports concentration problems may be due to being overmedicated.    Indications for TMS:  MDD, recurrent, severe; 4+ medication trials (from 2+ classes) ineffective; Psychotherapy ineffective     Procedure Diagnosis:  MDD, recurrent, severe; F33.2    Treatment Hx:  Treatment number this series: 35  Total lifetime treatment number: 35    Allergies   Allergen Reactions    Cefdinir Difficulty breathing     Patient is able to tolerate Penicillin and Amoxicillin without any problems    Latex Itching    Latuda [Lurasidone] Swelling     Facial Swelling    Amoxicillin-Pot Clavulanate Nausea and Vomiting and Diarrhea    Lamotrigine Rash      There were no vitals taken for this visit.    Pause for the Cause  Right patient: Yes  Right procedure/correct coil:  Yes; rTMS; [id number]; H1 coil.   Earplugs in place:  Yes    Procedure  Patient was seated in procedure chair. Identity and procedure was verified. Ear plugs were placed in ears and patient-specific cap was placed on head and tightened appropriately. Ruler locations were verified. Bone conducting headphones were not used.  Coil was placed at 0 - eyebrow - 16 and stimulator was set to 78% (120% of MT).  Initial train was well tolerated so 55 trains were delivered.  Patient tolerated procedure well with minimal right eyebrow movement.    Motor Threshold Determination  Distance from nasion to inion: 37cm  MT 1: 0 - 6 - 16  @ 65% on 10/24/2023  MT 2: 0 - 6 - 16 @ 65% on 11/2/2023    Standard LDLPFC  Frequency: 18  Train Duration: 2  Total pulses delivered: 1980  Inter-train interval: 20  Tx Loc: 0 - eye - 16 (adjusted for comfort)  Energy: 78% (120%MT)  Trains: 55  *USE SPACER*    Date MADRS QIDS PHQ-9   10/24/23 32 19 17   11/3/23  15 7   11/10/23  9 6   11/17/23  10 6   11/24/23  12 5   12/1/23  12 4   12/8/23  7 3                   12/8/2023    10:18 AM 12/11/2023     3:47 PM 12/13/2023     3:46 PM   PHQ-9 SCORE   PHQ-9 Total Score 3 5 3       Plan   -Continue treatment    This service provided today was under the supervising provider of the day, LOUISA Jenkins MD, who was available if needed.    Cecy Boone, TMS Technician  [facility]  Mental [facility] Neuromodulation

## 2023-12-15 ENCOUNTER — OFFICE VISIT (OUTPATIENT)
Dept: PSYCHIATRY | Facility: CLINIC | Age: 53
End: 2023-12-15
Payer: COMMERCIAL

## 2023-12-15 ENCOUNTER — TELEPHONE (OUTPATIENT)
Dept: PSYCHIATRY | Facility: CLINIC | Age: 53
End: 2023-12-15
Payer: COMMERCIAL

## 2023-12-15 ENCOUNTER — OFFICE VISIT (OUTPATIENT)
Dept: PSYCHIATRY | Facility: CLINIC | Age: 53
End: 2023-12-15
Attending: STUDENT IN AN ORGANIZED HEALTH CARE EDUCATION/TRAINING PROGRAM
Payer: COMMERCIAL

## 2023-12-15 VITALS
BODY MASS INDEX: 25.52 KG/M2 | SYSTOLIC BLOOD PRESSURE: 132 MMHG | DIASTOLIC BLOOD PRESSURE: 80 MMHG | HEART RATE: 80 BPM | WEIGHT: 166.6 LBS

## 2023-12-15 DIAGNOSIS — F33.2 SEVERE RECURRENT MAJOR DEPRESSION WITHOUT PSYCHOTIC FEATURES (H): ICD-10-CM

## 2023-12-15 DIAGNOSIS — F33.2 SEVERE RECURRENT MAJOR DEPRESSION WITHOUT PSYCHOTIC FEATURES (H): Primary | ICD-10-CM

## 2023-12-15 DIAGNOSIS — N95.1 SYMPTOMATIC MENOPAUSAL OR FEMALE CLIMACTERIC STATES: ICD-10-CM

## 2023-12-15 PROCEDURE — 99212 OFFICE O/P EST SF 10 MIN: CPT | Performed by: STUDENT IN AN ORGANIZED HEALTH CARE EDUCATION/TRAINING PROGRAM

## 2023-12-15 PROCEDURE — 99417 PROLNG OP E/M EACH 15 MIN: CPT | Performed by: STUDENT IN AN ORGANIZED HEALTH CARE EDUCATION/TRAINING PROGRAM

## 2023-12-15 PROCEDURE — 99215 OFFICE O/P EST HI 40 MIN: CPT | Performed by: STUDENT IN AN ORGANIZED HEALTH CARE EDUCATION/TRAINING PROGRAM

## 2023-12-15 RX ORDER — ZIPRASIDONE HYDROCHLORIDE 20 MG/1
20 CAPSULE ORAL 2 TIMES DAILY WITH MEALS
COMMUNITY
End: 2024-03-15

## 2023-12-15 RX ORDER — OMEGA-3-ACID ETHYL ESTERS 900 MG/1
CAPSULE, LIQUID FILLED ORAL
COMMUNITY
End: 2024-01-03

## 2023-12-15 ASSESSMENT — PATIENT HEALTH QUESTIONNAIRE - PHQ9
SUM OF ALL RESPONSES TO PHQ QUESTIONS 1-9: 5
10. IF YOU CHECKED OFF ANY PROBLEMS, HOW DIFFICULT HAVE THESE PROBLEMS MADE IT FOR YOU TO DO YOUR WORK, TAKE CARE OF THINGS AT HOME, OR GET ALONG WITH OTHER PEOPLE: EXTREMELY DIFFICULT
SUM OF ALL RESPONSES TO PHQ QUESTIONS 1-9: 5
SUM OF ALL RESPONSES TO PHQ QUESTIONS 1-9: 1

## 2023-12-15 ASSESSMENT — PAIN SCALES - GENERAL: PAINLEVEL: MILD PAIN (3)

## 2023-12-15 NOTE — TELEPHONE ENCOUNTER
On 12/13/23 the patient signed an ZENOBIA authorizing the release of all pertinent records from [facility] to [facility] for the purpose of continuing care.  I faxed this ZENOBIA to [phone] on 12/15/23, stamped that it was faxed, dated and sent to scanning. I held a copy in clinic until scanning complete/confirmed. Qamar LÓPEZ

## 2023-12-15 NOTE — PATIENT INSTRUCTIONS
Treatment Plan  -Continue your medications    -RTC: 1/12/23    **For crisis resources, please see the information at the end of this document**   Patient Education    Thank you for coming to the [facility] [place] [facility].     Lab Testing:  If you had lab testing today and your results are reassuring or normal they will be mailed to you or sent through [employer] within 7 days. If the lab tests need quick action we will call you with the results. The phone number we will call with results is # [phone]. If this is not the best number please call our clinic and change the number.     Medication Refills:  If you need any refills please call your pharmacy and they will contact us. Our fax number for refills is [phone].   Three business days of notice are needed for general medication refill requests.   Five business days of notice are needed for controlled substance refill requests.   If you need to change to a different pharmacy, please contact the new pharmacy directly. The new pharmacy will help you get your medications transferred.     Contact Us:  Please call [phone] during business hours (8-5:00 M-F).   If you have medication related questions after clinic hours, or on the weekend, please call [phone].     Financial Assistance [phone]   Medical Records [phone]       MENTAL HEALTH CRISIS RESOURCES:  For a emergency help, please call 911 or go to the nearest Emergency Department.     Emergency Walk-In Options:   EmPATH Unit @ [facility] ([place]): [phone] - Specialized mental health emergency area designed to be calming  [facility] ([place]): [phone]  [facility] ([place]): [phone]  [facility] ([place]): [phone]    [facility] Crisis Information:   [place]: [phone]  [place]: [phone]  [place] ([place]) - Adult: [phone]     Child: [phone]  [place] - Adult:  [phone]     Child: [phone]  [place]: [phone]  List of all [facility] resources:   https://[place].gov/dhs/people-we-serve/adults/health-care/mental-health/resources/crisis-contacts.jsp    National Crisis Information:   Crisis Text Line: Text  [place]  to 279986  Suicide & Crisis Lifeline: 988  National Suicide Prevention Lifeline: 7-855-974-TALK (1-160.876.1592)       For online chat options, visit https://suicidepreventionlifeline.org/chat/  Poison Control Center: 3-568-847-1354  Trans Lifeline: 1-004-152-9626 - Hotline for transgender people of all ages  The [employer] Project: 7-973-527-4404 - Hotline for LGBT youth     For Non-Emergency Support:   Fast Tracker: Mental Health & Substance Use Disorder Resources -   https://www.menschmaschine publishingtrackVocalIQn.org/

## 2023-12-15 NOTE — PROGRESS NOTES
Interventional Psychiatry Program  [address], [address]  [place] [address]  TMS Procedure Note   Valentina Humphries MRN# [id number]  Age: 53 year old   YOB: 1970    Pre-Procedure:  History and Physical: Reviewed in medical record  Consent signed by: Valentina Humphries for this course of treatment on: 10/24/2023    Valentina Humphries comes into clinic today at the request of, Blake Bryan MD, ordering provider for TMS treatments.    Clinical Narrative:  Patient tolerated treatment. Patient feeling good about last day. Plans to spend her time each day doing exercise or going to her art space.    Indications for TMS:  MDD, recurrent, severe; 4+ medication trials (from 2+ classes) ineffective; Psychotherapy ineffective     Procedure Diagnosis:  MDD, recurrent, severe; F33.2    Treatment Hx:  Treatment number this series: 36  Total lifetime treatment number: 36    Allergies   Allergen Reactions    Cefdinir Difficulty breathing     Patient is able to tolerate Penicillin and Amoxicillin without any problems    Latex Itching    Latuda [Lurasidone] Swelling     Facial Swelling    Amoxicillin-Pot Clavulanate Nausea and Vomiting and Diarrhea    Lamotrigine Rash      There were no vitals taken for this visit.    Pause for the Cause  Right patient: Yes  Right procedure/correct coil:  Yes; rTMS; [id number]; H1 coil.   Earplugs in place:  Yes    Procedure  Patient was seated in procedure chair. Identity and procedure was verified. Ear plugs were placed in ears and patient-specific cap was placed on head and tightened appropriately. Ruler locations were verified. Bone conducting headphones were not used.  Coil was placed at 0 - eyebrow - 16 and stimulator was set to 78% (120% of MT).  Initial train was well tolerated so 55 trains were delivered.  Patient tolerated procedure well with minimal right eyebrow movement.    Motor Threshold Determination  Distance from  nasion to inion: 37cm  MT 1: 0 - 6 - 16 @ 65% on 10/24/2023  MT 2: 0 - 6 - 16 @ 65% on 11/2/2023    Standard LDLPFC  Frequency: 18  Train Duration: 2  Total pulses delivered: 1980  Inter-train interval: 20  Tx Loc: 0 - eye - 16 (adjusted for comfort)  Energy: 78% (120%MT)  Trains: 55  *USE SPACER*    Date MADRS QIDS PHQ-9   10/24/23 32 19 17   11/3/23  15 7   11/10/23  9 6   11/17/23  10 6   11/24/23  12 5   12/1/23  12 4   12/8/23  7 3   12/15/23 8 8 1             12/14/2023     3:42 PM 12/15/2023     7:42 AM 12/15/2023    10:17 AM   PHQ-9 SCORE   PHQ-9 Total Score MyChart  5 (Mild depression)    PHQ-9 Total Score 1 5 1       Plan   -Continue treatment    This service provided today was under the supervising provider of the day, Suellen Zhang MD, who was available if needed.    Floresita Garcia, TMS Technician  [facility]  Mental Health Neuromodulation

## 2023-12-15 NOTE — TELEPHONE ENCOUNTER
On 12/13/23, the patient signed a Consent to Communicate authorizing phone messages to be left for him/ her regarding scheduling, medical, and billing information. The form also authorizes Person to Person communication with Chris Humphries (spouse-[phone]) for scheduling, medical, and billing information. I scanned this document into the patient's chart on 12/15/23. Qamar LÓPEZ

## 2023-12-19 ENCOUNTER — OFFICE VISIT (OUTPATIENT)
Dept: PSYCHIATRY | Facility: CLINIC | Age: 53
End: 2023-12-19
Payer: COMMERCIAL

## 2023-12-19 DIAGNOSIS — F33.2 SEVERE RECURRENT MAJOR DEPRESSION WITHOUT PSYCHOTIC FEATURES (H): Primary | ICD-10-CM

## 2023-12-20 NOTE — PROGRESS NOTES
Interventional Psychiatry Program   Treatment Resistant Depression (TRD) Group  [facility], [place]  Patient: Valentina Humphries (1970)     MRN: [id number]  Today's Date: 12.19.23  Next 90-day Review Date: 3.04.24     Date of Initial Service: 12.05.23    Date of INTIAL Treatment Plan: 12.06.23     Number of Participants: 5  Group Time: 90 minutes  Group Format: Hybrid, in-person and Amwell  Facilitators: Rick East, PhD LP      The treatment resistant depression (TRD) group is based on the cognitive behavioral therapy model that uses psychoeducation, cognitive restructuring, problem solving techniques, and social skills.       Diagnostic criteria for group participation: Major depressive disorder, bipolar disorder and current patient in TRD program      Group Topic for Today: Check in on intentions and goals from the previous week, psychoeducation on sleep hygiene, sleep rhythms, and an introduction to Cognitive Behavioral Therapy for Insomnia - as well as group discussion on members experience with sleep impacting their wellbeing.      Description: Pt was an active participant and engaged in the discussion with other group members.  Their emotional presentation was cooperative. Pt's mood was calm, cheerful, cooperative, and their affect was Appropriate.    Plan: Continue with group goals to minimize impact of depressive symptoms and maintain stability          Outpatient Treatment Plan      Today's Date: Dec 6, 2023                 90-Day Review Date: 3.04.24     Date of Initial Service: 12.05.23     Diagnosis:  Major depressive disorder, severe, recurrent, without psychotic features     Current symptoms and circumstances that substantiate the diagnosis:  From DEE Bryan MD 9/20/23: crying and not being able to stop crying. Hopelessness, feeling stuck, lack of enthusiasm. See note for more details     How symptoms and/or behaviors are affecting level of function:  Not working,  social isolation, limited engagement in activities                  SYMPTOMS; PROBLEMS    MEASURABLE GOALS;    FUNCTIONAL IMPROVEMENT INTERVENTIONS Gains Made:      Depression reduce depressive symptoms and find enjoyment at least once a day cognitive behavioral therapy; BA; mindfulness practice N/A                                   Frequency of Sessions: Weekly      Discharge and Aftercare Goals: see measurable goals above     Expected duration of treatment: eight weeks     Patient verbally consented to the treatment plan above.             Rick East, PhD LP on 12/6/2023 at 9:34 AM

## 2023-12-22 ENCOUNTER — VIRTUAL VISIT (OUTPATIENT)
Dept: PSYCHIATRY | Facility: CLINIC | Age: 53
End: 2023-12-22
Payer: COMMERCIAL

## 2023-12-22 DIAGNOSIS — F33.2 SEVERE RECURRENT MAJOR DEPRESSION WITHOUT PSYCHOTIC FEATURES (H): Primary | ICD-10-CM

## 2023-12-22 NOTE — PROGRESS NOTES
Physicians:  Care Coordination Note     SITUATION   Valentina Humphries is a 53 year old female who had been receiving Transcranial Magnetic Stimulation (TMS) at the request of Blake Bryan MD.    BACKGROUND     Post TMS call     ASSESSMENT        Call with patient today to check on her post TMS course.    During today's discussion writer and patient discussed:    Patient reports that she has been doing well since stopping TMS.  She feels like has had more energy and has been able to get more things done.  She notes that she was able to get ready for [place] and even sent out [place] cards for the first time.  She states that she has been more social and has been able to get up earlier and do things.  The only thing she has noticed that is still lingering is her overeating.  She does not that coming to group has been helpful for her as well.                        PLAN       Nursing Interventions: TMS education     Follow-up plan:  RN to follow up PAULINE Prather RN

## 2023-12-26 ENCOUNTER — PATIENT OUTREACH (OUTPATIENT)
Dept: CARE COORDINATION | Facility: CLINIC | Age: 53
End: 2023-12-26
Payer: COMMERCIAL

## 2023-12-27 ENCOUNTER — TELEPHONE (OUTPATIENT)
Dept: OBGYN | Facility: CLINIC | Age: 53
End: 2023-12-27
Payer: COMMERCIAL

## 2023-12-27 ENCOUNTER — TELEPHONE (OUTPATIENT)
Dept: PSYCHIATRY | Facility: CLINIC | Age: 53
End: 2023-12-27
Payer: COMMERCIAL

## 2023-12-27 NOTE — TELEPHONE ENCOUNTER
This encounter is being sent to inform the clinic that this patient has a referral from Odebunmi, Tolulope Oluwadamilola, MD for the diagnoses of Menopause and has requested that this patient be seen within 1-2 weeks.  Based on the availability of our provider(s), we are unable to accommodate this request.    Were all sites offered this patient?  Pt was already scheduled for 2/6/2024    Does scheduling algorithm request to schedule next available?  Patient has been scheduled for the first available opening with Renetta Sewell MD on 02/06/2024.  We have informed the patient that the clinic will review their referral and reach out if a sooner appointment is medically necessary.

## 2023-12-27 NOTE — TELEPHONE ENCOUNTER
[facility] Call Center    Phone Message    May a detailed message be left on voicemail: yes     Reason for Call: Other: Patient called regarding their upcoming appointment with their provider, Dr. Linton. The original plan was for the patient to get scheduled with an OBGYN prior to this appointment. However, the next available opening that the patient was able to find isn't until February. They were wondering whether they should still keep this upcoming appointment on 1/12/24, or if it should be rescheduled until after she's able to be seen by an OBGYN.     Action Taken: Message routed to:  Other: Dr. Linton    Travel Screening: Not Applicable

## 2024-01-02 ENCOUNTER — OFFICE VISIT (OUTPATIENT)
Dept: PSYCHIATRY | Facility: CLINIC | Age: 54
End: 2024-01-02
Payer: COMMERCIAL

## 2024-01-02 DIAGNOSIS — F33.2 SEVERE RECURRENT MAJOR DEPRESSION WITHOUT PSYCHOTIC FEATURES (H): Primary | ICD-10-CM

## 2024-01-03 ENCOUNTER — LAB (OUTPATIENT)
Dept: LAB | Facility: CLINIC | Age: 54
End: 2024-01-03
Payer: COMMERCIAL

## 2024-01-03 ENCOUNTER — OFFICE VISIT (OUTPATIENT)
Dept: PHARMACY | Facility: CLINIC | Age: 54
End: 2024-01-03
Attending: STUDENT IN AN ORGANIZED HEALTH CARE EDUCATION/TRAINING PROGRAM
Payer: COMMERCIAL

## 2024-01-03 VITALS — HEART RATE: 79 BPM | DIASTOLIC BLOOD PRESSURE: 81 MMHG | SYSTOLIC BLOOD PRESSURE: 131 MMHG

## 2024-01-03 DIAGNOSIS — Z78.9 TAKES DIETARY SUPPLEMENTS: ICD-10-CM

## 2024-01-03 DIAGNOSIS — K58.9 IRRITABLE BOWEL SYNDROME, UNSPECIFIED TYPE: ICD-10-CM

## 2024-01-03 DIAGNOSIS — K21.9 GASTROESOPHAGEAL REFLUX DISEASE WITHOUT ESOPHAGITIS: ICD-10-CM

## 2024-01-03 DIAGNOSIS — K02.9 DENTAL CARIES: ICD-10-CM

## 2024-01-03 DIAGNOSIS — F33.1 MODERATE EPISODE OF RECURRENT MAJOR DEPRESSIVE DISORDER (H): Primary | ICD-10-CM

## 2024-01-03 DIAGNOSIS — F41.1 GAD (GENERALIZED ANXIETY DISORDER): ICD-10-CM

## 2024-01-03 DIAGNOSIS — G47.09 OTHER INSOMNIA: ICD-10-CM

## 2024-01-03 DIAGNOSIS — Z78.0 MENOPAUSE: ICD-10-CM

## 2024-01-03 DIAGNOSIS — B49 FUNGAL INFECTION: ICD-10-CM

## 2024-01-03 DIAGNOSIS — R52 PAIN: ICD-10-CM

## 2024-01-03 LAB — VIT D+METAB SERPL-MCNC: 56 NG/ML (ref 20–50)

## 2024-01-03 PROCEDURE — 36415 COLL VENOUS BLD VENIPUNCTURE: CPT

## 2024-01-03 PROCEDURE — 99605 MTMS BY PHARM NP 15 MIN: CPT | Performed by: PHARMACIST

## 2024-01-03 PROCEDURE — 99607 MTMS BY PHARM ADDL 15 MIN: CPT | Performed by: PHARMACIST

## 2024-01-03 PROCEDURE — 82306 VITAMIN D 25 HYDROXY: CPT

## 2024-01-03 RX ORDER — LORAZEPAM 1 MG/1
.5-1 TABLET ORAL 3 TIMES DAILY PRN
COMMUNITY
Start: 2023-11-08 | End: 2024-05-16

## 2024-01-03 RX ORDER — CHLORAL HYDRATE 500 MG
2 CAPSULE ORAL DAILY
COMMUNITY

## 2024-01-03 RX ORDER — ACETAMINOPHEN 500 MG
500 TABLET ORAL 2 TIMES DAILY
COMMUNITY

## 2024-01-03 RX ORDER — OMEPRAZOLE 10 MG/1
10 CAPSULE, DELAYED RELEASE ORAL DAILY
Qty: 30 CAPSULE | Refills: 1 | Status: SHIPPED | OUTPATIENT
Start: 2024-01-03 | End: 2024-01-29

## 2024-01-03 NOTE — PROGRESS NOTES
Medication Therapy Management (MTM) Encounter    ASSESSMENT:                            Medication Adherence/Access: No issues identified    Depression/anxiety/insomnia:   May consider reducing desipramine, as she has been on it for the shortest amount of time and hasn't really noticed much improvement. It may also be contributing to constipation and cognitive issues.  Follow up with psychiatry after meeting with gynecology for assessment.    IBS:    Stable. Continue current medication.    GERD:   Discussed slowly reducing the omeprazole dose to see if symptoms return and ultimately discontinue if possible.  Pt would like to make this change.     Menopause:    Patient has upcoming new OB visit to help address symptoms. Continue current medication for now.    Pain:    Stable. Continue current medication.    Fungal infection:    Stable. Continue current medication.    Dental:      Stable. Continue current medication.    Supplements:   Would like to check Vitamin D level to help with appropriate dosing. Could increase fish oil since she did notice some improvement.     PLAN:                            -Check Vitamin D level--I placed an order  -Reduce omeprazole from 20mg to 10mg daily for at least one month   -Ok to increase fish oil to 2000mg daily  -I will talk to Dr. Malik about reducing the desipramine    Follow-up: 1/29/24 at 10am    SUBJECTIVE/OBJECTIVE:                          Valentina Humphries is a 53 year old female seen for an initial visit. She was referred to me from Dr. Linton, psychiatry.      Reason for visit: med review. Would like to reduce meds where possible    Allergies/ADRs: Reviewed in chart  Past Medical History: Reviewed in chart  Tobacco: She reports that she has never smoked. She has never used smokeless tobacco.  Alcohol: few times per year  Caffeine: infrequent  Medication Adherence/Access: no issues reported    Depression/anxiety/insomnia:   -desvenlafaxine 100mg  "daily  -mirtazapine 15mg at bedtime  -ziprasidone 20mg BID (takes with food)  -desipramine 50mg at bedtime (started 7/2023)  -trazodone 100-300mg nightly (usually takes 100mg) (since 1992)  -lorazepam 0.5mg every 8 hours prn anxiety (since 2012; has been taking on and off during the week)  -hydroxyzine 10mg as needed  -lightbox 5000 lux for 25 minutes every morning    Patient had psychiatry intake appointment with Dr. Linton on 12/15/23. She completed TMS on 12/15/23.  At the 12/15 visit, she reported feeling \"frenetic\" and \"Continues to experience dread about the future-being alone when he  dies because he's 8 years older than her, not having enough money saved up, thoughts about what will happen if Chris wins the election. When she's having these thoughts, she starts to feel more disconnected from her body with difficulty breathing and starts to feel panicky. She describes these panic feeling as hyperventilating and rushing around without achieving much.\"  She was also having concerns with cognition with multiple medication trials for depressive episode that started spring 2022. \"She started to find it hard to keep things in her working memory. Since then, she'd discovering that it's harder to work and she's asking for easier work tasks.\"  At that visit, they elected not to make any medication changes until after TMS was complete and patient was able to meet with OB/GYN for menopausal assessment.    Today, she reported feeling that she has had more difficulty with concentration and memory over the last year, including difficulty being able to manage multiple things at a time and to synthesize thoughts/information. She thinks it was more noticeably worse after starting desvenlafaxine and ziprasidone, but was difficult to tell. She is unsure she's actually noticed much improvement since starting these medications (desvenlafaxine 10/2022, ziprasidone around the same time).  She reported that she's been " crying a lot more at work and will take hydroxyzine if she is feeling upset, though lorazepam is much more effective.    She did have noticeable improvement with using the lightbox. She found that 10,000 lux was too stimulating and would cause issues with sleep. 5000 lux is more tolerable and seems to actually help her sleep.  She has found that sleep feels deeper since TMS. She is falling asleep pretty well and staying asleep through the night. She gets up at 5am for work and feels pretty groggy, which doesn't always improve throughout the day.   She was waking during the night if she didn't take trazodone.     She had tried ziprasidone at 40mg BID for a few weeks and noticed some mood improvement, but was much more hungry and could not tolerate taking the higher dose long term.   Mirtazapine 30mg also caused too much increased appetite.   She had been taking sertraline 150mg for 20 years, which was quite helpful for mood and binge eating, but seemed to stop working when she started into perimenopause. She is interested in retrying sertraline, but is planning to wait on any medication changes until after gynecology visit.    See psychiatry intake note 12/15/23 for details past medication trials.    IBS:    -dicyclomine 10mg QID prn  (since 2010)  -Miralax 17g daily (since 2010)    Dicyclomine is helpful when needed for stomach pain that may happen when she eats a lot of raw vegetables (a couple times per month). She tries to eat dietary fiber (lettuce, vegetables) to increase her intake and feels she gets an adequate amount.     BM frequency: usually daily, though seems to be variable when more anxious  Straining: No  Water intake: 2-3  ~24oz water bottles per day     GERD:   Omeprazole 20 mg once daily (since 2000)  Patient does occasionally have heartburn/reflux, though pretty infrequent. Most recently on [place], possibly eating more and different food than usual. Has experienced dizziness with famotidine  when she tried switching in 8/2023. She stopped omeprazole suddenly and was not feeling well.    Menopause:    -Prempro  0.625/2.5mg daily (started 5/2023)  Medication has been quite helpful with nocturnal sweating. She was admitted for depression 4/2023, one month after starting the medication and wondered if related. Stated that progesterone dose was reduced after that time.     She has new intake with with OB/GYN on 2/6/23 to help with symptom assessment.    Pain:    -acetaminophen 500mg BID  Med is quite helpful for pain that has been going on for 20-30 years. No concerns.         Fungal infection:    -ketoconazole 2% cream as needed  Helpful when needed.    Dental:      -Prevident toothpaste twice daily  No concerns.    Supplements:   -fish oil 1000mg daily  -Vitamin D 5000 units every other day x 6 months (took daily prior to that)  No reported issues at this time. No Vitamin D levels on file. States that she noticed some improvement with cognition after starting fish oil    Today's Vitals: /81   Pulse 79   ----------------      I spent 60 minutes with this patient today. All changes were made via collaborative practice agreement with Liborio Butler CNP. A copy of the visit note was provided to the patient's provider(s).    A summary of these recommendations was given to the patient.    Brigitte Spencer, PharmD  Medication Therapy Management Pharmacist  [facility]       Medication Therapy Recommendations  Gastroesophageal reflux disease without esophagitis    Current Medication: omeprazole (PRILOSEC) 20 MG DR capsule (Discontinued)   Rationale: No medical indication at this time - Unnecessary medication therapy - Indication   Recommendation: Decrease Dose - reduce from 20mg to 10mg daily   Status: Accepted per CPA         Moderate episode of recurrent major depressive disorder (H)    Current Medication: desipramine (NORPRAMIN) 50 MG tablet (Discontinued)    Rationale: Undesirable effect - Adverse medication event - Safety   Recommendation: Discontinue Medication - consider reducing/stopping desipramine   Status: Accepted per Provider         Takes dietary supplements    Current Medication: fish oil-omega-3 fatty acids 1000 MG capsule   Rationale: Dose too low - Dosage too low - Effectiveness   Recommendation: Increase Dose - ok to increase from 1000mg to 2000mg   Status: Accepted - no CPA Needed

## 2024-01-03 NOTE — PATIENT INSTRUCTIONS
"Recommendations from today's MTM visit:                                                    MTM (medication therapy management) is a service provided by a clinical pharmacist designed to help you get the most of out of your medicines.   Today we reviewed what your medicines are for, how to know if they are working, that your medicines are safe and how to make your medicine regimen as easy as possible.      -Check your Vitamin D level. I placed an order today.   -Reduce omeprazole from 20mg to 10mg daily for at least one month. I sent a new prescription.  -Ok to increase fish oil to 2000mg daily    Follow-up: I will call you on Monday, 1/29/24 at 10am    It was great speaking with you today.  I value your experience and would be very thankful for your time in providing feedback in our clinic survey. In the next few days, you may receive an email or text message from [employer] with a link to a survey related to your  clinical pharmacist.\"     To schedule another MTM appointment, please call the clinic directly or you may call the MTM scheduling line at [phone] or toll-free at [phone].     My Clinical Pharmacist's contact information:                                                      Please feel free to contact me with any questions or concerns you have.      Brigitte Spencer, PharmD  Medication Therapy Management Pharmacist  [facility]    "

## 2024-01-03 NOTE — PROGRESS NOTES
Interventional Psychiatry Program   Treatment Resistant Depression (TRD) Group  [facility], [place]  Patient: Valentina Humphries (1970)     MRN: [id number]  Today's Date: 1.02.24  Next 90-day Review Date: 3.04.24     Date of Initial Service: 12.05.23    Date of INTIAL Treatment Plan: 12.06.23     Number of Participants: 6  Group Time: 90 minutes  Group Format: Hybrid, in-person and Amwell  Facilitators: Rick East, PhD LP      The treatment resistant depression (TRD) group is based on the cognitive behavioral therapy model that uses psychoeducation, cognitive restructuring, problem solving techniques, and social skills.       Diagnostic criteria for group participation: Major depressive disorder, bipolar disorder and current patient in TRD program      Group Topic for Today: Check in on 'problems we are working through', group offered feedback, validation, or to vent (in participants words). In the second half of group we discussed Family and Relationships, as it relates to functioning and managing depression and mental illness. Themes from this discussion included the effects of instability in childhood, the challenge of perspective taking in parents, and combating the idea that mental illness is equivalent to weakness.      Description: Pt was an active participant and engaged in the discussion with other group members.  Their emotional presentation was cooperative. Pt's mood was generally calm, at times dysphoric, and their affect was tearful at times.    Plan: Continue with group goals to minimize impact of depressive symptoms and maintain stability          Outpatient Treatment Plan      Today's Date: Dec 6, 2023                 90-Day Review Date: 3.04.24     Date of Initial Service: 12.05.23     Diagnosis:  Major depressive disorder, severe, recurrent, without psychotic features     Current symptoms and circumstances that substantiate the diagnosis:  From DEE Bryan MD  9/20/23: crying and not being able to stop crying. Hopelessness, feeling stuck, lack of enthusiasm. See note for more details     How symptoms and/or behaviors are affecting level of function:  Not working, social isolation, limited engagement in activities                  SYMPTOMS; PROBLEMS    MEASURABLE GOALS;    FUNCTIONAL IMPROVEMENT INTERVENTIONS Gains Made:      Depression reduce depressive symptoms and find enjoyment at least once a day cognitive behavioral therapy; BA; mindfulness practice N/A                                   Frequency of Sessions: Weekly      Discharge and Aftercare Goals: see measurable goals above     Expected duration of treatment: eight weeks     Patient verbally consented to the treatment plan above.             Rick East, PhD LP on 12/6/2023 at 9:34 AM

## 2024-01-03 NOTE — Clinical Note
I met with this patient last week to review meds. She reported not noticing any change when desipramine was added in July and has the possibility to be contributing to cognitive issues and constipation. What do you think about reducing it? Thanks, Brigitte

## 2024-01-08 ASSESSMENT — ANXIETY QUESTIONNAIRES
GAD7 TOTAL SCORE: 14
2. NOT BEING ABLE TO STOP OR CONTROL WORRYING: MORE THAN HALF THE DAYS
GAD7 TOTAL SCORE: 14
3. WORRYING TOO MUCH ABOUT DIFFERENT THINGS: MORE THAN HALF THE DAYS
5. BEING SO RESTLESS THAT IT IS HARD TO SIT STILL: NOT AT ALL
IF YOU CHECKED OFF ANY PROBLEMS ON THIS QUESTIONNAIRE, HOW DIFFICULT HAVE THESE PROBLEMS MADE IT FOR YOU TO DO YOUR WORK, TAKE CARE OF THINGS AT HOME, OR GET ALONG WITH OTHER PEOPLE: VERY DIFFICULT
1. FEELING NERVOUS, ANXIOUS, OR ON EDGE: NEARLY EVERY DAY
6. BECOMING EASILY ANNOYED OR IRRITABLE: MORE THAN HALF THE DAYS
8. IF YOU CHECKED OFF ANY PROBLEMS, HOW DIFFICULT HAVE THESE MADE IT FOR YOU TO DO YOUR WORK, TAKE CARE OF THINGS AT HOME, OR GET ALONG WITH OTHER PEOPLE?: VERY DIFFICULT
4. TROUBLE RELAXING: MORE THAN HALF THE DAYS
7. FEELING AFRAID AS IF SOMETHING AWFUL MIGHT HAPPEN: NEARLY EVERY DAY
7. FEELING AFRAID AS IF SOMETHING AWFUL MIGHT HAPPEN: NEARLY EVERY DAY

## 2024-01-09 ENCOUNTER — PATIENT OUTREACH (OUTPATIENT)
Dept: GASTROENTEROLOGY | Facility: CLINIC | Age: 54
End: 2024-01-09
Payer: COMMERCIAL

## 2024-01-09 ENCOUNTER — OFFICE VISIT (OUTPATIENT)
Dept: PSYCHIATRY | Facility: CLINIC | Age: 54
End: 2024-01-09
Payer: COMMERCIAL

## 2024-01-09 DIAGNOSIS — F33.2 SEVERE RECURRENT MAJOR DEPRESSION WITHOUT PSYCHOTIC FEATURES (H): Primary | ICD-10-CM

## 2024-01-09 RX ORDER — DESIPRAMINE HYDROCHLORIDE 25 MG/1
25 TABLET ORAL AT BEDTIME
Qty: 30 TABLET | Refills: 1 | Status: SHIPPED | OUTPATIENT
Start: 2024-01-09 | End: 2024-02-09

## 2024-01-09 NOTE — PROGRESS NOTES
New Rx sent to pharmacy and MyChart message sent to patient.  Brigitte Spencer PharmD  Medication Therapy Management Pharmacist  [facility]

## 2024-01-10 ENCOUNTER — TRANSFERRED RECORDS (OUTPATIENT)
Dept: HEALTH INFORMATION MANAGEMENT | Facility: CLINIC | Age: 54
End: 2024-01-10
Payer: COMMERCIAL

## 2024-01-10 NOTE — PROGRESS NOTES
Interventional Psychiatry Program   Treatment Resistant Depression (TRD) Group  [facility], [place]  Patient: Valentina Humphries (1970)     MRN: [id number]  Today's Date: 1.09.24  Next 90-day Review Date: 3.04.24     Date of Initial Service: 12.05.23    Date of INTIAL Treatment Plan: 12.06.23     Number of Participants: 6  Group Time: 90 minutes  Group Format: Hybrid, in-person and Amwell  Facilitators: Rick East, PhD LP      The treatment resistant depression (TRD) group is based on the cognitive behavioral therapy model that uses psychoeducation, cognitive restructuring, problem solving techniques, and social skills.       Diagnostic criteria for group participation: Major depressive disorder, bipolar disorder and current patient in TRD program      Group Topic for Today: Check in on 'problems we are working through', group offered feedback, validation, or to vent (in participants words). In the second half of group we discussed Navigating our Healthcare. Themes from this discussion included taking time to consider our priorities including our values and what we want to get out of our healthcare. We talked about the challenge of aligning those priorities with our choices in healthcare as choices are sometimes limited.     Description: Pt was an active participant and engaged in the discussion with other group members.  Their emotional presentation was cooperative. Pt's mood was generally euthymic, and their affect was calm.    Plan: Continue with group goals to minimize impact of depressive symptoms and maintain stability          Outpatient Treatment Plan      Today's Date: Dec 6, 2023                 90-Day Review Date: 3.04.24     Date of Initial Service: 12.05.23     Diagnosis:  Major depressive disorder, severe, recurrent, without psychotic features     Current symptoms and circumstances that substantiate the diagnosis:  From DEE Bryan MD 9/20/23: crying and not being  able to stop crying. Hopelessness, feeling stuck, lack of enthusiasm. See note for more details     How symptoms and/or behaviors are affecting level of function:  Not working, social isolation, limited engagement in activities                  SYMPTOMS; PROBLEMS    MEASURABLE GOALS;    FUNCTIONAL IMPROVEMENT INTERVENTIONS Gains Made:      Depression reduce depressive symptoms and find enjoyment at least once a day cognitive behavioral therapy; BA; mindfulness practice N/A                                   Frequency of Sessions: Weekly      Discharge and Aftercare Goals: see measurable goals above     Expected duration of treatment: eight weeks     Patient verbally consented to the treatment plan above.             Rick East, PhD LP on 12/6/2023 at 9:34 AM        Answers submitted by the patient for this visit:  EBER-7 (Submitted on 1/8/2024)  EBER 7 TOTAL SCORE: 14

## 2024-01-12 ENCOUNTER — OFFICE VISIT (OUTPATIENT)
Dept: URGENT CARE | Facility: URGENT CARE | Age: 54
End: 2024-01-12
Payer: OTHER MISCELLANEOUS

## 2024-01-12 VITALS
HEART RATE: 87 BPM | DIASTOLIC BLOOD PRESSURE: 70 MMHG | WEIGHT: 166 LBS | SYSTOLIC BLOOD PRESSURE: 108 MMHG | BODY MASS INDEX: 25.43 KG/M2 | OXYGEN SATURATION: 98 % | TEMPERATURE: 98.3 F

## 2024-01-12 DIAGNOSIS — S60.419A FINGER ABRASION, NON-INFECTED: Primary | ICD-10-CM

## 2024-01-12 PROCEDURE — 99213 OFFICE O/P EST LOW 20 MIN: CPT | Performed by: STUDENT IN AN ORGANIZED HEALTH CARE EDUCATION/TRAINING PROGRAM

## 2024-01-12 NOTE — LETTER
REPORT OF WORK COMP    [facility] [place] [id number]  [address]  [place] [address]  [phone]      PATIENT DATA    Employee Name: Valentina Humphries      : 1970     #: [id number]    Work related injury: Yes  Employer at time of injury: [employer]  Employer contact & phone:   Employed elsewhere? No  Workers' Compensation Carrier/Managed Care Plan:     Today's date: 2024  Date of injury: 2024  Date of first visit: 2024    PROVIDER EVALUATION: Please fill in as needed.  Please give copy to employee for employer.    1. Diagnosis: finger abrasion    2. Treatment: topical antibiotic ointment daily for 5 days, keep site dry, wear a glove when immersing in water.  3. Medication: none  NOTE: When ordering a medication, [place] Rules require Work Comp or WC on prescriptions.    4. No work from 24-24  5. Return to work date: 24   ** WITH RESTRICTIONS? Yes, with work restrictions: * Keep injury site clean and dry  DURATION OF LIMITATIONS: 1 week      RESTRICTIONS: Unlimited unless listed.  Restrictions apply to home and leisure also.  If work restrictions is not available, the employee is totally disabled.    Maximum Medical Improvement (Date): 24  Any Permanent Partial Disability? 0%    Medical Examiner: Floresita Calles DO          License or registration: NPI [id number]    Next appointment: As needed    CC: Employer, Managed Care Plan/Payor, Patient

## 2024-01-13 NOTE — PROGRESS NOTES
Assessment & Plan     Finger abrasion, non-infected  Non-infected finger abrasion on R 2nd digit 24h after knife injury at work.  She has full sensation in the finger including distal to the injury, full range of motion at all joints.  No prior injury to this finger, UTD on Tetanus.  Exam reassuring against cellulitis, tendon or verve damage, joint involvement. There is no need for sutures or skin glue.  Discussed abrasion care management including triple antibiotic ointment for 5 days, keeping wound covered and dry when she returns to work.  Worker's Comp. letter completed.  ED precautions provided.  Patient was understanding of the plan at time of discharge.    Return for In clinic with your primary care provider.    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  Applied pressure, bled off and on until 9pm, put on band-aid and finger condom  Not on blood thinner  Hand throbbing  Got Tdap in 2022  No prior finger injury    Past Medical History:   Diagnosis Date    Anxiety     Asthma     Depression     hospitalized x4 in the 1990's, no suicide attempts    Migraine     Visual aura    Varicella        Allergies   Allergen Reactions    Cefdinir Difficulty breathing     Patient is able to tolerate Penicillin and Amoxicillin without any problems    Latex Itching    Latuda [Lurasidone] Swelling     Facial Swelling    Amoxicillin-Pot Clavulanate Nausea and Vomiting and Diarrhea    Lamotrigine Rash     Current Outpatient Medications   Medication    acetaminophen (TYLENOL) 500 MG tablet    desipramine (NORPRAMIN) 25 MG tablet    desvenlafaxine (PRISTIQ) 100 MG 24 hr tablet    dicyclomine (BENTYL) 10 MG capsule    fish oil-omega-3 fatty acids 1000 MG  capsule    hydrOXYzine (ATARAX) 10 MG tablet    ketoconazole (NIZORAL) 2 % external cream    LORazepam (ATIVAN) 0.5 MG tablet    LORazepam (ATIVAN) 1 MG tablet    mirtazapine (REMERON) 15 MG tablet    omeprazole (PRILOSEC) 10 MG DR capsule    polyethylene glycol (MIRALAX) 17 GM/Dose powder    PREMPRO 0.625-2.5 MG tablet    Sod Fluoride-Potassium Nitrate (PREVIDENT 5000 SENSITIVE) 1.1-5 % PSTE    traZODone (DESYREL) 100 MG tablet    vitamin D3 (CHOLECALCIFEROL) 125 MCG (5000 UT) tablet    ziprasidone (GEODON) 20 MG capsule     No current facility-administered medications for this visit.     Review of Systems  Constitutional, HEENT, cardiovascular, pulmonary, gi and gu systems are negative, except as otherwise noted.      Objective    /70   Pulse 87   Temp 98.3  F (36.8  C) (Tympanic)   Wt 75.3 kg (166 lb)   SpO2 98%   BMI 25.43 kg/m      Physical Exam   General: soft spoken, anxious  Skin: 2nd digit of R hand with top layer of skin removed, no active bleeding, no pus or drainage, no streaking  Neuro: full sensation on skin distal to the injury  Cardiac: normal capillary refill in 2nd digit  MSK: full ROM at PIP, DIP, MCP          The use of Dragon/[employer] dictation services may have been used to construct the content in this note; any grammatical or spelling errors are non-intentional. Please contact the author of this note directly if you are in need of any clarification.

## 2024-01-13 NOTE — PATIENT INSTRUCTIONS
You are up to date with Tetanus vaccines.    Please keep the finger clean, dry. If you are doing dishes, please wear gauze with a glove and tape it at your wrist to avoid getting moisture inside    You can shower and wash your hands as usual. You do not need antibiotics orally but please use the triple antibiotic ointment daily for 5 days    If you develop a fever, red streaking on your finger, or tingling and numbness of that finger, please present to the ED

## 2024-01-15 ENCOUNTER — TELEPHONE (OUTPATIENT)
Dept: PSYCHIATRY | Facility: CLINIC | Age: 54
End: 2024-01-15
Payer: COMMERCIAL

## 2024-01-15 NOTE — TELEPHONE ENCOUNTER
3 pages of Incoming Records from [facility] including 1 page of [employer] Testing (Done 03/09/2010) and 2 ROIS releasing the information from [facility] to [facility]. Sent to scanning on 01/15/2024.    - Sandoval Womack, Visit Facilitator

## 2024-01-16 ENCOUNTER — OFFICE VISIT (OUTPATIENT)
Dept: PSYCHIATRY | Facility: CLINIC | Age: 54
End: 2024-01-16
Payer: COMMERCIAL

## 2024-01-16 DIAGNOSIS — F33.2 SEVERE RECURRENT MAJOR DEPRESSION WITHOUT PSYCHOTIC FEATURES (H): Primary | ICD-10-CM

## 2024-01-17 ENCOUNTER — OFFICE VISIT (OUTPATIENT)
Dept: PSYCHIATRY | Facility: CLINIC | Age: 54
End: 2024-01-17
Payer: COMMERCIAL

## 2024-01-17 VITALS
OXYGEN SATURATION: 100 % | TEMPERATURE: 97.3 F | DIASTOLIC BLOOD PRESSURE: 74 MMHG | HEART RATE: 83 BPM | SYSTOLIC BLOOD PRESSURE: 110 MMHG

## 2024-01-17 DIAGNOSIS — F33.41 RECURRENT MAJOR DEPRESSION IN PARTIAL REMISSION (H): Primary | ICD-10-CM

## 2024-01-17 ASSESSMENT — PATIENT HEALTH QUESTIONNAIRE - PHQ9
SUM OF ALL RESPONSES TO PHQ QUESTIONS 1-9: 10
10. IF YOU CHECKED OFF ANY PROBLEMS, HOW DIFFICULT HAVE THESE PROBLEMS MADE IT FOR YOU TO DO YOUR WORK, TAKE CARE OF THINGS AT HOME, OR GET ALONG WITH OTHER PEOPLE: VERY DIFFICULT
SUM OF ALL RESPONSES TO PHQ QUESTIONS 1-9: 10

## 2024-01-17 ASSESSMENT — PAIN SCALES - GENERAL: PAINLEVEL: MODERATE PAIN (4)
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
## 2024-01-18 ENCOUNTER — TELEPHONE (OUTPATIENT)
Dept: PSYCHIATRY | Facility: CLINIC | Age: 54
End: 2024-01-18

## 2024-01-18 NOTE — TELEPHONE ENCOUNTER
----- Message from Blake Bryan MD sent at 1/17/2024  2:35 PM CST -----  Regarding: DEBRAS  Valentina is interested in VNS, can you connect her with [facility]?

## 2024-01-18 NOTE — PROGRESS NOTES
Interventional Psychiatry Program   Treatment Resistant Depression (TRD) Group  [facility], [place]  Patient: Valentina Humphries (1970)     MRN: [id number]  Today's Date: 1.16.24  Next 90-day Review Date: 3.04.24     Date of Initial Service: 12.05.23    Date of INTIAL Treatment Plan: 12.06.23     Number of Participants: 6  Group Time: 90 minutes  Group Format: Hybrid, in-person and Amwell  Facilitators: Rick East, PhD LP      The treatment resistant depression (TRD) group is based on the cognitive behavioral therapy model that uses psychoeducation, cognitive restructuring, problem solving techniques, and social skills.       Diagnostic criteria for group participation: Major depressive disorder, bipolar disorder and current patient in TRD program      Group Topic for Today: Check in on 'problems we are working through', group offered feedback, validation, or to vent (in group members words). In the second half of group we discussed Diet and Physical and Mental Health. Themes from this discussion included a summary of the current state of evidence in Nutritional Psychiatry and potential future directions. Group members discussed what makes diet change hard for them (cost, time, energy, missing foods they enjoy, not seeing immediate benefit). Writer shared about incremental change as positive (eg adding more of something good, taking away a bit less of something not so good). Shared about Pollan's 7 rules as an example of a well balanced take on nutrition and food; eat food, not too much, mostly plants.    Description: Pt was an active participant and engaged in the discussion with other group members.  Their emotional presentation was cooperative. Pt's mood was generally euthymic, and their affect was calm.    Plan: Continue with group goals to minimize impact of depressive symptoms and maintain stability          Outpatient Treatment Plan      Today's Date: Dec 6, 2023                  90-Day Review Date: 3.04.24     Date of Initial Service: 12.05.23     Diagnosis:  Major depressive disorder, severe, recurrent, without psychotic features     Current symptoms and circumstances that substantiate the diagnosis:  From DEE Bryan MD 9/20/23: crying and not being able to stop crying. Hopelessness, feeling stuck, lack of enthusiasm. See note for more details     How symptoms and/or behaviors are affecting level of function:  Not working, social isolation, limited engagement in activities                  SYMPTOMS; PROBLEMS    MEASURABLE GOALS;    FUNCTIONAL IMPROVEMENT INTERVENTIONS Gains Made:      Depression reduce depressive symptoms and find enjoyment at least once a day cognitive behavioral therapy; BA; mindfulness practice N/A                                   Frequency of Sessions: Weekly      Discharge and Aftercare Goals: see measurable goals above     Expected duration of treatment: eight weeks     Patient verbally consented to the treatment plan above.           Answers submitted by the patient for this visit:  EBER-7 (Submitted on 1/8/2024)  EBER 7 TOTAL SCORE: 14

## 2024-01-23 ENCOUNTER — OFFICE VISIT (OUTPATIENT)
Dept: PSYCHIATRY | Facility: CLINIC | Age: 54
End: 2024-01-23
Payer: COMMERCIAL

## 2024-01-23 DIAGNOSIS — F33.2 SEVERE RECURRENT MAJOR DEPRESSION WITHOUT PSYCHOTIC FEATURES (H): Primary | ICD-10-CM

## 2024-01-25 NOTE — PROGRESS NOTES
Interventional Psychiatry Program   Treatment Resistant Depression (TRD) Group  [facility], [place]  Patient: Valentina Humphries (1970)     MRN: [id number]  Today's Date: 1.23.24  Next 90-day Review Date: 3.04.24     Date of Initial Service: 12.05.23    Date of INTIAL Treatment Plan: 12.06.23     Number of Participants: 5  Group Time: 90 minutes  Group Format: Hybrid, in-person and Amwell  Facilitators: Rick East, PhD LP      The treatment resistant depression (TRD) group is based on the cognitive behavioral therapy model that uses psychoeducation, cognitive restructuring, problem solving techniques, and social skills.       Diagnostic criteria for group participation: Major depressive disorder, bipolar disorder and current patient in TRD program      Group Topic for Today: Check in on 'problems we are working through', group offered feedback, validation, or to vent (in group members words). In the second half of group writer gave a presentation on Mindfulness with a focus on 'Balancing Being Mind and Doing Mind'. Group members discussed where they notice pressures for 'doing' and we talked about the benefits of finding ways to intentionally practice 'being'. Examples included bringing focused attention to a task (making coffee as if a ceremony or ritual) and having a singular focus on one task.    Description: Pt was an active participant and engaged in the discussion with other group members. Their emotional presentation was cooperative. Pt's mood was generally euthymic, and their affect was calm.    Plan: Continue with group goals to minimize impact of depressive symptoms and maintain stability          Outpatient Treatment Plan      Today's Date: Dec 6, 2023                 90-Day Review Date: 3.04.24     Date of Initial Service: 12.05.23     Diagnosis:  Major depressive disorder, severe, recurrent, without psychotic features     Current symptoms and circumstances that  substantiate the diagnosis:  From DEE Bryan MD 9/20/23: crying and not being able to stop crying. Hopelessness, feeling stuck, lack of enthusiasm. See note for more details     How symptoms and/or behaviors are affecting level of function:  Not working, social isolation, limited engagement in activities                  SYMPTOMS; PROBLEMS    MEASURABLE GOALS;    FUNCTIONAL IMPROVEMENT INTERVENTIONS Gains Made:      Depression reduce depressive symptoms and find enjoyment at least once a day cognitive behavioral therapy; BA; mindfulness practice N/A                                   Frequency of Sessions: Weekly      Discharge and Aftercare Goals: see measurable goals above     Expected duration of treatment: eight weeks     Patient verbally consented to the treatment plan above.           Answers submitted by the patient for this visit:  EBER-7 (Submitted on 1/8/2024)  EBER 7 TOTAL SCORE: 14

## 2024-01-29 ENCOUNTER — VIRTUAL VISIT (OUTPATIENT)
Dept: PHARMACY | Facility: CLINIC | Age: 54
End: 2024-01-29
Payer: COMMERCIAL

## 2024-01-29 DIAGNOSIS — F33.1 MODERATE EPISODE OF RECURRENT MAJOR DEPRESSIVE DISORDER (H): Primary | ICD-10-CM

## 2024-01-29 DIAGNOSIS — F41.1 GAD (GENERALIZED ANXIETY DISORDER): ICD-10-CM

## 2024-01-29 DIAGNOSIS — G47.09 OTHER INSOMNIA: ICD-10-CM

## 2024-01-29 DIAGNOSIS — K21.9 GASTROESOPHAGEAL REFLUX DISEASE WITHOUT ESOPHAGITIS: ICD-10-CM

## 2024-01-29 PROCEDURE — 99606 MTMS BY PHARM EST 15 MIN: CPT | Mod: 93 | Performed by: PHARMACIST

## 2024-01-29 PROCEDURE — 99607 MTMS BY PHARM ADDL 15 MIN: CPT | Mod: 93 | Performed by: PHARMACIST

## 2024-01-29 NOTE — Clinical Note
Hello, I met with this patient today to follow-up since reducing desipramine from 50 mg to 25 mg about 3 weeks ago.  She has also reduced trazodone and started melatonin in the meantime. Today she reported overall improvement in mood, anxiety, cognition, and morning sedation.  She has gynecology visit next Tuesday and sees you next Friday.  See note for details, but depending on those visits, would consider continuing to reduce desipramine or likely okay to just stop it. Please let me know what questions you have. Brigitte

## 2024-01-29 NOTE — PATIENT INSTRUCTIONS
"Recommendations from today's MTM visit:                                                       Reduce lightbox use from 15 minutes to 10 minutes daily  Continue current medications.    Follow-up:   Gynecology 2/6/24  Psychiatry 2/9/24    It was great speaking with you today.  I value your experience and would be very thankful for your time in providing feedback in our clinic survey. In the next few days, you may receive an email or text message from [employer] with a link to a survey related to your  clinical pharmacist.\"     To schedule another MTM appointment, please call the clinic directly or you may call the MTM scheduling line at [phone] or toll-free at [phone].     My Clinical Pharmacist's contact information:                                                      Please feel free to contact me with any questions or concerns you have.      Brgiitte Spencer, PharmD  Medication Therapy Management Pharmacist  [facility]    "

## 2024-01-29 NOTE — PROGRESS NOTES
Medication Therapy Management (MTM) Encounter    ASSESSMENT:                            Medication Adherence/Access: No issues identified    Depression/anxiety/insomnia:   Overall improvement in sleep quality, cognition, general mood, and anxiety since last visit, likely attributable to the multiple changes that have been made. Since she is feeling some extra stimulation, suggested that she try using light box at 10,000 Lux for just 10 minutes instead of 15 minutes.  Patient agreed.  If melatonin continues to be effective could also consider further reducing trazodone and possibly stopping in the future.  She sees both GYN and psychiatry next week. Depending on assessment at GYN visit, would consider further desipramine reduction or discontinuation, as previous reduction seemed quite helpful.    GERD:   Stable. Continue current medication. Consider reducing omeprazole to 10mg in the future. Sent Rx for 20mg per PCP CPA so that patient is able to get refill.       PLAN:                            Reduce lightbox use from 15 minutes to 10 minutes daily  Continue current medications.    Follow-up:   Gynecology 2/6/24  Psychiatry 2/9/24    SUBJECTIVE/OBJECTIVE:                          Valentina Humphries is a 53 year old female called for a follow-up visit from 1/3/24.       Reason for visit: med check.    Allergies/ADRs: Reviewed in chart  Past Medical History: Reviewed in chart  Tobacco: She reports that she has never smoked. She has never been exposed to tobacco smoke. She has never used smokeless tobacco.  Alcohol: not currently using    Medication Adherence/Access: no issues reported    She has had some nausea and diarrhea in the last week, though seems to be improving.     Depression/anxiety/insomnia:   -desvenlafaxine 100mg daily  -mirtazapine 15mg at bedtime  -ziprasidone 20mg BID (takes with food)  -desipramine 25mg at bedtime (started 7/2023; reduced from 50mg on 1/9/24)  -trazodone 50mg nightly (Rx  "100-300mg nightly) (since 1992)  -melatonin 5mg at bedtime  -lorazepam 0.5mg every 8 hours prn anxiety (since 2012; has been taking on and off during the week)  -hydroxyzine 10mg as needed  -lightbox 10,000 lux x 15 min    Since last visit, patient reports the following changes:  -reduced desipramine from 50mg to 25mg at bedtime  -changed lightbox from 5000 lux x 25 min to 10,000 lux x 15 min  -reduced trazodone from 100mg to 50mg at bedtime   -started melatonin 5mg at bedtime    Patient has upcoming appointment with gynecology to to help assess hormonal and what might be contributing to current mood symptoms. She has psychiatry visit a few days later for follow up.    At last visit, we discussed her multiple medications and patient elected to reduce desipramine, since she had been taking it for the shortest amount of time, did not offer much benefit, and could be contributing to constipation and cognitive issues.   Today, she reported that memory seems better in the last few weeks. Putting thoughts together seems a bit easier and she better able to understand her tasks and what is going on at work. She thinks she has more energy throughout the day and seems easier to get up in the morning. Reported feeling \"a little wound up,\" though unsure if related. Anxiety feels somewhat improved as well.  She has not taken any hydroxyzine or lorazepam in the the last several weeks.     She usually falls asleep within 15-20 minutes. Sometimes wakes up around 4am, but able to get back to sleep pretty easily. She is a little bit groggy in the morning still, but able to get up much more easily. Thinks overnight sleep quality is better since starting melatonin.    GERD:   Omeprazole 20 mg once daily (since 2000)    At last visit, discussed reducing omeprazole to 10mg daily to see if still needed, though she has been having more GI and reflux issues lately, so elected to continue on current 20mg dose for " now.    ----------------    I spent 25 minutes with this patient today. A copy of the visit note was provided to the patient's provider(s).    A summary of these recommendations was sent via clinic portal.    Brigitte Spencer PharmD  Medication Therapy Management Pharmacist  [facility]      Telemedicine Visit Details  Type of service:  Telephone visit  Start Time:  10:00am  End Time:  10:25am     Medication Therapy Recommendations  No medication therapy recommendations to display

## 2024-02-06 ENCOUNTER — OFFICE VISIT (OUTPATIENT)
Dept: OBGYN | Facility: CLINIC | Age: 54
End: 2024-02-06
Attending: STUDENT IN AN ORGANIZED HEALTH CARE EDUCATION/TRAINING PROGRAM
Payer: COMMERCIAL

## 2024-02-06 VITALS
DIASTOLIC BLOOD PRESSURE: 69 MMHG | HEIGHT: 68 IN | SYSTOLIC BLOOD PRESSURE: 106 MMHG | WEIGHT: 167.7 LBS | OXYGEN SATURATION: 99 % | HEART RATE: 76 BPM | BODY MASS INDEX: 25.42 KG/M2

## 2024-02-06 DIAGNOSIS — N95.1 HOT FLASH, MENOPAUSAL: Primary | ICD-10-CM

## 2024-02-06 DIAGNOSIS — Z12.31 ENCOUNTER FOR SCREENING MAMMOGRAM FOR BREAST CANCER: ICD-10-CM

## 2024-02-06 PROCEDURE — 99203 OFFICE O/P NEW LOW 30 MIN: CPT | Performed by: OBSTETRICS & GYNECOLOGY

## 2024-02-06 RX ORDER — TRAZODONE HYDROCHLORIDE 100 MG/1
50 TABLET ORAL AT BEDTIME
COMMUNITY
Start: 2024-02-06 | End: 2024-03-15

## 2024-02-06 ASSESSMENT — PATIENT HEALTH QUESTIONNAIRE - PHQ9
5. POOR APPETITE OR OVEREATING: SEVERAL DAYS
SUM OF ALL RESPONSES TO PHQ QUESTIONS 1-9: 8

## 2024-02-06 ASSESSMENT — ANXIETY QUESTIONNAIRES
1. FEELING NERVOUS, ANXIOUS, OR ON EDGE: SEVERAL DAYS
7. FEELING AFRAID AS IF SOMETHING AWFUL MIGHT HAPPEN: MORE THAN HALF THE DAYS
6. BECOMING EASILY ANNOYED OR IRRITABLE: SEVERAL DAYS
IF YOU CHECKED OFF ANY PROBLEMS ON THIS QUESTIONNAIRE, HOW DIFFICULT HAVE THESE PROBLEMS MADE IT FOR YOU TO DO YOUR WORK, TAKE CARE OF THINGS AT HOME, OR GET ALONG WITH OTHER PEOPLE: SOMEWHAT DIFFICULT
2. NOT BEING ABLE TO STOP OR CONTROL WORRYING: SEVERAL DAYS
5. BEING SO RESTLESS THAT IT IS HARD TO SIT STILL: NOT AT ALL
GAD7 TOTAL SCORE: 7
GAD7 TOTAL SCORE: 7
3. WORRYING TOO MUCH ABOUT DIFFERENT THINGS: SEVERAL DAYS

## 2024-02-06 NOTE — PROGRESS NOTES
[facility] - OBGYN    CC: menopause management    S:Valentina Humphries is a 53 year old  who presents today for discussion of HRT.  Patient reports her last period was 2022.  Then in / she began to have night sweats and had a worsening of her depression.  She was seen by [facility] in 2023 and was started on prempro 0.625-5.  This helped her night sweats and hot flashes, but she felt her anxiety worsened.  She was hospitalized for mood symptoms for 4 days.  She states she has a history of anxiety and depression and had a prior hospitalization for mood in her 20's.  Patient reports she was under the impression the Prempro was meant to treat her anxiety and depression symptoms as well.  She was then decreased to Prempro 0.625-2.5 and has felt hot flash and sweats have been controlled on this dose.  She states that prior to Dec 2023 she has had a worsening of her anxiety and depression symptoms.  She started working with a psychiatrist on mood medication adjustment at that time and was recommended to see GYN for HRT discussion.  She sees a therapist every other week.  She denies any thoughts or plans to hurt herself.  Does feel down at times when thinking about quality of life and quality of life in the future and that is when she will question if she would be better off not alive (this reflects her PHQ9 responses and was discussed with the patient).      OBGYN Hx:     Patient's last menstrual period was 2021 (approximate).  Menopause- 2023  Sexually active with one male partner, her   STD Hx:none  Pap hx:21 NILM HPV negative.  Denies any history of abnormals    PMH:   Past Medical History:   Diagnosis Date    Anxiety     Asthma     Depression     hospitalized x4 in the , no suicide attempts    Migraine     Visual aura    Varicella        PSH:  Past Surgical History:   Procedure Laterality Date    ENDOSCOPY      TOTAL HIP ARTHROPLASTY Left  "2021    WISDOM TOOTH EXTRACTION         Meds:  Current Outpatient Medications   Medication    estrogen conj-medroxyPROGESTERone (PREMPRO) 0.625-2.5 MG tablet    traZODone (DESYREL) 100 MG tablet    acetaminophen (TYLENOL) 500 MG tablet    desipramine (NORPRAMIN) 25 MG tablet    desvenlafaxine (PRISTIQ) 100 MG 24 hr tablet    dicyclomine (BENTYL) 10 MG capsule    fish oil-omega-3 fatty acids 1000 MG capsule    hydrOXYzine (ATARAX) 10 MG tablet    ketoconazole (NIZORAL) 2 % external cream    LORazepam (ATIVAN) 0.5 MG tablet    LORazepam (ATIVAN) 1 MG tablet    mirtazapine (REMERON) 15 MG tablet    omeprazole (PRILOSEC) 20 MG DR capsule    polyethylene glycol (MIRALAX) 17 GM/Dose powder    PREMPRO 0.625-2.5 MG tablet    Sod Fluoride-Potassium Nitrate (PREVIDENT 5000 SENSITIVE) 1.1-5 % PSTE    ziprasidone (GEODON) 20 MG capsule     No current facility-administered medications for this visit.       Allergies:  Allergies   Allergen Reactions    Cefdinir Difficulty breathing     Patient is able to tolerate Penicillin and Amoxicillin without any problems    Latex Itching    Latuda [Lurasidone] Swelling     Facial Swelling    Amoxicillin-Pot Clavulanate Nausea and Vomiting and Diarrhea    Lamotrigine Rash       O: Patient Vitals for the past 24 hrs:   BP Pulse SpO2 Height Weight   24 1436 106/69 76 99 % 1.715 m (5' 7.5\") 76.1 kg (167 lb 11.2 oz)   ]  Exam:  General- awake, alert, answering questions appropriately, appears anxious   CV- regular rate  Lung- breathing comfortably on room air    A&P: Valentina Humprhies is a 53 year old  who presents today for discussion of HRT.      (N95.1) Hot flash, menopausal  (primary encounter diagnosis)  Comment: Reviewed with patient that estrogen and progesterone hormone replacement therapy is indicated for treatment of moderate to severe hot flashes and/or nigh sweats, defined as bothersome symptoms that interfere with daily activities, impair quality of " life, and/or interrupt sleep.  HRT is not specifically a treatment for anxiety or depression.  When HRT improves hot flashes and night sweats, sleep typically improves and this can have a positive downstream impact on mood.  However, anxiety and depression is best treated in conjunction with psych medications (patient is under treatment of psychiatrist) as well as therapy.  Patient currently has good management of hot flash and night sweat symptoms.  We discussed risks of HRT including increased risk of VTE and stroke as well as increased risk of breast cancer.  Best if duration of use is 5 years or less and principle of lowest effective dose for shortest period of time.  Patient has been on current dose less than a year.  Finds symptoms are treated well with few intermittent hot flashes.  Is currently adjusting psych med doses and thus will stay with current dose of HRT.  Plan: estrogen conj-medroxyPROGESTERone (PREMPRO)         0.625-2.5 MG tablet  Follow up in 1 year to re-address dose.    (Z12.31) Encounter for screening mammogram for breast cancer  Comment: due for mammogram  Plan: *MA Screening Digital Bilateral          Renetta Sewell MD
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
## 2024-02-09 ENCOUNTER — VIRTUAL VISIT (OUTPATIENT)
Dept: PSYCHIATRY | Facility: CLINIC | Age: 54
End: 2024-02-09
Attending: STUDENT IN AN ORGANIZED HEALTH CARE EDUCATION/TRAINING PROGRAM
Payer: COMMERCIAL

## 2024-02-09 DIAGNOSIS — F41.1 GENERALIZED ANXIETY DISORDER: ICD-10-CM

## 2024-02-09 DIAGNOSIS — F33.2 SEVERE RECURRENT MAJOR DEPRESSION WITHOUT PSYCHOTIC FEATURES (H): Primary | ICD-10-CM

## 2024-02-09 PROCEDURE — 99215 OFFICE O/P EST HI 40 MIN: CPT | Mod: 95 | Performed by: STUDENT IN AN ORGANIZED HEALTH CARE EDUCATION/TRAINING PROGRAM

## 2024-02-09 PROCEDURE — 99417 PROLNG OP E/M EACH 15 MIN: CPT | Mod: 95 | Performed by: STUDENT IN AN ORGANIZED HEALTH CARE EDUCATION/TRAINING PROGRAM

## 2024-02-09 ASSESSMENT — PAIN SCALES - GENERAL: PAINLEVEL: MILD PAIN (3)

## 2024-02-09 NOTE — PATIENT INSTRUCTIONS
Thank you for coming to the [facility].     Treatment Plan    Medications:  Continue the following medications:  -Geodon 20mg BID   -Desvenlafaxine 100mg    -Discontinue Desipramine 25mg at bedtime  -Mirtazapine 15mg at bedtime   -Trazodone 50mg at bedtime  -Prempro 0.625-2.5mg     Therapy:  Continue individual tehrapy    RTC:  In 2 weeks      Lab Testing:  If you had lab testing today and your results are reassuring or normal they will be mailed to you or sent through DNsolution within 7 days. If the lab tests need quick action we will call you with the results. The phone number we will call with results is # [phone]. If this is not the best number please call our clinic and change the number.     Medication Refills:  If you need any refills please call your pharmacy and they will contact us. Our fax number for refills is [phone].   Three business days of notice are needed for general medication refill requests.   Five business days of notice are needed for controlled substance refill requests.   If you need to change to a different pharmacy, please contact the new pharmacy directly. The new pharmacy will help you get your medications transferred.     Contact Us:  Please call [phone] during business hours (8-5:00 M-F).   If you have medication related questions after clinic hours, or on the weekend, please call [phone].     Financial Assistance [phone]   Medical Records [phone]       **For crisis resources, please see the information at the end of this document**     To find additional local resources, refer to Postpartum Support International (PSI). Available at: http://www.postpartum.net/get-help/locations/united-states/  -[facility] for Women's Mental Health at: www.womensmentalhealth.org is a good resource for information about psychiatric medication in pregnancy/lactation  -Mother To Baby A service of the nonprofit Organization of Teratology  "Information Specialists (SPIKE), provides evidence based information online and in printable handouts to provide patients and providers regarding medications and other exposures during pregnancy and lactation Available at: https://mothertobaby.org/fact-sheets-parent/.  -The 4th Trimester Project - Expert written resources and information for mothers and families. Available at: https://[employer]/  -Consider using \"The Pregnancy and Postpartum Anxiety Workbook,\" by Claudine Gonzalez PhD and Bradley Hall PsyD.    Postpartum Planning Tools:  Maternal Wellbeing Plan from [facility]: https://www.health.[facility].[place]/people/womeninfants/pmad/pmadsfs.html    4th Trimester Postpartum plan: https://[employer]/mypostpartumplan    Tips for partners:  http://www.postpartum.net/family/tips-for-postpartum-dads-and-partners/        MENTAL HEALTH CRISIS RESOURCES:  For a emergency help, please call 911 or go to the nearest Emergency Department.     Emergency Walk-In Options:   EmPATH Unit @ [facility]): [phone] - Specialized mental health emergency area designed to be [facility]  [facility] ([place]): [phone]  [facility] ([place]): [phone]  [facility] ([place]): [phone]    County Crisis Information:   [place]: [phone]  [place]: [phone]  [place] (JAIDA) - Adult: [phone]     Child: [phone]  [place] - Adult: [phone]     Child: [phone]  [place]: [phone]  List of all [facility] resources:   https://[place].gov/dhs/people-we-serve/adults/health-care/mental-health/resources/crisis-contacts.jsp    National Crisis Information:   Crisis Text Line: Text  [place]  to 305537  Suicide & Crisis Lifeline: 988  National Suicide Prevention Lifeline: 2-993-024-TALK (1-408.951.7108)       For online chat options, visit https://suicidepreventionlifeline.org/chat/  Poison Control Center: 8-595-031-1994  Trans Lifeline: 1-459.638.4790 - " Hotline for transgender people of all ages  The Martínez Project: 2-311-138-0573 - Hotline for LGBT youth     For Non-Emergency Support:   Fast Tracker: Mental Health & Substance Use Disorder Resources -   https://www.[place].org/

## 2024-02-09 NOTE — NURSING NOTE
Is the patient currently in the state of [place]? YES    Visit mode:VIDEO    If the visit is dropped, the patient can be reconnected by: VIDEO VISIT: Text to cell phone:   Telephone Information:   Mobile [phone]       Will anyone else be joining the visit? NO  (If patient encounters technical issues they should call [phone])    How would you like to obtain your AVS? MyChart    Are changes needed to the allergy or medication list? No  Patient denies any changes since echeck-in regarding medication and allergies and states all information entered during echeck-in remains accurate.    Reason for visit: RECHJONO IZAGUIRRE

## 2024-02-11 ENCOUNTER — OFFICE VISIT (OUTPATIENT)
Dept: FAMILY MEDICINE | Facility: CLINIC | Age: 54
End: 2024-02-11
Payer: COMMERCIAL

## 2024-02-11 VITALS
DIASTOLIC BLOOD PRESSURE: 75 MMHG | OXYGEN SATURATION: 98 % | HEART RATE: 82 BPM | TEMPERATURE: 99.5 F | SYSTOLIC BLOOD PRESSURE: 127 MMHG

## 2024-02-11 DIAGNOSIS — U07.1 INFECTION DUE TO 2019 NOVEL CORONAVIRUS: Primary | ICD-10-CM

## 2024-02-11 PROCEDURE — 99214 OFFICE O/P EST MOD 30 MIN: CPT | Performed by: PHYSICIAN ASSISTANT

## 2024-02-11 NOTE — PATIENT INSTRUCTIONS
Begin taking Paxlovid with food.   Hold your Trazodone while taking this medicine.   Use alternative form of birth control if having sex while on Paxlovid. Paxlovid may make birth control less effective.   You can continue taking Ziprazodone, but seek medical attention if you develop any symptoms of QT prolongation: palpitations, lightheadedness, chest pain outside of pain while coughing.

## 2024-02-11 NOTE — PROGRESS NOTES
Patient presents with:  Covid Concern: Positive covid test, want to start paxlovid      Clinical Decision Making:  Patient had a positive COVID test yesterday.  Patient qualifies for Paxlovid.  Medications reviewed and patient will discontinue trazodone while taking Paxlovid.  We discussed that there may be increased likelihood for QT prolongation with the use of ziprasidone, but benefits of Paxlovid outweigh the risks.  Patient will also use alternative form of contraception following Paxlovid due to decreased efficacy of hormonal contraception while taking medication.      ICD-10-CM    1. Infection due to 2019 novel coronavirus  U07.1 nirmatrelvir and ritonavir (PAXLOVID) 300 mg/100 mg therapy pack          Patient Instructions   Begin taking Paxlovid with food.   Hold your Trazodone while taking this medicine.   Use alternative form of birth control if having sex while on Paxlovid. Paxlovid may make birth control less effective.   You can continue taking Ziprazodone, but seek medical attention if you develop any symptoms of QT prolongation: palpitations, lightheadedness, chest pain outside of pain while coughing.     HPI:  Valentina Humphries is a 53 year old female who presents today complaining of sore throat that started yesterday.  Patient did an at-home COVID test and it was positive.  Patient is interested in Paxlovid due to history of mental health issues.    History obtained from the patient.    Problem List:  2023-11: Hyperlipidemia  2023-04: Symptomatic menopausal or female climacteric states  2023-04: Eating disorder  2022-11: Cognitive changes  2021-09: Encounter for surveillance of contraceptive pills  2021-07: Depression with anxiety  2021-01: Unspecified abdominal pain  2019-11: Microcytic anemia  2019-10: EBER (generalized anxiety disorder)  2017-10: Other hemorrhoids  2017-10: Polyp of colon  2017-03: Gastroesophageal reflux disease without esophagitis  2017-02: Severe episode of  recurrent major depressive disorder,   without psychotic features (H)  2016-02: Family history of glaucoma in father  2016-02: Myopia of both eyes  2015-11: Advised about management of weight  2015-11: Constipation, unspecified constipation type  2015-11: Constipation  2015-11: Nonspecific abdominal pain  2014-06: Family history of colonic polyps  2014-06: Gastritis and gastroduodenitis  2012-05: Irritable bowel syndrome  2012-02: Environmental allergies  2011-12: STEVEN (obstructive sleep apnea)  2011-08: Migraine without status migrainosus, not intractable  2011-08: Migraine headache  2011-03: Benign neoplasm of colon  2011-03: Hemorrhoids  2010-02: History of colonic polyps  2009-09: Dietary counseling and surveillance  2009-09: Routine general medical examination at a health care facility      Past Medical History:   Diagnosis Date    Anxiety     Asthma     Depression     hospitalized x4 in the 1990's, no suicide attempts    Migraine     Visual aura    Varicella        Social History     Tobacco Use    Smoking status: Never     Passive exposure: Never    Smokeless tobacco: Never   Substance Use Topics    Alcohol use: Yes     Comment: rare       Review of Systems    Vitals:    02/11/24 1142   BP: 127/75   Pulse: 82   Temp: 99.5  F (37.5  C)   TempSrc: Tympanic   SpO2: 98%       Physical Exam  Vitals and nursing note reviewed.   Constitutional:       General: She is not in acute distress.     Appearance: She is not toxic-appearing or diaphoretic.   HENT:      Head: Normocephalic and atraumatic.      Right Ear: External ear normal.      Left Ear: External ear normal.   Eyes:      Conjunctiva/sclera: Conjunctivae normal.   Cardiovascular:      Rate and Rhythm: Normal rate and regular rhythm.      Heart sounds: No murmur heard.  Pulmonary:      Effort: Pulmonary effort is normal. No respiratory distress.      Breath sounds: Normal breath sounds.   Neurological:      Mental Status: She is alert.   Psychiatric:          Mood and Affect: Mood normal.         Behavior: Behavior normal.         Thought Content: Thought content normal.         Judgment: Judgment normal.       At the end of the encounter, I discussed results, diagnosis, medications. Discussed red flags for immediate return to clinic/ER, as well as indications for follow up if no improvement. Patient understood and agreed to plan. Patient was stable for discharge.

## 2024-02-16 ENCOUNTER — ANCILLARY PROCEDURE (OUTPATIENT)
Dept: MAMMOGRAPHY | Facility: HOSPITAL | Age: 54
End: 2024-02-16
Attending: OBSTETRICS & GYNECOLOGY
Payer: COMMERCIAL

## 2024-02-16 ENCOUNTER — TELEPHONE (OUTPATIENT)
Dept: PSYCHIATRY | Facility: CLINIC | Age: 54
End: 2024-02-16

## 2024-02-16 DIAGNOSIS — Z12.31 ENCOUNTER FOR SCREENING MAMMOGRAM FOR BREAST CANCER: ICD-10-CM

## 2024-02-16 PROCEDURE — 77063 BREAST TOMOSYNTHESIS BI: CPT

## 2024-02-16 NOTE — TELEPHONE ENCOUNTER
"Valentina Colindres isn't doing so well. I plan to see her in 2 weeks. Please could you call to check in on her in 1 week?     -We discontinued desipramine today. Please ask her about it.   -safety check.     Thank you,   [place]     Follow up:  Writer placed a phone call to the patient. She confirmed she stopped the desipramine 1 week ago. She denies any worsening of depression or safety concerns. She reports getting COVID approximately 1 week ago as well, so she's recovering from that currently. She said, \"I feel less with it cognitively, but don't feel more depressed.\" At this time, she does not have any concerns for Dr. Linton. She would like to confirm when their next appointment should be, as Dr. Linton's schedule is full next Friday, 2/23.    Suma Moreno RN on 2/16/2024 at 10:40 AM      "

## 2024-02-17 ENCOUNTER — MYC MEDICAL ADVICE (OUTPATIENT)
Dept: INTERNAL MEDICINE | Facility: CLINIC | Age: 54
End: 2024-02-17
Payer: COMMERCIAL

## 2024-02-17 ENCOUNTER — MYC REFILL (OUTPATIENT)
Dept: INTERNAL MEDICINE | Facility: CLINIC | Age: 54
End: 2024-02-17
Payer: COMMERCIAL

## 2024-02-17 DIAGNOSIS — K03.89 TOOTH SENSITIVITY: Primary | ICD-10-CM

## 2024-02-19 RX ORDER — ACETAMINOPHEN 500 MG
1 TABLET ORAL 2 TIMES DAILY
Qty: 113 G | Refills: 3 | Status: SHIPPED | OUTPATIENT
Start: 2024-02-19

## 2024-02-21 ENCOUNTER — TELEPHONE (OUTPATIENT)
Dept: INTERNAL MEDICINE | Facility: CLINIC | Age: 54
End: 2024-02-21
Payer: COMMERCIAL

## 2024-02-22 ENCOUNTER — TELEPHONE (OUTPATIENT)
Dept: INTERNAL MEDICINE | Facility: CLINIC | Age: 54
End: 2024-02-22
Payer: COMMERCIAL

## 2024-02-22 NOTE — TELEPHONE ENCOUNTER
Left voicemail for patient to return call to clinic. When patient returns call, please give them below message. FYI That     Product backordered/ Unavailable      Medication: Sod Fluoride-Potassium Nitrate (PREVIDENT 5000 SENSITIVE) 1.1-5 % PSTE

## 2024-02-22 NOTE — TELEPHONE ENCOUNTER
Product backordered/ Unavailable     Medication: Sod Fluoride-Potassium Nitrate (PREVIDENT 5000 SENSITIVE) 1.1-5 % PSTE

## 2024-02-22 NOTE — TELEPHONE ENCOUNTER
Information relayed to Patient about Sod Fluoride-Potassium Nitrate 1.1-5% PSTE being on backorder. Patient wondering if she could get something else instead and sent to [facility] in [place].

## 2024-02-23 NOTE — TELEPHONE ENCOUNTER
Pharmacy notice reg:    Sod Fluoride-Potassium Nitrate (PREVIDENT 5000 SENSITIVE) 1.1-5 % PSTE     Alternative requested- this product is also on backorder, please send new RX for alternative.

## 2024-02-26 NOTE — TELEPHONE ENCOUNTER
Notified pt. She said she sent a my chart message with a name of a toothpaste that the pharmacist recommended yesterday but I told her we do not have any messages. Pt will resend my chart message and see if [facility] can order the medication if we have the name of it. Pt says her dentist has the toothpaste but it is twice as expensive.
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
## 2024-03-15 ENCOUNTER — OFFICE VISIT (OUTPATIENT)
Dept: PSYCHIATRY | Facility: CLINIC | Age: 54
End: 2024-03-15
Attending: STUDENT IN AN ORGANIZED HEALTH CARE EDUCATION/TRAINING PROGRAM
Payer: COMMERCIAL

## 2024-03-15 DIAGNOSIS — R41.89 IMPAIRED COGNITION: ICD-10-CM

## 2024-03-15 DIAGNOSIS — F33.2 SEVERE RECURRENT MAJOR DEPRESSION WITHOUT PSYCHOTIC FEATURES (H): Primary | ICD-10-CM

## 2024-03-15 PROCEDURE — 99417 PROLNG OP E/M EACH 15 MIN: CPT | Performed by: STUDENT IN AN ORGANIZED HEALTH CARE EDUCATION/TRAINING PROGRAM

## 2024-03-15 PROCEDURE — 99215 OFFICE O/P EST HI 40 MIN: CPT | Performed by: STUDENT IN AN ORGANIZED HEALTH CARE EDUCATION/TRAINING PROGRAM

## 2024-03-15 RX ORDER — DESVENLAFAXINE 50 MG/1
50 TABLET, FILM COATED, EXTENDED RELEASE ORAL DAILY
Qty: 21 TABLET | Refills: 0 | Status: SHIPPED | OUTPATIENT
Start: 2024-03-15 | End: 2024-05-08

## 2024-03-15 RX ORDER — TRAZODONE HYDROCHLORIDE 100 MG/1
50 TABLET ORAL AT BEDTIME
Qty: 30 TABLET | Refills: 1 | Status: SHIPPED | OUTPATIENT
Start: 2024-03-15 | End: 2024-05-17

## 2024-03-15 RX ORDER — MIRTAZAPINE 15 MG/1
15 TABLET, FILM COATED ORAL AT BEDTIME
Qty: 30 TABLET | Refills: 1 | Status: SHIPPED | OUTPATIENT
Start: 2024-03-15 | End: 2024-05-17

## 2024-03-15 RX ORDER — ZIPRASIDONE HYDROCHLORIDE 20 MG/1
20 CAPSULE ORAL 2 TIMES DAILY WITH MEALS
Qty: 60 CAPSULE | Refills: 1 | Status: SHIPPED | OUTPATIENT
Start: 2024-03-15 | End: 2024-07-26

## 2024-03-15 RX ORDER — SERTRALINE HYDROCHLORIDE 25 MG/1
TABLET, FILM COATED ORAL
Qty: 183 TABLET | Refills: 0 | Status: SHIPPED | OUTPATIENT
Start: 2024-03-15 | End: 2024-05-16

## 2024-03-15 ASSESSMENT — PATIENT HEALTH QUESTIONNAIRE - PHQ9
10. IF YOU CHECKED OFF ANY PROBLEMS, HOW DIFFICULT HAVE THESE PROBLEMS MADE IT FOR YOU TO DO YOUR WORK, TAKE CARE OF THINGS AT HOME, OR GET ALONG WITH OTHER PEOPLE: VERY DIFFICULT
SUM OF ALL RESPONSES TO PHQ QUESTIONS 1-9: 11
SUM OF ALL RESPONSES TO PHQ QUESTIONS 1-9: 11

## 2024-03-15 NOTE — PATIENT INSTRUCTIONS
Thank you for coming to the [facility].     Treatment Plan    Medications:  Continue the following medications:  -Geodon 20mg BID   -Mirtazapine 15mg at bedtime   -Trazodone 50mg at bedtime  -Prempro 0.625-2.5mg    Cross titration plan    Week 1: Continue desvenlafaxine 100 mg; start Sertraline 25mg   Week 2: reduce desvenlafaxine to 50mg; increase sertraline to 50mg   Week 3: continue desvenlafaxine 50mg; increase sertraline to 75mg  Week 4: Make no medication changes. Patient will be in [place]  Week 5: stop desvenlafaxine and increase sertraline to 100mg    Therapy:  Continue    RTC:  On April 19th for 60mins.      Lab Testing:  If you had lab testing today and your results are reassuring or normal they will be mailed to you or sent through Crew within 7 days. If the lab tests need quick action we will call you with the results. The phone number we will call with results is # [phone]. If this is not the best number please call our clinic and change the number.     Medication Refills:  If you need any refills please call your pharmacy and they will contact us. Our fax number for refills is [phone].   Three business days of notice are needed for general medication refill requests.   Five business days of notice are needed for controlled substance refill requests.   If you need to change to a different pharmacy, please contact the new pharmacy directly. The new pharmacy will help you get your medications transferred.     Contact Us:  Please call [phone] during business hours (8-5:00 M-F).   If you have medication related questions after clinic hours, or on the weekend, please call [phone].     Financial Assistance [phone]   Medical Records [phone]       **For crisis resources, please see the information at the end of this document**     To find additional local resources, refer to Postpartum Support International (PSI). Available at:  "http://www.postpartum.net/get-help/locations/united-states/  -[facility] Women's Mental [facility] at: www.womensmentalhealth.org is a good resource for information about psychiatric medication in pregnancy/lactation  -Mother To Baby A service of the nonprofit Organization of Terat[employer] Information Specialists (SPIKE), provides evidence based information online and in printable handouts to provide patients and providers regarding medications and other exposures during pregnancy and lactation Available at: https://mothertobaby.org/fact-sheets-parent/.  -The 4th Trimester Project - Expert written resources and information for mothers and families. Available at: https://[employer]/  -Consider using \"The Pregnancy and Postpartum Anxiety Workbook,\" by Claudine Gonzalez PhD and Bradley Hall PsyD.    Postpartum Planning Tools:  Maternal Wellbeing Plan from [facility]: https://www.health.[facility].mn./people/womeninfants/pmad/pmadsfs.html    4th Trimester Postpartum plan: https://[employer]/mypostpartumplan    Tips for partners:  http://www.postpartum.net/family/tips-for-postpartum-dads-and-partners/        MENTAL HEALTH CRISIS RESOURCES:  For a emergency help, please call 911 or go to the nearest Emergency Department.     Emergency Walk-In Options:   EmPATH Unit @ [facility] ([place]): [phone] - Specialized mental health emergency area designed to be calming  [facility] ([place]): [phone]  [facility] ([place]): [phone]  [facility]): [phone]    County Crisis Information:   [place]: [phone]  [place]: [phone]  [place] ([place]) - Adult: [phone]     Child: [phone]  [place] - Adult: [phone]     Child: [phone]  [place]: [phone]  List of all [facility] resources:   https://[place].gov/dhs/people-we-serve/adults/health-care/mental-health/resources/crisis-contacts.jsp    National Crisis Information:   Crisis " Text Line: Text  [place]  to 847728  Suicide & Crisis Lifeline: 988  National Suicide Prevention Lifeline: 6-996-289-TALK (1-518.914.3287)       For online chat options, visit https://suicidepreventionlifeline.org/chat/  Poison Control Center: 7-531-530-6671  Trans Lifeline: 2-371-484-4250 - Hotline for transgender people of all ages  The Martínez Project: 7-064-004-3153 - Hotline for LGBT youth     For Non-Emergency Support:   Fast Tracker: Mental Health & Substance Use Disorder Resources -   https://www.[employer].org/

## 2024-03-18 ENCOUNTER — TELEPHONE (OUTPATIENT)
Dept: PSYCHIATRY | Facility: CLINIC | Age: 54
End: 2024-03-18
Payer: COMMERCIAL

## 2024-03-18 DIAGNOSIS — F33.2 SEVERE RECURRENT MAJOR DEPRESSION WITHOUT PSYCHOTIC FEATURES (H): Primary | ICD-10-CM

## 2024-03-18 NOTE — TELEPHONE ENCOUNTER
[facility] Call [facility]    Phone Message    May a detailed message be left on voicemail: yes     Reason for Call: Medication Refill Request    Has the patient contacted the pharmacy for the refill? Yes   Name of medication being requested: Sertraline  Provider who prescribed the medication: Dr. Linton  Pharmacy:   [facility]/pharmacy #[id number] - [place], [place] - [address] [place] [address]     Date medication is needed: Patient needs before April 6th trip, says pharmacy needs PA for refills      Action Taken: Message routed to:  Other: P PSYCHIATRY NURSE-P    Travel Screening: Not Applicable

## 2024-04-13 DIAGNOSIS — F33.2 SEVERE RECURRENT MAJOR DEPRESSION WITHOUT PSYCHOTIC FEATURES (H): ICD-10-CM

## 2024-04-14 RX ORDER — DESVENLAFAXINE 50 MG/1
50 TABLET, FILM COATED, EXTENDED RELEASE ORAL DAILY
Qty: 21 TABLET | Refills: 0 | OUTPATIENT
Start: 2024-04-14

## 2024-05-06 ENCOUNTER — TELEPHONE (OUTPATIENT)
Dept: PSYCHIATRY | Facility: CLINIC | Age: 54
End: 2024-05-06

## 2024-05-06 NOTE — TELEPHONE ENCOUNTER
Reached out to patient to connect again. No answer at number provided. LVM, requesting a call back. Clinic number provided.

## 2024-05-06 NOTE — TELEPHONE ENCOUNTER
Reached out to patient to connect again. Patient confirmed getting hold safety and shared her  was home waiting to talk to her. Patient contracted for safety. Agreed to come into the ED or call 911 if safety is a concern. Patient also confirmed having phone number for on-call and COPE if needed.

## 2024-05-06 NOTE — TELEPHONE ENCOUNTER
Jayda Maxwell, RN  Jayda Maxwell, RN; Kyara Barclay RN  Check on mood after restarting Remeron 7.5 mg. See encounter from 4/29 for further details.    Weekly calls until seen with PharmD or [place]    Follow up:     Writer reached out to patient to connect and obtain an update after restarting Remeron. Patient answered the call and shared she was at work and did not feel comfortable sharing. She agreed to call this writer back around 4 pm.

## 2024-05-06 NOTE — TELEPHONE ENCOUNTER
"Writer received incoming call from patient returning this writer call. Patient shared she just got off work and is sitting in her car. She immediately became tearful when asked how she is doing. Shared she made it through work today but is having difficult time concentrating and completing small tasks. 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 She also has noticed making a lot mistakes at work and is afraid of losing her job. She feels she is much slower to learn and process information compared to others at work which is on her mind.     Patient restarted Remeron 7.5 mg on 4/29 to help with mood. Shared she has noticed improvement in mood when she wakes up. She also shared when she thinks about all the tasks that are needed to be completed, she starts to experience depression and anxiety and is unable to complete them. Expressed \" I feel stuck because I am not thinking well to get anything done.\" She also noticed her glenda eating has returned yesterday but today is better. She has been in process of cross titration from Pristiq to Sertraline. She discontinued Pristiq and started taking Sertraline 100 mg on 5/5. She has not noticed any improvement in symptoms so far. She continued to express passive SI such as \" wish I was dead.\" She reports good support from .     During the call patient continued to be tearful and had a plan to drive home. Writer agreed to stay on the call with the patient until she was home safely. Patient stayed on the call with this writer for 7 min before the call was disconnected. Writer attempted to reach out to patient again but was not able to connect. Left a message requesting a call back.     Routed to provider       "

## 2024-05-07 ENCOUNTER — TELEPHONE (OUTPATIENT)
Dept: PSYCHIATRY | Facility: CLINIC | Age: 54
End: 2024-05-07
Payer: COMMERCIAL

## 2024-05-07 ASSESSMENT — ANXIETY QUESTIONNAIRES
GAD7 TOTAL SCORE: 17
6. BECOMING EASILY ANNOYED OR IRRITABLE: NEARLY EVERY DAY
GAD7 TOTAL SCORE: 17
1. FEELING NERVOUS, ANXIOUS, OR ON EDGE: NEARLY EVERY DAY
4. TROUBLE RELAXING: MORE THAN HALF THE DAYS
2. NOT BEING ABLE TO STOP OR CONTROL WORRYING: NEARLY EVERY DAY
5. BEING SO RESTLESS THAT IT IS HARD TO SIT STILL: SEVERAL DAYS
7. FEELING AFRAID AS IF SOMETHING AWFUL MIGHT HAPPEN: NEARLY EVERY DAY
GAD7 TOTAL SCORE: 17
IF YOU CHECKED OFF ANY PROBLEMS ON THIS QUESTIONNAIRE, HOW DIFFICULT HAVE THESE PROBLEMS MADE IT FOR YOU TO DO YOUR WORK, TAKE CARE OF THINGS AT HOME, OR GET ALONG WITH OTHER PEOPLE: VERY DIFFICULT
7. FEELING AFRAID AS IF SOMETHING AWFUL MIGHT HAPPEN: NEARLY EVERY DAY
3. WORRYING TOO MUCH ABOUT DIFFERENT THINGS: MORE THAN HALF THE DAYS
8. IF YOU CHECKED OFF ANY PROBLEMS, HOW DIFFICULT HAVE THESE MADE IT FOR YOU TO DO YOUR WORK, TAKE CARE OF THINGS AT HOME, OR GET ALONG WITH OTHER PEOPLE?: VERY DIFFICULT

## 2024-05-07 ASSESSMENT — PATIENT HEALTH QUESTIONNAIRE - PHQ9
10. IF YOU CHECKED OFF ANY PROBLEMS, HOW DIFFICULT HAVE THESE PROBLEMS MADE IT FOR YOU TO DO YOUR WORK, TAKE CARE OF THINGS AT HOME, OR GET ALONG WITH OTHER PEOPLE: VERY DIFFICULT
SUM OF ALL RESPONSES TO PHQ QUESTIONS 1-9: 21
SUM OF ALL RESPONSES TO PHQ QUESTIONS 1-9: 21

## 2024-05-07 NOTE — TELEPHONE ENCOUNTER
"Telephone    I called back Valentina today this morning and she reports that she's still struggling. Things got worse when she got back from her recent trip and didn't have anything to look forward to. In addition, she doesn't enjoy her job and feels unmotivated to look for a new one. She's still experiencing intermittent suicidal ideations with fleeting considerations of what she could do. She reports no intent, stating that \"I would go to the ER first before I do anything\".     She's currently at 100mg of sertraline. We discussed increasing dose to 150mg. She was at this dose the last time she tried sertraline and found benefit. She has also been using up to 100mg of Trazodone to help with sleep.    Plan:  -Increase sertraline to 150 mg (has enough tablets to make this change until her next appointment).  -Continue Trazodone 50-100mg at bedtime  -Continue Remeron 7.5mg daily  -Continue Geodon 20mg BID  -Has MTM visit on 5/16  -Follow up appointment with me on 5/17.  -RN check in early next week.    JEREMIAS Khan,MPH    "

## 2024-05-08 ENCOUNTER — OFFICE VISIT (OUTPATIENT)
Dept: INTERNAL MEDICINE | Facility: CLINIC | Age: 54
End: 2024-05-08
Payer: COMMERCIAL

## 2024-05-08 VITALS
RESPIRATION RATE: 16 BRPM | BODY MASS INDEX: 24.55 KG/M2 | DIASTOLIC BLOOD PRESSURE: 64 MMHG | SYSTOLIC BLOOD PRESSURE: 116 MMHG | TEMPERATURE: 98 F | OXYGEN SATURATION: 97 % | HEIGHT: 68 IN | HEART RATE: 69 BPM | WEIGHT: 162 LBS

## 2024-05-08 DIAGNOSIS — F33.2 SEVERE EPISODE OF RECURRENT MAJOR DEPRESSIVE DISORDER, WITHOUT PSYCHOTIC FEATURES (H): ICD-10-CM

## 2024-05-08 DIAGNOSIS — M21.611 BUNION, RIGHT: ICD-10-CM

## 2024-05-08 DIAGNOSIS — Z51.81 MEDICATION MONITORING ENCOUNTER: Primary | ICD-10-CM

## 2024-05-08 DIAGNOSIS — G47.33 OSA (OBSTRUCTIVE SLEEP APNEA): ICD-10-CM

## 2024-05-08 DIAGNOSIS — K58.9 IRRITABLE BOWEL SYNDROME, UNSPECIFIED TYPE: ICD-10-CM

## 2024-05-08 DIAGNOSIS — E78.5 HYPERLIPIDEMIA, UNSPECIFIED HYPERLIPIDEMIA TYPE: ICD-10-CM

## 2024-05-08 DIAGNOSIS — K21.9 GASTROESOPHAGEAL REFLUX DISEASE WITHOUT ESOPHAGITIS: ICD-10-CM

## 2024-05-08 DIAGNOSIS — K59.00 CONSTIPATION, UNSPECIFIED CONSTIPATION TYPE: ICD-10-CM

## 2024-05-08 DIAGNOSIS — K02.9 TOOTH DECAY: ICD-10-CM

## 2024-05-08 LAB
CHOLEST SERPL-MCNC: 201 MG/DL
FASTING STATUS PATIENT QL REPORTED: YES
FASTING STATUS PATIENT QL REPORTED: YES
GLUCOSE SERPL-MCNC: 106 MG/DL (ref 70–99)
HDLC SERPL-MCNC: 84 MG/DL
LDLC SERPL CALC-MCNC: 102 MG/DL
NONHDLC SERPL-MCNC: 117 MG/DL
TRIGL SERPL-MCNC: 75 MG/DL

## 2024-05-08 PROCEDURE — 93005 ELECTROCARDIOGRAM TRACING: CPT | Performed by: NURSE PRACTITIONER

## 2024-05-08 PROCEDURE — 80061 LIPID PANEL: CPT | Performed by: NURSE PRACTITIONER

## 2024-05-08 PROCEDURE — G2211 COMPLEX E/M VISIT ADD ON: HCPCS | Performed by: NURSE PRACTITIONER

## 2024-05-08 PROCEDURE — 36415 COLL VENOUS BLD VENIPUNCTURE: CPT | Performed by: NURSE PRACTITIONER

## 2024-05-08 PROCEDURE — 82947 ASSAY GLUCOSE BLOOD QUANT: CPT | Performed by: NURSE PRACTITIONER

## 2024-05-08 PROCEDURE — 99214 OFFICE O/P EST MOD 30 MIN: CPT | Performed by: NURSE PRACTITIONER

## 2024-05-08 RX ORDER — SODIUM FLUORIDE AND POTASSIUM NITRATE 57.5; 5.8 MG/ML; MG/ML
GEL, DENTIFRICE DENTAL
COMMUNITY
End: 2024-05-08

## 2024-05-08 RX ORDER — SODIUM FLUORIDE AND POTASSIUM NITRATE 57.5; 5.8 MG/ML; MG/ML
GEL, DENTIFRICE DENTAL
Qty: 112 G | Refills: 11 | Status: SHIPPED | OUTPATIENT
Start: 2024-05-08

## 2024-05-08 RX ORDER — MELATONIN 5 MG
5-10 TABLET,CHEWABLE ORAL AT BEDTIME
COMMUNITY
Start: 2023-12-15

## 2024-05-08 RX ORDER — SERTRALINE HYDROCHLORIDE 100 MG/1
150 TABLET, FILM COATED ORAL DAILY
COMMUNITY
End: 2024-05-17

## 2024-05-08 NOTE — PROGRESS NOTES
Assessment & Plan     Medication monitoring encounter  Psychiatry requested that we check a EKG to monitor QTc interval, QTc 466 today, borderline.  Personally interpreted as normal sinus rhythm with no ST changes.  - Glucose; Future  - EKG 12-lead, tracing only  - Glucose    Hyperlipidemia, unspecified hyperlipidemia type  Fasting today.  Not on a statin.  She does use fish oil  - Lipid Profile (Chol, Trig, HDL, LDL calc); Future  - Lipid Profile (Chol, Trig, HDL, LDL calc)    Tooth decay  She uses a special toothpaste and is requesting refills  - Sod Fluoride-Potassium Nitrate (DENTA 5000 PLUS SENSITIVE) 1.1-5 % PSTE; Use twice a day, mint flavor, mat substitute other brand if not in stock    STEVEN (obstructive sleep apnea)  Compliant with CPAP    Irritable bowel syndrome, unspecified type  Rare use of Bentyl    Constipation, unspecified constipation type  Controlled on MiraLAX    Gastroesophageal reflux disease without esophagitis  Controlled on omeprazole    Severe episode of recurrent major depressive disorder, without psychotic features (H)  Treatment resistant depression, working closely with psychiatry.  On Geodon, trazodone, mirtazapine, sertraline.  Had been on desvenlafaxine prior.  Ativan as needed.  History of transcranial stimulation therapy.  Working on getting neuropsychiatry referral due to memory problems.  We talked about establishing regular physical activity regimen, getting outside on a regular basis, continued use of CPAP, etc.    Fleeting thoughts of self-harm but no active plan.    Bunion, right  Advised podiatry consult    Patient Instructions   EKG today.    Recheck glucose and cholesterol.    Work on getting outside on a regular basis.    Establish a regular exercise regimen.    Continue using CPAP.    Make sure to call and schedule a neuropsychiatric evaluation as recommended by your psychiatrist.    Call and set up an appointment with Jovanny Terrazas at [facility] for your bunion pain.   "[phone].    Follow-up in 6 months    The longitudinal plan of care for the diagnosis(es)/condition(s) as documented were addressed during this visit. Due to the added complexity in care, I will continue to support Valentina in the subsequent management and with ongoing continuity of care.                Kath Montgomery is a 54 year old, presenting for the following health issues:  Follow Up (Bunion on foot, needs EKG per psych, wants labs)      5/8/2024     7:53 AM   Additional Questions   Roomed by      History of Present Illness       Mental Health Follow-up:  Patient presents to follow-up on Depression & Anxiety.Patient's depression since last visit has been:  Worse  The patient is having other symptoms associated with depression.  Patient's anxiety since last visit has been:  Medium  The patient is having other symptoms associated with anxiety.  Any significant life events: relationship concerns, job concerns and financial concerns  Patient is feeling anxious or having panic attacks.  Patient has no concerns about alcohol or drug use.    Hyperlipidemia:  She presents for follow up of hyperlipidemia.   She is not taking medication to lower cholesterol. She is not having myalgia or other side effects to statin medications.    Reason for visit:  Psychiatrist asked me to ask for a EKG due to being on multiple psychiatic medications    She eats 4 or more servings of fruits and vegetables daily.She consumes 0 sweetened beverage(s) daily.She exercises with enough effort to increase her heart rate 20 to 29 minutes per day.  She exercises with enough effort to increase her heart rate 4 days per week.   She is taking medications regularly.           Objective    /64 (BP Location: Right arm, Patient Position: Sitting, Cuff Size: Adult Regular)   Pulse 69   Resp 16   Ht 1.715 m (5' 7.5\")   Wt 73.5 kg (162 lb)   LMP 08/05/2021 (Approximate)   SpO2 97%   Breastfeeding No   BMI 25.00 kg/m    Body mass " index is 25 kg/m .  Physical Exam   Mild erythema involving right bunion.              Signed Electronically by: Liborio Butler CNP

## 2024-05-08 NOTE — PATIENT INSTRUCTIONS
EKG today.    Recheck glucose and cholesterol.    Work on getting outside on a regular basis.    Establish a regular exercise regimen.    Continue using CPAP.    Make sure to call and schedule a neuropsychiatric evaluation as recommended by your psychiatrist.    Call and set up an appointment with Jovanny Terrazas at [facility] for your bunion pain.  [phone].    Follow-up in 6 months

## 2024-05-13 ENCOUNTER — TELEPHONE (OUTPATIENT)
Dept: PSYCHIATRY | Facility: CLINIC | Age: 54
End: 2024-05-13
Payer: COMMERCIAL

## 2024-05-13 NOTE — TELEPHONE ENCOUNTER
Stephen Montelongo,   I forgot to inform you.   I called Valentina this morning and left a brief encounter note. Please could we plan another check-in on Friday or early next week.     Thank you,   [place]     Follow up:     Reached out to patient to assess for safety. Patient shared she was at a tax appt and requested this writer call back at 4. Writer agreed with the plan.

## 2024-05-13 NOTE — TELEPHONE ENCOUNTER
"Received returned call from patient returning this writers call. Patient reports taking Sertraline 150 mg daily and is feeling better. Patient shared the feeling of depressed mood and heavy heart and feet have lightened up. She continues to experience passive SI such as \"wish I was not born or wish I wouldn't have   my .\" She denies any active plans or intent. Shared the thoughts have lessened since the pat 3 days. Patient is wanting to know if she can switch the appt on 5/17 to be virtual so she can make it to her other therapy appt. Writer agreed to reach out to provider to confirm.       "

## 2024-05-15 NOTE — TELEPHONE ENCOUNTER
Reached out to patient to update her that the visit on 5/17 is changed from in person to virtual. No answer at number provided. LVM, requesting a call back. Clinic number provided.

## 2024-05-15 NOTE — PROGRESS NOTES
Medication Therapy Management (MTM) Encounter    ASSESSMENT:                            Medication Adherence/Access: No issues identified    Mental Health   Major depressive disorder, recurrent, severe without psychotic features (w/ history of difficult to treat depression)  Generalized anxiety disorder:   Symptoms improving with recent transition to sertraline from Pristiq. Would be room to increase the dose if needed; doesn't appear patient has been on doses >150mg in the past. Reviewed drug interaction with mirtazapine and ziprasidone - flags for QTc prolongation, recent QTc obtained while on current med regimen and was 466ms. Given uncertain benefit from ziprasidone, we discussed one potential option may be to try decreasing/stopping it in the future. Could also consider stopping trazodone (or using as needed) - could be that it isn't necessary given concomitant therapy with mirtazapine.     GERD:   Patient can consider lower dose omeprazole 10mg daily.     Constipation:   Could consider reducing miralax to 1/2 cupful, but recommend increasing back up if constipation worsens. Provided ed on non-pharm interventions - hydration, fibrous foods, physical activity.       PLAN:                            Reviewed medications today, ok to try lower dose of omeprazole and miralax if you are interested.   Future considerations could include possible dose reduction/discontinuation of ziprasidone and/or trazodone    Follow-up: psychiatry provider tomorrow 5/17    SUBJECTIVE/OBJECTIVE:                          Valentina Humphries is a 54 year old female coming in for an initial visit with writer (follow-up from visits with Brigitte Spencer). She was referred to me from psychiatry.      Reason for visit: recent antidepressant cross-taper.    Allergies/ADRs: Reviewed in chart  Past Medical History: Reviewed in chart  Tobacco: She reports that she has never smoked. She has never been exposed to tobacco smoke. She has  never used smokeless tobacco.  Alcohol: did not discuss    Medication Adherence/Access: no issues reported    Mental Health   Major depressive disorder, recurrent, severe without psychotic features (w/ history of difficult to treat depression)  Generalized anxiety disorder  Sertraline 150mg daily   Mirtazapine 7.5mg nightly   Geodon 20mg twice daily started 2023  trazodone 50mg at bedtime  Ativan 0.5mg - 2mg three times daily as needed (use goes in streaks, some weeks none, some weeks a couple doses)     Today, patient reports feeling better than she has in the last couple years.  She completed a cross-taper from desvenlafaxine to sertraline a week and a half ago.  She is on day 12 of sertraline 150 mg daily.  She recently stopped mirtazapine 15 mg nightly due to concern for increased appetite however reports some worsening symptoms during her cross titration so she resumed mirtazapine at 7.5 mg nightly.  She had been on sertraline in the past up to a max dose of 150 mg daily (potentially did not go higher due to sexual side effects), however felt efficacy wore off and subsequently tried several other antidepressants in addition to TMS.  She reports she believes sertraline was the most effective and best tolerated antidepressant she has tried and therefore wanted to retry it.  She does have some concern for cognitive symptoms which she reports began around the time of menopause in November 2022.  She has been referred for neuropsych testing.  She has questions today about interactions, medication side effects.  She read that mirtazapine and ziprasidone should not be taken together.  She read that trazodone can cause cognitive issues.  Reports ziprasidone was started about 1-1/2 years ago and she is unsure if she noticed benefit with it.  She does not believe she has been on ziprasidone and sertraline together in the past. Doesn't think she has ever tried taking just mirtazapine for sleep without trazodone; has  skipped both mirtazapine and trazodone and doesn't sleep well.     EKG -   QTC 466ms 5/8/24                                                                                                                 GERD:   Omeprazole 20 mg once daily   Patient feels that current regimen is effective. Has tried to come off in the past but noticed symptoms. Has 10mg at home that she would like to try instead of 20mg daily.     Constipation:   Miralax daily   Bentyl as needed   Sees GI at [facility], annually    Has BM once to twice daily with miralax, but can go several days without a BM if doesn't take miralax. Has been taking for 5 years, wonders if she can try 1/2 capful.      ----------------      I spent 60 minutes with this patient today. A copy of the visit note was provided to the patient's provider(s).    A summary of these recommendations was sent via [employer].    Leena Gomez, PharmD, BCPP  Medication Therapy Management Pharmacist  [facility]           Medication Therapy Recommendations  No medication therapy recommendations to display

## 2024-05-16 ENCOUNTER — OFFICE VISIT (OUTPATIENT)
Dept: PHARMACY | Facility: CLINIC | Age: 54
End: 2024-05-16
Attending: STUDENT IN AN ORGANIZED HEALTH CARE EDUCATION/TRAINING PROGRAM
Payer: COMMERCIAL

## 2024-05-16 DIAGNOSIS — F33.9 MDD (MAJOR DEPRESSIVE DISORDER), RECURRENT EPISODE (H): ICD-10-CM

## 2024-05-16 DIAGNOSIS — F41.1 GAD (GENERALIZED ANXIETY DISORDER): Primary | ICD-10-CM

## 2024-05-16 DIAGNOSIS — K58.9 IRRITABLE BOWEL SYNDROME, UNSPECIFIED TYPE: ICD-10-CM

## 2024-05-16 DIAGNOSIS — K21.9 GASTROESOPHAGEAL REFLUX DISEASE WITHOUT ESOPHAGITIS: ICD-10-CM

## 2024-05-16 PROCEDURE — 99606 MTMS BY PHARM EST 15 MIN: CPT | Performed by: PHARMACIST

## 2024-05-16 PROCEDURE — 99607 MTMS BY PHARM ADDL 15 MIN: CPT | Performed by: PHARMACIST

## 2024-05-17 ENCOUNTER — VIRTUAL VISIT (OUTPATIENT)
Dept: PSYCHIATRY | Facility: CLINIC | Age: 54
End: 2024-05-17
Attending: STUDENT IN AN ORGANIZED HEALTH CARE EDUCATION/TRAINING PROGRAM
Payer: COMMERCIAL

## 2024-05-17 VITALS — HEIGHT: 68 IN | BODY MASS INDEX: 24.55 KG/M2 | WEIGHT: 162 LBS

## 2024-05-17 DIAGNOSIS — F41.1 GENERALIZED ANXIETY DISORDER: Primary | ICD-10-CM

## 2024-05-17 DIAGNOSIS — F33.2 SEVERE RECURRENT MAJOR DEPRESSION WITHOUT PSYCHOTIC FEATURES (H): ICD-10-CM

## 2024-05-17 DIAGNOSIS — R41.89 IMPAIRED COGNITION: ICD-10-CM

## 2024-05-17 PROCEDURE — 90833 PSYTX W PT W E/M 30 MIN: CPT | Mod: 95 | Performed by: STUDENT IN AN ORGANIZED HEALTH CARE EDUCATION/TRAINING PROGRAM

## 2024-05-17 PROCEDURE — G2211 COMPLEX E/M VISIT ADD ON: HCPCS | Mod: 95 | Performed by: STUDENT IN AN ORGANIZED HEALTH CARE EDUCATION/TRAINING PROGRAM

## 2024-05-17 PROCEDURE — 99214 OFFICE O/P EST MOD 30 MIN: CPT | Mod: 95 | Performed by: STUDENT IN AN ORGANIZED HEALTH CARE EDUCATION/TRAINING PROGRAM

## 2024-05-17 RX ORDER — TRAZODONE HYDROCHLORIDE 100 MG/1
50-100 TABLET ORAL AT BEDTIME
Qty: 30 TABLET | Refills: 1 | Status: SHIPPED | OUTPATIENT
Start: 2024-05-17 | End: 2024-07-26

## 2024-05-17 RX ORDER — SERTRALINE HYDROCHLORIDE 100 MG/1
150 TABLET, FILM COATED ORAL DAILY
Qty: 45 TABLET | Refills: 1 | Status: SHIPPED | OUTPATIENT
Start: 2024-05-17 | End: 2024-06-07

## 2024-05-17 RX ORDER — MIRTAZAPINE 15 MG/1
7.5 TABLET, FILM COATED ORAL AT BEDTIME
Qty: 15 TABLET | Refills: 1 | Status: SHIPPED | OUTPATIENT
Start: 2024-05-17 | End: 2024-06-07

## 2024-05-17 ASSESSMENT — PAIN SCALES - GENERAL: PAINLEVEL: MODERATE PAIN (4)

## 2024-05-17 NOTE — PATIENT INSTRUCTIONS
Thank you for coming to the [facility].     Treatment Plan    Medications:  Continue sertraline to 150 mg  -Continue Trazodone 50-100mg at bedtime  -Continue Remeron 7.5mg daily  -Continue Geodon 20mg BID    Therapy:  Continue individual therapy. Starting DBT next week    RTC:  In 3 weeks      Lab Testing:  If you had lab testing today and your results are reassuring or normal they will be mailed to you or sent through [employer] within 7 days. If the lab tests need quick action we will call you with the results. The phone number we will call with results is # [phone]. If this is not the best number please call our clinic and change the number.     Medication Refills:  If you need any refills please call your pharmacy and they will contact us. Our fax number for refills is [phone].   Three business days of notice are needed for general medication refill requests.   Five business days of notice are needed for controlled substance refill requests.   If you need to change to a different pharmacy, please contact the new pharmacy directly. The new pharmacy will help you get your medications transferred.     Contact Us:  Please call [phone] during business hours (8-5:00 M-F).   If you have medication related questions after clinic hours, or on the weekend, please call [phone].     Financial Assistance [phone]   Medical Records [phone]       **For crisis resources, please see the information at the end of this document**     To find additional local resources, refer to Postpartum Support International (PSI). Available at: http://www.postpartum.net/get-help/locations/united-Lists of hospitals in the [place]/  -[facility]'s Mental [facility] at: www.womensmentalhealth.org is a good resource for information about psychiatric medication in pregnancy/lactation  -Mother To Baby A service of the nonprofit Organization of [employer]at[employer] (SPIKE), provides evidence based  "information online and in printable handouts to provide patients and providers regarding medications and other exposures during pregnancy and lactation Available at: https://mothertobaby.org/fact-sheets-parent/.  -The 4th Trimester Project - Expert written resources and information for mothers and families. Available at: https://HelpMeRent.com/  -Consider using \"The Pregnancy and Postpartum Anxiety Workbook,\" by Claudine Gonzalez PhD and Bradley Hall PsyD.    Postpartum Planning Tools:  Maternal Wellbeing Plan from [facility]: https://www.health.[facility].[place]/people/womeninfants/pmad/pmadsfs.html    4th Trimester Postpartum plan: https://HelpMeRent.com/mypostpartumplan    Tips for partners:  http://www.postpartum.net/family/tips-for-postpartum-dads-and-partners/        MENTAL HEALTH CRISIS RESOURCES:  For a emergency help, please call 911 or go to the nearest Emergency Department.     Emergency Walk-In Options:   EmPATH Unit @ [facility]): [phone] - Specialized mental health emergency area designed to be [facility]  [facility] ([place]): [phone]  [facility] ([place]): [phone]  [facility]): [phone]    [employer] Crisis Information:   [place]: [phone]  [place]: [phone]  [place] ([place]) - Adult: [phone]     Child: [phone]  [place] - Adult: [phone]     Child: [phone]  [place]: [phone]  List of all [facility] resources:   https://[place].gov/dhs/people-we-serve/adults/health-care/mental-health/resources/crisis-contacts.jsp    National Crisis Information:   Crisis Text Line: Text  [place]  to 803629  Suicide & Crisis Lifeline: 988  National Suicide Prevention Lifeline: 8-977-338-TALK (1-492.764.9427)       For online chat options, visit https://suicidepreventionlifeline.org/chat/  Poison Control Center: 7-816-438-6439  Trans Lifeline: 1-814.841.8972 - Hotline for transgender people of all ages  The Mart[place]nez " Project: 7-118-577-1584 - Hotline for LGBT youth     For Non-Emergency Support:   Fast Tracker: Mental Health & Substance Use Disorder Resources -   https://www.GuestShotsn.org/

## 2024-05-17 NOTE — PATIENT INSTRUCTIONS
"Recommendations from today's MTM visit:                                                    Reviewed medications today, ok to try lower dose of omeprazole and miralax if you are interested.   Future considerations could include possible dose reduction/discontinuation of ziprasidone and/or trazodone    Follow-up: psychiatry provider tomorrow 5/17  It was great speaking with you today.  I value your experience and would be very thankful for your time in providing feedback in our clinic survey. In the next few days, you may receive an email or text message from [employer] with a link to a survey related to your  clinical pharmacist.\"     To schedule another MTM appointment, please call the clinic directly or you may call the MTM scheduling line at [phone] or toll-free at [phone].     My Clinical Pharmacist's contact information:                                                      Please feel free to contact me with any questions or concerns you have.      Leena Gomez, PharmD, BCPP  Medication Therapy Management Pharmacist  [facility]    "
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
## 2024-05-17 NOTE — NURSING NOTE
Is the patient currently in the state of [place]? YES    Visit mode:VIDEO    If the visit is dropped, the patient can be reconnected by: VIDEO VISIT: Text to cell phone:   Telephone Information:   Mobile [phone]       Will anyone else be joining the visit? NO  (If patient encounters technical issues they should call [phone]    How would you like to obtain your AVS? MyChart    Are changes needed to the allergy or medication list? No    Are refills needed on medications prescribed by this physician? YES    Reason for visit: RECHECK    Shital IZAGUIRRE

## 2024-05-20 ENCOUNTER — PATIENT OUTREACH (OUTPATIENT)
Dept: CARE COORDINATION | Facility: CLINIC | Age: 54
End: 2024-05-20
Payer: COMMERCIAL

## 2024-05-20 NOTE — PROGRESS NOTES
Clinic Care Coordination Contact  [facility]/Voicemail    Clinical Data: Care Coordinator Outreach    Outreach Documentation Number of Outreach Attempt   5/20/2024  10:37 AM 1     Left message on patient's voicemail with call back information and requested return call.    Plan: Care Coordinator will try to reach patient again in 1-2 business days.    CHERELLE Figueroa  Clinic Care Coordination  [facility]  [email or web address]  [phone]

## 2024-05-22 NOTE — PROGRESS NOTES
Clinic Care Coordination Contact  [facility]/Voicemail    Clinical Data: Care Coordinator Outreach    Outreach Documentation Number of Outreach Attempt   5/20/2024  10:37 AM 1   5/22/2024  11:34 AM 2     Left message on patient's voicemail with call back information and requested return call.    Plan: Care Coordinator will try to reach patient again in 3-5 business days.    Gayathri Gandhi [facility]  Clinic Care Coordination  [facility]  [email or web address]  [phone]

## 2024-05-29 ENCOUNTER — PATIENT OUTREACH (OUTPATIENT)
Dept: CARE COORDINATION | Facility: CLINIC | Age: 54
End: 2024-05-29
Payer: COMMERCIAL

## 2024-05-29 NOTE — PROGRESS NOTES
Clinic Care Coordination Contact  Follow Up Progress Note      Assessment: [facility] contacted patient to follow up on referral placed by Dr. Linton. Patient reports that she is currently driving. She requested to call me back later. Will await return call.    Care Gaps:    Health Maintenance Due   Topic Date Due    DEPRESSION ACTION PLAN  Never done    DEPRESSION 12 MO INDEX REPEAT PHQ-9  05/22/2024     Intervention/Education provided during outreach: Patient verbalized understanding, engaged in AIDET communication during patient encounter.     Plan: Patient was encouraged to reach out with any questions or concerns.    Care Coordinator will follow up in 10 business days.    Gayathri Gandhi [facility]  Clinic Care Coordination  [facility]  [email or web address]  [phone]
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
## 2024-06-07 ENCOUNTER — OFFICE VISIT (OUTPATIENT)
Dept: PSYCHIATRY | Facility: CLINIC | Age: 54
End: 2024-06-07
Attending: STUDENT IN AN ORGANIZED HEALTH CARE EDUCATION/TRAINING PROGRAM
Payer: COMMERCIAL

## 2024-06-07 ENCOUNTER — MYC MEDICAL ADVICE (OUTPATIENT)
Dept: PSYCHIATRY | Facility: CLINIC | Age: 54
End: 2024-06-07
Payer: COMMERCIAL

## 2024-06-07 VITALS
BODY MASS INDEX: 24.75 KG/M2 | SYSTOLIC BLOOD PRESSURE: 119 MMHG | DIASTOLIC BLOOD PRESSURE: 75 MMHG | WEIGHT: 160.4 LBS | HEART RATE: 75 BPM

## 2024-06-07 DIAGNOSIS — F33.2 SEVERE RECURRENT MAJOR DEPRESSION WITHOUT PSYCHOTIC FEATURES (H): ICD-10-CM

## 2024-06-07 DIAGNOSIS — F41.1 GAD (GENERALIZED ANXIETY DISORDER): ICD-10-CM

## 2024-06-07 PROCEDURE — G2211 COMPLEX E/M VISIT ADD ON: HCPCS | Performed by: STUDENT IN AN ORGANIZED HEALTH CARE EDUCATION/TRAINING PROGRAM

## 2024-06-07 PROCEDURE — 99214 OFFICE O/P EST MOD 30 MIN: CPT | Performed by: STUDENT IN AN ORGANIZED HEALTH CARE EDUCATION/TRAINING PROGRAM

## 2024-06-07 PROCEDURE — 90833 PSYTX W PT W E/M 30 MIN: CPT | Performed by: STUDENT IN AN ORGANIZED HEALTH CARE EDUCATION/TRAINING PROGRAM

## 2024-06-07 RX ORDER — LORAZEPAM 0.5 MG/1
0.5 TABLET ORAL DAILY PRN
Qty: 15 TABLET | Refills: 0 | Status: SHIPPED | OUTPATIENT
Start: 2024-06-07

## 2024-06-07 RX ORDER — SERTRALINE HYDROCHLORIDE 100 MG/1
150 TABLET, FILM COATED ORAL DAILY
Qty: 45 TABLET | Refills: 1 | Status: SHIPPED | OUTPATIENT
Start: 2024-06-07 | End: 2024-06-07

## 2024-06-07 RX ORDER — MIRTAZAPINE 15 MG/1
7.5 TABLET, FILM COATED ORAL AT BEDTIME
Qty: 15 TABLET | Refills: 1 | Status: SHIPPED | OUTPATIENT
Start: 2024-06-07 | End: 2024-07-26

## 2024-06-07 RX ORDER — HYDROXYZINE HYDROCHLORIDE 10 MG/1
10-20 TABLET, FILM COATED ORAL DAILY PRN
Qty: 30 TABLET | Refills: 0 | Status: SHIPPED | OUTPATIENT
Start: 2024-06-07 | End: 2024-06-14

## 2024-06-07 RX ORDER — SERTRALINE HYDROCHLORIDE 100 MG/1
200 TABLET, FILM COATED ORAL DAILY
Qty: 60 TABLET | Refills: 1 | Status: SHIPPED | OUTPATIENT
Start: 2024-06-07 | End: 2024-07-26

## 2024-06-07 ASSESSMENT — PATIENT HEALTH QUESTIONNAIRE - PHQ9
SUM OF ALL RESPONSES TO PHQ QUESTIONS 1-9: 18
SUM OF ALL RESPONSES TO PHQ QUESTIONS 1-9: 18
10. IF YOU CHECKED OFF ANY PROBLEMS, HOW DIFFICULT HAVE THESE PROBLEMS MADE IT FOR YOU TO DO YOUR WORK, TAKE CARE OF THINGS AT HOME, OR GET ALONG WITH OTHER PEOPLE: EXTREMELY DIFFICULT

## 2024-06-07 ASSESSMENT — PAIN SCALES - GENERAL: PAINLEVEL: MODERATE PAIN (4)

## 2024-06-07 NOTE — PATIENT INSTRUCTIONS
Thank you for coming to the [facility] Women[facility] Wellbeing Program.     Treatment Plan    Medications:    -Increase sertraline to 200 mg  -Continue Trazodone 50-100mg at bedtime  -Continue Remeron 7.5mg daily  -Continue Geodon 20mg BID    Therapy:  Continue individual therapy & DBT    RTC:  2 weeks    Lab Testing:  If you had lab testing today and your results are reassuring or normal they will be mailed to you or sent through [employer] within 7 days. If the lab tests need quick action we will call you with the results. The phone number we will call with results is # [phone]. If this is not the best number please call our clinic and change the number.     Medication Refills:  If you need any refills please call your pharmacy and they will contact us. Our fax number for refills is [phone].   Three business days of notice are needed for general medication refill requests.   Five business days of notice are needed for controlled substance refill requests.   If you need to change to a different pharmacy, please contact the new pharmacy directly. The new pharmacy will help you get your medications transferred.     Contact Us:  Please call [phone] during business hours (8-5:00 M-F).   If you have medication related questions after clinic hours, or on the weekend, please call [phone].     Financial Assistance [phone]   Medical Records [phone]       **For crisis resources, please see the information at the end of this document**     To find additional local resources, refer to Postpartum Support International (PSI). Available at: http://www.postpartum.net/get-help/locations/united-[facility]/  -[facility]'s [facility] at: www.womensmentalhealth.org is a good resource for information about psychiatric medication in pregnancy/lactation  -Mother To Baby A service of the nonprofit Organization of Teratology Information Specialists (SPIKE), provides evidence based information online and  "in printable handouts to provide patients and providers regarding medications and other exposures during pregnancy and lactation Available at: https://mothertobaby.org/fact-sheets-parent/.  -The 4th Trimester Project - Expert written resources and information for mothers and families. Available at: https://Plunify/  -Consider using \"The Pregnancy and Postpartum Anxiety Workbook,\" by Claudine Gonzalez PhD and Bradley Hall PsyD.    Postpartum Planning Tools:  Maternal Wellbeing Plan from [facility]: https://www.health.[facility].mn.us/people/womeninfants/pmad/pmadsfs.html    4th Trimester Postpartum plan: https://Plunify/mypostpartumplan    Tips for partners:  http://www.postpartum.net/family/tips-for-postpartum-dads-and-partners/        MENTAL HEALTH CRISIS RESOURCES:  For a emergency help, please call 911 or go to the nearest Emergency Department.     Emergency Walk-In Options:   EmPATH Unit @ [facility]): [phone] - Specialized mental health emergency area designed to be [facility]  [facility] ([place]): [phone]  [facility] ([place]): [phone]  [facility]): [phone]    [facility] Crisis Information:   [place]: [phone]  [place]: [phone]  [place] (JAIDA) - Adult: [phone]     Child: [phone]  [place] - Adult: [phone]     Child: [phone]  [place]: [phone]  List of all [facility] resources:   https://[place].gov/dhs/people-we-serve/adults/health-care/mental-health/resources/crisis-contacts.jsp    National Crisis Information:   Crisis Text Line: Text  [place]  to 516491  Suicide & Crisis Lifeline: 988  National Suicide Prevention Lifeline: 7-073-879-TALK (1-255.325.5842)       For online chat options, visit https://suicidepreventionlifeline.org/chat/  Poison Control Center: 1-244.349.3297  Trans Lifeline: 3-991-273-6651 - Hotline for transgender people of all ages  The Martínez Project: 1-175-888-9507 " - Hotline for LGBT youth     For Non-Emergency Support:   Fast Tracker: Mental Health & Substance Use Disorder Resources -   https://www.Plastycn.org/

## 2024-06-07 NOTE — NURSING NOTE
Chief Complaint   Patient presents with    Recheck Medication     Generalized anxiety disorder     Blood pressure 119/75, pulse 75, weight 72.8 kg (160 lb 6.4 oz), last menstrual period 08/05/2021, not currently breastfeeding.    - Sandoval Womack, Visit Facilitator

## 2024-06-11 NOTE — TELEPHONE ENCOUNTER
Prescriptions sent to [facility] on 6/7/24:  Lorazepam (Ativan) 0.5 mg, Take 1 tablet (0.5 mg) by mouth daily as needed for anxiety  Hydroxyzine HCl (Atarax) 10 mg, Take 1-2 tablets (10-20 mg) by mouth daily as needed for anxiety

## 2024-06-12 ENCOUNTER — PATIENT OUTREACH (OUTPATIENT)
Dept: CARE COORDINATION | Facility: CLINIC | Age: 54
End: 2024-06-12
Payer: COMMERCIAL

## 2024-06-12 NOTE — PROGRESS NOTES
Clinic Care Coordination Contact  [facility]/Voicemail    Clinical Data: Care Coordinator Outreach    Outreach Documentation Number of Outreach Attempt   5/20/2024  10:37 AM 1   5/22/2024  11:34 AM 2   6/12/2024  10:21 AM 3     Left message on patient's voicemail with call back information and requested return call.    Plan: Care Coordinator will await return call from patient.    Gayathri Gandhi ATIF  Clinic Care Coordination  [facility]  [email or web address]  [phone]
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
## 2024-06-14 ENCOUNTER — VIRTUAL VISIT (OUTPATIENT)
Dept: PSYCHIATRY | Facility: CLINIC | Age: 54
End: 2024-06-14
Attending: STUDENT IN AN ORGANIZED HEALTH CARE EDUCATION/TRAINING PROGRAM
Payer: COMMERCIAL

## 2024-06-14 VITALS — BODY MASS INDEX: 23.86 KG/M2 | WEIGHT: 152 LBS | HEIGHT: 67 IN

## 2024-06-14 DIAGNOSIS — F41.1 GAD (GENERALIZED ANXIETY DISORDER): ICD-10-CM

## 2024-06-14 DIAGNOSIS — F33.2 SEVERE RECURRENT MAJOR DEPRESSION WITHOUT PSYCHOTIC FEATURES (H): Primary | ICD-10-CM

## 2024-06-14 PROCEDURE — 99214 OFFICE O/P EST MOD 30 MIN: CPT | Mod: 95 | Performed by: STUDENT IN AN ORGANIZED HEALTH CARE EDUCATION/TRAINING PROGRAM

## 2024-06-14 PROCEDURE — G2211 COMPLEX E/M VISIT ADD ON: HCPCS | Mod: 95 | Performed by: STUDENT IN AN ORGANIZED HEALTH CARE EDUCATION/TRAINING PROGRAM

## 2024-06-14 PROCEDURE — 90833 PSYTX W PT W E/M 30 MIN: CPT | Mod: 95 | Performed by: STUDENT IN AN ORGANIZED HEALTH CARE EDUCATION/TRAINING PROGRAM

## 2024-06-14 RX ORDER — HYDROXYZINE HYDROCHLORIDE 10 MG/1
10-20 TABLET, FILM COATED ORAL DAILY PRN
Qty: 30 TABLET | Refills: 1 | Status: SHIPPED | OUTPATIENT
Start: 2024-06-14 | End: 2024-09-11

## 2024-06-14 RX ORDER — PROPRANOLOL HYDROCHLORIDE 10 MG/1
10 TABLET ORAL 2 TIMES DAILY
Qty: 60 TABLET | Refills: 1 | Status: SHIPPED | OUTPATIENT
Start: 2024-06-14 | End: 2024-07-26

## 2024-06-14 RX ORDER — SODIUM FLUORIDE1.1%, POTASSIUM NITRATE 5% 5.8; 57.5 MG/ML; MG/ML
GEL, DENTIFRICE DENTAL
COMMUNITY
Start: 2024-06-12

## 2024-06-14 RX ORDER — LORAZEPAM 0.5 MG/1
0.5 TABLET ORAL DAILY PRN
Qty: 15 TABLET | Refills: 0 | Status: CANCELLED | OUTPATIENT
Start: 2024-06-14

## 2024-06-14 ASSESSMENT — PATIENT HEALTH QUESTIONNAIRE - PHQ9: SUM OF ALL RESPONSES TO PHQ QUESTIONS 1-9: 10

## 2024-06-14 ASSESSMENT — PAIN SCALES - GENERAL: PAINLEVEL: MODERATE PAIN (4)

## 2024-06-14 NOTE — PROGRESS NOTES
Virtual Visit Details    Type of service:  Video Visit     Originating Location (pt. Location): Home    Distant Location (provider location):  Off-site  Platform used for Video Visit: Wendi

## 2024-06-14 NOTE — NURSING NOTE
Is the patient currently in the state of [place]? YES    Visit mode:VIDEO    If the visit is dropped, the patient can be reconnected by: VIDEO VISIT: Text to cell phone:   Telephone Information:   Mobile [phone]       Will anyone else be joining the visit? NO  (If patient encounters technical issues they should call [phone]    How would you like to obtain your AVS? MyChart    Are changes needed to the allergy or medication list? No    Are refills needed on medications prescribed by this physician? NO    Reason for visit: RECHECK    Remigio [id number]

## 2024-06-14 NOTE — PATIENT INSTRUCTIONS
Thank you for coming to the [facility] Program.     Treatment Plan    Medications:  Continue sertraline  200 mg  -Continue Trazodone 50-100mg at bedtime  -Continue Remeron 7.5mg daily  -Continue Geodon 20mg BID  -Start Propranolol 10 mg BID     Therapy:  Continue individual and DBT therapy.     RTC:  On 7/26. Has a MT follow up on 6/21      Lab Testing:  If you had lab testing today and your results are reassuring or normal they will be mailed to you or sent through LookStat within 7 days. If the lab tests need quick action we will call you with the results. The phone number we will call with results is # [phone]. If this is not the best number please call our clinic and change the number.     Medication Refills:  If you need any refills please call your pharmacy and they will contact us. Our fax number for refills is [phone].   Three business days of notice are needed for general medication refill requests.   Five business days of notice are needed for controlled substance refill requests.   If you need to change to a different pharmacy, please contact the new pharmacy directly. The new pharmacy will help you get your medications transferred.     Contact Us:  Please call [phone] during business hours (8-5:00 M-F).   If you have medication related questions after clinic hours, or on the weekend, please call [phone].     Financial Assistance [phone]   Medical Records [phone]       **For crisis resources, please see the information at the end of this document**     To find additional local resources, refer to Postpartum Support International (PSI). Available at: http://www.postpartum.net/get-help/locations/united-\[facility]\""/  -[facility] at: www.womensmentalhealth.org is a good resource for information about psychiatric medication in pregnancy/lactation  -Mother To Baby A service of the nonprofit Organization of Terat[employer] Information  "Specialists (SPIKE), provides evidence based information online and in printable handouts to provide patients and providers regarding medications and other exposures during pregnancy and lactation Available at: https://mothertobaby.org/fact-sheets-parent/.  -The 4th Trimester Project - Expert written resources and information for mothers and families. Available at: https://Fashion One/  -Consider using \"The Pregnancy and Postpartum Anxiety Workbook,\" by Claudine Gonzalez PhD and Bradley Hall PsyD.    Postpartum Planning Tools:  Maternal Wellbeing Plan from [facility]: https://www.health.[facility].[place].us/people/womeninfants/pmad/pmadsfs.html    4th Trimester Postpartum plan: https://Fashion One/mypostpartumplan    Tips for partners:  http://www.postpartum.net/family/tips-for-postpartum-dads-and-partners/        MENTAL HEALTH CRISIS RESOURCES:  For a emergency help, please call 911 or go to the nearest Emergency Department.     Emergency Walk-In Options:   EmPATH Unit @ [facility]): [phone] - Specialized mental health emergency area designed to be [facility]  [facility] ([place]): [phone]  [facility] ([place]): [phone]  [facility] ([place]): [phone]    County Crisis Information:   [place]: [phone]  [place]: [phone]  [place] ([place]) - Adult: [phone]     Child: [phone]  [place] - Adult: [phone]     Child: [phone]  [place]: [phone]  List of all [facility] resources:   https://[place].gov/dhs/people-we-serve/adults/health-care/mental-health/resources/crisis-contacts.jsp    National Crisis Information:   Crisis Text Line: Text  [place]  to 786762  Suicide & Crisis Lifeline: 988  National Suicide Prevention Lifeline: 0-523-129-TALK (1-252.754.9410)       For online chat options, visit https://suicidepreventionlifeline.org/chat/  Poison Control Center: 6-711-764-0806  Trans Lifeline: 1-557.393.7462 - Hotline for " transgender people of all ages  The Martínez Project: 7-500-971-3740 - Hotline for LGBT youth     For Non-Emergency Support:   Fast Tracker: Mental Health & Substance Use Disorder Resources -   https://www.[employer].org/

## 2024-06-18 NOTE — PROGRESS NOTES
Medication Therapy Management (MTM) Encounter    ASSESSMENT:                            Mental Health   Major depressive disorder, recurrent, severe without psychotic features (w/ history of difficult to treat depression)  Generalized anxiety disorder:   Anxiety improved over the last week since starting propranolol. Hasn't needed any ativan or hydroxyzine over the last week. No side effects.     PLAN:                            No medication changes recommended at this time.     Follow-up:   Appointments in Next Year      Jul 26, 2024 10:00 AM  (Arrive by 9:45 AM)  Women's Well-Being Follow Up with Tolulope Oluwadamilola Odebunmi, MD  [facility] ([facility] ) [phone]     Nov 13, 2024 7:30 AM  (Arrive by 7:10 AM)  Adult Preventative Visit with Liborio Butler CNP  [facility] ([facility] ) [phone]            SUBJECTIVE/OBJECTIVE:                          Valentina Humphries is a 54 year old female seen for a follow-up visit.       Reason for visit: medication check-in, propranolol new start.    Allergies/ADRs: Reviewed in chart  Past Medical History: Reviewed in chart  Tobacco: She reports that she has never smoked. She has never been exposed to tobacco smoke. She has never used smokeless tobacco.  Alcohol: none    Medication Adherence/Access: no issues reported    Mental Health   Major depressive disorder, recurrent, severe without psychotic features (w/ history of difficult to treat depression)  Generalized anxiety disorder  Sertraline 200mg daily   Mirtazapine 7.5mg nightly   Geodon 20mg twice daily - started 2023  trazodone 50mg - 100mg at bedtime (taking 100mg)  Propranolol 10mg twice daily   Ativan 0.5mg - 2mg three times daily as needed (use goes in streaks, some weeks none, some weeks a couple doses)  Melatonin 10mg nightly     Today, patient reports  improvement in anxiety since starting propranolol 1 week ago (6/14/24). Initially, she thought it caused some trouble sleeping the first 2 nights, but now is sleeping well. She reports propranolol has been helpful for anxiety - butterflies in stomach, trouble concentrating. Prior to starting propranolol was taking ativan more often, since starting she has not taken any ativan or hydroxyzine. Denies any medication side effects               ----------------      I spent 15 minutes with this patient today. A copy of the visit note was provided to the patient's provider(s).    A summary of these recommendations was sent via [employer].    Leena Gomez, PharmD, BCPP  Medication Therapy Management Pharmacist  [facility]      Telemedicine Visit Details  Type of service:  Video Conference via Exploretrip  Start Time:  813am  End Time:  828am     Medication Therapy Recommendations  No medication therapy recommendations to display

## 2024-06-21 ENCOUNTER — VIRTUAL VISIT (OUTPATIENT)
Dept: PHARMACY | Facility: CLINIC | Age: 54
End: 2024-06-21
Payer: COMMERCIAL

## 2024-06-21 DIAGNOSIS — F41.1 GAD (GENERALIZED ANXIETY DISORDER): Primary | ICD-10-CM

## 2024-06-21 DIAGNOSIS — F33.9 MDD (MAJOR DEPRESSIVE DISORDER), RECURRENT EPISODE (H): ICD-10-CM

## 2024-06-21 PROCEDURE — 99606 MTMS BY PHARM EST 15 MIN: CPT | Mod: 95 | Performed by: PHARMACIST

## 2024-06-21 NOTE — PATIENT INSTRUCTIONS
"Recommendations from today's MTM visit:                                                      No medication changes recommended at this time.     It was great speaking with you today.  I value your experience and would be very thankful for your time in providing feedback in our clinic survey. In the next few days, you may receive an email or text message from [facility] emotion.me with a link to a survey related to your  clinical pharmacist.\"     To schedule another MTM appointment, please call the clinic directly or you may call the MTM scheduling line at [phone] or toll-free at [phone].     My Clinical Pharmacist's contact information:                                                      Please feel free to contact me with any questions or concerns you have.      Leena Gomez, PharmD, BCPP  Medication Therapy Management Pharmacist  [facility]    "

## 2024-06-21 NOTE — Clinical Note
Patient tolerating propranolol well and no ativan or hydroxyzine since starting it!  Thank you,   Leena Gomez, PharmD, BCPP Medication Therapy Management Pharmacist [facility]

## 2024-06-25 ENCOUNTER — TELEPHONE (OUTPATIENT)
Dept: PSYCHIATRY | Facility: CLINIC | Age: 54
End: 2024-06-25
Payer: COMMERCIAL

## 2024-06-25 NOTE — TELEPHONE ENCOUNTER
"Kyara Barclay, RN  Kyara Barclay, RN; Jayda Maxwell, RN; Shantal Posada, RN  Patient reached out about increased anxiety, increased use of hydroxyzine and lorazepam prn. See encounter from 6/11/24.  Please could we plan to call her in 2 weeks?    Best,  [place]    Follow up:     Reached out to patient to connect and obtain an update. Patient reports Propranolol has been helping with anxiety. Reports her mood has been better and feeling \"more confident.\" Denies any safety concerns at this time. Patient has not been needing to use Lorazepam and Hydroxyzine since starting Propranolol.     Writer agreed to pass this along to provider   "

## 2024-07-18 ENCOUNTER — TRANSFERRED RECORDS (OUTPATIENT)
Dept: HEALTH INFORMATION MANAGEMENT | Facility: CLINIC | Age: 54
End: 2024-07-18
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
## 2024-07-26 ENCOUNTER — VIRTUAL VISIT (OUTPATIENT)
Dept: PSYCHIATRY | Facility: CLINIC | Age: 54
End: 2024-07-26
Attending: STUDENT IN AN ORGANIZED HEALTH CARE EDUCATION/TRAINING PROGRAM
Payer: COMMERCIAL

## 2024-07-26 DIAGNOSIS — F41.1 GAD (GENERALIZED ANXIETY DISORDER): ICD-10-CM

## 2024-07-26 DIAGNOSIS — F33.1 MODERATE EPISODE OF RECURRENT MAJOR DEPRESSIVE DISORDER (H): ICD-10-CM

## 2024-07-26 PROCEDURE — 90833 PSYTX W PT W E/M 30 MIN: CPT | Mod: 95 | Performed by: STUDENT IN AN ORGANIZED HEALTH CARE EDUCATION/TRAINING PROGRAM

## 2024-07-26 PROCEDURE — G2211 COMPLEX E/M VISIT ADD ON: HCPCS | Mod: 95 | Performed by: STUDENT IN AN ORGANIZED HEALTH CARE EDUCATION/TRAINING PROGRAM

## 2024-07-26 PROCEDURE — 99214 OFFICE O/P EST MOD 30 MIN: CPT | Mod: 95 | Performed by: STUDENT IN AN ORGANIZED HEALTH CARE EDUCATION/TRAINING PROGRAM

## 2024-07-26 RX ORDER — ZIPRASIDONE HYDROCHLORIDE 20 MG/1
20 CAPSULE ORAL 2 TIMES DAILY WITH MEALS
Qty: 60 CAPSULE | Refills: 1 | Status: SHIPPED | OUTPATIENT
Start: 2024-07-26 | End: 2024-09-04

## 2024-07-26 RX ORDER — TRAZODONE HYDROCHLORIDE 100 MG/1
50-100 TABLET ORAL AT BEDTIME
Qty: 30 TABLET | Refills: 1 | Status: SHIPPED | OUTPATIENT
Start: 2024-07-26 | End: 2024-09-04

## 2024-07-26 RX ORDER — PROPRANOLOL HYDROCHLORIDE 10 MG/1
10 TABLET ORAL 2 TIMES DAILY
Qty: 60 TABLET | Refills: 1 | Status: SHIPPED | OUTPATIENT
Start: 2024-07-26 | End: 2024-08-26

## 2024-07-26 RX ORDER — SERTRALINE HYDROCHLORIDE 100 MG/1
200 TABLET, FILM COATED ORAL DAILY
Qty: 60 TABLET | Refills: 1 | Status: SHIPPED | OUTPATIENT
Start: 2024-07-26 | End: 2024-09-04

## 2024-07-26 ASSESSMENT — PATIENT HEALTH QUESTIONNAIRE - PHQ9
SUM OF ALL RESPONSES TO PHQ QUESTIONS 1-9: 6
SUM OF ALL RESPONSES TO PHQ QUESTIONS 1-9: 6
10. IF YOU CHECKED OFF ANY PROBLEMS, HOW DIFFICULT HAVE THESE PROBLEMS MADE IT FOR YOU TO DO YOUR WORK, TAKE CARE OF THINGS AT HOME, OR GET ALONG WITH OTHER PEOPLE: SOMEWHAT DIFFICULT

## 2024-07-26 NOTE — PATIENT INSTRUCTIONS
Treatment Plan    Medications:    -Continue sertraline to 200 mg  -Continue Trazodone 50-100mg at bedtime  -Discontinue Remeron 7.5mg daily  -Continue Geodon 20mg BID  -Continue Propranolol 10mg BID (only taking it once daily)  -Continue Hydroxyzine 10-20mg PRN  -Continue Lorazepam 0.5mg daily PRN     Therapy:  Continue individual and DBT therapy.       **For crisis resources, please see the information at the end of this document**   Patient Education    Thank you for coming to the [facility].     Lab Testing:  If you had lab testing today and your results are reassuring or normal they will be mailed to you or sent through [employer] within 7 days. If the lab tests need quick action we will call you with the results. The phone number we will call with results is # [phone]. If this is not the best number please call our clinic and change the number.     Medication Refills:  If you need any refills please call your pharmacy and they will contact us. Our fax number for refills is [phone].   Three business days of notice are needed for general medication refill requests.   Five business days of notice are needed for controlled substance refill requests.   If you need to change to a different pharmacy, please contact the new pharmacy directly. The new pharmacy will help you get your medications transferred.     Contact Us:  Please call [phone] during business hours (8-5:00 M-F).   If you have medication related questions after clinic hours, or on the weekend, please call [phone].     Financial Assistance [phone]   Medical Records [phone]       To find additional local resources, refer to Postpartum Support International (PSI). Available at: http://www.postpartum.net/get-help/locations/united-states/    -[facility] at: www.womensmentalhealth.org is a good resource for information about psychiatric medication in  "pregnancy/lactation    -Mother To Baby A service of the nonprofit Organization of Teratology Information Specialists (SPIKE), provides evidence based information online and in printable handouts to provide patients and providers regarding medications and other exposures during pregnancy and lactation Available at: https://mothertobaby.org/fact-sheets-parent/.    -The 4th Trimester Project - Expert written resources and information for mothers and families. Available at: https://Bramasol/    -Consider using \"The Pregnancy and Postpartum Anxiety Workbook,\" by Claudine Gonzalez PhD and Bradley Hall PsyD.    Postpartum Planning Tools:  Maternal Wellbeing Plan from [facility]: https://www.health.[facility].[place]/people/womeninfants/pmad/pmadsfs.html    4th Trimester Postpartum plan: https://Bramasol/mypostpartumplan    Tips for partners:  http://www.postpartum.net/family/tips-for-postpartum-dads-and-partners/        MENTAL HEALTH CRISIS RESOURCES:  For a emergency help, please call 911 or go to the nearest Emergency Department.     Emergency Walk-In Options:   EmPATH Unit @ [facility]): [phone] - Specialized mental health emergency area designed to be calming  [facility] ([place]): [phone]  [facility] ([place]): [phone]  [facility]): [phone]    County Crisis Information:   [place]: [phone]  [place]: [phone]  [place] ([place]) - Adult: [phone]     Child: [phone]  [place] - Adult: [phone]     Child: [phone]  [place]: [phone]  List of all [facility] resources:   https://[place].gov/dhs/people-we-serve/adults/health-care/mental-health/resources/crisis-contacts.jsp    National Crisis Information:   Crisis Text Line: Text  [place]  to 789400  Suicide & Crisis Lifeline: 988  National Suicide Prevention Lifeline: 6-483-852-TALK (1-541.300.6174)       For online chat options, visit " https://suicidepreventionlifeline.org/chat/  Poison Control Center: 7-742-802-9363  Trans Lifeline: 1-336-521-3024 - Hotline for transgender people of all ages  The Martínez Project: 6-341-337-9824 - Hotline for LGBT youth     For Non-Emergency Support:   Fast Tracker: Mental Health & Substance Use Disorder Resources -   https://www.[employer].org/

## 2024-07-26 NOTE — NURSING NOTE
Current patient location: [address]   [place] [address]    Is the patient currently in the state of [place]? YES    Visit mode:VIDEO    If the visit is dropped, the patient can be reconnected by: VIDEO VISIT: Text to cell phone:   Telephone Information:   Mobile [phone]       Will anyone else be joining the visit? NO  (If patient encounters technical issues they should call [phone])    How would you like to obtain your AVS? MyChart    Are changes needed to the allergy or medication list? No    Are refills needed on medications prescribed by this physician? NO    Reason for visit: RECHECK    Libertad IZAGUIRRE

## 2024-08-14 ENCOUNTER — TRANSFERRED RECORDS (OUTPATIENT)
Dept: HEALTH INFORMATION MANAGEMENT | Facility: CLINIC | Age: 54
End: 2024-08-14
Payer: COMMERCIAL

## 2024-08-22 ENCOUNTER — MYC MEDICAL ADVICE (OUTPATIENT)
Dept: PSYCHIATRY | Facility: CLINIC | Age: 54
End: 2024-08-22
Payer: COMMERCIAL

## 2024-08-22 ENCOUNTER — NURSE TRIAGE (OUTPATIENT)
Dept: INTERNAL MEDICINE | Facility: CLINIC | Age: 54
End: 2024-08-22
Payer: COMMERCIAL

## 2024-08-22 ENCOUNTER — MYC MEDICAL ADVICE (OUTPATIENT)
Dept: INTERNAL MEDICINE | Facility: CLINIC | Age: 54
End: 2024-08-22
Payer: COMMERCIAL

## 2024-08-22 DIAGNOSIS — U07.1 INFECTION DUE TO 2019 NOVEL CORONAVIRUS: Primary | ICD-10-CM

## 2024-08-22 NOTE — TELEPHONE ENCOUNTER
COVID Positive/Requesting COVID treatment    Patient is positive for COVID and requesting treatment options.    Date of positive COVID test (PCR or at home)? 08/22/2024    Current COVID symptoms: fatigue, runny nose & sore throat    Date COVID symptoms began: 08/21/2024    Message should be routed to clinic RN pool. Best phone number to use for call back: [phone]    Jenny Sanchez

## 2024-08-22 NOTE — TELEPHONE ENCOUNTER
Patient wrote in on [employer] with positive COVID test today. Symptoms started yesterday 8/21/24. Runny nose, sore throat, tired. Has not checked for fever. Patient requesting Paxlovid and passed protocol. Prescription sent to preferred pharmacy. Educated on home care and red flag symptoms. Patient stated understanding.       Reason for Disposition   COVID-19 diagnosed by positive lab test (e.g., PCR, rapid self-test kit) and mild symptoms (e.g., cough, fever, others) and no complications or SOB    Additional Information   Negative: SEVERE difficulty breathing (e.g., struggling for each breath, speaks in single words)   Negative: Difficult to awaken or acting confused (e.g., disoriented, slurred speech)   Negative: Bluish (or gray) lips or face now   Negative: Shock suspected (e.g., cold/pale/clammy skin, too weak to stand, low BP, rapid pulse)   Negative: Sounds like a life-threatening emergency to the triager   Negative: Diagnosed or suspected COVID-19 and symptoms lasting 3 or more weeks   Negative: COVID-19 exposure and no symptoms   Negative: COVID-19 vaccine reaction suspected (e.g., fever, headache, muscle aches) occurring 1 to 3 days after getting vaccine   Negative: COVID-19 vaccine, questions about   Negative: Lives with someone known to have influenza (flu test positive) and flu-like symptoms (e.g., cough, runny nose, sore throat, SOB; with or without fever)   Negative: Possible COVID-19 symptoms and triager concerned about severity of symptoms or other causes   Negative: COVID-19 and breastfeeding, questions about   Negative: SEVERE or constant chest pain or pressure  (Exception: Mild central chest pain, present only when coughing.)   Negative: MODERATE difficulty breathing (e.g., speaks in phrases, SOB even at rest, pulse 100-120)   Negative: Headache and stiff neck (can't touch chin to chest)   Negative: Oxygen level (e.g., pulse oximetry) 90% or lower   Negative: Chest pain or pressure  (Exception: MILD  central chest pain, present only when coughing.)   Negative: Drinking very little and dehydration suspected (e.g., no urine > 12 hours, very dry mouth, very lightheaded)   Negative: Patient sounds very sick or weak to the triager   Negative: MILD difficulty breathing (e.g., minimal/no SOB at rest, SOB with walking, pulse <100)   Negative: Fever > 103 F (39.4 C)   Negative: Fever > 101 F (38.3 C) and over 60 years of age   Negative: Fever > 100.0 F (37.8 C) and bedridden (e.g., CVA, chronic illness, recovering from surgery)   Negative: HIGH RISK patient (e.g., weak immune system, age > 64 years, obesity with BMI of 30 or higher, pregnant, chronic lung disease or other chronic medical condition) and COVID symptoms (e.g., cough, fever)  (Exceptions: Already seen by doctor or NP/PA and no new or worsening symptoms.)   Negative: HIGH RISK patient and influenza is widespread in the community and ONE OR MORE respiratory symptoms: cough, sore throat, runny or stuffy nose   Negative: HIGH RISK patient and influenza exposure within the last 7 days and ONE OR MORE respiratory symptoms: cough, sore throat, runny or stuffy nose   Negative: Oxygen level (e.g., pulse oximetry) 91 to 94%   Negative: COVID-19 infection suspected by caller or triager and mild symptoms (cough, fever, or others) and negative COVID-19 rapid test   Negative: Fever present > 3 days (72 hours)   Negative: Fever returns after gone for over 24 hours and symptoms worse or not improved   Negative: Continuous (nonstop) coughing interferes with work or school and no improvement using cough treatment per Care Advice   Negative: Cough present > 3 weeks   Negative: COVID-19 diagnosed by positive lab test (e.g., PCR, rapid self-test kit) and NO symptoms (e.g., cough, fever, others)    Protocols used: Coronavirus (COVID-19) Diagnosed or [id number]

## 2024-08-22 NOTE — TELEPHONE ENCOUNTER
RN COVID TREATMENT VISIT  08/22/24      The patient has been triaged and does not require a higher level of care.    Valentina Humphries  54 year old  Current weight? 152 lbs    Has the patient been seen by a primary care provider at an [facility] or [facility] within the past two years? Yes.   Have you been in close proximity to/do you have a known exposure to a person with a confirmed case of influenza? No.     General treatment eligibility:  Date of positive COVID test (PCR or at home)?  8/22/24    Are you or have you been hospitalized for this COVID-19 infection? No.   Have you received monoclonal antibodies or antiviral treatment for COVID-19 since this positive test? No.   Do you have any of the following conditions that place you at risk of being very sick from COVID-19?   - Age 50 years or older  - Mental health disorders including mood disorders, depression, schizophrenia spectrum disorders   Yes, patient has at least one high risk condition as noted above.     Current COVID symptoms:   - fatigue  - sore throat  - congestion or runny nose  Yes. Patient has at least one symptom as selected.     How many days since symptoms started? 5 days or less. Established patient, 12 years or older weighing at least 88.2 lbs, who has symptoms that started in the past 5 days, has not been hospitalized nor received treatment already, and is at risk for being very sick from COVID-19.     Treatment eligibility by RN:  Are you currently pregnant or nursing? No  Do you have a clinically significant hypersensitivity to nirmatrelvir or ritonavir, or toxic epidermal necrolysis (TEN) or Howard-Shiraz Syndrome? No  Do you have a history of hepatitis, any hepatic impairment on the Problem List (such as Child-Montgomery Class C, cirrhosis, fatty liver disease, alcoholic liver disease), or was the last liver lab (hepatic panel, ALT, AST, ALK Phos, bilirubin) elevated in the past 6 months? No  Do you  have any history of severe renal impairment (eGFR < 30mL/min)? No    Is patient eligible to continue? Yes, patient meets all eligibility requirements for the RN COVID treatment (as denoted by all no responses above).     Current Outpatient Medications   Medication Sig Dispense Refill    acetaminophen (TYLENOL) 500 MG tablet Take 500 mg by mouth 2 times daily      Dentifrices (SENSITIVE TOOTHPASTE/FLUORIDE) 5-0.243 % PSTE Apply 1 g to affected area 2 times daily 113 g 3    dicyclomine (BENTYL) 10 MG capsule Take 1 capsule (10 mg) by mouth 4 times daily as needed 30 capsule 3    fish oil-omega-3 fatty acids 1000 MG capsule Take 2 g by mouth daily [employer]      hydrOXYzine HCl (ATARAX) 10 MG tablet Take 1-2 tablets (10-20 mg) by mouth daily as needed for anxiety 30 tablet 1    LORazepam (ATIVAN) 0.5 MG tablet Take 1 tablet (0.5 mg) by mouth daily as needed for anxiety 15 tablet 0    Melatonin 5 MG CHEW Take 5-10 mg by mouth at bedtime      omeprazole (PRILOSEC) 20 MG DR capsule Take 1 capsule (20 mg) by mouth daily 90 capsule 3    polyethylene glycol (MIRALAX) 17 GM/Dose powder Take 17 g by mouth daily as needed      propranolol (INDERAL) 10 MG tablet Take 1 tablet (10 mg) by mouth 2 times daily 60 tablet 1    sertraline (ZOLOFT) 100 MG tablet Take 2 tablets (200 mg) by mouth daily 60 tablet 1    Sod Fluoride-Potassium Nitrate (DENTA 5000 PLUS SENSITIVE) 1.1-5 % PSTE Use twice a day, mint flavor, mat substitute other brand if not in stock 112 g 11    Sod Fluoride-Potassium Nitrate (PREVIDENT 5000 SENSITIVE) 1.1-5 % PSTE Apply 1 Application. topically 2 times daily 112 g 3    SODIUM FLUORIDE 5000 SENSITIVE 1.1-5 % GEL       traZODone (DESYREL) 100 MG tablet Take 0.5-1 tablets ( mg) by mouth at bedtime 30 tablet 1    ziprasidone (GEODON) 20 MG capsule Take 1 capsule (20 mg) by mouth 2 times daily (with meals) 60 capsule 1       Medications from List 1 of the standing order (on medications that exclude the  use of Paxlovid) that patient is taking: NONE. Is patient taking Farooq's Wort? No  Is patient taking Farooq's Wort or any meds from List 1? No.   Medications from List 2 of the standing order (on meds that provider needs to adjust) that patient is taking: NONE. Is patient on any of the meds from List 2? No.   Medications from List 3 of standing order (on meds that a RN needs to adjust) that patient is taking: trazodone (Desyrel): Instructed patient to stop taking trazodone while taking Paxlovid and restart trazodone 3 days after the completion of Paxlovid.  Is patient on any meds from List 3? Yes. Patient is on meds from list 3. No meds require a provider visit and at least one med required RN to adjust.     Paxlovid has an approximate 90% reduction in hospitalization. Paxlovid can possibly cause altered sense of taste, diarrhea (loose, watery stools), high blood pressure, muscle aches.     Would patient like a Paxlovid prescription?   Yes.   Lab Results   Component Value Date    GFRESTIMATED >90 11/06/2023       Was last eGFR reduced? No, eGFR 60 or greater/ No Result on record. Patient can receive the normal renal function dose. Paxlovid Rx sent to [facility]   Prefers [facility] in [place].     Temporary change to home medications: trazodone (Desyrel): Instructed patient to stop taking trazodone while taking Paxlovid and restart trazodone 3 days after the completion of Paxlovid.     All medication adjustments (holds, etc) were discussed with the patient and patient was asked to repeat back (teachback) their med adjustment.  Did patient understand med adjustment? Yes, patient repeated back and understood correctly.        Reviewed the following instructions with the patient:    Paxlovid (nimatrelvir and ritonavir)    How it works  Two medicines (nirmatrelvir and ritonavir) are taken together. They stop the virus from growing. Less amount of virus is easier for your body to fight.    How to take  Medicine  comes in a daily container with both medicine tablets. Take by mouth twice daily (once in the morning, once at night) for 5 days.  The number of tablets to take varies by patient.  Don't chew or break capsules. Swallow whole.    When to take  Take as soon as possible after positive COVID-19 test result, and within 5 days of your first symptoms.    Possible side effects  Can cause altered sense of taste, diarrhea (loose, watery stools), high blood pressure, muscle aches.    Imani Holcomb RN

## 2024-08-26 ENCOUNTER — MYC MEDICAL ADVICE (OUTPATIENT)
Dept: PHARMACY | Facility: CLINIC | Age: 54
End: 2024-08-26
Payer: COMMERCIAL

## 2024-08-26 ENCOUNTER — VIRTUAL VISIT (OUTPATIENT)
Dept: PHARMACY | Facility: CLINIC | Age: 54
End: 2024-08-26
Payer: COMMERCIAL

## 2024-08-26 ENCOUNTER — NURSE TRIAGE (OUTPATIENT)
Dept: INTERNAL MEDICINE | Facility: CLINIC | Age: 54
End: 2024-08-26
Payer: COMMERCIAL

## 2024-08-26 DIAGNOSIS — F41.1 GAD (GENERALIZED ANXIETY DISORDER): Primary | ICD-10-CM

## 2024-08-26 DIAGNOSIS — F33.9 MDD (MAJOR DEPRESSIVE DISORDER), RECURRENT EPISODE (H): Primary | ICD-10-CM

## 2024-08-26 DIAGNOSIS — F33.9 MDD (MAJOR DEPRESSIVE DISORDER), RECURRENT EPISODE (H): ICD-10-CM

## 2024-08-26 DIAGNOSIS — F41.1 GAD (GENERALIZED ANXIETY DISORDER): ICD-10-CM

## 2024-08-26 PROCEDURE — 99607 MTMS BY PHARM ADDL 15 MIN: CPT | Mod: 95 | Performed by: PHARMACIST

## 2024-08-26 PROCEDURE — 99606 MTMS BY PHARM EST 15 MIN: CPT | Mod: 95 | Performed by: PHARMACIST

## 2024-08-26 RX ORDER — PROPRANOLOL HYDROCHLORIDE 10 MG/1
10 TABLET ORAL 2 TIMES DAILY
Qty: 90 TABLET | Refills: 0 | Status: SHIPPED | OUTPATIENT
Start: 2024-08-26 | End: 2024-09-04

## 2024-08-26 NOTE — PROGRESS NOTES
Medication Therapy Management (MTM) Encounter    ASSESSMENT:                            Medication Adherence/Access: No issues identified    Mental Health   Major depressive disorder, recurrent, severe without psychotic features (w/ history of difficult to treat depression)  Generalized anxiety disorder:   Patient with worsening depression and recent SI, but currently feeling safe.  Was diagnosed with COVID last week, has been taking Paxlovid and was instructed to hold trazodone.  She has not been sleeping as well.  Has continued all other medications unchanged during Paxlovid therapy.  Most recent med change prior to this was discontinuation of mirtazapine about 1 month ago.  Plans to go to work tomorrow and does feel the structure of this will be helpful, but has some anxiety about it as well.  We reviewed potential medication options including option to restart trazodone at 50% of previous dose (rec per [facility] Paxlovid drug interaction management).  Hopeful that improved sleep will help with mood symptoms.  We also discussed option to increase propranolol as needed for anxiety.  Writer had previously discussed these options with Dr. Linton.  Patient is in agreement with plan    PLAN:                            Propranolol - Ok to increase propranolol by 10mg daily as needed for anxiety. New dose will be 10mg twice daily + 10mg daily as needed  Trazodone - ok to restart at 50mg (50% of previous dose); and increase to 100mg 3 days after stopping Paxlovid (8/30/24)    Follow-up:   Appointments in Next Year      Sep 04, 2024 4:00 PM  (Arrive by 3:45 PM)  Women's Well-Being Follow Up with Tolulope Oluwadamilola Odebunmi, MD  [facility] ([facility] ) [phone]     Nov 13, 2024 7:30 AM  (Arrive by 7:10 AM)  Adult Preventative Visit with Liborio Butler CNP  [facility] ([facility] [phone]            SUBJECTIVE/OBJECTIVE:                          Valentina Humphries is a 54 year old female seen for a follow-up visit.       Reason for visit: urgent add-on visit; worsening symptoms.    Allergies/ADRs: Reviewed in chart  Past Medical History: Reviewed in chart  Tobacco: She reports that she has never smoked. She has never been exposed to tobacco smoke. She has never used smokeless tobacco.  Alcohol: none    Medication Adherence/Access: no issues reported    Mental Health   Major depressive disorder, recurrent, severe without psychotic features (w/ history of difficult to treat depression)  Generalized anxiety disorder  Sertraline 200mg daily   Geodon 20mg twice daily - started 2023  trazodone 50mg - 100mg at bedtime (was taking 100mg) - currently holding due to paxlovid  Propranolol 10mg twice daily   Hydroxyzine 10-20mg daily as needed   Ativan 0.5mg - 2mg three times daily as needed   Melatonin 10mg nightly     - Patient reports testing COVID+ positive on Thursday morning, taking Paxlovid. Was seen in ER for SOB yesterday, but now physically feeling ok. Tomorrow morning will be last dose of Paxlovid.     - Primary symptoms include depression which she feels were worsening before she got COVID, but now isolation is making it harder. She hasn't been at work since Wednesday last week, but plans to go back tomorrow. Does have some anxiety around going back, but feels the structure will be helpful. Dishwashing at work worsens her hand pain which is difficult.     - Had SI yesterday and earlier today, but this is improving after talking with [facility], called [phone]. She exercised today, will be talking with her sister and attending virtual al-anon group later today. Has DBT group tomorrow, therapy Thursday, sees Dr. Linton next week Wednesday. Currently feeling safe.     - Hasn't been taking trazodone since Thursday when Paxlovid was started. States she slept ok for a couple nights  without trazodone, but sleeping during the day with COVID so sleep schedule is disrupted. Last night slept 12am-7am. Taking propranolol twice daily.  Has been taking 20mg hydroxyzine most days in the morning. Took 0.25mg ativan x1 this past week. Some dizziness/lightheadedness, but doesn't feel it is related to propranolol.         BP Readings from Last 3 Encounters:   06/07/24 119/75   05/08/24 116/64   02/11/24 127/75     Pulse Readings from Last 3 Encounters:   06/07/24 75   05/08/24 69   02/11/24 82                ----------------      I spent 40 minutes with this patient today. All changes were made via verbal approval with Fabián Linton MD. A copy of the visit note was provided to the patient's provider(s).    A summary of these recommendations was sent via [employer].    Kellen MacedoD, BCPP  Medication Therapy Management Pharmacist  [facility]      Telemedicine Visit Details  Type of service:  Video Conference via [email or web address]  Start Time:  205p  End Time:  245p     Medication Therapy Recommendations  EBER (generalized anxiety disorder)    Current Medication: propranolol (INDERAL) 10 MG tablet   Rationale: Dose too low - Dosage too low - Effectiveness   Recommendation: Increase Dose - propranolol 10 MG tablet   Status: Accepted per Provider         MDD (major depressive disorder), recurrent episode (H24)    Current Medication: traZODone (DESYREL) 100 MG tablet   Rationale: Medication interaction - Dosage too high - Safety   Recommendation: Decrease Dose - traZODone 50 MG tablet   Status: Patient Agreed - Adherence/Education

## 2024-08-26 NOTE — Clinical Note
Hello! I met with patient who reported doing better than earlier today and is feeling safe. Reviewed med plan including option to take additional 10mg propranolol as needed for anxiety and restarting trazodone at 50mg (lower dose due to paxlovid).  She feels comfortable with the plan.   Thank you!  [facility]

## 2024-08-26 NOTE — TELEPHONE ENCOUNTER
"As requested by Dr. Linton, this writer called Valentina. She was immediately tearful on the phone and shared that she has been feeling very depressed. Valentina confirmed it has worsened and also endorses passive SI. Inquired about a plan or intent, which she denied. However, just prior to this writer's phone call, Valentina shared that she was on the phone with the [employer] crisis line. Writer inquired about the events leading up to this call. She denied having active thoughts to harm herself and reported she wanted to talk to someone. In particular, she reports that she has been struggling to eat and complete ADLs. Valentina shared with this writer that she has an eating disorder and has noticed her restricted eating has increased as well. She said, \"I felt like I was making progress, but now things are just getting worse.\"     Writer inquired about her living situation. She lives with her  and cat, Zeb. Valentina reports her  is working today, and she is home alone with Zeb. She finds her  to be supportive, but said that he's been very busy with work and preparing for the school year. Writer inquired about her safety multiple times as she will be home alone today. She repeatedly contracted for safety, although she remained quite tearful. Discussed coming into the hospital, which Valentina reports is not helpful. She shared that she has her DBT group on Tuesdays and individual therapy on Thursdays, which she finds to be much more productive then a psychiatric hospitalization. Writer voiced understanding.     Writer suggested Dr. Linton's recommendation of increasing the propranolol. Valentina somewhat disregarded this suggestion. Also mentioned the idea of meeting with [facility] which Valentina agreed to. Will connect with [facility] and Dr. Linton to confirm this plan.    During the call Valentina shared that her cat was sitting on her lap and finds this to be helpful. She also had plans to do some " job searching and meet with her sisters by video at 3 pm. Valentina also plans to call the [employer] crisis line after the call with this writer, as they have been trying to reach her phone call with this writer. She again contracted for safety. Writer agreed to connect with Dr. Linton and follow up with Valentina to confirm the appt with [facility] and relay any additional recommendations Dr. Linton has.    Suma Moreno RN on 8/26/2024 at 10:35 AM

## 2024-08-26 NOTE — PATIENT INSTRUCTIONS
"Recommendations from today's MTM visit:                                                      Propranolol - Ok to increase propranolol by 10mg daily as needed for anxiety. New dose will be 10mg twice daily + 10mg daily as needed  Trazodone - ok to restart at 50mg (50% of previous dose); and increase to 100mg 3 days after stopping Paxlovid (8/30/24)    It was great speaking with you today.  I value your experience and would be very thankful for your time in providing feedback in our clinic survey. In the next few days, you may receive an email or text message from [employer] with a link to a survey related to your  clinical pharmacist.\"     To schedule another MTM appointment, please call the clinic directly or you may call the MTM scheduling line at [phone] or toll-free at [phone].     My Clinical Pharmacist's contact information:                                                      Please feel free to contact me with any questions or concerns you have.      Leena Gomez, PharmD, BCPP  Medication Therapy Management Pharmacist  [facility]    "

## 2024-08-26 NOTE — TELEPHONE ENCOUNTER
Discussed patient's concerns with Dr. Linton and [facility]. They agreed an MTM visit with [facility] at 2 pm today will suffice. Dr. Linton asked this writer to educate the patient on when to present to the hospital and do a phone check-in on Friday.     Called Valentina and confirmed the plan of an MTM visit today. She was no longer tearful and contracted for safety again. She plans organizing things at home and exercising before her appt at 2 pm with [facility]. She understands when to come to the hospital and will reach out to this clinic with any other concerns prior to her appt on 9/4 with Dr. Linton. Writer also send the [facility] Crisis line to the patient via [employer].    Suma Moreno RN on 8/26/2024 at 11:00 AM

## 2024-08-26 NOTE — TELEPHONE ENCOUNTER
Went to the ER last night. I spoke with the patient and she is feeling depressed. She spoke with Psych and they conducted a safety plan which she states is helping her get through her depression. Not currently having suicidal ideations but she was before speaking to psych.    Reason for Disposition   MILD difficulty breathing (e.g., minimal/no SOB at rest, SOB with walking, pulse <100)    Additional Information   Negative: SEVERE difficulty breathing (e.g., struggling for each breath, speaks in single words)   Negative: Difficult to awaken or acting confused (e.g., disoriented, slurred speech)   Negative: Bluish (or gray) lips or face now   Negative: Shock suspected (e.g., cold/pale/clammy skin, too weak to stand, low BP, rapid pulse)   Negative: Sounds like a life-threatening emergency to the triager   Negative: SEVERE or constant chest pain or pressure  (Exception: Mild central chest pain, present only when coughing.)   Negative: MODERATE difficulty breathing (e.g., speaks in phrases, SOB even at rest, pulse 100-120)   Negative: Headache and stiff neck (can't touch chin to chest)   Negative: Chest pain or pressure  (Exception: MILD central chest pain, present only when coughing.)   Negative: Drinking very little and dehydration suspected (e.g., no urine > 12 hours, very dry mouth, very lightheaded)   Negative: Patient sounds very sick or weak to the triager   Negative: Fever > 103 F (39.4 C)   Negative: Fever > 101 F (38.3 C) and over 60 years of age    Protocols used: Coronavirus (COVID-19) Diagnosed or [id number]

## 2024-08-26 NOTE — TELEPHONE ENCOUNTER
I need this message clarified a bit more.  Is the patient asking for Paxlovid?  It looks like her COVID test came back positive

## 2024-08-26 NOTE — TELEPHONE ENCOUNTER
Reported off further that:    1). Disposition states get provider recommendation due to mild shortness of breath but she was evaluated last night in the ER.    2). Patient is taking Paxlovid.    3). Patient is having mental health concerns (suicidal ideations previously) but has since met with Psych and they are following her for this.    Chester Butler CNP stated that since she is being followed by psych and her mental health is being evaluated there is no need for a follow-up appointment at this time unless the patient wanted one.    Patient notified and stated she will let us know or go in to be seen is sx or mental health concerns arise.    YULIANA Rivera

## 2024-08-27 RX ORDER — MIRTAZAPINE 7.5 MG/1
7.5 TABLET, FILM COATED ORAL AT BEDTIME
Qty: 30 TABLET | Refills: 0 | Status: SHIPPED | OUTPATIENT
Start: 2024-08-27 | End: 2024-09-04

## 2024-08-30 ENCOUNTER — MYC MEDICAL ADVICE (OUTPATIENT)
Dept: PSYCHIATRY | Facility: CLINIC | Age: 54
End: 2024-08-30
Payer: COMMERCIAL
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
## 2024-09-04 ENCOUNTER — VIRTUAL VISIT (OUTPATIENT)
Dept: PSYCHIATRY | Facility: CLINIC | Age: 54
End: 2024-09-04
Attending: STUDENT IN AN ORGANIZED HEALTH CARE EDUCATION/TRAINING PROGRAM
Payer: COMMERCIAL

## 2024-09-04 DIAGNOSIS — F33.1 MODERATE EPISODE OF RECURRENT MAJOR DEPRESSIVE DISORDER (H): ICD-10-CM

## 2024-09-04 DIAGNOSIS — F41.1 GAD (GENERALIZED ANXIETY DISORDER): ICD-10-CM

## 2024-09-04 PROCEDURE — G2211 COMPLEX E/M VISIT ADD ON: HCPCS | Mod: 95 | Performed by: STUDENT IN AN ORGANIZED HEALTH CARE EDUCATION/TRAINING PROGRAM

## 2024-09-04 PROCEDURE — 90833 PSYTX W PT W E/M 30 MIN: CPT | Mod: 95 | Performed by: STUDENT IN AN ORGANIZED HEALTH CARE EDUCATION/TRAINING PROGRAM

## 2024-09-04 PROCEDURE — 99214 OFFICE O/P EST MOD 30 MIN: CPT | Mod: 95 | Performed by: STUDENT IN AN ORGANIZED HEALTH CARE EDUCATION/TRAINING PROGRAM

## 2024-09-04 RX ORDER — TRAZODONE HYDROCHLORIDE 100 MG/1
50-100 TABLET ORAL AT BEDTIME
Qty: 30 TABLET | Refills: 1 | Status: SHIPPED | OUTPATIENT
Start: 2024-09-04

## 2024-09-04 RX ORDER — ZIPRASIDONE HYDROCHLORIDE 20 MG/1
20 CAPSULE ORAL 2 TIMES DAILY WITH MEALS
Qty: 60 CAPSULE | Refills: 1 | Status: SHIPPED | OUTPATIENT
Start: 2024-09-04

## 2024-09-04 RX ORDER — MIRTAZAPINE 7.5 MG/1
15 TABLET, FILM COATED ORAL AT BEDTIME
Qty: 60 TABLET | Refills: 1 | Status: SHIPPED | OUTPATIENT
Start: 2024-09-04 | End: 2024-09-05

## 2024-09-04 RX ORDER — PROPRANOLOL HYDROCHLORIDE 10 MG/1
20 TABLET ORAL 2 TIMES DAILY
Qty: 150 TABLET | Refills: 1 | Status: SHIPPED | OUTPATIENT
Start: 2024-09-04

## 2024-09-04 RX ORDER — SERTRALINE HYDROCHLORIDE 100 MG/1
200 TABLET, FILM COATED ORAL DAILY
Qty: 60 TABLET | Refills: 1 | Status: SHIPPED | OUTPATIENT
Start: 2024-09-04

## 2024-09-04 ASSESSMENT — ANXIETY QUESTIONNAIRES
GAD7 TOTAL SCORE: 12
GAD7 TOTAL SCORE: 12
8. IF YOU CHECKED OFF ANY PROBLEMS, HOW DIFFICULT HAVE THESE MADE IT FOR YOU TO DO YOUR WORK, TAKE CARE OF THINGS AT HOME, OR GET ALONG WITH OTHER PEOPLE?: VERY DIFFICULT
GAD7 TOTAL SCORE: 12
7. FEELING AFRAID AS IF SOMETHING AWFUL MIGHT HAPPEN: NEARLY EVERY DAY

## 2024-09-04 ASSESSMENT — PATIENT HEALTH QUESTIONNAIRE - PHQ9
SUM OF ALL RESPONSES TO PHQ QUESTIONS 1-9: 16
SUM OF ALL RESPONSES TO PHQ QUESTIONS 1-9: 16
10. IF YOU CHECKED OFF ANY PROBLEMS, HOW DIFFICULT HAVE THESE PROBLEMS MADE IT FOR YOU TO DO YOUR WORK, TAKE CARE OF THINGS AT HOME, OR GET ALONG WITH OTHER PEOPLE: VERY DIFFICULT

## 2024-09-04 NOTE — PATIENT INSTRUCTIONS
Treatment Plan    Medications:  -Continue sertraline  200 mg  -Continue Trazodone 50-100mg at bedtime  -Increase Remeron to 15mg daily  -Continue Geodon 20mg BID  -Increase Propranolol to 20mg BID, 10 daily PRN  -Continue Hydroxyzine 10-20mg PRN  -Continue Lorazepam 0.5mg daily PRN     Therapy:  ontinue individual and DBT therapy.       **For crisis resources, please see the information at the end of this document**   Patient Education    Thank you for coming to the [facility].     Lab Testing:  If you had lab testing today and your results are reassuring or normal they will be mailed to you or sent through [employer] within 7 days. If the lab tests need quick action we will call you with the results. The phone number we will call with results is # [phone]. If this is not the best number please call our clinic and change the number.     Medication Refills:  If you need any refills please call your pharmacy and they will contact us. Our fax number for refills is [phone].   Three business days of notice are needed for general medication refill requests.   Five business days of notice are needed for controlled substance refill requests.   If you need to change to a different pharmacy, please contact the new pharmacy directly. The new pharmacy will help you get your medications transferred.     Contact Us:  Please call [phone] during business hours (8-5:00 M-F).   If you have medication related questions after clinic hours, or on the weekend, please call [phone].     Financial Assistance [phone]   Medical Records [phone]       To find additional local resources, refer to Postpartum Support International (PSI). Available at: http://www.postpartum.net/get-help/locations/united-states/    -[facility]'s Mental [facility] at: www.womensmentalhealth.org is a good resource for information about psychiatric medication in pregnancy/lactation    -Mother To Baby A  "service of the nonprofit Organization of Teratology Information Specialists (SPIKE), provides evidence based information online and in printable handouts to provide patients and providers regarding medications and other exposures during pregnancy and lactation Available at: https://mothertobaby.org/fact-sheets-parent/.    -The 4th Trimester Project - Expert written resources and information for mothers and families. Available at: https://WinDensity/    -Consider using \"The Pregnancy and Postpartum Anxiety Workbook,\" by Claudine Gonzalez PhD and Bradley Hall PsyD.    Postpartum Planning Tools:  Maternal Wellbeing Plan from [facility]: https://www.health.[facility].[place]/people/womeninfants/pmad/pmadsfs.html    4th Trimester Postpartum plan: https://WinDensity/mypostpartumplan    Tips for partners:  http://www.postpartum.net/family/tips-for-postpartum-dads-and-partners/        MENTAL HEALTH CRISIS RESOURCES:  For a emergency help, please call 911 or go to the nearest Emergency Department.     Emergency Walk-In Options:   EmPATH Unit @ [facility] ([place]): [phone] - Specialized mental health emergency area designed to be calming  [facility] ([place]): [phone]  [facility] ([place]): [phone]  [facility] ([place]): [phone]    [facility] Crisis Information:   [place]: [phone]  [place]: [phone]  [place] (JA[place]) - Adult: [phone]     Child: [phone]  [place] - Adult: [phone]     Child: [phone]  [place]: [phone]  List of all [facility] resources:   https://[place].gov/dhs/people-we-serve/adults/health-care/mental-health/resources/crisis-contacts.jsp    National Crisis Information:   Crisis Text Line: Text  [place]  to 144331  Suicide & Crisis Lifeline: 988  National Suicide Prevention Lifeline: 2-461-826-TALK (1-473.347.6879)       For online chat options, visit https://suicidepreventionlifeline.org/chat/  Poison Control " Center: 0-131-565-3688  [facility] Lifeline: 7-036-812-0043 - Hotline for transgender people of all ages  The Martínez Project: 6-502-718-1326 - Hotline for LGBT youth     For Non-Emergency Support:   Fast Tracker: Mental Health & Substance Use Disorder Resources -   https://www.[employer].org/

## 2024-09-04 NOTE — NURSING NOTE
Current patient location:  [place]    Is the patient currently in the state of [place]? YES    Visit mode:VIDEO    If the visit is dropped, the patient can be reconnected by: VIDEO VISIT: Text to cell phone:   Telephone Information:   Mobile [phone]       Will anyone else be joining the visit? NO  (If patient encounters technical issues they should call [phone] :[phone])    How would you like to obtain your AVS? MyChart    Are changes needed to the allergy or medication list? Pt stated no changes to allergies and Pt stated no med changes    Are refills needed on medications prescribed by this physician? NO    Rooming Documentation:  Patient will complete questionnaire(s) in MyChart      Reason for visit: DUSTY Chua VVF

## 2024-09-05 NOTE — PROGRESS NOTES
Medication Therapy Management (MTM) Encounter    ASSESSMENT:                            Medication Adherence/Access: No issues identified    Mental Health   Major depressive disorder, recurrent, severe without psychotic features (w/ history of difficult to treat depression)  Generalized anxiety disorder:   Discussed potential options to help with daytime drowsiness including:  -Propranolol: Trying to reduce morning propranolol dose from 20 mg to 10 mg; and continuing to 10 mg as needed and 20 mg nightly  -Hydroxyzine: Trying to avoid taking it, or only taking 10 mg instead of 20mg (patient's idea)    For now, we discussed continuing current medications as prescribed over the weekend as this may improve with more time on med changes.  If she is still feeling drowsy during the day she will attempt one of the above options next week.  Writer will check in in 2 weeks.    PLAN:                            - OK to try reducing propranolol morning dose to 10mg or hydroxyzine dose if still having issues with daytime sedation.  - patient will call insurance to check on Trintellix coverage    Follow-up: MTM 2 weeks; psychiatry provider 10/11/24    SUBJECTIVE/OBJECTIVE:                          Valentina Humphries is a 54 year old female seen for a follow-up visit.       Reason for visit: medication check in.    Allergies/ADRs: Reviewed in chart  Past Medical History: Reviewed in chart  Tobacco: She reports that she has never smoked. She has never been exposed to tobacco smoke. She has never used smokeless tobacco.  Alcohol: none    Medication Adherence/Access: no issues reported    Mental Health   Major depressive disorder, recurrent, severe without psychotic features (w/ history of difficult to treat depression)  Generalized anxiety disorder  Sertraline 200mg daily   Mirtazapine 15mg nightly (increased 9/4/24)  Geodon 20mg twice daily - started 2023  trazodone 50mg - 100mg at bedtime (taking 100mg)   Propranolol 20mg  twice daily + 10mg as needed (increased 9/4/24)  Hydroxyzine 10-20mg daily as needed for anxiety (most often takes 10mg, but sometimes 20mg)  Ativan 0.5mg - 2mg three times daily as needed   Melatonin 10mg nightly    Sees Dr. Linton - most recent visit 9/4/24 at which time propranolol was increased from 10mg twice daily to 20mg twice daily (as needed 10mg dose stayed the same) and mirtazapine was increased to 15mg nightly.    Recent dose increase of propranolol helpful for anxiety/concentration and sleep, but feels it makes her drowsy when driving. No dizziness/lightheadedness. Taking as needed dose around lunch/dinner. Wonders if hydroxyzine could also be contributing to daytime drowsiness. Typically takes 10mg but sometimes 20mg.    Mirtazapine restarted about 1 week ago and just increased at 9/4 visit. Does feel it has helped lift mood, but has noticed an increase in craving for sugary foods.     Feels sertraline was effective for about 30 years until menopause then hasn't been as effective. At last visit with Dr. Linton discussed possibly Trintellix or Viibryd. Hasn't tried a serotonin modulator. Is possibly interested in Trintellix - appears it would be covered by insurance, but copay may be higher. Patient plans to call insurance to see what copay would be.               ----------------      I spent 45 minutes with this patient today. All changes were made via collaborative practice agreement with Odebunmi,Tolulope Oluwadamilola. A copy of the visit note was provided to the patient's provider(s).    A summary of these recommendations was sent via [employer].    Leena Gomez, PharmD, BCPP  Medication Therapy Management Pharmacist  [facility]      Telemedicine Visit Details  Type of service:  Video Conference via [employer]  Start Time:  900a  End Time:  893m     Medication Therapy Recommendations  EBER (generalized anxiety disorder)    Current Medication: propranolol (INDERAL) 10 MG  tablet   Rationale: Undesirable effect - Adverse medication event - Safety   Recommendation: Provide Education   Status: Patient Agreed - Adherence/Education

## 2024-09-06 ENCOUNTER — VIRTUAL VISIT (OUTPATIENT)
Dept: PHARMACY | Facility: CLINIC | Age: 54
End: 2024-09-06
Attending: STUDENT IN AN ORGANIZED HEALTH CARE EDUCATION/TRAINING PROGRAM
Payer: COMMERCIAL

## 2024-09-06 DIAGNOSIS — F41.1 GAD (GENERALIZED ANXIETY DISORDER): ICD-10-CM

## 2024-09-06 DIAGNOSIS — F33.9 MDD (MAJOR DEPRESSIVE DISORDER), RECURRENT EPISODE (H): Primary | ICD-10-CM

## 2024-09-06 PROCEDURE — 99606 MTMS BY PHARM EST 15 MIN: CPT | Mod: 95 | Performed by: PHARMACIST

## 2024-09-06 PROCEDURE — 99607 MTMS BY PHARM ADDL 15 MIN: CPT | Mod: 95 | Performed by: PHARMACIST

## 2024-09-06 NOTE — PATIENT INSTRUCTIONS
"Recommendations from today's MTM visit:                                                      - OK to try reducing propranolol morning dose to 10mg or hydroxyzine dose if still having issues with daytime sedation.  - patient will call insurance to check on Trintellix coverage    Follow-up: MTM 2 weeks; psychiatry provider 10/11/24    It was great speaking with you today.  I value your experience and would be very thankful for your time in providing feedback in our clinic survey. In the next few days, you may receive an email or text message from [email or web address] with a link to a survey related to your  clinical pharmacist.\"     To schedule another MTM appointment, please call the clinic directly or you may call the MTM scheduling line at [phone] or toll-free at [phone].     My Clinical Pharmacist's contact information:                                                      Please feel free to contact me with any questions or concerns you have.      Leena Gomez, PharmD, BCPP  Medication Therapy Management Pharmacist  [facility]    "

## 2024-09-06 NOTE — Clinical Note
Hello! Patient got on my sched right away. She report some daytime sedation but I am hoping it improves with just a couple more days of med change under her belt. I will see her again in 2 weeks.   Thank you!  Leena

## 2024-09-11 DIAGNOSIS — F41.1 GAD (GENERALIZED ANXIETY DISORDER): ICD-10-CM

## 2024-09-11 RX ORDER — HYDROXYZINE HYDROCHLORIDE 10 MG/1
10-20 TABLET, FILM COATED ORAL DAILY PRN
Qty: 30 TABLET | Refills: 0 | Status: SHIPPED | OUTPATIENT
Start: 2024-09-11

## 2024-09-11 NOTE — TELEPHONE ENCOUNTER
"Date of Last Office Visit: 9/4/2024  [facility]      RTC: 3-4 wks  No shows: 0  Cancellations: 0  Date of Next Office Visit:    10/11/2024 9:30 AM (30 min)  [place]   Arrive by:  9:15 AM   [facility] RETURN    Rfl Status: Authorized   URPSY ([facility])   Odebunmi, Tolulope Oluwadamilola, MD     ------------------------------    Medication requested:    hydrOXYzine HCl (ATARAX) 10 MG tablet 30 tablet 1 6/14/2024 -- No   Sig - Route: Take 1-2 tablets (10-20 mg) by mouth daily as needed for anxiety - Oral     ------------------------------  From last visit note:   \"-Continue Hydroxyzine 10-20mg PRN \"       Refill decision: Medication refilled per protocol.                       "

## 2024-09-12 ENCOUNTER — TELEPHONE (OUTPATIENT)
Dept: PSYCHIATRY | Facility: CLINIC | Age: 54
End: 2024-09-12

## 2024-09-12 NOTE — TELEPHONE ENCOUNTER
Kyara Barclay, Kyara Mata, RN; Jayda Maxwell, YULIANA; Shantal Posada, RN  Hello,  Please could you do a check-in call next week?    Just to see how she's faring.    Thank you,  [place]    Follow up:     Reached out to patient to connect. No answer at number provided. LVM, requesting a call back. Clinic number provided.

## 2024-09-13 ENCOUNTER — TELEPHONE (OUTPATIENT)
Dept: PSYCHIATRY | Facility: CLINIC | Age: 54
End: 2024-09-13
Payer: COMMERCIAL

## 2024-09-13 NOTE — TELEPHONE ENCOUNTER
Second attempt made to reach out to patient to connect. No answer at number provided. LVM, requesting a call back. Clinic number provided.

## 2024-09-13 NOTE — TELEPHONE ENCOUNTER
Received 6 pages of records from [facility]. The records have been sent to HIM for scanning and will be held in nurse triage office until scanning is confirmed. Provider has been notified.     Rhonda Resendez EMT

## 2024-09-13 NOTE — TELEPHONE ENCOUNTER
On 7/5/2025 the Patient signed an ZENOBIA authorizing medical records to be released from [facility] to [facility] for the purpose of care coordination. The ZENOBIA also authorizes records to be released from [facility] to [facility]. The ZENOBIA has been sent to HIM for scanning and will be held in nurse triage office until scanning is confirmed.     Rhonda Resendez, EMT

## 2024-09-20 ENCOUNTER — VIRTUAL VISIT (OUTPATIENT)
Dept: PHARMACY | Facility: CLINIC | Age: 54
End: 2024-09-20
Payer: COMMERCIAL

## 2024-09-20 DIAGNOSIS — F41.1 GAD (GENERALIZED ANXIETY DISORDER): ICD-10-CM

## 2024-09-20 DIAGNOSIS — F33.9 MDD (MAJOR DEPRESSIVE DISORDER), RECURRENT EPISODE (H): Primary | ICD-10-CM

## 2024-09-20 PROCEDURE — 99607 MTMS BY PHARM ADDL 15 MIN: CPT | Mod: 95 | Performed by: PHARMACIST

## 2024-09-20 PROCEDURE — 99606 MTMS BY PHARM EST 15 MIN: CPT | Mod: 95 | Performed by: PHARMACIST

## 2024-09-20 NOTE — PROGRESS NOTES
Medication Therapy Management (MTM) Encounter    ASSESSMENT:                            Mental Health   Major depressive disorder, recurrent, severe without psychotic features (w/ history of difficult to treat depression)  Generalized anxiety disorder:     Patient with improvement in daytime sedation potentially due to several changes in the last 2 weeks including dose reduction of propranolol and trazodone as well as no use of hydroxyzine in the last 2 weeks. She has also reduced mirtazapine dose about 1.5 weeks ago due to increased appetite which has improved. It is noted propranolol was increased by Dr. Linton at 9/4/24 visit to help with worsening anxiety. Currently, patient feels anxiety is manageable and depression is more distressing.   She would like to consider switching sertraline to Trintellix, but isn't sure if it is covered by insurance.     PLAN:                            I will ask Dr. Linton about her thoughts on switching sertraline to Trintellix. We aren't sure about insurance coverage  2. I will ask Dr. Linton if she is ok with you taking propranolol 10-20mg twice daily + 10mg as needed instead of 20mg twice daily +10mg as needed     Follow-up:   Appointments in Next Year      Oct 11, 2024 9:30 AM  (Arrive by 9:15 AM)  Women's Well-Being Follow Up with Tolulope Oluwadamilola Odebunmi, MD  [facility] ([facility] ) [phone]     Nov 13, 2024 7:30 AM  (Arrive by 7:10 AM)  Adult Preventative Visit with Liborio Butler CNP  [facility] ([facility] ) [phone]            SUBJECTIVE/OBJECTIVE:                          Valentina Humphries is a 54 year old female seen for a follow-up visit.       Reason for visit: medication check in.    Allergies/ADRs: Reviewed in chart  Past Medical History: Reviewed in chart  Tobacco: She reports that she has  never smoked. She has never been exposed to tobacco smoke. She has never used smokeless tobacco.  Alcohol: none    Medication Adherence/Access: no issues reported    Mental Health   Major depressive disorder, recurrent, severe without psychotic features (w/ history of difficult to treat depression)  Generalized anxiety disorder  Sertraline 200mg daily   Mirtazapine 7.5mg nightly (reduced from 15mg nightly via 9/10 Valentin Uzhun message with Dr. Contreras (covering for Dr. Linton)   Geodon 20mg twice daily - started 2023  trazodone 50mg - 100mg at bedtime (taking 50mg)   Propranolol 20mg twice daily + 10mg as needed (has been taking 10mg twice daily due to daytime sedation)  Hydroxyzine 10-20mg daily as needed for anxiety (most often takes 10mg, but sometimes 20mg)  Ativan 0.5mg - 2mg three times daily as needed   Melatonin 10mg nightly    Today, patient reports increased appetite with mirtazapine 15mg nightly so she messaged Dr. Linton and reduced dose to 7.5mg nightly about 10 days ago (change made via [employer] message). Appetite improved with lower dose of mirtazapine.     She has also only been taking propranolol 10mg twice daily instead of 20mg twice daily due daytime sedation. Sedation has improved with dose reduction and she is no longer feeling unsafe to drive. Wonders if propranolol prescription can be changed to 10-20mg twice daily. Is taking trazodone 50mg nightly now instead of 100mg. Hasn't been taking hydroxyzine at all over the last couple weeks.   Reports anxiety has been 3/10 and has been manageable. Depression varies 4-6/10.   Considering switching antidepressant. Discussed Trintellix previously, but patient didn't call insurance to see if it was covered.   Isn't sure if Geodon is providing benefit.    Sees Dr. Linton in 3 weeks             ----------------      I spent 33 minutes with this patient today. A copy of the visit note was provided to the patient's provider(s).    A summary of these  recommendations was sent via [employer].    Leena Gomez, PharmD, BCPP  Medication Therapy Management Pharmacist  [facility]      Telemedicine Visit Details  The patient's medications can be safely assessed via a telemedicine encounter.  Type of service:  Video Conference via [employer]  Originating Location (pt. Location): Home    Distant Location (provider location):  Off-site  Start Time:  910a  End Time:  943a     Medication Therapy Recommendations  No medication therapy recommendations to display

## 2024-09-20 NOTE — Clinical Note
Hello! POD/covering provider - please feel free to save for Pepper Pike to weigh in on when she returns.  Patient is interested in possibly switching to Trintellix and taking a lower dose of propranolol than prescribed. Curious to get Fabián's thoughts/recommendations on that.  Fabián - let me know!  Thank you!  Leena

## 2024-09-20 NOTE — PATIENT INSTRUCTIONS
"Recommendations from today's MTM visit:                                                      I will ask Dr. Linton about her thoughts on switching sertraline to Trintellix. We aren't sure about insurance coverage  2. I will ask Dr. Linton if she is ok with you taking propranolol 10-20mg twice daily + 10mg as needed instead of 20mg twice daily +10mg as needed     It was great speaking with you today.  I value your experience and would be very thankful for your time in providing feedback in our clinic survey. In the next few days, you may receive an email or text message from [facility] [employer] with a link to a survey related to your  clinical pharmacist.\"     To schedule another MTM appointment, please call the clinic directly or you may call the MTM scheduling line at [phone] or toll-free at [phone].     My Clinical Pharmacist's contact information:                                                      Please feel free to contact me with any questions or concerns you have.      Leena Gomez, PharmD, BCPP  Medication Therapy Management Pharmacist  [facility]    "
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
## 2024-10-11 ENCOUNTER — VIRTUAL VISIT (OUTPATIENT)
Dept: PSYCHIATRY | Facility: CLINIC | Age: 54
End: 2024-10-11
Attending: STUDENT IN AN ORGANIZED HEALTH CARE EDUCATION/TRAINING PROGRAM
Payer: COMMERCIAL

## 2024-10-11 VITALS — WEIGHT: 160 LBS | BODY MASS INDEX: 25.06 KG/M2

## 2024-10-11 DIAGNOSIS — F33.1 MODERATE EPISODE OF RECURRENT MAJOR DEPRESSIVE DISORDER (H): ICD-10-CM

## 2024-10-11 DIAGNOSIS — Z79.899 ENCOUNTER FOR LONG-TERM (CURRENT) USE OF MEDICATIONS: Primary | ICD-10-CM

## 2024-10-11 DIAGNOSIS — F41.1 GAD (GENERALIZED ANXIETY DISORDER): ICD-10-CM

## 2024-10-11 PROCEDURE — G2211 COMPLEX E/M VISIT ADD ON: HCPCS | Mod: 95 | Performed by: STUDENT IN AN ORGANIZED HEALTH CARE EDUCATION/TRAINING PROGRAM

## 2024-10-11 PROCEDURE — 99214 OFFICE O/P EST MOD 30 MIN: CPT | Mod: 95 | Performed by: STUDENT IN AN ORGANIZED HEALTH CARE EDUCATION/TRAINING PROGRAM

## 2024-10-11 PROCEDURE — 90833 PSYTX W PT W E/M 30 MIN: CPT | Mod: 95 | Performed by: STUDENT IN AN ORGANIZED HEALTH CARE EDUCATION/TRAINING PROGRAM

## 2024-10-11 RX ORDER — MIRTAZAPINE 7.5 MG/1
7.5 TABLET, FILM COATED ORAL AT BEDTIME
Qty: 30 TABLET | Refills: 1 | Status: SHIPPED | OUTPATIENT
Start: 2024-10-11

## 2024-10-11 RX ORDER — PROPRANOLOL HYDROCHLORIDE 10 MG/1
TABLET ORAL
Qty: 90 TABLET | Refills: 1 | Status: SHIPPED | OUTPATIENT
Start: 2024-10-11

## 2024-10-11 RX ORDER — SERTRALINE HYDROCHLORIDE 100 MG/1
200 TABLET, FILM COATED ORAL DAILY
Qty: 60 TABLET | Refills: 1 | Status: SHIPPED | OUTPATIENT
Start: 2024-10-11

## 2024-10-11 RX ORDER — TRAZODONE HYDROCHLORIDE 100 MG/1
50-100 TABLET ORAL AT BEDTIME
Qty: 30 TABLET | Refills: 1 | Status: SHIPPED | OUTPATIENT
Start: 2024-10-11

## 2024-10-11 RX ORDER — ZIPRASIDONE HYDROCHLORIDE 20 MG/1
20 CAPSULE ORAL 2 TIMES DAILY WITH MEALS
Qty: 60 CAPSULE | Refills: 1 | Status: SHIPPED | OUTPATIENT
Start: 2024-10-11

## 2024-10-11 ASSESSMENT — PATIENT HEALTH QUESTIONNAIRE - PHQ9
SUM OF ALL RESPONSES TO PHQ QUESTIONS 1-9: 6
10. IF YOU CHECKED OFF ANY PROBLEMS, HOW DIFFICULT HAVE THESE PROBLEMS MADE IT FOR YOU TO DO YOUR WORK, TAKE CARE OF THINGS AT HOME, OR GET ALONG WITH OTHER PEOPLE: SOMEWHAT DIFFICULT
SUM OF ALL RESPONSES TO PHQ QUESTIONS 1-9: 6

## 2024-10-11 ASSESSMENT — PAIN SCALES - GENERAL: PAINLEVEL: MODERATE PAIN (4)

## 2024-10-11 NOTE — PATIENT INSTRUCTIONS
Reminder  Please remember to use your light box for up to 30 minutes a day and to use it at the 10,000 lux setting.    **For crisis resources, please see the information at the end of this document**   Patient Education    Thank you for coming to the [facility] [place] [facility].     Lab Testing:  If you had lab testing today and your results are reassuring or normal they will be mailed to you or sent through legalPAD within 7 days. If the lab tests need quick action we will call you with the results. The phone number we will call with results is # [phone]. If this is not the best number please call our clinic and change the number.     Medication Refills:  If you need any refills please call your pharmacy and they will contact us. Our fax number for refills is [phone].   Three business days of notice are needed for general medication refill requests.   Five business days of notice are needed for controlled substance refill requests.   If you need to change to a different pharmacy, please contact the new pharmacy directly. The new pharmacy will help you get your medications transferred.     Contact Us:  Please call [phone] during business hours (8-5:00 M-F).   If you have medication related questions after clinic hours, or on the weekend, please call [phone].     Financial Assistance [phone]   Medical Records [phone]       MENTAL HEALTH CRISIS RESOURCES:  For a emergency help, please call 911 or go to the nearest Emergency Department.     Emergency Walk-In Options:   EmPATH Unit @ [facility] ([place]): [phone] - Specialized mental health emergency area designed to be calming  [facility] ([place]): [phone]  [facility] ([place]): [phone]  [facility]): [phone]    [facility] Crisis Information:   [place]: [phone]  [place]: [phone]  [place] (JAIDA) - Adult:  [phone]     Child: [phone]  [place] - Adult: [phone]     Child: [phone]  [place]: [phone]  List of all [facility] resources:   https://[place].gov/dhs/people-we-serve/adults/health-care/mental-health/resources/crisis-contacts.jsp    National Crisis Information:   Crisis Text Line: Text  [place]  to 802180  Suicide & Crisis Lifeline: 988  National Suicide Prevention Lifeline: 3-447-369-TALK (1-425.701.8618)       For online chat options, visit https://suicidepreventionlifeline.org/chat/  Poison Control Center: 1-975.181.6241  Trans Lifeline: 1-673.410.8466 - Hotline for transgender people of all ages  The Mart[employer]nez Project: 6-815-889-6682 - Hotline for LGBT youth     For Non-Emergency Support:   Fast Tracker: Mental Health & Substance Use Disorder Resources -   https://www.[employer].org/

## 2024-10-11 NOTE — NURSING NOTE
Current patient location: [address]   [place] [address]    Is the patient currently in the state of [place]? YES    Visit mode:VIDEO    If the visit is dropped, the patient can be reconnected by: VIDEO VISIT: Text to cell phone:   Telephone Information:   Mobile [phone]       Will anyone else be joining the visit? NO  (If patient encounters technical issues they should call [phone])    Are changes needed to the allergy or medication list? No    Are refills needed on medications prescribed by this physician? YES    Rooming Documentation:  Questionnaire(s) completed    Reason for visit: RECHECK    Laurel NAJERAF

## 2024-11-13 ENCOUNTER — OFFICE VISIT (OUTPATIENT)
Dept: INTERNAL MEDICINE | Facility: CLINIC | Age: 54
End: 2024-11-13
Payer: COMMERCIAL

## 2024-11-13 VITALS
OXYGEN SATURATION: 97 % | HEIGHT: 69 IN | DIASTOLIC BLOOD PRESSURE: 70 MMHG | BODY MASS INDEX: 25.24 KG/M2 | WEIGHT: 170.4 LBS | TEMPERATURE: 97.9 F | RESPIRATION RATE: 16 BRPM | HEART RATE: 70 BPM | SYSTOLIC BLOOD PRESSURE: 100 MMHG

## 2024-11-13 DIAGNOSIS — Z79.899 ENCOUNTER FOR LONG-TERM (CURRENT) USE OF MEDICATIONS: ICD-10-CM

## 2024-11-13 DIAGNOSIS — R73.01 IMPAIRED FASTING GLUCOSE: ICD-10-CM

## 2024-11-13 DIAGNOSIS — Z00.00 ANNUAL PHYSICAL EXAM: ICD-10-CM

## 2024-11-13 DIAGNOSIS — F33.2 SEVERE EPISODE OF RECURRENT MAJOR DEPRESSIVE DISORDER, WITHOUT PSYCHOTIC FEATURES (H): ICD-10-CM

## 2024-11-13 DIAGNOSIS — K21.9 GASTROESOPHAGEAL REFLUX DISEASE WITHOUT ESOPHAGITIS: ICD-10-CM

## 2024-11-13 DIAGNOSIS — Z51.81 ENCOUNTER FOR THERAPEUTIC DRUG MONITORING: Primary | ICD-10-CM

## 2024-11-13 DIAGNOSIS — Z13.21 ENCOUNTER FOR VITAMIN DEFICIENCY SCREENING: ICD-10-CM

## 2024-11-13 DIAGNOSIS — Z83.719 FAMILY HISTORY OF COLONIC POLYPS: ICD-10-CM

## 2024-11-13 DIAGNOSIS — G43.809 OTHER MIGRAINE WITHOUT STATUS MIGRAINOSUS, NOT INTRACTABLE: ICD-10-CM

## 2024-11-13 DIAGNOSIS — D69.6 THROMBOCYTOPENIA (H): ICD-10-CM

## 2024-11-13 DIAGNOSIS — Z83.49 FAMILY HISTORY OF HEMOCHROMATOSIS: ICD-10-CM

## 2024-11-13 DIAGNOSIS — Z12.31 ENCOUNTER FOR SCREENING MAMMOGRAM FOR BREAST CANCER: ICD-10-CM

## 2024-11-13 DIAGNOSIS — K58.9 IRRITABLE BOWEL SYNDROME, UNSPECIFIED TYPE: ICD-10-CM

## 2024-11-13 DIAGNOSIS — G47.33 OSA (OBSTRUCTIVE SLEEP APNEA): ICD-10-CM

## 2024-11-13 LAB
ALBUMIN SERPL BCG-MCNC: 4.8 G/DL (ref 3.5–5.2)
ALP SERPL-CCNC: 67 U/L (ref 40–150)
ALT SERPL W P-5'-P-CCNC: 43 U/L (ref 0–50)
ANION GAP SERPL CALCULATED.3IONS-SCNC: 12 MMOL/L (ref 7–15)
AST SERPL W P-5'-P-CCNC: 35 U/L (ref 0–45)
ATRIAL RATE - MUSE: 61 BPM
BILIRUB SERPL-MCNC: 0.4 MG/DL
BUN SERPL-MCNC: 18.8 MG/DL (ref 6–20)
CALCIUM SERPL-MCNC: 9.7 MG/DL (ref 8.8–10.4)
CHLORIDE SERPL-SCNC: 102 MMOL/L (ref 98–107)
CREAT SERPL-MCNC: 0.68 MG/DL (ref 0.51–0.95)
DIASTOLIC BLOOD PRESSURE - MUSE: NORMAL MMHG
EGFRCR SERPLBLD CKD-EPI 2021: >90 ML/MIN/1.73M2
ERYTHROCYTE [DISTWIDTH] IN BLOOD BY AUTOMATED COUNT: 13 % (ref 10–15)
EST. AVERAGE GLUCOSE BLD GHB EST-MCNC: 108 MG/DL
FERRITIN SERPL-MCNC: 89 NG/ML (ref 11–328)
GLUCOSE SERPL-MCNC: 107 MG/DL (ref 70–99)
HBA1C MFR BLD: 5.4 % (ref 0–5.6)
HCO3 SERPL-SCNC: 27 MMOL/L (ref 22–29)
HCT VFR BLD AUTO: 40.3 % (ref 35–47)
HGB BLD-MCNC: 13.9 G/DL (ref 11.7–15.7)
INTERPRETATION ECG - MUSE: NORMAL
MCH RBC QN AUTO: 30.8 PG (ref 26.5–33)
MCHC RBC AUTO-ENTMCNC: 34.5 G/DL (ref 31.5–36.5)
MCV RBC AUTO: 89 FL (ref 78–100)
P AXIS - MUSE: 62 DEGREES
PLATELET # BLD AUTO: 130 10E3/UL (ref 150–450)
POTASSIUM SERPL-SCNC: 4.3 MMOL/L (ref 3.4–5.3)
PR INTERVAL - MUSE: 156 MS
PROT SERPL-MCNC: 6.7 G/DL (ref 6.4–8.3)
QRS DURATION - MUSE: 88 MS
QT - MUSE: 460 MS
QTC - MUSE: 463 MS
R AXIS - MUSE: -28 DEGREES
RBC # BLD AUTO: 4.52 10E6/UL (ref 3.8–5.2)
SODIUM SERPL-SCNC: 141 MMOL/L (ref 135–145)
SYSTOLIC BLOOD PRESSURE - MUSE: NORMAL MMHG
T AXIS - MUSE: 27 DEGREES
VENTRICULAR RATE- MUSE: 61 BPM
VIT D+METAB SERPL-MCNC: 37 NG/ML (ref 20–50)
WBC # BLD AUTO: 5.1 10E3/UL (ref 4–11)

## 2024-11-13 PROCEDURE — 93005 ELECTROCARDIOGRAM TRACING: CPT | Performed by: NURSE PRACTITIONER

## 2024-11-13 PROCEDURE — 91320 SARSCV2 VAC 30MCG TRS-SUC IM: CPT | Performed by: NURSE PRACTITIONER

## 2024-11-13 PROCEDURE — 93010 ELECTROCARDIOGRAM REPORT: CPT | Performed by: INTERNAL MEDICINE

## 2024-11-13 PROCEDURE — 85027 COMPLETE CBC AUTOMATED: CPT | Performed by: NURSE PRACTITIONER

## 2024-11-13 PROCEDURE — 36415 COLL VENOUS BLD VENIPUNCTURE: CPT | Performed by: NURSE PRACTITIONER

## 2024-11-13 PROCEDURE — 83036 HEMOGLOBIN GLYCOSYLATED A1C: CPT | Performed by: NURSE PRACTITIONER

## 2024-11-13 PROCEDURE — 82728 ASSAY OF FERRITIN: CPT | Performed by: NURSE PRACTITIONER

## 2024-11-13 PROCEDURE — 90480 ADMN SARSCOV2 VAC 1/ONLY CMP: CPT | Performed by: NURSE PRACTITIONER

## 2024-11-13 PROCEDURE — 99213 OFFICE O/P EST LOW 20 MIN: CPT | Mod: 25 | Performed by: NURSE PRACTITIONER

## 2024-11-13 PROCEDURE — 99396 PREV VISIT EST AGE 40-64: CPT | Mod: 25 | Performed by: NURSE PRACTITIONER

## 2024-11-13 PROCEDURE — 82306 VITAMIN D 25 HYDROXY: CPT | Performed by: NURSE PRACTITIONER

## 2024-11-13 PROCEDURE — 80053 COMPREHEN METABOLIC PANEL: CPT | Performed by: NURSE PRACTITIONER

## 2024-11-13 SDOH — HEALTH STABILITY: PHYSICAL HEALTH: ON AVERAGE, HOW MANY DAYS PER WEEK DO YOU ENGAGE IN MODERATE TO STRENUOUS EXERCISE (LIKE A BRISK WALK)?: 5 DAYS

## 2024-11-13 ASSESSMENT — SOCIAL DETERMINANTS OF HEALTH (SDOH): HOW OFTEN DO YOU GET TOGETHER WITH FRIENDS OR RELATIVES?: MORE THAN THREE TIMES A WEEK

## 2024-11-13 NOTE — PATIENT INSTRUCTIONS
Blood pressure and other vital signs look good.    EKG looks okay today.  QTc is mildly elevated but acceptable.    You need to see the sleep medicine clinic about your broken CPAP machine.  Someone should be calling for that to help you get that scheduled.    Recheck lab work today.  Results come through [facility].    Get your mammogram done later this spring.    Colonoscopy due in 2029.    Pap smear due in 2026

## 2024-11-14 ENCOUNTER — MYC MEDICAL ADVICE (OUTPATIENT)
Dept: INTERNAL MEDICINE | Facility: CLINIC | Age: 54
End: 2024-11-14
Payer: COMMERCIAL

## 2024-11-14 ENCOUNTER — PATIENT OUTREACH (OUTPATIENT)
Dept: CARE COORDINATION | Facility: CLINIC | Age: 54
End: 2024-11-14
Payer: COMMERCIAL

## 2024-11-26 ENCOUNTER — TELEPHONE (OUTPATIENT)
Dept: PSYCHIATRY | Facility: CLINIC | Age: 54
End: 2024-11-26
Payer: COMMERCIAL

## 2024-11-26 NOTE — TELEPHONE ENCOUNTER
"Received incoming call from patient wanting to speak with a nurse. Patient is very tearful and shard she had to leave work early due to feeling very emotional and dizzy. Patient was able to eat breakfast and took Lorazepam 0.25 mg at 815 am and another 0.25 mg at 915 am. She reports the holidays are tough on her due to spending time with family. Shared stressors with her mother in-law who was placed in a memory care facility due to being diagnosed with dementia. Patient currently works at an assisted living facility and mentioned feeling sad about it. She also shared her mother in-law was a good person but since her diagnosis, she has been very violent person. She also has stressors from family. She is upset because it hard being around her sister in-law who does not like anyone's input on how her mom is cared for. Patient has plans to spend Thanksgiving with her family as well as her husbands.     Due to experiencing dizziness, writer stayed on the call with the patient until she got to her destination. Patient denies any active SI/HI at this time. Did shared experiencing intrusive thoughts such as \"I am a loser and I will never feel better.\" Reports good support from  but shared \"he is set in his ways due to his mental health diagnosis.\" She also has support from neighbors. Patient sees a therapist today at 4 pm and then has DBT groups at 5 pm. She does feel these groups are very helpful.      Writer was able to discuss coping skills with the patient. Patient was able to calm down and drove to a nearby park for a walk ([facility] in [place]). Writer plans to call patient back in 15 min before she drives home to check in on dizziness and safety. Patient agreed with the plan.   "

## 2024-11-26 NOTE — TELEPHONE ENCOUNTER
"Received incoming call from patient after she was done with a walk in the park. Patient reports feeling \"little\" better now after the walk. Shared she still has mild dizziness which has been going on since the morning. She is unsure if this is due to Lorazepam as she does not take this medication frequently. She also mentioned her BP at work was 140/100 but patient was feeling anxious. Writer agreed to stay on the call with patient until she got home and is able to retake her BP. Patient reached home safety and wanted to relax before taking her BP. Writer agreed to call her back shortly.   "

## 2024-11-26 NOTE — TELEPHONE ENCOUNTER
Returned a call to patient to check on her since being at home. Patient reports having lunch and shared feeling better. She is still experiencing dizziness. Patient checked her BP is 111/72. Patient plans to take a nap for the rest of the day. Writer agreed to pass this along to provider.

## 2024-11-26 NOTE — TELEPHONE ENCOUNTER
Odebunmi, Tolulope Oluwadamilola, MD  You3 hours ago (1:15 PM)       Thanks, Jayda.    We can encourage her to keep an eye on the dizziness and let us know how things go. Could we give her another call tomorrow to check in since there's a long holiday coming up?    Best,  Fabián     Reached out to patient to check in on dizziness. No answer at number provided. LVM, requesting a call back. Clinic number provided.

## 2024-11-27 ENCOUNTER — MYC MEDICAL ADVICE (OUTPATIENT)
Dept: PSYCHIATRY | Facility: CLINIC | Age: 54
End: 2024-11-27
Payer: COMMERCIAL

## 2024-11-27 NOTE — TELEPHONE ENCOUNTER
"Patient returned call to writer.  Patient clarified that she wasn't sure if it was her missing morning medication or taking both dicyclomine with Ativan together that caused her dizziness.  Patient reports feeling overall better today, but still endorses increased anxiety.  She reports that she slept a lot yesterday and took a half day at work.  Still said that she is still pretty slow at her job and that she is concerned about that.  Writer told patient that provider endorsed not taking Lorazepam to rule out it being the cause of her dizziness and utilizing PRN propranolol for increased anxiety.  Patient said that she needs the Lorazepam and doesn't find propranolol effective.  She said he makes her more foggy and \"gali disassociating, almost.\"   Patient became tearful when discussing two family dinners tomorrow.  Patient encouraged to utilize safe family members and to communicate if she is feeling dizzy with her .  Patient denied any current safety concerns and agreed to discuss medications with her provider at her appointment next week.     "

## 2025-01-16 ENCOUNTER — MYC MEDICAL ADVICE (OUTPATIENT)
Dept: PHARMACY | Facility: CLINIC | Age: 55
End: 2025-01-16
Payer: COMMERCIAL

## 2025-01-16 ENCOUNTER — VIRTUAL VISIT (OUTPATIENT)
Dept: PHARMACY | Facility: CLINIC | Age: 55
End: 2025-01-16
Attending: STUDENT IN AN ORGANIZED HEALTH CARE EDUCATION/TRAINING PROGRAM
Payer: COMMERCIAL

## 2025-01-16 DIAGNOSIS — F33.9 MDD (MAJOR DEPRESSIVE DISORDER), RECURRENT EPISODE: Primary | ICD-10-CM

## 2025-01-16 DIAGNOSIS — R52 PAIN: ICD-10-CM

## 2025-01-16 DIAGNOSIS — F41.1 GAD (GENERALIZED ANXIETY DISORDER): ICD-10-CM

## 2025-01-16 DIAGNOSIS — K58.9 IRRITABLE BOWEL SYNDROME, UNSPECIFIED TYPE: ICD-10-CM

## 2025-01-16 DIAGNOSIS — K21.9 GASTROESOPHAGEAL REFLUX DISEASE WITHOUT ESOPHAGITIS: ICD-10-CM

## 2025-01-16 RX ORDER — MECLIZINE HYDROCHLORIDE 25 MG/1
25 TABLET ORAL 3 TIMES DAILY PRN
COMMUNITY

## 2025-01-16 RX ORDER — VITAMIN B COMPLEX
1 TABLET ORAL DAILY
COMMUNITY

## 2025-01-16 RX ORDER — MELATONIN 10 MG
10 CAPSULE ORAL AT BEDTIME
COMMUNITY

## 2025-01-16 NOTE — Clinical Note
Hello! Saw patient today for mtm - please see note for details of our discussion. Thoughts on changing geodon to risperidone?  Thank you!  Leena

## 2025-01-16 NOTE — PATIENT INSTRUCTIONS
"Recommendations from today's MTM visit:                                                      I will talk to Dr. Linton about changing Geodon to risperidone; given current dose likely could just stop Geodon and start risperidone 1mg nightly.  2. Future plan to stop mirtazapine pending above medication change  3. Ok to use SAD lamp 20-60 min per day    Follow-up: 2 weeks after med change    It was great speaking with you today.  I value your experience and would be very thankful for your time in providing feedback in our clinic survey. In the next few days, you may receive an email or text message from [employer] with a link to a survey related to your  clinical pharmacist.\"     To schedule another MTM appointment, please call the clinic directly or you may call the MTM scheduling line at [phone] or toll-free at [phone].     My Clinical Pharmacist's contact information:                                                      Please feel free to contact me with any questions or concerns you have.      Leena Gomez, PharmD, BCPP  Medication Therapy Management Pharmacist  [facility]    "

## 2025-01-16 NOTE — PROGRESS NOTES
Medication Therapy Management (MTM) Encounter    ASSESSMENT:                            Mental Health   Major depressive disorder, recurrent, severe without psychotic features (w/ history of difficult to treat depression)  Generalized anxiety disorder:     Patient with ongoing, difficult to treat depression symptoms.  She is having bothersome side effects of increased appetite and weight gain with mirtazapine.  Also experiences daytime drowsiness which could be related to mirtazapine, Geodon, or propranolol.  She does experience occasional dizziness which could also be related to these medications, although she reports her blood pressure is within normal limits and she denies orthostasis. She will follow-up with her PCP at scheduled visit next week to discuss dizziness/BP.     She asked about alternatives to sertraline and we discussed for vilazodone (Viibryd), which does appear to be covered by her insurance. She reports Trinllelix is not covered. We also discussed alternatives to Geodon given uncertain benefit and possible contribution to above side effects. Reviewed past antipsychotic trials; she leans away from trying medications similar to Abilify (ie Vraylar) due to past restlessness/insomnia with Abilify. She reports risperidone was helpful for her in the past and is interested in retrying it. Does not believe she gained weight on it like she has with mirtazapine. Would prefer to try changing Geodon and mirtazapine prior to changing sertraline.     GERD:   Stable    Pain:   Stable     Constipation:   Stable     PLAN:                            I will talk to Dr. Linton about changing Geodon to risperidone; given current dose likely could just stop Geodon and start risperidone 1mg nightly.  2. Future plan to stop mirtazapine pending above medication change  3. Ok to use SAD lamp 20-60 min per day    Follow-up: 2 weeks after med change    SUBJECTIVE/OBJECTIVE:                          Valentina Clement  Yandel is a 54 year old female seen for an initial visit for 2025. Last seen by writer Sept 2024. She was referred to me from psychiatry.      Reason for visit: Patient would like to revisit medication change and is interested in a collaborative approach to medication options.    Allergies/ADRs: Reviewed in chart  Past Medical History: Reviewed in chart  Tobacco: She reports that she has never smoked. She has never been exposed to tobacco smoke. She has never used smokeless tobacco.  Alcohol: none    Medication Adherence/Access: no issues reported.    Mental Health   Major depressive disorder, recurrent, severe without psychotic features (w/ history of difficult to treat depression)  Generalized anxiety disorder  Sertraline 200mg daily   Mirtazapine 7.5mg nightly   Geodon 20mg twice daily - started 2023  trazodone 50mg - 100mg at bedtime (taking 50mg)   Propranolol 10mg twice daily + 10mg as needed (as needed use varies)  Ativan 0.5mg - 2mg three times daily as needed (takes rarely; most recently took last week)  Melatonin 10mg nightly  SAD lamp    Today, patient reports:   - used SAD lamp for about 1 hour Monday and Tuesday this week and felt it was helpful; but wonders how long is too long, also has some trouble fitting it in during the day due to work schedule.  Works 630a-3p 3 days per week; later shift 2 days per week. 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 Tries to get out for walks during lunch hour; taking vitamin D  - feels like medications aren't working as well; does feel that SAD lamp provides significant benefit.   - binge-eating side effects with mirtazapine; gained 30lb in 6 months; is helpful for mood but would like to get off it due to side effects  - talked about mirapex as an option at last psych appt; worried about sedation as a potential side effect. Already feels drowsy while driving  - has been having some dizziness; states it seems to come and go. Got dizzy recently at work and checked  her blood pressure at the nurses station but states it was wnl. Sees pcp on Monday  - Feels sertraline had historically been more helpful than previous antidepressant trials - took 1342-6099 then again 2024-current  - had considered Trintellix, but insurance didn't cover  - wonders about Viibryd  - isn't really sure how helpful Geodon has been  - Has tried the following antipsychotics:   - Latuda possibly caused facial swelling 2 weeks after starting   - Abilify - felt restless, difficulty relaxing, trouble falling asleep  - risperidone up to 1.5mg - was on for several years 2437-4889 and felt it was helpful  - Seroquel    QTC 463ms 11/13/24             GERD:   Omeprazole 20 mg once daily   Patient feels that current regimen is effective.    Pain:   APAP 1000mg three times daily   Has been taking scheduled three times daily for a couple years for muscle aches/pains    Constipation:   Miralax as needed - takes daily  Dicyclomine as needed - rarely  No issues reported    ----------------      I spent 65 minutes with this patient today.     A summary of these recommendations was sent via [employer].    Leena Gomez, PharmD, BCPP  Medication Therapy Management Pharmacist  [facility]      Telemedicine Visit Details  The patient's medications can be safely assessed via a telemedicine encounter.  Type of service:  Video Conference via [employer]  Originating Location (pt. Location): Home    Distant Location (provider location):  Off-site  Start Time:  233p  End Time:  340p     Medication Therapy Recommendations  Severe episode of recurrent major depressive disorder, without psychotic features (H)   1 Current Medication: ziprasidone (GEODON) 20 MG capsule (Discontinued)   Current Medication Sig: Take 1 capsule (20 mg) by mouth 2 times daily (with meals).   Rationale: Condition refractory to medication - Ineffective medication - Effectiveness   Recommendation: Change Medication - risperiDONE 1 MG  tablet   Status: Contact Provider - Awaiting Response   Identified Date: 1/16/2025

## 2025-01-18 ENCOUNTER — VIRTUAL VISIT (OUTPATIENT)
Dept: URGENT CARE | Facility: CLINIC | Age: 55
End: 2025-01-18
Payer: COMMERCIAL

## 2025-01-18 DIAGNOSIS — J01.90 ACUTE NON-RECURRENT SINUSITIS, UNSPECIFIED LOCATION: Primary | ICD-10-CM

## 2025-01-18 PROCEDURE — 98005 SYNCH AUDIO-VIDEO EST LOW 20: CPT

## 2025-01-18 RX ORDER — DOXYCYCLINE HYCLATE 100 MG
100 TABLET ORAL 2 TIMES DAILY
Qty: 14 TABLET | Refills: 0 | Status: SHIPPED | OUTPATIENT
Start: 2025-01-18 | End: 2025-01-25

## 2025-01-18 NOTE — LETTER
January 18, 2025      Valentina Humphries  [address]   [place] [address]        To Whom It May Concern:    Valentina Humphries  was seen on 1/18/25.  Please excuse her from work missed January 15-17.2025 due to illness.  She can return to work Tuesday January 21, 2025 with no restrictions.         Sincerely,    Brianda Perez CNP  [facility].     Electronically signed

## 2025-01-19 NOTE — PROGRESS NOTES
"Valentina is a 54 year old who is being evaluated via a billable video visit.          Assessment & Plan     Acute non-recurrent sinusitis, unspecified location    - doxycycline hyclate (VIBRA-TABS) 100 MG tablet; Take 1 tablet (100 mg) by mouth 2 times daily for 7 days.    BMI  Estimated body mass index is 26.63 kg/m  as calculated from the following:    Height as of 12/6/24: 1.702 m (5' 7\").    Weight as of 12/6/24: 77.1 kg (170 lb).             No follow-ups on file.    Subjective   Valentina is a 54 year old, presenting for the following health issues:  No chief complaint on file.    HPI     Sick for 7 days with facial pain/pressure, productive cough, fatigue, runny nose, HA and sore throat. Home COVID test negative. Using OTC for sx management.         Review of Systems  Constitutional, HEENT, cardiovascular, pulmonary, gi and gu systems are negative, except as otherwise noted.      Objective           Vitals:  No vitals were obtained today due to virtual visit.    Physical Exam   GENERAL: alert and no distress  RESP: No audible wheeze, cough, or visible cyanosis.    PSYCH: Appropriate affect, tone, and pace of words          Video-Visit Details    Type of service:  Video Visit   Originating Location (pt. Location): Home    Distant Location (provider location):  Off-site  Platform used for Video Visit: Wendi  Signed Electronically by: Virtual Urgent Care    "

## 2025-01-20 ENCOUNTER — OFFICE VISIT (OUTPATIENT)
Dept: INTERNAL MEDICINE | Facility: CLINIC | Age: 55
End: 2025-01-20
Payer: COMMERCIAL

## 2025-01-20 VITALS
TEMPERATURE: 97.7 F | HEIGHT: 67 IN | BODY MASS INDEX: 27.47 KG/M2 | SYSTOLIC BLOOD PRESSURE: 106 MMHG | RESPIRATION RATE: 16 BRPM | WEIGHT: 175 LBS | HEART RATE: 70 BPM | OXYGEN SATURATION: 95 % | DIASTOLIC BLOOD PRESSURE: 60 MMHG

## 2025-01-20 DIAGNOSIS — J06.9 UPPER RESPIRATORY TRACT INFECTION, UNSPECIFIED TYPE: Primary | ICD-10-CM

## 2025-01-20 DIAGNOSIS — F33.2 SEVERE EPISODE OF RECURRENT MAJOR DEPRESSIVE DISORDER, WITHOUT PSYCHOTIC FEATURES (H): ICD-10-CM

## 2025-01-20 DIAGNOSIS — F41.1 GAD (GENERALIZED ANXIETY DISORDER): ICD-10-CM

## 2025-01-20 PROCEDURE — 99214 OFFICE O/P EST MOD 30 MIN: CPT | Performed by: NURSE PRACTITIONER

## 2025-01-20 PROCEDURE — G2211 COMPLEX E/M VISIT ADD ON: HCPCS | Performed by: NURSE PRACTITIONER

## 2025-01-20 ASSESSMENT — PATIENT HEALTH QUESTIONNAIRE - PHQ9
SUM OF ALL RESPONSES TO PHQ QUESTIONS 1-9: 10
10. IF YOU CHECKED OFF ANY PROBLEMS, HOW DIFFICULT HAVE THESE PROBLEMS MADE IT FOR YOU TO DO YOUR WORK, TAKE CARE OF THINGS AT HOME, OR GET ALONG WITH OTHER PEOPLE: SOMEWHAT DIFFICULT
SUM OF ALL RESPONSES TO PHQ QUESTIONS 1-9: 10

## 2025-01-20 ASSESSMENT — ENCOUNTER SYMPTOMS: COUGH: 1

## 2025-01-20 NOTE — PROGRESS NOTES
"  Assessment & Plan     Upper respiratory tract infection, unspecified type  She has a reassuring physical exam today with normal vital signs.  I told her it is reasonable to continue the doxycycline antibiotic for now    EBER (generalized anxiety disorder)  Anxiety and depression are reasonably controlled currently.  She continues her care via psychiatry    Severe episode of recurrent major depressive disorder, without psychotic features (H)  As above.  Continue on current medication regimen    Patient Instructions   Physical exam is reassuring today.  Vital signs are normal.  Ears look good.    I think it is reasonable to finish out the doxycycline for now.    Note given for work, you can return to work tomorrow.      The longitudinal plan of care for the diagnosis(es)/condition(s) as documented were addressed during this visit. Due to the added complexity in care, I will continue to support Valentina in the subsequent management and with ongoing continuity of care.            BMI  Estimated body mass index is 27.41 kg/m  as calculated from the following:    Height as of this encounter: 1.702 m (5' 7\").    Weight as of this encounter: 79.4 kg (175 lb).             Kath Montgomery is a 54 year old, presenting for the following health issues:  Dizziness (Dizziness,  wondering if from medications  ) and Cough (Cough, chest congestion, fatigue, had VV over the weekend, possible RSV)      1/20/2025     7:02 AM   Additional Questions   Roomed by Luly High    History of Present Illness       Reason for visit:  Occassional Dizziness and nausea.  My psychiatrist wants me to be checked out physically to see if there is a physical cause for the dizziness.It could be my psych medicines    She eats 4 or more servings of fruits and vegetables daily.She consumes 0 sweetened beverage(s) daily.She exercises with enough effort to increase her heart rate 30 to 60 minutes per day.  She exercises with enough effort to " "increase her heart rate 3 or less days per week.   She is taking medications regularly.     Symptoms started on Monday with URI. Coughing up some mucus. Feeling a better. Mild fever of 99.5F    Had a virtual visit with script for Doxy. Have been taking for 1.5 days. Maybe a little better with abx. Was having some wheezing.     Tested for COVID, but it was negative.                           Objective    /60 (BP Location: Right arm, Patient Position: Sitting, Cuff Size: Adult Regular)   Pulse 70   Temp 97.7  F (36.5  C)   Resp 16   Ht 1.702 m (5' 7\")   Wt 79.4 kg (175 lb)   LMP 08/05/2021 (Approximate)   SpO2 95%   BMI 27.41 kg/m    Body mass index is 27.41 kg/m .  Physical Exam   GENERAL: alert and no distress  NECK: no adenopathy, no asymmetry, masses, or scars  RESP: lungs clear to auscultation - no rales, rhonchi or wheezes  CV: regular rate and rhythm, normal S1 S2, no S3 or S4, no murmur, click or rub, no peripheral edema  ABDOMEN: soft, nontender, no hepatosplenomegaly, no masses and bowel sounds normal  MS: no gross musculoskeletal defects noted, no edema            Signed Electronically by: Liborio Butler CNP    "

## 2025-01-20 NOTE — PATIENT INSTRUCTIONS
Physical exam is reassuring today.  Vital signs are normal.  Ears look good.    I think it is reasonable to finish out the doxycycline for now.    Note given for work, you can return to work tomorrow.

## 2025-01-20 NOTE — LETTER
January 20, 2025      Valentina Humphries  [address]   [place] [address]        To Whom It May Concern:    Valentina Humphries  was seen on 1/20/25.  Please excuse her  until 1/21/2025 due to illness.        Sincerely,        Liborio Butler CNP    Electronically signed

## 2025-01-22 ENCOUNTER — HOSPITAL ENCOUNTER (OUTPATIENT)
Dept: NUTRITION | Facility: CLINIC | Age: 55
Discharge: HOME | End: 2025-01-22
Admitting: STUDENT IN AN ORGANIZED HEALTH CARE EDUCATION/TRAINING PROGRAM
Payer: COMMERCIAL

## 2025-01-22 PROCEDURE — 97803 MED NUTRITION INDIV SUBSEQ: CPT | Mod: GT,95 | Performed by: DIETITIAN, REGISTERED

## 2025-01-22 NOTE — PROGRESS NOTES
Virtual Visit Details    Type of service:  Video Visit     Originating Location (pt. Location): Other parked car in [place]     Distant Location (provider location):  On-site  Platform used for Video Visit: Wendi     OUTPATIENT NUTRITION REASSESSMENT   REASON FOR ASSESSMENT  Valentina Humphries referred by Dr. Linton  for MNT related to Weight gain [R63.5]  - Primary .    Patient accompanied by self        ASSESSMENT   Nutrition History:  - Information obtained from patient  - Patient is on a regular diet at home. Patient currently tracking food on My Fitness Pal. Patient lives with . Patient tearful during session.      History of binge eating   Remeron -increases hunger and helps best with mental health   Inpatient mental health 1994 and had eating disorder component   Binge eating getting worse 6000 calories per session  Past 3 weeks avoiding sugar at work   Eating 3000 caloris per day   Works 3 day pr week  Avoiding sugar at work      Trigger foods: peanut butter, white bread, cookies, chips     1/22/2025 Patient started adding snacks and protein source at meal/snacks. 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 Patient states the meals are heavy on meat and potatoes with a lot of added butter on vegetables. Patient able to eat meal when working. Patient prefers vegetables to have less fat and not overcooked. Patient has trouble digesting baby carrots and likes sweet peppers. Patient likes soup -craving chicken noodle or wild rice soup. Patient has been able to avoid binging since last appointment. Patient states likely had RSV so did not feel well recently.      Diet Recall:  Breakfast: oatmeal + raisins (10) + 2 tsp walnuts + milk (5:30 AM)    3/4 cup oatmeal (9 AM) soy milk and raisins   Lunch: soup chicken + vegetables + cottage cheese + fruit (5 oz) 1:30   Fruit 3:00-3:30   Dinner: 5 PM salad with cottage cheese + 2 pieces sour dough bread + 3 T PB + 1 cup dejuan + cookie + apple cider  "  Snack: apple cider   Beverages: milk; apple cider  Dining out: varies      NUTRITION FOCUSED PHYSICAL ASSESSMENT (NFPA) FOR DIAGNOSING MALNUTRITION  Yes        Observed:   No nutrition-related physical findings observed     Obtained from Chart/Interdisciplinary Team:  None noted      LABS  Labs reviewed     MEDICATIONS  Medications reviewed     ANTHROPOMETRICS   Height: 5'7\"  Weight: 175 lbs   BMI (kg/m2): 27.4 kg/m2   Weight Status:  Overweight BMI 25-29.9  %IBW: 129%  Weight History:   Wt Readings from Last 10 Encounters:   01/20/25 79.4 kg (175 lb)   11/13/24 77.3 kg (170 lb 6.4 oz)   05/08/24 73.5 kg (162 lb)   02/06/24 76.1 kg (167 lb 11.2 oz)   01/12/24 75.3 kg (166 lb)   11/06/23 73.5 kg (162 lb)   09/20/23 72.6 kg (160 lb 1.6 oz)   08/27/23 68 kg (150 lb)   08/23/23 68 kg (150 lb)   08/08/23 68.9 kg (152 lb)       ASSESSED NUTRITION NEEDS  Estimated Energy Needs: 2468-3073 kcals/day (20-22 Kcal/Kg)  Justification:  (maintenance)  Estimated Protein Needs: 65-78 grams protein/day (1-1.2 g pro/Kg)  Justification:  (preservation of lean body mass)  Estimated Fluid Needs: 0726-1900 mL/day (30-35 mL/kg)     ASSESSED MALNUTRITION STATUS  % Weight Loss:  None noted  % Intake:  No decreased intake noted  Subcutaneous Fat Loss:  None observed  Loss of Muscle Mass:  None observed  Fluid Retention:  None noted     Malnutrition Diagnosis:  Patient does not meet two of the above criteria necessary for diagnosing malnutrition    EVALUATION/PROGRESS TOWARDS GOALS  Previous nutrition goals:  6 eating times per day-improving    Avoid eating when driving-met     Previous nutrition diagnosis: Disordered eating pattern related to preoccupation with weight and food as evidenced by binge eating and diet recall-no change    Current nutrition diagnosis: Disordered eating pattern related to preoccupation with weight and food as evidenced by binge eating and diet recall        INTERVENTIONS   Nutrition Prescription   Recommend " normalized eating pattern:   Breakfast: 1 oz protein, 2 grain, 1 fat, 1 fruit   Snack: 1 fruit, 1 oz protein   Lunch: 3 oz protein, 2 grains, 1 vegetable, 1 fat, 1 dairy   Snack: 1 vegetable, 1 oz protein   Dinner: 3 oz protein, 1 grain, 2 vegetable, 1 fat, 1 fruit   Snack: 1 dairy, 1 fruit      IMPLEMENTATION   Assessed learning needs and learning preference  Teaching Method(s) used: Explanation   Vitamin and Mineral Supplements: recommend complete multivitamin and mineral   Diet Education:  Provided education on normalized eating pattern   Nutrition Education (Content):              a)  Discussed progress towards goals and current binge eating behaviors. Commended patient for avoiding eating in her car. Discussed importance of consistent eating pattern to reduce binge eating. Recommend patient continue adding protein sources at meals. Discussed strategies to delay binge eating. Encouraged gradual diet and behavior change for healthy relationship with food. Supported patient with the challenge of her eating disorder.                b)  Provided Building a Healthy Relationship with Food    Nutrition Education (Application):              a)  Discussed protein sources and meal options.                b)  Patient verbalizes understanding of diet by eating 6 times per day.   Expected patient engagement: good   Other: Patient to continue working with therapist.      GOALS (Patient determined)   6 eating times   Avoid eating when driving       FOLLOW UP/MONITORING   Progress towards goals will be monitored and evaluated per protocol and Practice Guidelines  Patient to follow up in 2 weeks   RD name and number provided      Time Spent with Patient  24 minutes      Khadijah Urias, RD, LD  [facility]  [phone] (office phone)

## 2025-01-22 NOTE — DISCHARGE INSTRUCTIONS
Stephen Montgomery,    Great work on avoiding eating when driving along with increasing your protein intake.     Goals:  Keep focusing on eating 6 times per day  Avoid eating and driving     Please let me know if you have any concerns prior to our next appointment.    Hope you continue to feel better!    Take care,    Khadijah Godoy, RD, LD  [facility]  [phone] (office phone)

## 2025-01-27 ASSESSMENT — SLEEP AND FATIGUE QUESTIONNAIRES
HOW LIKELY ARE YOU TO NOD OFF OR FALL ASLEEP IN A CAR, WHILE STOPPED FOR A FEW MINUTES IN TRAFFIC: SLIGHT CHANCE OF DOZING
HOW LIKELY ARE YOU TO NOD OFF OR FALL ASLEEP WHILE SITTING AND TALKING TO SOMEONE: SLIGHT CHANCE OF DOZING
HOW LIKELY ARE YOU TO NOD OFF OR FALL ASLEEP WHILE SITTING QUIETLY AFTER LUNCH WITHOUT ALCOHOL: SLIGHT CHANCE OF DOZING
HOW LIKELY ARE YOU TO NOD OFF OR FALL ASLEEP WHILE SITTING AND READING: MODERATE CHANCE OF DOZING
HOW LIKELY ARE YOU TO NOD OFF OR FALL ASLEEP WHILE WATCHING TV: MODERATE CHANCE OF DOZING
HOW LIKELY ARE YOU TO NOD OFF OR FALL ASLEEP WHILE SITTING INACTIVE IN A PUBLIC PLACE: SLIGHT CHANCE OF DOZING
HOW LIKELY ARE YOU TO NOD OFF OR FALL ASLEEP WHEN YOU ARE A PASSENGER IN A CAR FOR AN HOUR WITHOUT A BREAK: HIGH CHANCE OF DOZING
HOW LIKELY ARE YOU TO NOD OFF OR FALL ASLEEP WHILE LYING DOWN TO REST IN THE AFTERNOON WHEN CIRCUMSTANCES PERMIT: HIGH CHANCE OF DOZING

## 2025-01-28 ENCOUNTER — OFFICE VISIT (OUTPATIENT)
Dept: SLEEP MEDICINE | Facility: CLINIC | Age: 55
End: 2025-01-28
Attending: NURSE PRACTITIONER
Payer: COMMERCIAL

## 2025-01-28 VITALS
RESPIRATION RATE: 12 BRPM | BODY MASS INDEX: 27.31 KG/M2 | HEART RATE: 68 BPM | DIASTOLIC BLOOD PRESSURE: 64 MMHG | SYSTOLIC BLOOD PRESSURE: 102 MMHG | HEIGHT: 67 IN | WEIGHT: 174 LBS | OXYGEN SATURATION: 98 %

## 2025-01-28 DIAGNOSIS — F41.1 GAD (GENERALIZED ANXIETY DISORDER): ICD-10-CM

## 2025-01-28 DIAGNOSIS — G47.33 OSA (OBSTRUCTIVE SLEEP APNEA): Primary | ICD-10-CM

## 2025-01-28 PROCEDURE — 99213 OFFICE O/P EST LOW 20 MIN: CPT | Performed by: INTERNAL MEDICINE

## 2025-01-28 NOTE — PATIENT INSTRUCTIONS
Please do not drive drowsy under any circumstances.  If you find yourself in a situation in which you are driving and feeling sleepy, please pull over right away in a safe place and take a quick nap before getting back on the road.    Please discuss with your mental health professional the option of lowering the dosage of you medications to decrease drowsiness during the day.

## 2025-01-28 NOTE — NURSING NOTE
"Chief Complaint   Patient presents with    Sleep Problem     Patient is present today to establish care and in need of CPAP supplies.        Initial /64   Pulse 68   Resp 12   Ht 1.702 m (5' 7.01\")   Wt 78.9 kg (174 lb)   LMP 08/05/2021 (Approximate)   SpO2 98%   BMI 27.25 kg/m   Estimated body mass index is 27.25 kg/m  as calculated from the following:    Height as of this encounter: 1.702 m (5' 7.01\").    Weight as of this encounter: 78.9 kg (174 lb).    Medication Reconciliation: complete    Neck circumference: 13.5 inches / 34 centimeters.    DME: [facility]    Sole Ellsworth CMA on 1/28/2025 at 10:07 AM      "
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
## 2025-01-28 NOTE — NURSING NOTE
2 year appointment reminder message was created and will be sent out 2 - 3 months prior to next appointment due date. Via [employer]     Writer changed pressures on patient's CPAP manually in the clinic.

## 2025-02-10 ENCOUNTER — HOSPITAL ENCOUNTER (OUTPATIENT)
Dept: NUTRITION | Facility: CLINIC | Age: 55
Discharge: HOME | End: 2025-02-10
Attending: DIETITIAN, REGISTERED | Admitting: DIETITIAN, REGISTERED
Payer: COMMERCIAL

## 2025-02-10 PROCEDURE — 97803 MED NUTRITION INDIV SUBSEQ: CPT | Mod: GT,95 | Performed by: DIETITIAN, REGISTERED

## 2025-02-10 NOTE — PROGRESS NOTES
Virtual Visit Details    Type of service:  Video Visit     Originating Location (pt. Location): Other parked car    Distant Location (provider location):  On-site  Platform used for Video Visit: Wendi     OUTPATIENT NUTRITION REASSESSMENT   REASON FOR ASSESSMENT  Valentina Humphries referred by Dr. Linton  for MNT related to Weight gain [R63.5]  - Primary .    Patient accompanied by self        ASSESSMENT   Nutrition History:  - Information obtained from patient  - Patient is on a regular diet at home. Patient currently tracking food on My Fitness Pal. Patient lives with . Patient tearful during session.      History of binge eating   Remeron -increases hunger and helps best with mental health   Inpatient mental health 1994 and had eating disorder component   Binge eating getting worse 6000 calories per session  Past 3 weeks avoiding sugar at work   Eating 3000 caloris per day   Works 3 day pr week  Avoiding sugar at work      Trigger foods: peanut butter, white bread, cookies, chips      1/22/2025 Patient started adding snacks and protein source at meal/snacks. 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 Patient states the meals are heavy on meat and potatoes with a lot of added butter on vegetables. Patient able to eat meal when working. Patient prefers vegetables to have less fat and not overcooked. Patient has trouble digesting baby carrots and likes sweet peppers. Patient likes soup -craving chicken noodle or wild rice soup. Patient has been able to avoid binging since last appointment. Patient states likely had RSV so did not feel well recently.     2/10/2025 Patient had a change in medication and states is feeling better. Patient had 1 episode of binge eating. Patient was able to discuss with therapist to determine reason-patient felt vulnerable at athletic race for nephew. Patient has planned way to feel less vulnerable at next event. Patient has less of regular eating pattern  "during the weekend. Patient does not eat meals with her . Patient trying to pay attention to eating when you're hungry.   Patient has been trying to avoid eating in her car. Patient interested in amount of calories to consume.     Diet Recall:  Breakfast: oatmeal + raisins (10) + 2 tsp walnuts + milk (5:30 AM)    3/4 cup oatmeal (9 AM) soy milk and raisins   Lunch: soup chicken + vegetables + cottage cheese + fruit (5 oz) 1:30   Fruit 3:00-3:30   Dinner: 5 PM salad with cottage cheese + 2 pieces sour dough bread + 3 T PB + 1 cup dejuan + cookie + apple cider   Snack: apple cider   Beverages: milk; apple cider  Dining out: varies      NUTRITION FOCUSED PHYSICAL ASSESSMENT (NFPA) FOR DIAGNOSING MALNUTRITION  Yes        Observed:   No nutrition-related physical findings observed     Obtained from Chart/Interdisciplinary Team:  None noted      LABS  Labs reviewed     MEDICATIONS  Medications reviewed     ANTHROPOMETRICS   Height: 5'7\"  Weight: 175 lbs   BMI (kg/m2): 27.4 kg/m2   Weight Status:  Overweight BMI 25-29.9  %IBW: 129%  Weight History:   Wt Readings from Last 10 Encounters:   01/28/25 78.9 kg (174 lb)   01/20/25 79.4 kg (175 lb)   11/13/24 77.3 kg (170 lb 6.4 oz)   05/08/24 73.5 kg (162 lb)   02/06/24 76.1 kg (167 lb 11.2 oz)   01/12/24 75.3 kg (166 lb)   11/06/23 73.5 kg (162 lb)   09/20/23 72.6 kg (160 lb 1.6 oz)   08/27/23 68 kg (150 lb)   08/23/23 68 kg (150 lb)        ASSESSED NUTRITION NEEDS  Estimated Energy Needs: 6105-4471 kcals/day (20-22 Kcal/Kg)  Justification:  (maintenance)  Estimated Protein Needs: 65-78 grams protein/day (1-1.2 g pro/Kg)  Justification:  (preservation of lean body mass)  Estimated Fluid Needs: 3033-4682 mL/day (30-35 mL/kg)     ASSESSED MALNUTRITION STATUS  % Weight Loss:  None noted  % Intake:  No decreased intake noted  Subcutaneous Fat Loss:  None observed  Loss of Muscle Mass:  None observed  Fluid Retention:  None noted     Malnutrition Diagnosis:  Patient does not " meet two of the above criteria necessary for diagnosing malnutrition     EVALUATION/PROGRESS TOWARDS GOALS  Previous nutrition goals:  6 eating times per day-improving    Avoid eating when driving-met      Previous nutrition diagnosis: Disordered eating pattern related to preoccupation with weight and food as evidenced by binge eating and diet recall-no change     Current nutrition diagnosis: Disordered eating pattern related to preoccupation with weight and food as evidenced by binge eating and diet recall        INTERVENTIONS   Nutrition Prescription   Recommend normalized eating pattern:   Breakfast: 1 oz protein, 2 grain, 1 fat, 1 fruit   Snack: 1 fruit, 1 oz protein   Lunch: 3 oz protein, 2 grains, 1 vegetable, 1 fat, 1 dairy   Snack: 1 vegetable, 1 oz protein   Dinner: 3 oz protein, 1 grain, 2 vegetable, 1 fat, 1 fruit   Snack: 1 dairy, 1 fruit      IMPLEMENTATION   Assessed learning needs and learning preference  Teaching Method(s) used: Explanation   Vitamin and Mineral Supplements: recommend complete multivitamin and mineral   Diet Education:  Provided education on normalized eating pattern   Nutrition Education (Content):              a)  Discussed progress towards goals and current binge eating behaviors. Commended patient for avoiding eating in her car. Discussed importance of consistent eating pattern to reduce binge eating. Recommend patient continue adding protein sources at meals. Discussed strategies to delay binge eating. Encouraged gradual diet and behavior change for healthy relationship with food. Supported patient with the challenge of her eating disorder.                b)  Provided Building a Healthy Relationship with Food    Nutrition Education (Application):              a)  Discussed protein sources and meal options.                b)  Patient verbalizes understanding of diet by planning for ski event.    Expected patient engagement: good   Other: Patient to continue working with  therapist.      GOALS (Patient determined)   6 eating times   Avoid eating when driving    Plan for ski event      FOLLOW UP/MONITORING   Progress towards goals will be monitored and evaluated per protocol and Practice Guidelines  Patient to follow up in 2 weeks   RD name and number provided      Time Spent with Patient  25 minutes      Khadijah Urias, RD, LD  [facility] Outpatient Dietitian  [phone] (office phone)

## 2025-02-11 NOTE — DISCHARGE INSTRUCTIONS
Stephen Montgomery,    I'm glad you're feeling better with your medication change. Below are the goals we discussed:     6 eating times per day   Avoid eating when driving    Plan for ski event -make meal, chat with sister, see if  can be present, plan to journal before and after event    Please let me know if you have any questions prior to your next appointment.    Take care,    Khadijah Godoy, RD, LD  [facility] Outpatient Dietitian  [phone] (office phone)

## 2025-02-12 ENCOUNTER — MYC REFILL (OUTPATIENT)
Dept: PHARMACY | Facility: CLINIC | Age: 55
End: 2025-02-12
Payer: COMMERCIAL

## 2025-02-12 ENCOUNTER — MYC REFILL (OUTPATIENT)
Dept: PSYCHIATRY | Facility: CLINIC | Age: 55
End: 2025-02-12
Payer: COMMERCIAL

## 2025-02-12 DIAGNOSIS — K21.9 GASTROESOPHAGEAL REFLUX DISEASE WITHOUT ESOPHAGITIS: ICD-10-CM

## 2025-02-12 DIAGNOSIS — F41.1 GAD (GENERALIZED ANXIETY DISORDER): ICD-10-CM

## 2025-02-12 DIAGNOSIS — F33.2 SEVERE EPISODE OF RECURRENT MAJOR DEPRESSIVE DISORDER, WITHOUT PSYCHOTIC FEATURES (H): ICD-10-CM

## 2025-02-12 RX ORDER — OMEPRAZOLE 20 MG/1
20 CAPSULE, DELAYED RELEASE ORAL DAILY
Qty: 90 CAPSULE | Refills: 3 | Status: SHIPPED | OUTPATIENT
Start: 2025-02-12

## 2025-02-12 RX ORDER — RISPERIDONE 1 MG/1
1 TABLET ORAL AT BEDTIME
Qty: 30 TABLET | Refills: 0 | Status: CANCELLED | OUTPATIENT
Start: 2025-02-12

## 2025-02-12 RX ORDER — LORAZEPAM 0.5 MG/1
0.5 TABLET ORAL DAILY PRN
Qty: 15 TABLET | Refills: 0 | Status: SHIPPED | OUTPATIENT
Start: 2025-02-12

## 2025-02-12 NOTE — TELEPHONE ENCOUNTER
Last seen: 01/10/2025  RTC: 5 Weeks  Any patient initiated cancellations or no shows since last visit? No  Next appt: 03/15/2025  /Last filled: 01     Incoming refill from patient via [facility] by patient or caregiver    Medication requested:   Pending Prescriptions:                       Disp   Refills    LORazepam (ATIVAN) 0.5 MG tablet          15 tab*0            Sig: Take 1 tablet (0.5 mg) by mouth daily as needed           for anxiety.      From chart note:   Medications:      -Continue sertraline  200 mg  -Continue Trazodone 50-100mg at bedtime (has been using 50mg).  -Continue Remeron to 7.5mg daily  -Continue Geodon 20mg BID  -Continue Propranolol 10 mg BID,10 daily PRN (plans to skip afternoon dose)  -Continue Lorazepam 0.5mg daily PRN (only taking it during intense anxiety episodes)    Is note signed/closed? Yes    Is this a 90 day request for a psych medication? No    If any red answers - send to provider    Otherwise -   LPNs - Please check  if controlled and send to provider  Others - Send to RNs

## 2025-02-12 NOTE — CONFIDENTIAL NOTE
Rx Refill note     I received rx refill request for lorazepam 0.5mg, #15, sig 1 tab po qday prn for anxiety - as I am a psychiatric prescriber of the day for the clinic today and Dr. Linton is out of clinic.  This medication, per Epic Medications tab was most recently ordered by Dr. Linton on 6/7/2024 for #15, R-0.     Reviewed portions of Valentina Humphries's medical record, including most recent psychiatric progress note by Dr. Linton of 1/10/2025; and DEBORAH Gomez, PharmD's 2/7/2025 MTM visit. Reviewed notes in this encounter and reviewed [place]  [place] (of note, the only rx on the list was lorazepam 0.5mg, #15, ordered by Dr. Linton on 6/7/2024 and filled on 6/7/2024). Follow-up with ROD Linton, is scheduled for 3/14/2024, and with DEBORAH Gomez is on 2/21/2025.     Will order lorazepam 0.5mg, #15, sig 1 tab po qday prn for anxiety, R-0.     Akash Eng MD

## 2025-02-12 NOTE — TELEPHONE ENCOUNTER
Last seen: 01/10/2025  RTC: 5 Weeks  Any patient initiated cancellations or no shows since last visit? No  Next appt: 03/15/2025  Last filled: 01/17/2025     Incoming refill from patient via [facility] by patient or caregiver    Medication requested:   Pending Prescriptions:                       Disp   Refills    risperiDONE (RISPERDAL) 1 MG tablet       30 tab*0            Sig: Take 1 tablet (1 mg) by mouth at bedtime.      From chart note:   Medications:      -Continue sertraline  200 mg  -Continue Trazodone 50-100mg at bedtime (has been using 50mg).  -Continue Remeron to 7.5mg daily  -Continue Geodon 20mg BID  -Continue Propranolol 10 mg BID,10 daily PRN (plans to skip afternoon dose)  -Continue Lorazepam 0.5mg daily PRN (only taking it during intense anxiety episodes)  Is note signed/closed? Yes    Is this a 90 day request for a psych medication? No    If any red answers - send to provider    Otherwise -   LPNs - Please check  if controlled and send to provider  Others - Send to RNs

## 2025-02-16 ENCOUNTER — MYC MEDICAL ADVICE (OUTPATIENT)
Dept: PSYCHIATRY | Facility: CLINIC | Age: 55
End: 2025-02-16
Payer: COMMERCIAL

## 2025-02-19 ENCOUNTER — MYC MEDICAL ADVICE (OUTPATIENT)
Dept: INTERNAL MEDICINE | Facility: CLINIC | Age: 55
End: 2025-02-19
Payer: COMMERCIAL

## 2025-02-19 ENCOUNTER — ANCILLARY PROCEDURE (OUTPATIENT)
Dept: MAMMOGRAPHY | Facility: CLINIC | Age: 55
End: 2025-02-19
Attending: NURSE PRACTITIONER
Payer: COMMERCIAL

## 2025-02-19 DIAGNOSIS — Z12.31 ENCOUNTER FOR SCREENING MAMMOGRAM FOR BREAST CANCER: ICD-10-CM

## 2025-02-19 DIAGNOSIS — R09.81 NASAL CONGESTION: ICD-10-CM

## 2025-02-19 PROCEDURE — 77063 BREAST TOMOSYNTHESIS BI: CPT

## 2025-02-19 RX ORDER — FLUTICASONE PROPIONATE 50 MCG
1 SPRAY, SUSPENSION (ML) NASAL DAILY
Qty: 9.9 ML | Refills: 5 | Status: SHIPPED | OUTPATIENT
Start: 2025-02-19

## 2025-02-19 NOTE — TELEPHONE ENCOUNTER
LOV 1/20/25  Assessment & Plan  Upper respiratory tract infection, unspecified type  She has a reassuring physical exam today with normal vital signs.  I told her it is reasonable to continue the doxycycline antibiotic for now

## 2025-03-09 ENCOUNTER — MYC REFILL (OUTPATIENT)
Dept: PSYCHIATRY | Facility: CLINIC | Age: 55
End: 2025-03-09
Payer: COMMERCIAL

## 2025-03-09 DIAGNOSIS — F33.2 SEVERE EPISODE OF RECURRENT MAJOR DEPRESSIVE DISORDER, WITHOUT PSYCHOTIC FEATURES (H): ICD-10-CM

## 2025-03-09 DIAGNOSIS — F33.2 SEVERE RECURRENT MAJOR DEPRESSION WITHOUT PSYCHOTIC FEATURES (H): ICD-10-CM

## 2025-03-09 RX ORDER — SERTRALINE HYDROCHLORIDE 100 MG/1
200 TABLET, FILM COATED ORAL DAILY
Qty: 60 TABLET | Refills: 1 | Status: CANCELLED | OUTPATIENT
Start: 2025-03-09

## 2025-03-10 RX ORDER — SERTRALINE HYDROCHLORIDE 100 MG/1
200 TABLET, FILM COATED ORAL DAILY
Qty: 60 TABLET | Refills: 0 | Status: SHIPPED | OUTPATIENT
Start: 2025-03-10

## 2025-03-10 RX ORDER — RISPERIDONE 1 MG/1
1 TABLET ORAL AT BEDTIME
Qty: 30 TABLET | Refills: 0 | Status: SHIPPED | OUTPATIENT
Start: 2025-03-10

## 2025-03-10 NOTE — TELEPHONE ENCOUNTER
Added this request to the other refill request sent to our clinic. See encounter on 03/09/2025.     - Sandoval Womack, Visit Facilitator

## 2025-03-10 NOTE — TELEPHONE ENCOUNTER
Patient meets refill protocol. 30 d/s of Risperdal and Zoloft sent to patient's pharmacy.    Suma Moreno RN on 3/10/2025 at 8:37 AM

## 2025-03-10 NOTE — TELEPHONE ENCOUNTER
Last seen: 01/10/2025  RTC: 5 Weeks  Any patient initiated cancellations or no shows since last visit? No  Next appt: 03/14/2025  Last filled: 01/17/205     Incoming refill from patient via [employer] by patient or caregiver    Medication requested:   Pending Prescriptions:                       Disp   Refills    risperiDONE (RISPERDAL) 1 MG tablet       30 tab*0            Sig: Take 1 tablet (1 mg) by mouth at bedtime.    sertraline (ZOLOFT) 100 MG tablet         60 tab*1            Sig: Take 2 tablets (200 mg) by mouth daily.      From chart note:   Per visit with PharmD on 2/7/25:  Sertraline 200mg daily   Mirtazapine 7.5mg nightly   Risperidone 1mg nightly  trazodone 50mg - 100mg at bedtime (taking 50mg)   Propranolol 10mg twice daily + 10mg as needed (as needed use varies)  Ativan 0.5mg - 2mg three times daily as needed (takes rarely; most recently took last week)  Melatonin 10mg nightly  SAD lamp     At MTM 1/16/25, stopped Geodon and started risperidone (approved by Dr. Linton).        Is note signed/closed? Yes    Is this a 90 day request for a psych medication? No    LPNs - Please check  if controlled and send to provider  Others - Send to RNs

## 2025-03-17 ENCOUNTER — HOSPITAL ENCOUNTER (OUTPATIENT)
Dept: NUTRITION | Facility: CLINIC | Age: 55
Discharge: HOME | End: 2025-03-17
Attending: DIETITIAN, REGISTERED | Admitting: STUDENT IN AN ORGANIZED HEALTH CARE EDUCATION/TRAINING PROGRAM
Payer: COMMERCIAL

## 2025-03-17 PROCEDURE — 97803 MED NUTRITION INDIV SUBSEQ: CPT | Mod: GT,95 | Performed by: DIETITIAN, REGISTERED

## 2025-03-17 NOTE — DISCHARGE INSTRUCTIONS
Goals:   Eat 5-6 times per day   Avoid eating when driving    Balance food groups at meals   Allow self to make simple meal when tired     Please reach out if you have questions prior to your next appointment.    Take simon,    Khadijah Godoy, RD, LD  [facility] Outpatient Dietitian  [phone] (office phone)

## 2025-03-17 NOTE — PROGRESS NOTES
Virtual Visit Details    Type of service:  Video Visit     Originating Location (pt. Location): Home    Distant Location (provider location):  On-site  Platform used for Video Visit: [facility]     OUTPATIENT NUTRITION REASSESSMENT   REASON FOR ASSESSMENT  Valentina FLAKITA Humphries referred by Dr. Linton  for MNT related to Weight gain [R63.5]  - Primary .    Patient accompanied by self        ASSESSMENT   Nutrition History:  - Information obtained from patient  - Patient is on a regular diet at home. Patient currently tracking food on My Fitness Pal. Patient lives with . Patient tearful during session.      History of binge eating   Remeron -increases hunger and helps best with mental health   Inpatient mental health 1994 and had eating disorder component   Binge eating getting worse 6000 calories per session  Past 3 weeks avoiding sugar at work   Eating 3000 caloris per day   Works 3 day pr week  Avoiding sugar at work      Trigger foods: peanut butter, white bread, cookies, chips      1/22/2025 Patient started adding snacks and protein source at meal/snacks. 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 Patient states the meals are heavy on meat and potatoes with a lot of added butter on vegetables. Patient able to eat meal when working. Patient prefers vegetables to have less fat and not overcooked. Patient has trouble digesting baby carrots and likes sweet peppers. Patient likes soup -craving chicken noodle or wild rice soup. Patient has been able to avoid binging since last appointment. Patient states likely had RSV so did not feel well recently.      2/10/2025 Patient had a change in medication and states is feeling better. Patient had 1 episode of binge eating. Patient was able to discuss with therapist to determine reason-patient felt vulnerable at athletic race for nephew. Patient has planned way to feel less vulnerable at next event. Patient has less of regular eating pattern during the  "weekend. Patient does not eat meals with her . Patient trying to pay attention to eating when you're hungry.   Patient has been trying to avoid eating in her car. Patient interested in amount of calories to consume.     3/17/2025 Patient is not eating and driving; no binge eating since last visit. Patient will be tired at night and does not feel that she eats complete meal. Patient has been trying to add protein with meals     2 servings fruit + cereal and milk; peanut butter sandwich and fruit; reducing sweets-     Oatmeal with yogurt (prior to work)   1/2 cup oatmeal (9 AM)  1:30 lunch   3 PM snack  5:00-5:30 or 7:00 dinner      Diet Recall:  Breakfast: oatmeal + raisins (10) + 2 tsp walnuts + milk (5:30 AM)    3/4 cup oatmeal (9 AM) soy milk and raisins   Lunch: soup chicken + vegetables + cottage cheese + fruit (5 oz) 1:30   Fruit 3:00-3:30   Dinner: 5 PM salad with cottage cheese + 2 pieces sour dough bread + 3 T PB + 1 cup dejuan + cookie + apple cider   Snack: apple cider   Beverages: milk; apple cider  Dining out: varies      NUTRITION FOCUSED PHYSICAL ASSESSMENT (NFPA) FOR DIAGNOSING MALNUTRITION  Yes        Observed:   No nutrition-related physical findings observed     Obtained from Chart/Interdisciplinary Team:  None noted      LABS  Labs reviewed     MEDICATIONS  Medications reviewed     ANTHROPOMETRICS   Height: 5'7\"  Weight: 175 lbs   BMI (kg/m2): 27.4 kg/m2   Weight Status:  Overweight BMI 25-29.9  %IBW: 129%  Weight History:   Wt Readings from Last 10 Encounters:   01/28/25 78.9 kg (174 lb)   01/20/25 79.4 kg (175 lb)   11/13/24 77.3 kg (170 lb 6.4 oz)   05/08/24 73.5 kg (162 lb)   02/06/24 76.1 kg (167 lb 11.2 oz)   01/12/24 75.3 kg (166 lb)   11/06/23 73.5 kg (162 lb)   09/20/23 72.6 kg (160 lb 1.6 oz)   08/27/23 68 kg (150 lb)   08/23/23 68 kg (150 lb)         ASSESSED NUTRITION NEEDS  Estimated Energy Needs: 3980-7158 kcals/day (20-22 Kcal/Kg)  Justification:  (maintenance)  Estimated " Protein Needs: 65-78 grams protein/day (1-1.2 g pro/Kg)  Justification:  (preservation of lean body mass)  Estimated Fluid Needs: 5960-6948 mL/day (30-35 mL/kg)     ASSESSED MALNUTRITION STATUS  % Weight Loss:  None noted  % Intake:  No decreased intake noted  Subcutaneous Fat Loss:  None observed  Loss of Muscle Mass:  None observed  Fluid Retention:  None noted     Malnutrition Diagnosis:  Patient does not meet two of the above criteria necessary for diagnosing malnutrition     EVALUATION/PROGRESS TOWARDS GOALS  Previous nutrition goals:  6 eating times per day-improving    Avoid eating when driving-met   Plan for ski event -met     Previous nutrition diagnosis: Disordered eating pattern related to preoccupation with weight and food as evidenced by binge eating and diet recall-no change     Current nutrition diagnosis: Disordered eating pattern related to preoccupation with weight and food as evidenced by previous binge eating and diet recall        INTERVENTIONS   Nutrition Prescription   Recommend normalized eating pattern:   Breakfast: 1 oz protein, 2 grain, 1 fat, 1 fruit   Snack: 1 fruit, 1 oz protein   Lunch: 3 oz protein, 2 grains, 1 vegetable, 1 fat, 1 dairy   Snack: 1 vegetable, 1 oz protein   Dinner: 3 oz protein, 1 grain, 2 vegetable, 1 fat, 1 fruit   Snack: 1 dairy, 1 fruit      IMPLEMENTATION   Assessed learning needs and learning preference  Teaching Method(s) used: Explanation   Vitamin and Mineral Supplements: recommend complete multivitamin and mineral   Diet Education:  Provided education on normalized eating pattern   Nutrition Education (Content):              a)  Discussed progress towards goals and current eating disorder behaviors. Commended patient for avoiding eating in her car. Discussed importance of consistent eating pattern to reduce binge eating. Recommend patient continue adding protein sources at meals. Discussed strategies to delay binge eating. Encouraged gradual diet and behavior  change for healthy relationship with food. Supported patient with the challenge of her eating disorder.    Nutrition Education (Application):              a)  Discussed protein sources and quick meal options.                b)  Patient verbalizes understanding of diet by stating will eat balanced meals   Expected patient engagement: good   Other: Patient to continue working with therapist.      GOALS (Patient determined)   6 eating times   Avoid eating when driving    Balance food groups at meals   Allow self to make simple meal when tired      FOLLOW UP/MONITORING   Progress towards goals will be monitored and evaluated per protocol and Practice Guidelines  Patient to follow up in 10 weeks   RD name and number provided      Time Spent with Patient  25 minutes      Khadijah Urias, RD, LD  [facility] Outpatient Dietitian  [phone] (office phone)

## 2025-04-13 ENCOUNTER — MYC REFILL (OUTPATIENT)
Dept: PSYCHIATRY | Facility: CLINIC | Age: 55
End: 2025-04-13
Payer: COMMERCIAL

## 2025-04-13 DIAGNOSIS — F41.1 GAD (GENERALIZED ANXIETY DISORDER): ICD-10-CM

## 2025-04-14 RX ORDER — LORAZEPAM 0.5 MG/1
0.5 TABLET ORAL DAILY PRN
Qty: 15 TABLET | Refills: 0 | Status: SHIPPED | OUTPATIENT
Start: 2025-04-14

## 2025-04-14 NOTE — TELEPHONE ENCOUNTER
Last seen: 3/14/25  RTC: 8 weeks   Any patient initiated cancellations or no shows since last visit? No  Next appt: 5/30/25  Last filled:        Incoming refill from patient via mychart by patient or caregiver    Is note signed/closed? Yes    Is this a 90 day request for a psych medication? No    From chart note:   -Continue Lorazepam 0.5-2mg TID PRN (only taking it during intense anxiety episodes)     Medication unable to be refilled by RN due to criteria not met as indicated.                 []Eligibility - not seen in the last year              []Supervision - no future appointment              []Compliance - no shows, cancellations or lapse in therapy              []Verification - order discrepancy              [x]Controlled medication              []Medication not included in policy              []90-day supply request              []Other:      Suma Moreno RN on 4/14/2025 at 7:25 AM

## 2025-04-21 DIAGNOSIS — K58.9 IRRITABLE BOWEL SYNDROME, UNSPECIFIED TYPE: ICD-10-CM

## 2025-04-21 RX ORDER — DICYCLOMINE HYDROCHLORIDE 10 MG/1
CAPSULE ORAL
Qty: 30 CAPSULE | Refills: 3 | Status: SHIPPED | OUTPATIENT
Start: 2025-04-21

## 2025-04-29 ENCOUNTER — TELEPHONE (OUTPATIENT)
Dept: PSYCHIATRY | Facility: CLINIC | Age: 55
End: 2025-04-29
Payer: COMMERCIAL

## 2025-04-29 NOTE — TELEPHONE ENCOUNTER
"Writer received call from Valentina, who was crying heavily and emotionally dysregulated. She explained that she was put on a performance improvement plan by her Supervisor today, and she experienced significant anxiety as a result. \"I'm feeling so hurt,\" and has been having more difficulty at work since new Supervisor started.   Valentina is currently sitting in her car, as she became emotional after discussion with Supervisor and couldn't compose herself. Due to level of anxiety symptoms, she took prn lorazepam 0.5 mg about 20-25 minutes prior to calling clinic.   She doesn't feel like she can go back to work due to how upset and emotional she's feeling, but also doesn't feel it would be appropriate to take another prn, as this would impact her ability to function at work.   Denies safety concerns, including SI/SIB, but does feel \"too much panic\" to go back to work right now. She is requesting a note from Provider excusing her from work this afternoon.  Valentina and writer developed safety plan that she would continue sitting in her car and doing deep breathing exercises. She will try to get a ride home from someone, but if unable she plans to drive home eventually. Valentina agreed not to drive if experiencing any dizziness, lightheadedness, or safety concerns.   Dr Linton updated of patient's concerns. Work excusal letter approved and sent to patient via [employer].   Called Valentina and updated her of completed letter. Advised her that Dr Linton strongly recommends that she get a ride home from someone. Valentina responded that she could call her Sister for a ride, and would do that shortly. Valentina agrees to call 911 if she develops any safety concerns, including SI/SIB. Plan for writer to check in later today via [employer].        "

## 2025-04-29 NOTE — LETTER
April 29, 2025      RE: Valentina BOGGS Racquel Humphries  [address]  [place] [address]         To Whom It May Concern,      Valentina is under my care at [facility]. Please excuse her from work for the afternoon of Tuesday, April 29, 2025.     Please contact the clinic if you require any further information.        Sincerely,    Tolulope Oluwadamilola Odebunmi, MD  Electronically signed by Dr Linton on 4/29/25 at 12:45pm

## 2025-05-28 ENCOUNTER — HOSPITAL ENCOUNTER (OUTPATIENT)
Dept: NUTRITION | Facility: CLINIC | Age: 55
Discharge: HOME | End: 2025-05-28
Attending: DIETITIAN, REGISTERED | Admitting: STUDENT IN AN ORGANIZED HEALTH CARE EDUCATION/TRAINING PROGRAM
Payer: COMMERCIAL

## 2025-05-28 PROCEDURE — 97803 MED NUTRITION INDIV SUBSEQ: CPT | Mod: GT,95 | Performed by: DIETITIAN, REGISTERED

## 2025-05-28 NOTE — PROGRESS NOTES
Virtual Visit Details    Type of service:  Video Visit     Originating Location (pt. Location): Home    Distant Location (provider location):  On-site  Platform used for Video Visit: [facility]     OUTPATIENT NUTRITION REASSESSMENT   REASON FOR ASSESSMENT  Valentina FLAKITA Humphries referred by Dr. Linton  for MNT related to Weight gain [R63.5]  - Primary .    Patient accompanied by self        ASSESSMENT   Nutrition History:  - Information obtained from patient  - Patient is on a regular diet at home. Patient currently tracking food on My [employer]itness Pal. Patient lives with . Patient tearful during session.      History of binge eating   Remeron -increases hunger and helps best with mental health   Inpatient mental health 1994 and had eating disorder component   Binge eating getting worse 6000 calories per session  Past 3 weeks avoiding sugar at work   Eating 3000 caloris per day   Works 3 day pr week  Avoiding sugar at work      Trigger foods: peanut butter, white bread, cookies, chips      1/22/2025 Patient started adding snacks and protein source at meal/snacks. 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 Patient states the meals are heavy on meat and potatoes with a lot of added butter on vegetables. Patient able to eat meal when working. Patient prefers vegetables to have less fat and not overcooked. Patient has trouble digesting baby carrots and likes sweet peppers. Patient likes soup -craving chicken noodle or wild rice soup. Patient has been able to avoid binging since last appointment. Patient states likely had RSV so did not feel well recently.      2/10/2025 Patient had a change in medication and states is feeling better. Patient had 1 episode of binge eating. Patient was able to discuss with therapist to determine reason-patient felt vulnerable at athletic race for nephew. Patient has planned way to feel less vulnerable at next event. Patient has less of regular eating pattern during the  "weekend. Patient does not eat meals with her . Patient trying to pay attention to eating when you're hungry.   Patient has been trying to avoid eating in her car. Patient interested in amount of calories to consume.      3/17/2025 Patient is not eating and driving; no binge eating since last visit. Patient will be tired at night and does not feel that she eats complete meal. Patient has been trying to add protein with meals      2 servings fruit + cereal and milk; peanut butter sandwich and fruit; reducing sweets-      Oatmeal with yogurt (prior to work)   1/2 cup oatmeal (9 AM)  1:30 lunch   3 PM snack  5:00-5:30 or 7:00 dinner     5/28/2025 Reduced binging; 1600 calories yesterday-stressful work situation; binging 1 time per month -restriction currently due to appetite suppressed; walking -likes to be in nature; sweets tempting at work but trying to skip; limiting eating car; tea-camomile tea; loose leaf tea (1 cup tea (16 oz) will drink 32 oz per day) Patient feels is making progress with food.     Oatmeal-600 kcals -raisins, nuts, yogurt or milk  Skipped lunch   Lentil soup 350 kcals   Lentil soup + tortilla   Snacks more balanced -oatmeal and yogurt at work; adding vegetables at lunch      Diet Recall:  Breakfast: oatmeal + raisins (10) + 2 tsp walnuts + milk (5:30 AM)    3/4 cup oatmeal (9 AM) soy milk and raisins   Lunch: soup chicken + vegetables + cottage cheese + fruit (5 oz) 1:30   Fruit 3:00-3:30   Dinner: 5 PM salad with cottage cheese + 2 pieces sour dough bread + 3 T PB + 1 cup dejuan + cookie + apple cider   Snack: apple cider   Beverages: milk; apple cider  Dining out: varies      NUTRITION FOCUSED PHYSICAL ASSESSMENT (NFPA) FOR DIAGNOSING MALNUTRITION  Yes        Observed:   No nutrition-related physical findings observed     Obtained from Chart/Interdisciplinary Team:  None noted      LABS  Labs reviewed     MEDICATIONS  Medications reviewed     ANTHROPOMETRICS   Height: 5'7\"  Weight: 175 " lbs   BMI (kg/m2): 27.4 kg/m2   Weight Status:  Overweight BMI 25-29.9  %IBW: 129%  Weight History:   Wt Readings from Last 10 Encounters:   01/28/25 78.9 kg (174 lb)   01/20/25 79.4 kg (175 lb)   11/13/24 77.3 kg (170 lb 6.4 oz)   05/08/24 73.5 kg (162 lb)   02/06/24 76.1 kg (167 lb 11.2 oz)   01/12/24 75.3 kg (166 lb)   11/06/23 73.5 kg (162 lb)   09/20/23 72.6 kg (160 lb 1.6 oz)   08/27/23 68 kg (150 lb)   08/23/23 68 kg (150 lb)          ASSESSED NUTRITION NEEDS  Estimated Energy Needs: 1774-2339 kcals/day (20-22 Kcal/Kg)  Justification:  (maintenance)  Estimated Protein Needs: 65-78 grams protein/day (1-1.2 g pro/Kg)  Justification:  (preservation of lean body mass)  Estimated Fluid Needs: 6684-2609 mL/day (30-35 mL/kg)     ASSESSED MALNUTRITION STATUS  % Weight Loss:  None noted  % Intake:  No decreased intake noted  Subcutaneous Fat Loss:  None observed  Loss of Muscle Mass:  None observed  Fluid Retention:  None noted     Malnutrition Diagnosis:  Patient does not meet two of the above criteria necessary for diagnosing malnutrition     EVALUATION/PROGRESS TOWARDS GOALS  Previous nutrition goals:  6 eating times -improving   Avoid eating when driving -met  Balance food groups at meals -improving   Allow self to make simple meal when tired -improving      Previous nutrition diagnosis: Disordered eating pattern related to preoccupation with weight and food as evidenced by binge eating and diet recall-no change     Current nutrition diagnosis: Disordered eating pattern related to preoccupation with weight and food as evidenced by previous binge eating and diet recall        INTERVENTIONS   Nutrition Prescription   Recommend normalized eating pattern:   Breakfast: 1 oz protein, 2 grain, 1 fat, 1 fruit   Snack: 1 fruit, 1 oz protein   Lunch: 3 oz protein, 2 grains, 1 vegetable, 1 fat, 1 dairy   Snack: 1 vegetable, 1 oz protein   Dinner: 3 oz protein, 1 grain, 2 vegetable, 1 fat, 1 fruit   Snack: 1 dairy, 1 fruit       IMPLEMENTATION   Assessed learning needs and learning preference  Teaching Method(s) used: Explanation   Vitamin and Mineral Supplements: recommend complete multivitamin and mineral   Diet Education:  Provided education on normalized eating pattern   Nutrition Education (Content):              a)  Discussed progress towards goals and current eating disorder behaviors. Commended patient for avoiding eating in her car. Discussed importance of consistent eating pattern to reduce binge eating. Recommend patient continue adding protein sources at meals. Discussed balanced meals to include all food groups which patient focusing on. Encouraged gradual diet and behavior change for healthy relationship with food. Supported patient with the challenge of her eating disorder.    Nutrition Education (Application):              a)  Discussed protein sources and quick meal options.                b)  Patient verbalizes understanding of diet by stating will eat balanced meals   Expected patient engagement: good   Other: Patient to continue working with therapist.      GOALS (Patient determined)   6 eating times   Avoid eating when driving    Balance food groups at meals   Allow self to make simple meal when tired      FOLLOW UP/MONITORING   Progress towards goals will be monitored and evaluated per protocol and Practice Guidelines  Patient to follow up in August RD name and number provided      Time Spent with Patient  24 minutes      Khadijah Urias, RD, LD  [facility] Outpatient Dietitian  [phone] (office phone)

## 2025-05-28 NOTE — DISCHARGE INSTRUCTIONS
GOALS   6 eating times   Avoid eating when driving    Balance food groups at meals   Allow self to make simple meal when tired     Please call me if you have any questions prior to our next appointment.    Have a great summer!    Khadijah Godoy, RD, LD  [facility] Outpatient Dietitian  [phone] (office phone)

## 2025-06-02 ENCOUNTER — PATIENT OUTREACH (OUTPATIENT)
Dept: CARE COORDINATION | Facility: CLINIC | Age: 55
End: 2025-06-02
Payer: COMMERCIAL

## 2025-06-02 NOTE — PROGRESS NOTES
Clinic Care Coordination Contact  [facility]/Voicemail    Clinical Data: Care Coordinator Outreach    Outreach Documentation Number of Outreach Attempt   6/2/2025  12:43 PM 1       Left message on patient's voicemail with call back information and requested return call.      Plan: Care Coordinator will try to reach patient again in 1-2 business days.    Gayathri Gandhi ATIF  Clinic Care Coordination  [facility]  [email or web address]  [phone]

## 2025-06-02 NOTE — LETTER
[facility] - BEHAVIORAL HEALTH CARE COORDINATION  [facility]  [address] [place] [address], [address]  [place] [address]    June 4, 2025        Valentina Humphries  [address]  [place] [address]      Dear Valentina,    I am a social work care coordinator who works with the providers and staff at the [facility] to manage patient care. I wanted to introduce myself and provide my contact information. I have been trying to reach you recently to introduce my social work care coordinator role and answer any questions or concerns about your care.  A social work care coordinator knows the health care system. My goals are to help you during your care and to improve your access to care. I will work with your care team to help you identify your health and social needs. Our services are voluntary and are offered without charge to you personally.    We will:  Create a care plan that supports communication between you and your care team.   Help you obtain health care and local resources.   Give you the information and knowledge you may need for your care.   Work with you to remove any barriers you may have during your care.     We work to give you the highest quality healthcare. Please call me at [phone] with any questions about your care.    Sincerely,      Gayathri Gandhi [facility]  Clinic Care Coordination  [facility]  [email or web address]  [phone]

## 2025-06-04 ENCOUNTER — TRANSFERRED RECORDS (OUTPATIENT)
Dept: ADMINISTRATIVE | Facility: CLINIC | Age: 55
End: 2025-06-04
Payer: COMMERCIAL

## 2025-06-04 NOTE — PROGRESS NOTES
Clinic Care Coordination Contact  [facility]/Voicemail    Clinical Data: Care Coordinator Outreach    Outreach Documentation Number of Outreach Attempt   6/2/2025  12:43 PM 1   6/4/2025  10:51 AM 2     Left message on patient's voicemail with call back information and requested return call.    Plan: Care Coordinator will send unable to contact letter with care coordinator contact information via [employer]. Care Coordinator will try to reach patient again in 3-5 business days.    CHERELLE Figueroa  Clinic Care Coordination  [facility]  [email or web address]  [phone]

## 2025-06-05 NOTE — PROGRESS NOTES
"    _________________________________________________________________________________________________    [address] [address]  [place] [address]  [phone]      Patient:            Valentina Humphries   YOB: 1970  Date:                    6/4/2025  Historian:    self  Visit Type:              Office Visit   Rendering Provider:  Bryan FREED    History of Present Illness:    This is a pleasant 54-year-old female who presents to our clinic for follow-up of chronic GERD and constipation.     Begins today's visit by sharing that she is perimenopausal and that this has been challenging for her mental health.  She has put a lot of time into mental health and completed DBT.  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  Regards to reflux, this is controlled on omeprazole 20 mg daily, taken in the morning before breakfast.  Had used famotidine in the past but that caused her to be dizzy.  EGD in 2021 was normal.  If she misses a dose of her omeprazole, she will feel \"acidity\".    In regards to constipation, this is a chronic issue.  She has a bowel movement many days of the week; however, she could occasionally go a couple days without having a bowel movement and then have several stools thereafter.  Continues taking MiraLAX once daily and she also takes a nutritional yeast and cod liver oil supplements at the same time.  Not currently on any specific dietary fiber regimen.    She has occasional abdominal pain.  Takes dicyclomine occasionally; perhaps a couple days out of the month.     Most recent colonoscopy was November 2022 for follow-up of previous adenomatous colon polyp.  She had a tubular adenoma removed November 2022.  Plan is for follow-up at a 7-year interval.    Assessment/Plan  # Detail Type Description   1. Assessment Chronic GERD (K21.9).   2. Assessment Constipation, unspecified constipation type (K59.00).     Detail Type Description   Impression 1.  GERD.  Controlled " on omeprazole once daily.  Has symptoms if she misses a dose.  Given long-term PPI use, we will check magnesium, calcium.  Given her age, we will check a DEXA scan.  EGD in 2021 showed normal esophagus.    2.  Constipation  This is a chronic issue.  MiraLAX, though can still go a couple days without having a bowel movement at times.  We talked about the importance of getting adequate dietary fiber.  Making sure she meets her fluid goals will also be helpful.    3.  History adenomatous colon polyps.  Last colonoscopy was November 2022 removed adenomatous polyp.  Due for colonoscopy to 7-year interval.    If she remains stable we can plan on seeing her in 1 year.  Otherwise, sooner if needs arise.    Thank for the opportunity to participate in her care today.     Detail Type Description   Provider Plan 1) Continue omeprazole.    Rx sent in.   2) Check labs as listed below.   3) Obtain DEXA to assess bone density.   4) Continue Miralax once daily as you are.  5) Goal of over 20g fiber per day in your diet.  6) Fluids: Get at least 72 oz fluid per day.   7) Continue dicyclomine as needed.   8) Follow-up in 1 year, though sooner if needs arise.   Rx sent in.     Orders  Labs:  Test Comments Timeframe Assessment   Calcium, Ionized  First Available K21.9   Magnesium  First Available K21.9       Diagnostics:  Procedure Comments Timeframe Assessment   DEXA Bone Density Study; Axial Skeleton (e.g. Hips, Pelvis, Spine)  First Available K21.9     Instruction(s)/Education:  Instruction/Education Timeframe Assessment   High Fiber Diet  K59.00     Follow-up visit/Referral:  Order Comments   follow-up visit with Bryan Lezama PAC 1 Year or by 06/04/2026      If your health care provider has asked for a follow-up visit, your appointment will be scheduled with a nurse practitioner or physician assistant on your provider's care team, unless noted above.      cc:  Liborio Butler DNP  cc:       Electronically Signed by:  Bryan Lezama  PAC  06/04/2025 08:24 AM      Medications:  Medication Dose Sig Description Comments   acetaminophen 500 mg Tab 500 mg take 2 tablet (1000MG)  by oral route  every 6 hours as needed taking as directed   dicyclomine 10 mg capsule 10 mg take 1 capsule by oral route 3 times every day as needed taking as directed   lorazepam 0.5 mg tablet 0.5 mg take 0.5 tablet by ORAL route 3 times every day as needed taking as directed   magnesium 200 mg tablet 200 mg     melatonin 10 mg capsule 10 mg     omeprazole 20 mg capsule,delayed release 20 mg take 1 capsule by oral route  every day before a meal taking as directed   omeprazole 20 mg capsule,delayed release 20 mg take 1 capsule by oral route 1 times every day before a meal as needed taking as directed   polyethylene glycol 3350 17 gram/dose oral powder 17 gram/dose take 1 capful by Oral route  every day taking as directed   propranolol 10 mg tablet 10 mg take 1 tablet by oral route  every 4 days (50 mg total)    Risperdal 1 mg tablet 1 mg take 1 tablet by oral route  every bedtime    risperidone 1 mg tablet 1 mg take 1 tablet by oral route  every day taking as directed   sertraline 100 mg tablet 100 mg take 1 Tablet by oral route  every day    trazodone 100 mg tablet 100 mg take 2 tablet by oral route  every bedtime after meals taking as directed     Allergies:  Medication Name Ingredient Reaction Comment    CEFDINIR      LATEX Itching     Environmental Allergies      PERFUME OIL Rash      Vitals:  BP mm/Hg Pulse/min Resp/min Temp F Height (Total in.) Weight (lbs.) Weight (oz.) BMI   81/52 57   67.50 162.00  25.00     Smoking Status:    Use Status Type Smoking Status Usage Per Day Years Used Total Pack Years   no/never  Never smoker        Race:  White  Ethnicity:  Not  or   Preferred Language:  English  Total time of the encounter was 32 minutes which includes time spent face-to-face with the patient as well as non-face-to-face time personally spend by the  provider and/or other qualified health care professionals.

## 2025-06-09 ENCOUNTER — TRANSFERRED RECORDS (OUTPATIENT)
Dept: ADMINISTRATIVE | Facility: CLINIC | Age: 55
End: 2025-06-09
Payer: COMMERCIAL

## 2025-06-10 NOTE — PROCEDURES
2025        Valentina Humphries   [id number] [facility] [place] [address]      Valentina Humphries,  :  1970    I am writing to let you know the results of the tests that were done the other day.   Thank you for allowing [facility] the opportunity to take part in your healthcare.  At [facility] we strive to provide each patient with the finest gastroenterology care available.  We hope your experience was pleasant and informative.    Your ionized calcium came back normal.  Unfortunately, there was an issue with having the magnesium assessed, as they were not able to obtain that result.  Sorry to see this.  You are welcome to have this redrawn at any time.  If you like to have the magnesium redrawn, just let us know we can help arrange for a lab visit.  You are welcome to contact us via the patient portal or contact us at [phone] (Yanna - Patent Coordinator)  Specimen Status Report 2025 08:24   Description Result Units Flags Range   Specimen Status Report TNP      Comments   Performed At: [facility] [address], [place], [place], [id number]  Savana Joe MD, Phone: [phone]   Specimen Status Report:  Test not performed. One specimen was submitted with requests for  multiple tests. The requested testing requires a separate  specimen for each test requested.        TEST:  [id number]  Magnesium   Calcium, Ionized, Serum 2025 08:24   Description Result Units Flags Range   Calcium, Ionized, Serum 4.9 mg/dL  4.5-5.6   Comments   Performed At: [facility] [place] [id number] [facility] [address], [place], [place], [id number]  Kobe Goel MD, Phone: [phone]   Magnesium 2025 08:24   Description Result Units Flags Range   Magnesium TNP mg/dL     Comments   Performed At: [facility] [address] [place], [place], [id number]  Savana Joe MD, Phone: [phone]   Magnesium:  Test not performed. One specimen was submitted with requests for  multiple  tests. The requested testing requires a separate  specimen for each test requested.           Thank you.    Electronically signed by:  Bryan FREED 06/09/2025 01:05 PM  Document generated by:  Bryan FREED  06/09/2025  If your provider ordered multiple tests; the results may not become available at the same time.  If multiple test results are received within 14 days of one another, you may receive a duplicate.  cc:  Liborio Butler DNP

## 2025-06-20 ENCOUNTER — TRANSFERRED RECORDS (OUTPATIENT)
Dept: HEALTH INFORMATION MANAGEMENT | Facility: CLINIC | Age: 55
End: 2025-06-20
Payer: COMMERCIAL

## 2025-06-23 ENCOUNTER — TELEPHONE (OUTPATIENT)
Dept: PSYCHIATRY | Facility: CLINIC | Age: 55
End: 2025-06-23
Payer: COMMERCIAL

## 2025-06-23 ENCOUNTER — TELEPHONE (OUTPATIENT)
Dept: BEHAVIORAL HEALTH | Facility: CLINIC | Age: 55
End: 2025-06-23
Payer: COMMERCIAL

## 2025-06-24 ENCOUNTER — TELEPHONE (OUTPATIENT)
Dept: PSYCHIATRY | Facility: CLINIC | Age: 55
End: 2025-06-24
Payer: COMMERCIAL

## 2025-06-24 NOTE — TELEPHONE ENCOUNTER
-----------------------------------------------------------------------------------------------------------  Brief Psychiatry Phone note      Valentina Humphries MRN# [id number]   Age: 55 year old   Clinic Provider: Dr. Linton YOB: 1970   PCP: Liborio Butler            Phone note:   Writer was notified that Valentina Humphries has called and requested to speak with a resident regarding trouble sleeping and question about her medications. Writer called Valentina Humphries at this number : [phone]  at 8:07 PM.     She reported that she hasn't slept well for the last couple of nights due to anxiety, reporting fear for her brother-in-law's life after the political shooting last week (he is in politics) and for her sister. She also said she is worried about her own safety after watching the news re the political climate and with work stressors keeping her awake. She shared that her anxiety has mainly affected her sleep and denied SI/SIB or HI. She did not give indication that she felt unsafe at home at this current moment but instead voiced general fears for the days to come. Noted that over a couple of nights, slept 5 hrs, and is having a hard time concentrating. She said she was not willing to go to the ED just yet due to having work tomorrow and wanting to maintain her job although she is aware that the ED is an option. She also knows to call [phone] as an alternative option if symptoms worsen.   She shared that she is taking her scheduled meds as prescribed before bedtime, but they are not enough. Per chart, these are Risperidone and trazodone. She also has Lorazepam  prn for anxiety - says it makes her feel sleeping during the day which worries her with driving, so she takes 0.25mg instead of 0.5mg which has been working for her. Said that she often does not take it before bed for anxiety and is thinking to do that tonight.  She said she would take 0.25mg and  if that does not help with sleep and calm before bed , she would take another 0.25mg. Discussed that it is prescribed as 0.5mg currently and that she may want to try that dosing for before bed. She mentioned that she told Dr. Linton about her taking it as 0.25mg. She also said that she takes propranolol before dinner and was encouraged to do so and not take it at the same time as the ativan tonight to avoid dizziness and falls. She agreed to this. She mentioned that she has not yet been scheduled with a new resident in clinic. Discussed that she may need to be seen sooner in clinic due to current symptoms and to call the clinic in the AM. She agreed to this.          Assessment and Plan:   Valentina Humphries is a 55 year old female with a history of MDD, anxiety and past medical hx significant for irritable bowel syndrome who called today regarding worsening anxiety leading to poor sleep and medication questions to better target anxiety.  She understood that further med changes than those recommended regarding prns would require a clinic visit which she is willing to schedule in the AM. Pt was provided with clinic number. She agreed to try taking prn ativan tonight as she has it prescribed for anxiety currently. Reccommended she take her medications as prescribed by her provider.    Patient is not currently suicidal or homicidal. she is aware to call 911 or go to the nearest ER if safety concern or urgent symptoms arise.    - Pt is a patient of Dr. Linton and is currently transitioning her care to a new resident as med prescriber. Tonight, I recommended she call clinic tomorrow to get an earlier appointment possibly with Dr. Linton. She agreed to do that.   - pt will try her prns as prescribed tonight.     =============================================================================  Li Vela MD  Psychiatry Resident Physician  PGY2

## 2025-06-24 NOTE — TELEPHONE ENCOUNTER
----- Message from Tolulope Oluwadamilola Odludwinmi sent at 6/24/2025  8:22 AM CDT -----  Hi Team,    Valentina called the clinic last night (see note in chart). Please, could you do a check-in with her?  She's transitioning to a resident in [facility] next academic year.      Scheduling team,  Which resident is Valentina assigned to and how soon can they see her?    Thank you,  [place]    Follow-up:  I was unable to reach the patient.  LVM to call the clinic.    Patient will transfer to Dr. Deleon.  I placed a hold on 7/7 to see if that works for the patient.

## 2025-06-24 NOTE — TELEPHONE ENCOUNTER
S:  7:30pm- Pt called to request a phone call w/ Psych on call.     B: Pt is a patient of Dr. Linton and seeing a medication management provider. Dr. Linton was going to get her seen by a psychiatrist but does not have an appointment with a resident yet.  I also need to talk to a psychiatrist tonight. I haven't slept well for the last couple of nights.  A couple of nights I slept 5 hrs, and having a hard time concentrating.  Have to drive to work.  Want to talk about medication and ways to feel safe.      Brother in law is a state senator, and he was second on the list of representatives.      A: Call back#[phone].    R: Paged Resident at 7:37pm.

## 2025-06-25 ENCOUNTER — OFFICE VISIT (OUTPATIENT)
Dept: INTERNAL MEDICINE | Facility: CLINIC | Age: 55
End: 2025-06-25
Payer: COMMERCIAL

## 2025-06-25 ENCOUNTER — TELEPHONE (OUTPATIENT)
Dept: BEHAVIORAL HEALTH | Facility: CLINIC | Age: 55
End: 2025-06-25

## 2025-06-25 ENCOUNTER — TELEPHONE (OUTPATIENT)
Dept: PSYCHIATRY | Facility: CLINIC | Age: 55
End: 2025-06-25

## 2025-06-25 VITALS
HEART RATE: 68 BPM | WEIGHT: 165 LBS | SYSTOLIC BLOOD PRESSURE: 98 MMHG | OXYGEN SATURATION: 96 % | RESPIRATION RATE: 14 BRPM | HEIGHT: 68 IN | BODY MASS INDEX: 25.01 KG/M2 | TEMPERATURE: 98 F | DIASTOLIC BLOOD PRESSURE: 68 MMHG

## 2025-06-25 DIAGNOSIS — M79.671 RIGHT FOOT PAIN: ICD-10-CM

## 2025-06-25 DIAGNOSIS — E78.5 HYPERLIPIDEMIA, UNSPECIFIED HYPERLIPIDEMIA TYPE: Primary | ICD-10-CM

## 2025-06-25 DIAGNOSIS — G47.33 OSA (OBSTRUCTIVE SLEEP APNEA): ICD-10-CM

## 2025-06-25 DIAGNOSIS — F33.2 SEVERE EPISODE OF RECURRENT MAJOR DEPRESSIVE DISORDER, WITHOUT PSYCHOTIC FEATURES (H): ICD-10-CM

## 2025-06-25 DIAGNOSIS — F50.9 EATING DISORDER, UNSPECIFIED TYPE: ICD-10-CM

## 2025-06-25 DIAGNOSIS — F41.1 GAD (GENERALIZED ANXIETY DISORDER): ICD-10-CM

## 2025-06-25 DIAGNOSIS — H52.13 MYOPIA OF BOTH EYES: ICD-10-CM

## 2025-06-25 DIAGNOSIS — N39.3 STRESS INCONTINENCE OF URINE: ICD-10-CM

## 2025-06-25 LAB
CHOLEST SERPL-MCNC: 248 MG/DL
FASTING STATUS PATIENT QL REPORTED: YES
HDLC SERPL-MCNC: 64 MG/DL
LDLC SERPL CALC-MCNC: 169 MG/DL
NONHDLC SERPL-MCNC: 184 MG/DL
TRIGL SERPL-MCNC: 73 MG/DL

## 2025-06-25 PROCEDURE — 90471 IMMUNIZATION ADMIN: CPT | Performed by: NURSE PRACTITIONER

## 2025-06-25 PROCEDURE — 3078F DIAST BP <80 MM HG: CPT | Performed by: NURSE PRACTITIONER

## 2025-06-25 PROCEDURE — 80061 LIPID PANEL: CPT | Performed by: NURSE PRACTITIONER

## 2025-06-25 PROCEDURE — G2211 COMPLEX E/M VISIT ADD ON: HCPCS | Performed by: NURSE PRACTITIONER

## 2025-06-25 PROCEDURE — 3074F SYST BP LT 130 MM HG: CPT | Performed by: NURSE PRACTITIONER

## 2025-06-25 PROCEDURE — 99213 OFFICE O/P EST LOW 20 MIN: CPT | Mod: 25 | Performed by: NURSE PRACTITIONER

## 2025-06-25 PROCEDURE — 96127 BRIEF EMOTIONAL/BEHAV ASSMT: CPT | Performed by: NURSE PRACTITIONER

## 2025-06-25 PROCEDURE — 36415 COLL VENOUS BLD VENIPUNCTURE: CPT | Performed by: NURSE PRACTITIONER

## 2025-06-25 PROCEDURE — 90677 PCV20 VACCINE IM: CPT | Performed by: NURSE PRACTITIONER

## 2025-06-25 RX ORDER — MULTIVITAMIN WITH IRON
1 TABLET ORAL DAILY
COMMUNITY
Start: 2025-03-01

## 2025-06-25 ASSESSMENT — ANXIETY QUESTIONNAIRES
6. BECOMING EASILY ANNOYED OR IRRITABLE: SEVERAL DAYS
GAD7 TOTAL SCORE: 14
GAD7 TOTAL SCORE: 14
7. FEELING AFRAID AS IF SOMETHING AWFUL MIGHT HAPPEN: NEARLY EVERY DAY
3. WORRYING TOO MUCH ABOUT DIFFERENT THINGS: MORE THAN HALF THE DAYS
IF YOU CHECKED OFF ANY PROBLEMS ON THIS QUESTIONNAIRE, HOW DIFFICULT HAVE THESE PROBLEMS MADE IT FOR YOU TO DO YOUR WORK, TAKE CARE OF THINGS AT HOME, OR GET ALONG WITH OTHER PEOPLE: VERY DIFFICULT
5. BEING SO RESTLESS THAT IT IS HARD TO SIT STILL: SEVERAL DAYS
1. FEELING NERVOUS, ANXIOUS, OR ON EDGE: NEARLY EVERY DAY
4. TROUBLE RELAXING: MORE THAN HALF THE DAYS
2. NOT BEING ABLE TO STOP OR CONTROL WORRYING: MORE THAN HALF THE DAYS
8. IF YOU CHECKED OFF ANY PROBLEMS, HOW DIFFICULT HAVE THESE MADE IT FOR YOU TO DO YOUR WORK, TAKE CARE OF THINGS AT HOME, OR GET ALONG WITH OTHER PEOPLE?: VERY DIFFICULT
GAD7 TOTAL SCORE: 14
7. FEELING AFRAID AS IF SOMETHING AWFUL MIGHT HAPPEN: NEARLY EVERY DAY

## 2025-06-25 ASSESSMENT — PATIENT HEALTH QUESTIONNAIRE - PHQ9
10. IF YOU CHECKED OFF ANY PROBLEMS, HOW DIFFICULT HAVE THESE PROBLEMS MADE IT FOR YOU TO DO YOUR WORK, TAKE CARE OF THINGS AT HOME, OR GET ALONG WITH OTHER PEOPLE: VERY DIFFICULT
SUM OF ALL RESPONSES TO PHQ QUESTIONS 1-9: 12
SUM OF ALL RESPONSES TO PHQ QUESTIONS 1-9: 12

## 2025-06-25 NOTE — TELEPHONE ENCOUNTER
S: 12:04AM- Pt called to request a phone call w/ Resident on call.     B: Pt does not know if she took her evening medication and she cannot sleep.  She wants to know what medication to take.  She reports seeind Dr. Linton at the psychiatry clinic.    A: Call back # is at [phone].     R: Paged on call Resident at 12:07AM.

## 2025-06-25 NOTE — TELEPHONE ENCOUNTER
-----------------------------------------------------------------------------------------------------------  Brief Psychiatry Phone note      Valentina Humphries MRN# [id number]   Age: 55 year old   Clinic Provider: Dr. Linton YOB: 1970   PCP: Liborio Butler            Phone note:   Writer was notified that Valentina Humphries has called around midnight and requested to speak with a resident regarding missed doses of medications tonight. Writer called Valentina Humphries at this number : [phone]  at 12:30 am).      Patient shared that she thinks she didn't take her evening meds, is 80% sure did not take any, because she could not recall holding them in her hand. She said she remembered she had them out and had put them all in a cup and went to sleep but could not sleep - watched some TV. She then washed the cup with the pills in it,she thinks, because she found white slime in the cup after putting water in it. She denied drinking from the cup and said that all her evening meds were in it so she had not taken any pills tonight. Wondering now which ones to take. She shared that the only side effects she has had from her meds in the past was some dizziness. She noted that she has been on a higher dose of trazodone in the past and had no issues with it. We discussed pros and cons of each evening med regarding whether taking an additional dose, in the event she had already taken the pills, would be risky or not. Please see plan below. She agreed to call clinic in the AM if she feels more anxious or having side effects and knows to call back or go to the ED if she reacts poorly to the meds.            Assessment and Plan:   Valentina Humphries is a 55 year old female with a history of MDD, anxiety and past medical hx significant for irritable bowel syndrome who called today regarding missing med doses this evening.  She understood that further med  changes would require a clinic visit which she is willing to call in the AM. We discussed pros and cons of each evening med regarding whether taking an additional dose, in the event she had already taken the pills, would be risky or not. She agreed to take her evening Risperidone, ativan, Trazodone and melatonin as usual since they are at low enough doses that risk of double dipping would be low, and, based on her story, it is likely she did not take them tonight. She agreed to hold off propranolol and magnesium tonight as higher doses of those could be riskier ,and to resume her med regimen as usual tomorrow.    Patient is not currently suicidal or homicidal. she is aware to call 911 or go to the nearest ER if safety concern or urgent symptoms arise.    =============================================================================  Li Vela MD  Psychiatry Resident Physician  PGY2

## 2025-06-25 NOTE — PROGRESS NOTES
Assessment & Plan     Hyperlipidemia, unspecified hyperlipidemia type  Historically mild elevation in LDL but very high HDL, greater than 90 last year.  She would like to recheck fasting cholesterol labs today  - Lipid Profile (Chol, Trig, HDL, LDL calc); Future    Dizziness  She thinks this is related to her anxiety.  Her brother-in-law is a state representative and with the targeted [employer] [place] recently this has raised her anxiety levels a bit    STEVEN (obstructive sleep apnea)  She did have a recalled machine but was able to get the new CPAP from the  and is using this    Eating disorder, unspecified type  She does DBT through her therapy clinic for her eating disorder.  She was a very competitive long-distance runner in her younger years    EBER (generalized anxiety disorder)  Continuing care through psychiatry    Severe episode of recurrent major depressive disorder, without psychotic features (H)  As above    Myopia of both eyes  Has an eye exam scheduled for later this fall    Right foot pain  Bunion.  She purchased some new shoes and this has helped significantly    Stress incontinence of urine  We talked about Kegel exercises.  I do not know if anticholinergic medication would be in her best interest right now given her psych meds    Patient Instructions   Blood pressure and vital signs look good.    Try Kegel exercises for the incontinence.    Prevnar 20 pneumonia shot today.    Recheck cholesterol labs.    Keep tabs/records of your dizziness symptoms.  Try to identify triggers or patterns.  Send me a [employer] message if you start to identify certain triggers and then we can work on coming up with a solution.    Otherwise come back and see me in 6 months.  You will be due for an annual physical at that time      The longitudinal plan of care for the diagnosis(es)/condition(s) as documented were addressed during this visit. Due to the added complexity in care, I will continue to support  "Valentina in the subsequent management and with ongoing continuity of care.        BMI  Estimated body mass index is 25.46 kg/m  as calculated from the following:    Height as of this encounter: 1.715 m (5' 7.5\").    Weight as of this encounter: 74.8 kg (165 lb).       Depression Screening Follow Up        6/25/2025     7:47 AM   PHQ   PHQ-9 Total Score 12    Q9: Thoughts of better off dead/self-harm past 2 weeks Several days   F/U: Thoughts of suicide or self-harm Yes   F/U: Self harm-plan No   F/U: Self-harm action No   F/U: Safety concerns No       Patient-reported                     Follow Up Actions Taken  Crisis resource information provided in the After Visit Summary  Referred patient back to mental health provider    Discussed the following ways the patient can remain in a safe environment:  remove alcohol, remove drugs, and be around others        Kath Montgomery is a 55 year old, presenting for the following health issues:  Follow Up (Mental health, right foot pain)      6/25/2025     7:50 AM   Additional Questions   Roomed by      HPI      Wants to talk about bunion.  Intermittent dizziness.                  Objective    BP 98/68 (BP Location: Right arm, Patient Position: Sitting, Cuff Size: Adult Regular)   Pulse 68   Temp 98  F (36.7  C) (Oral)   Resp 14   Ht 1.715 m (5' 7.5\")   Wt 74.8 kg (165 lb)   LMP 08/05/2021 (Approximate)   SpO2 96%   Breastfeeding No   BMI 25.46 kg/m    Body mass index is 25.46 kg/m .  Physical Exam   Patient is healthy appearing and in no acute distress.              Signed Electronically by: Liborio Butler CNP    Answers submitted by the patient for this visit:  Patient Health Questionnaire (Submitted on 6/25/2025)  If you checked off any problems, how difficult have these problems made it for you to do your work, take care of things at home, or get along with other people?: Very difficult  PHQ9 TOTAL SCORE: 12  Patient Health Questionnaire (G7) (Submitted " on 6/25/2025)  EBER 7 TOTAL SCORE: 14

## 2025-06-25 NOTE — PATIENT INSTRUCTIONS
Blood pressure and vital signs look good.    Try Kegel exercises for the incontinence.    Prevnar 20 pneumonia shot today.    Recheck cholesterol labs.    Keep tabs/records of your dizziness symptoms.  Try to identify triggers or patterns.  Send me a [employer] message if you start to identify certain triggers and then we can work on coming up with a solution.    Otherwise come back and see me in 6 months.  You will be due for an annual physical at that time

## 2025-06-26 ENCOUNTER — RESULTS FOLLOW-UP (OUTPATIENT)
Dept: INTERNAL MEDICINE | Facility: CLINIC | Age: 55
End: 2025-06-26

## 2025-06-26 NOTE — TELEPHONE ENCOUNTER
"I called the patient back.  They reported they slept 7 hours last night, which they noted as an improvement.    They did have a new stressor - they injured their ankle at work and needed to go to the doctor today.  She is worried because she is missing so much more work as well as the downtime.    She also notes \"lots of worry and anxiety from past traumas\".  Occasional \"angry thoughts\" towards her  (thinks of yelling at him), but no safety concerns.    She used Ativan prn the first night she called the resident line, but has not done so since.  She will try to use the ativan tonight to see if it continues to improve her sleep.    Can do the appt on 7/7 in-person with Dr. Deleon.    She knows to use emergency services (lives across the street from [facility]) if she does have any safety concerns.  "

## 2025-07-07 ENCOUNTER — OFFICE VISIT (OUTPATIENT)
Dept: PSYCHIATRY | Facility: CLINIC | Age: 55
End: 2025-07-07
Attending: PSYCHIATRY & NEUROLOGY
Payer: COMMERCIAL

## 2025-07-07 VITALS
SYSTOLIC BLOOD PRESSURE: 116 MMHG | BODY MASS INDEX: 25.68 KG/M2 | HEART RATE: 59 BPM | DIASTOLIC BLOOD PRESSURE: 73 MMHG | TEMPERATURE: 98.5 F | WEIGHT: 166.4 LBS

## 2025-07-07 DIAGNOSIS — F41.1 GAD (GENERALIZED ANXIETY DISORDER): ICD-10-CM

## 2025-07-07 DIAGNOSIS — F33.1 MODERATE EPISODE OF RECURRENT MAJOR DEPRESSIVE DISORDER (H): Primary | ICD-10-CM

## 2025-07-07 RX ORDER — LORAZEPAM 0.5 MG/1
.5-2 TABLET ORAL DAILY PRN
Qty: 15 TABLET | Refills: 3 | Status: SHIPPED | OUTPATIENT
Start: 2025-07-07

## 2025-07-07 RX ORDER — TRAZODONE HYDROCHLORIDE 100 MG/1
50-100 TABLET ORAL AT BEDTIME
Qty: 30 TABLET | Refills: 3 | Status: SHIPPED | OUTPATIENT
Start: 2025-07-07

## 2025-07-07 RX ORDER — PROPRANOLOL HYDROCHLORIDE 10 MG/1
TABLET ORAL
Qty: 90 TABLET | Refills: 3 | Status: SHIPPED | OUTPATIENT
Start: 2025-07-07

## 2025-07-07 RX ORDER — PROPRANOLOL HYDROCHLORIDE 10 MG/1
TABLET ORAL
Qty: 90 TABLET | Refills: 3 | Status: SHIPPED | OUTPATIENT
Start: 2025-07-07 | End: 2025-07-07

## 2025-07-07 RX ORDER — SERTRALINE HYDROCHLORIDE 100 MG/1
200 TABLET, FILM COATED ORAL DAILY
Qty: 60 TABLET | Refills: 3 | Status: SHIPPED | OUTPATIENT
Start: 2025-07-07

## 2025-07-07 RX ORDER — RISPERIDONE 1 MG/1
1 TABLET ORAL AT BEDTIME
Qty: 30 TABLET | Refills: 3 | Status: SHIPPED | OUTPATIENT
Start: 2025-07-07

## 2025-07-07 ASSESSMENT — PAIN SCALES - GENERAL: PAINLEVEL_OUTOF10: NO PAIN (0)

## 2025-07-07 NOTE — PROGRESS NOTES
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 to display                  3/14/2025     9:10 AM 5/30/2025     8:44 AM 6/25/2025     7:47 AM   PHQ-9 SCORE   PHQ-9 Total Score MyChart 6 (Mild depression) 15 (Moderately severe depression) 12 (Moderate depression)   PHQ-9 Total Score 6  15  12        Patient-reported    Proxy-reported         1/10/2025     9:18 AM 5/30/2025     8:46 AM 6/25/2025     7:48 AM   EBER-7 SCORE   Total Score 11 (moderate anxiety) 16 (severe anxiety) 14 (moderate anxiety)   Total Score 11  16  14        Patient-reported    Proxy-reported           Recent Labs   Lab Test 11/13/24  0822 05/08/24  0847 11/06/23  0910   * 106* 94   A1C 5.4  --  5.2     Recent Labs   Lab Test 06/25/25  0848 05/08/24  0847   CHOL 248* 201*   TRIG 73 75   * 102*   HDL 64 84     Recent Labs   Lab Test 11/13/24  0822 11/06/23  0910   AST 35 23   ALT 43 18   ALKPHOS 67 55     Recent Labs   Lab Test 11/13/24  0822 11/21/22  1022   WBC 5.1 8.1   HGB 13.9 13.8   * 158        PROVIDER: Aniyah Deleon MD    Patient staffed in clinic with Dr. Eng who will sign the note.  Supervisor is Dr. Eng.

## 2025-07-07 NOTE — PATIENT INSTRUCTIONS
**For crisis resources, please see the information at the end of this document**   Patient Education    Thank you for coming to the [facility] [place] [facility].     Comments from your provider:  It was great to meet you today! I sent in prescription refills of all your medications with changes to the propranolol (can take 10-20mg PRN as needed, watch for dizziness) and trazodone (50-100mg every evening as needed).       Please schedule a follow up with me in 3 months. During this time, we'll check in on your A1C (diabetes labs), your lipids, liver labs, and your vitamin D levels. If you could schedule a time with our lab to get those done right before or in the week leading up to our next appointment that'd be great. I'll let your care team know we discussed these today too so they're better informed!    If you want to trial stopping your cod oil, we can see if that has any effect on your labs. Also keep an eye on your focus and attention - if you find a new job and this improves then great, if not we can talk about other diagnostic options or things to look at. I reviewed your neuropsych testing from before and this is reassuring, you don't need any repeat testing at this time.       Lab Testing:  If you had lab testing today and your results are reassuring or normal they will be mailed to you or sent through [employer] within 7 days. If the lab tests need quick action we will call you with the results. The phone number we will call with results is # [phone]. If this is not the best number please call our clinic and change the number.     Medication Refills:  If you need any refills please call your pharmacy and they will contact us. Our fax number for refills is [phone].   Three business days of notice are needed for general medication refill requests.   Five business days of notice are needed for controlled substance refill requests.   If you need to change to a  different pharmacy, please contact the new pharmacy directly. The new pharmacy will help you get your medications transferred.     Contact Us:  Please call [phone] during business hours (8-5:00 M-F).   If you have medication related questions after clinic hours, or on the weekend, please call [phone]. Note that they will not be able to prescribe new medications for you over the weekend, but can help with sending prescriptions to new pharmacies or answer questions/concerns about existing prescriptions.    Financial Assistance [phone]   Medical Records [phone]       MENTAL HEALTH CRISIS RESOURCES:  For a emergency help, please call 911 or go to the nearest Emergency Department.     Emergency Walk-In Options:   EmPATH Unit @ [facility] ([place]): [phone] - Specialized mental health emergency area designed to be [facility]  [facility] ([place]): [phone]  [facility] ([place]): [phone]  [facility] ([place]): [phone]    [facility] Crisis Information:   [place]: [phone]  [place]: [phone]  [place] ([place]) - Adult: [phone]     Child: [phone]  [place] - Adult: [phone]     Child: [phone]  [place]: [phone]  List of all [facility] resources:   https://[place].gov/dhs/people-we-serve/adults/health-care/mental-health/resources/crisis-contacts.jsp    National Crisis Information:   Crisis Text Line: Text  [place]  to 265827  Suicide & Crisis Lifeline: 988  National Suicide Prevention Lifeline: 7-350-079-TALK (1-924.197.2654)       For online chat options, visit https://suicidepreventionlifeline.org/chat/  Poison Control Center: 1-582.769.1586  Trans Lifeline: 1-761.557.3516 - Hotline for transgender people of all ages  [employer]: 1-882.582.8388 - Hotline for LGBT youth     For Non-Emergency Support:   Fast Tracker: Mental Health & Substance Use Disorder Resources -   https://www.Bamateatrackermn.org/

## 2025-07-07 NOTE — Clinical Note
Appt can be virtual or in person. I think I see an appointment on 8/1 with me which I dont think she'll need, she can just see me again in 3 months.

## 2025-08-11 ENCOUNTER — OFFICE VISIT (OUTPATIENT)
Dept: URGENT CARE | Facility: URGENT CARE | Age: 55
End: 2025-08-11
Payer: COMMERCIAL

## 2025-08-11 VITALS
DIASTOLIC BLOOD PRESSURE: 65 MMHG | TEMPERATURE: 98.6 F | SYSTOLIC BLOOD PRESSURE: 100 MMHG | RESPIRATION RATE: 16 BRPM | OXYGEN SATURATION: 96 % | HEART RATE: 65 BPM

## 2025-08-11 DIAGNOSIS — H10.021 PINK EYE DISEASE OF RIGHT EYE: Primary | ICD-10-CM

## 2025-08-11 PROCEDURE — 3074F SYST BP LT 130 MM HG: CPT | Performed by: PHYSICIAN ASSISTANT

## 2025-08-11 PROCEDURE — 99213 OFFICE O/P EST LOW 20 MIN: CPT | Performed by: PHYSICIAN ASSISTANT

## 2025-08-11 PROCEDURE — 3078F DIAST BP <80 MM HG: CPT | Performed by: PHYSICIAN ASSISTANT

## 2025-08-11 RX ORDER — POLYMYXIN B SULFATE AND TRIMETHOPRIM 1; 10000 MG/ML; [USP'U]/ML
1-2 SOLUTION OPHTHALMIC EVERY 4 HOURS
Qty: 10 ML | Refills: 0 | Status: SHIPPED | OUTPATIENT
Start: 2025-08-11 | End: 2025-08-18

## 2025-08-20 ENCOUNTER — HOSPITAL ENCOUNTER (OUTPATIENT)
Dept: NUTRITION | Facility: CLINIC | Age: 55
Discharge: HOME | End: 2025-08-20
Attending: DIETITIAN, REGISTERED
Payer: COMMERCIAL

## 2025-09-04 ENCOUNTER — PATIENT OUTREACH (OUTPATIENT)
Dept: CARE COORDINATION | Facility: CLINIC | Age: 55
End: 2025-09-04
Payer: COMMERCIAL